# Patient Record
Sex: MALE | Race: WHITE | Employment: OTHER | URBAN - NONMETROPOLITAN AREA
[De-identification: names, ages, dates, MRNs, and addresses within clinical notes are randomized per-mention and may not be internally consistent; named-entity substitution may affect disease eponyms.]

---

## 2017-01-17 ENCOUNTER — ANTI-COAG VISIT (OUTPATIENT)
Dept: OTHER | Age: 77
End: 2017-01-17

## 2017-01-17 VITALS
BODY MASS INDEX: 23.91 KG/M2 | DIASTOLIC BLOOD PRESSURE: 65 MMHG | HEART RATE: 54 BPM | WEIGHT: 171.4 LBS | SYSTOLIC BLOOD PRESSURE: 122 MMHG

## 2017-01-17 DIAGNOSIS — I26.99 OTHER PULMONARY EMBOLISM WITHOUT ACUTE COR PULMONALE, UNSPECIFIED CHRONICITY (HCC): ICD-10-CM

## 2017-01-17 LAB — POC INR: 2.6 (ref 0.8–1.2)

## 2017-01-17 PROCEDURE — G8420 CALC BMI NORM PARAMETERS: HCPCS | Performed by: PHARMACIST

## 2017-01-17 PROCEDURE — 99999 PR OFFICE/OUTPT VISIT,PROCEDURE ONLY: CPT | Performed by: PHARMACIST

## 2017-01-17 PROCEDURE — G8427 DOCREV CUR MEDS BY ELIG CLIN: HCPCS | Performed by: PHARMACIST

## 2017-01-17 PROCEDURE — 4040F PNEUMOC VAC/ADMIN/RCVD: CPT | Performed by: PHARMACIST

## 2017-01-17 PROCEDURE — 85610 PROTHROMBIN TIME: CPT | Performed by: PHARMACIST

## 2017-01-17 ASSESSMENT — ENCOUNTER SYMPTOMS
DIARRHEA: 0
BLOOD IN STOOL: 0
SHORTNESS OF BREATH: 0
CONSTIPATION: 0

## 2017-01-18 ENCOUNTER — TELEPHONE (OUTPATIENT)
Dept: OTHER | Age: 77
End: 2017-01-18

## 2017-01-19 ENCOUNTER — OFFICE VISIT (OUTPATIENT)
Dept: UROLOGY | Age: 77
End: 2017-01-19

## 2017-01-19 ENCOUNTER — TELEPHONE (OUTPATIENT)
Dept: UROLOGY | Age: 77
End: 2017-01-19

## 2017-01-19 VITALS — WEIGHT: 170 LBS | BODY MASS INDEX: 23.8 KG/M2 | HEIGHT: 71 IN

## 2017-01-19 DIAGNOSIS — R31.0 GROSS HEMATURIA: Primary | ICD-10-CM

## 2017-01-19 LAB
BILIRUBIN URINE: NEGATIVE
BLOOD URINE, POC: NORMAL
CHARACTER, URINE: CLEAR
COLOR, URINE: YELLOW
GLUCOSE URINE: NEGATIVE MG/DL
KETONES, URINE: NORMAL
LEUKOCYTE CLUMPS, URINE: NEGATIVE
NITRITE, URINE: NEGATIVE
PH, URINE: 6
PROTEIN, URINE: NORMAL MG/DL
SPECIFIC GRAVITY, URINE: 1.02 (ref 1–1.03)
UROBILINOGEN, URINE: 0.2 EU/DL

## 2017-01-19 PROCEDURE — G8427 DOCREV CUR MEDS BY ELIG CLIN: HCPCS | Performed by: UROLOGY

## 2017-01-19 PROCEDURE — 81003 URINALYSIS AUTO W/O SCOPE: CPT | Performed by: UROLOGY

## 2017-01-19 PROCEDURE — 1036F TOBACCO NON-USER: CPT | Performed by: UROLOGY

## 2017-01-19 PROCEDURE — G8420 CALC BMI NORM PARAMETERS: HCPCS | Performed by: UROLOGY

## 2017-01-19 PROCEDURE — 1123F ACP DISCUSS/DSCN MKR DOCD: CPT | Performed by: UROLOGY

## 2017-01-19 PROCEDURE — G8484 FLU IMMUNIZE NO ADMIN: HCPCS | Performed by: UROLOGY

## 2017-01-19 PROCEDURE — 52000 CYSTOURETHROSCOPY: CPT | Performed by: UROLOGY

## 2017-01-19 PROCEDURE — 4040F PNEUMOC VAC/ADMIN/RCVD: CPT | Performed by: UROLOGY

## 2017-01-19 PROCEDURE — 99203 OFFICE O/P NEW LOW 30 MIN: CPT | Performed by: UROLOGY

## 2017-01-19 RX ORDER — FINASTERIDE 5 MG/1
5 TABLET, FILM COATED ORAL DAILY
Qty: 90 TABLET | Refills: 0 | Status: SHIPPED | OUTPATIENT
Start: 2017-01-19 | End: 2017-03-23 | Stop reason: SDUPTHER

## 2017-01-19 RX ORDER — TAMSULOSIN HYDROCHLORIDE 0.4 MG/1
0.4 CAPSULE ORAL NIGHTLY
Qty: 90 CAPSULE | Refills: 0 | Status: SHIPPED | OUTPATIENT
Start: 2017-01-19 | End: 2017-03-23 | Stop reason: SDUPTHER

## 2017-01-19 ASSESSMENT — ENCOUNTER SYMPTOMS
SHORTNESS OF BREATH: 1
BACK PAIN: 0
EYE REDNESS: 0
NAUSEA: 0
ABDOMINAL PAIN: 0
COLOR CHANGE: 0
CHEST TIGHTNESS: 0
EYE PAIN: 0

## 2017-01-23 ENCOUNTER — TELEPHONE (OUTPATIENT)
Dept: OTHER | Age: 77
End: 2017-01-23

## 2017-02-02 ENCOUNTER — OFFICE VISIT (OUTPATIENT)
Dept: UROLOGY | Age: 77
End: 2017-02-02

## 2017-02-02 ENCOUNTER — TELEPHONE (OUTPATIENT)
Dept: UROLOGY | Age: 77
End: 2017-02-02

## 2017-02-02 VITALS
SYSTOLIC BLOOD PRESSURE: 108 MMHG | BODY MASS INDEX: 23.8 KG/M2 | WEIGHT: 170 LBS | DIASTOLIC BLOOD PRESSURE: 72 MMHG | HEIGHT: 71 IN

## 2017-02-02 DIAGNOSIS — N40.1 BPH WITH OBSTRUCTION/LOWER URINARY TRACT SYMPTOMS: ICD-10-CM

## 2017-02-02 DIAGNOSIS — R31.0 GROSS HEMATURIA: Primary | ICD-10-CM

## 2017-02-02 DIAGNOSIS — N20.0 KIDNEY STONES: ICD-10-CM

## 2017-02-02 DIAGNOSIS — N13.8 BPH WITH OBSTRUCTION/LOWER URINARY TRACT SYMPTOMS: ICD-10-CM

## 2017-02-02 LAB
BILIRUBIN URINE: NEGATIVE
BLOOD URINE, POC: NORMAL
CHARACTER, URINE: CLEAR
COLOR, URINE: YELLOW
GLUCOSE URINE: NEGATIVE MG/DL
KETONES, URINE: NEGATIVE
LEUKOCYTE CLUMPS, URINE: NORMAL
NITRITE, URINE: NEGATIVE
PH, URINE: 5.5
POST VOID RESIDUAL (PVR): 0 ML
PROTEIN, URINE: NEGATIVE MG/DL
SPECIFIC GRAVITY, URINE: 1.02 (ref 1–1.03)
UROBILINOGEN, URINE: 0.2 EU/DL

## 2017-02-02 PROCEDURE — G8427 DOCREV CUR MEDS BY ELIG CLIN: HCPCS | Performed by: UROLOGY

## 2017-02-02 PROCEDURE — 1036F TOBACCO NON-USER: CPT | Performed by: UROLOGY

## 2017-02-02 PROCEDURE — 99213 OFFICE O/P EST LOW 20 MIN: CPT | Performed by: UROLOGY

## 2017-02-02 PROCEDURE — 81003 URINALYSIS AUTO W/O SCOPE: CPT | Performed by: UROLOGY

## 2017-02-02 PROCEDURE — 4040F PNEUMOC VAC/ADMIN/RCVD: CPT | Performed by: UROLOGY

## 2017-02-02 PROCEDURE — G8484 FLU IMMUNIZE NO ADMIN: HCPCS | Performed by: UROLOGY

## 2017-02-02 PROCEDURE — 1123F ACP DISCUSS/DSCN MKR DOCD: CPT | Performed by: UROLOGY

## 2017-02-02 PROCEDURE — G8420 CALC BMI NORM PARAMETERS: HCPCS | Performed by: UROLOGY

## 2017-02-02 PROCEDURE — 51798 US URINE CAPACITY MEASURE: CPT | Performed by: UROLOGY

## 2017-02-14 ENCOUNTER — OFFICE VISIT (OUTPATIENT)
Dept: FAMILY MEDICINE CLINIC | Age: 77
End: 2017-02-14

## 2017-02-14 ENCOUNTER — TELEPHONE (OUTPATIENT)
Dept: OTHER | Age: 77
End: 2017-02-14

## 2017-02-14 ENCOUNTER — TELEPHONE (OUTPATIENT)
Dept: UROLOGY | Age: 77
End: 2017-02-14

## 2017-02-14 VITALS
DIASTOLIC BLOOD PRESSURE: 56 MMHG | BODY MASS INDEX: 24.4 KG/M2 | HEART RATE: 56 BPM | RESPIRATION RATE: 16 BRPM | WEIGHT: 174.25 LBS | SYSTOLIC BLOOD PRESSURE: 108 MMHG | HEIGHT: 71 IN

## 2017-02-14 DIAGNOSIS — J96.11 HYPOXEMIC RESPIRATORY FAILURE, CHRONIC (HCC): ICD-10-CM

## 2017-02-14 DIAGNOSIS — E11.8 TYPE 2 DIABETES MELLITUS WITH COMPLICATION, WITHOUT LONG-TERM CURRENT USE OF INSULIN (HCC): Primary | ICD-10-CM

## 2017-02-14 DIAGNOSIS — Z85.01 HISTORY OF ESOPHAGEAL CANCER: ICD-10-CM

## 2017-02-14 DIAGNOSIS — I10 ESSENTIAL HYPERTENSION: ICD-10-CM

## 2017-02-14 DIAGNOSIS — J44.9 CHRONIC OBSTRUCTIVE PULMONARY DISEASE, UNSPECIFIED COPD TYPE (HCC): ICD-10-CM

## 2017-02-14 DIAGNOSIS — K21.9 GASTROESOPHAGEAL REFLUX DISEASE, ESOPHAGITIS PRESENCE NOT SPECIFIED: ICD-10-CM

## 2017-02-14 DIAGNOSIS — Z85.038 HISTORY OF COLON CANCER: ICD-10-CM

## 2017-02-14 LAB
GLUCOSE BLD-MCNC: 149 MG/DL
HBA1C MFR BLD: 7.2 %

## 2017-02-14 PROCEDURE — 99213 OFFICE O/P EST LOW 20 MIN: CPT | Performed by: EMERGENCY MEDICINE

## 2017-02-14 PROCEDURE — 83036 HEMOGLOBIN GLYCOSYLATED A1C: CPT | Performed by: EMERGENCY MEDICINE

## 2017-02-14 PROCEDURE — 82962 GLUCOSE BLOOD TEST: CPT | Performed by: EMERGENCY MEDICINE

## 2017-02-14 RX ORDER — OMEPRAZOLE 40 MG/1
40 CAPSULE, DELAYED RELEASE ORAL 2 TIMES DAILY
Qty: 180 CAPSULE | Refills: 1 | Status: SHIPPED | OUTPATIENT
Start: 2017-02-14 | End: 2017-08-17 | Stop reason: SDUPTHER

## 2017-02-14 ASSESSMENT — ENCOUNTER SYMPTOMS
SORE THROAT: 0
SINUS PRESSURE: 0
COUGH: 0
DIARRHEA: 0
CHEST TIGHTNESS: 0
CONSTIPATION: 0
WHEEZING: 0
VOICE CHANGE: 0
RHINORRHEA: 0
BACK PAIN: 0
VOMITING: 0
TROUBLE SWALLOWING: 0
ABDOMINAL PAIN: 0
SHORTNESS OF BREATH: 0
NAUSEA: 0

## 2017-02-14 ASSESSMENT — PATIENT HEALTH QUESTIONNAIRE - PHQ9
1. LITTLE INTEREST OR PLEASURE IN DOING THINGS: 0
SUM OF ALL RESPONSES TO PHQ QUESTIONS 1-9: 0
2. FEELING DOWN, DEPRESSED OR HOPELESS: 0
SUM OF ALL RESPONSES TO PHQ9 QUESTIONS 1 & 2: 0

## 2017-02-14 ASSESSMENT — COPD QUESTIONNAIRES: COPD: 1

## 2017-02-15 ENCOUNTER — TELEPHONE (OUTPATIENT)
Dept: UROLOGY | Age: 77
End: 2017-02-15

## 2017-02-24 ENCOUNTER — TELEPHONE (OUTPATIENT)
Dept: UROLOGY | Age: 77
End: 2017-02-24

## 2017-02-24 DIAGNOSIS — I10 ESSENTIAL HYPERTENSION: ICD-10-CM

## 2017-02-24 DIAGNOSIS — N20.0 KIDNEY STONES: Primary | ICD-10-CM

## 2017-02-24 DIAGNOSIS — Z01.818 PRE-OP TESTING: ICD-10-CM

## 2017-02-28 ENCOUNTER — ANTI-COAG VISIT (OUTPATIENT)
Dept: OTHER | Age: 77
End: 2017-02-28

## 2017-02-28 VITALS
DIASTOLIC BLOOD PRESSURE: 65 MMHG | HEART RATE: 58 BPM | BODY MASS INDEX: 24.13 KG/M2 | SYSTOLIC BLOOD PRESSURE: 120 MMHG | WEIGHT: 173 LBS

## 2017-02-28 DIAGNOSIS — I26.99 OTHER PULMONARY EMBOLISM WITHOUT ACUTE COR PULMONALE, UNSPECIFIED CHRONICITY (HCC): ICD-10-CM

## 2017-02-28 LAB — POC INR: 2.2 (ref 0.8–1.2)

## 2017-02-28 PROCEDURE — G8420 CALC BMI NORM PARAMETERS: HCPCS | Performed by: NURSE PRACTITIONER

## 2017-02-28 PROCEDURE — G8484 FLU IMMUNIZE NO ADMIN: HCPCS | Performed by: NURSE PRACTITIONER

## 2017-02-28 PROCEDURE — 99212 OFFICE O/P EST SF 10 MIN: CPT | Performed by: NURSE PRACTITIONER

## 2017-02-28 PROCEDURE — 1036F TOBACCO NON-USER: CPT | Performed by: NURSE PRACTITIONER

## 2017-02-28 PROCEDURE — 4040F PNEUMOC VAC/ADMIN/RCVD: CPT | Performed by: NURSE PRACTITIONER

## 2017-02-28 PROCEDURE — G8427 DOCREV CUR MEDS BY ELIG CLIN: HCPCS | Performed by: NURSE PRACTITIONER

## 2017-02-28 PROCEDURE — 85610 PROTHROMBIN TIME: CPT | Performed by: NURSE PRACTITIONER

## 2017-02-28 PROCEDURE — 1123F ACP DISCUSS/DSCN MKR DOCD: CPT | Performed by: NURSE PRACTITIONER

## 2017-02-28 ASSESSMENT — ENCOUNTER SYMPTOMS
CONSTIPATION: 0
SHORTNESS OF BREATH: 0
DIARRHEA: 0
BLOOD IN STOOL: 0

## 2017-03-01 ENCOUNTER — TELEPHONE (OUTPATIENT)
Dept: UROLOGY | Age: 77
End: 2017-03-01

## 2017-03-15 DIAGNOSIS — I26.99 OTHER PULMONARY EMBOLISM WITHOUT ACUTE COR PULMONALE, UNSPECIFIED CHRONICITY (HCC): Primary | ICD-10-CM

## 2017-03-20 ENCOUNTER — ANTI-COAG VISIT (OUTPATIENT)
Dept: OTHER | Age: 77
End: 2017-03-20

## 2017-03-20 VITALS
HEART RATE: 59 BPM | BODY MASS INDEX: 24.07 KG/M2 | WEIGHT: 172.6 LBS | DIASTOLIC BLOOD PRESSURE: 66 MMHG | SYSTOLIC BLOOD PRESSURE: 113 MMHG

## 2017-03-20 DIAGNOSIS — I26.99 OTHER PULMONARY EMBOLISM WITHOUT ACUTE COR PULMONALE, UNSPECIFIED CHRONICITY (HCC): ICD-10-CM

## 2017-03-20 LAB — POC INR: 1.5 (ref 0.8–1.2)

## 2017-03-20 RX ORDER — WARFARIN SODIUM 5 MG/1
5 TABLET ORAL
COMMUNITY
End: 2017-04-03 | Stop reason: SDUPTHER

## 2017-03-20 ASSESSMENT — ENCOUNTER SYMPTOMS
CONSTIPATION: 0
SHORTNESS OF BREATH: 0
DIARRHEA: 0
BLOOD IN STOOL: 0

## 2017-03-23 ENCOUNTER — OFFICE VISIT (OUTPATIENT)
Dept: UROLOGY | Age: 77
End: 2017-03-23

## 2017-03-23 VITALS
SYSTOLIC BLOOD PRESSURE: 94 MMHG | HEIGHT: 71 IN | DIASTOLIC BLOOD PRESSURE: 58 MMHG | BODY MASS INDEX: 23.8 KG/M2 | WEIGHT: 170 LBS

## 2017-03-23 DIAGNOSIS — N20.0 KIDNEY STONE: Primary | ICD-10-CM

## 2017-03-23 PROCEDURE — 99024 POSTOP FOLLOW-UP VISIT: CPT | Performed by: UROLOGY

## 2017-03-23 RX ORDER — FINASTERIDE 5 MG/1
5 TABLET, FILM COATED ORAL DAILY
Qty: 90 TABLET | Refills: 3 | Status: SHIPPED | OUTPATIENT
Start: 2017-03-23 | End: 2018-03-27 | Stop reason: SDUPTHER

## 2017-03-23 RX ORDER — TAMSULOSIN HYDROCHLORIDE 0.4 MG/1
0.4 CAPSULE ORAL NIGHTLY
Qty: 90 CAPSULE | Refills: 3 | Status: SHIPPED | OUTPATIENT
Start: 2017-03-23 | End: 2018-03-27 | Stop reason: SDUPTHER

## 2017-03-28 LAB — STONE ANALYSIS: NORMAL

## 2017-04-03 ENCOUNTER — ANTI-COAG VISIT (OUTPATIENT)
Dept: OTHER | Age: 77
End: 2017-04-03

## 2017-04-03 VITALS
HEART RATE: 62 BPM | SYSTOLIC BLOOD PRESSURE: 121 MMHG | DIASTOLIC BLOOD PRESSURE: 65 MMHG | WEIGHT: 170.2 LBS | BODY MASS INDEX: 23.74 KG/M2

## 2017-04-03 DIAGNOSIS — I26.99 OTHER PULMONARY EMBOLISM WITHOUT ACUTE COR PULMONALE, UNSPECIFIED CHRONICITY (HCC): ICD-10-CM

## 2017-04-03 LAB — POC INR: 2.2 (ref 0.8–1.2)

## 2017-04-03 RX ORDER — WARFARIN SODIUM 5 MG/1
TABLET ORAL
Qty: 25 TABLET | Refills: 11 | Status: SHIPPED | OUTPATIENT
Start: 2017-04-03 | End: 2018-04-24 | Stop reason: SDUPTHER

## 2017-04-03 ASSESSMENT — ENCOUNTER SYMPTOMS
DIARRHEA: 0
SHORTNESS OF BREATH: 0
CONSTIPATION: 0
BLOOD IN STOOL: 0

## 2017-04-24 ENCOUNTER — ANTI-COAG VISIT (OUTPATIENT)
Dept: OTHER | Age: 77
End: 2017-04-24

## 2017-04-24 VITALS
DIASTOLIC BLOOD PRESSURE: 63 MMHG | SYSTOLIC BLOOD PRESSURE: 112 MMHG | BODY MASS INDEX: 23.85 KG/M2 | HEART RATE: 62 BPM | WEIGHT: 171 LBS

## 2017-04-24 DIAGNOSIS — I26.99 OTHER PULMONARY EMBOLISM WITHOUT ACUTE COR PULMONALE, UNSPECIFIED CHRONICITY (HCC): ICD-10-CM

## 2017-04-24 LAB — POC INR: 1.7 (ref 0.8–1.2)

## 2017-04-24 ASSESSMENT — ENCOUNTER SYMPTOMS
BLOOD IN STOOL: 0
SHORTNESS OF BREATH: 0
CONSTIPATION: 0
DIARRHEA: 0

## 2017-05-15 ENCOUNTER — ANTI-COAG VISIT (OUTPATIENT)
Dept: OTHER | Age: 77
End: 2017-05-15

## 2017-05-15 VITALS
HEART RATE: 57 BPM | BODY MASS INDEX: 23.93 KG/M2 | WEIGHT: 171.6 LBS | SYSTOLIC BLOOD PRESSURE: 99 MMHG | DIASTOLIC BLOOD PRESSURE: 53 MMHG

## 2017-05-15 DIAGNOSIS — I26.99 OTHER PULMONARY EMBOLISM WITHOUT ACUTE COR PULMONALE, UNSPECIFIED CHRONICITY (HCC): ICD-10-CM

## 2017-05-15 LAB — POC INR: 2.3 (ref 0.8–1.2)

## 2017-05-15 ASSESSMENT — ENCOUNTER SYMPTOMS
SHORTNESS OF BREATH: 1
BLOOD IN STOOL: 0
DIARRHEA: 0
CONSTIPATION: 0

## 2017-06-12 ENCOUNTER — ANTI-COAG VISIT (OUTPATIENT)
Dept: OTHER | Age: 77
End: 2017-06-12

## 2017-06-12 VITALS
WEIGHT: 168.8 LBS | DIASTOLIC BLOOD PRESSURE: 61 MMHG | BODY MASS INDEX: 23.54 KG/M2 | HEART RATE: 62 BPM | SYSTOLIC BLOOD PRESSURE: 106 MMHG

## 2017-06-12 DIAGNOSIS — I26.99 OTHER PULMONARY EMBOLISM WITHOUT ACUTE COR PULMONALE, UNSPECIFIED CHRONICITY (HCC): ICD-10-CM

## 2017-06-12 LAB — POC INR: 1.8 (ref 0.8–1.2)

## 2017-06-12 ASSESSMENT — ENCOUNTER SYMPTOMS
DIARRHEA: 0
SHORTNESS OF BREATH: 1
BLOOD IN STOOL: 0
CONSTIPATION: 0

## 2017-06-22 ENCOUNTER — OFFICE VISIT (OUTPATIENT)
Dept: FAMILY MEDICINE CLINIC | Age: 77
End: 2017-06-22

## 2017-06-22 VITALS
SYSTOLIC BLOOD PRESSURE: 102 MMHG | DIASTOLIC BLOOD PRESSURE: 60 MMHG | WEIGHT: 171.6 LBS | HEIGHT: 71 IN | BODY MASS INDEX: 24.02 KG/M2 | RESPIRATION RATE: 16 BRPM | HEART RATE: 76 BPM

## 2017-06-22 DIAGNOSIS — J44.9 COPD, SEVERE (HCC): ICD-10-CM

## 2017-06-22 DIAGNOSIS — Z85.038 HISTORY OF COLON CANCER: ICD-10-CM

## 2017-06-22 DIAGNOSIS — E11.8 TYPE 2 DIABETES MELLITUS WITH COMPLICATION, WITHOUT LONG-TERM CURRENT USE OF INSULIN (HCC): Primary | ICD-10-CM

## 2017-06-22 DIAGNOSIS — Z85.01 HISTORY OF ESOPHAGEAL CANCER: ICD-10-CM

## 2017-06-22 DIAGNOSIS — K21.9 GASTROESOPHAGEAL REFLUX DISEASE, ESOPHAGITIS PRESENCE NOT SPECIFIED: ICD-10-CM

## 2017-06-22 LAB
GLUCOSE BLD-MCNC: 160 MG/DL
HBA1C MFR BLD: 7 %

## 2017-06-22 PROCEDURE — 99213 OFFICE O/P EST LOW 20 MIN: CPT | Performed by: EMERGENCY MEDICINE

## 2017-06-22 PROCEDURE — 82962 GLUCOSE BLOOD TEST: CPT | Performed by: EMERGENCY MEDICINE

## 2017-06-22 PROCEDURE — 83036 HEMOGLOBIN GLYCOSYLATED A1C: CPT | Performed by: EMERGENCY MEDICINE

## 2017-06-22 RX ORDER — CLOPIDOGREL BISULFATE 75 MG/1
TABLET ORAL
Refills: 0 | COMMUNITY
Start: 2017-05-21 | End: 2020-01-01

## 2017-06-22 ASSESSMENT — ENCOUNTER SYMPTOMS
VOICE CHANGE: 0
WHEEZING: 0
SINUS PRESSURE: 0
VOMITING: 0
ABDOMINAL PAIN: 0
SORE THROAT: 0
CONSTIPATION: 0
NAUSEA: 0
COUGH: 0
TROUBLE SWALLOWING: 0
SHORTNESS OF BREATH: 0
DIARRHEA: 0
BACK PAIN: 0
CHEST TIGHTNESS: 0
RHINORRHEA: 0

## 2017-07-10 ENCOUNTER — ANTI-COAG VISIT (OUTPATIENT)
Dept: OTHER | Age: 77
End: 2017-07-10

## 2017-07-10 VITALS
DIASTOLIC BLOOD PRESSURE: 66 MMHG | BODY MASS INDEX: 24.27 KG/M2 | WEIGHT: 174 LBS | HEART RATE: 61 BPM | SYSTOLIC BLOOD PRESSURE: 119 MMHG

## 2017-07-10 DIAGNOSIS — I26.99 OTHER PULMONARY EMBOLISM WITHOUT ACUTE COR PULMONALE, UNSPECIFIED CHRONICITY (HCC): ICD-10-CM

## 2017-07-10 LAB — POC INR: 1.7 (ref 0.8–1.2)

## 2017-07-10 ASSESSMENT — ENCOUNTER SYMPTOMS
CONSTIPATION: 0
DIARRHEA: 0
BLOOD IN STOOL: 0
SHORTNESS OF BREATH: 1

## 2017-07-11 ENCOUNTER — OFFICE VISIT (OUTPATIENT)
Dept: PULMONOLOGY | Age: 77
End: 2017-07-11

## 2017-07-11 VITALS
HEART RATE: 58 BPM | TEMPERATURE: 98.2 F | HEIGHT: 71 IN | DIASTOLIC BLOOD PRESSURE: 68 MMHG | SYSTOLIC BLOOD PRESSURE: 126 MMHG | WEIGHT: 174.6 LBS | BODY MASS INDEX: 24.44 KG/M2 | OXYGEN SATURATION: 97 %

## 2017-07-11 DIAGNOSIS — Z87.891 HISTORY OF TOBACCO USE: ICD-10-CM

## 2017-07-11 DIAGNOSIS — J44.9 COPD, SEVERE (HCC): ICD-10-CM

## 2017-07-11 DIAGNOSIS — R09.02 HYPOXIA: ICD-10-CM

## 2017-07-11 DIAGNOSIS — J96.11 CHRONIC RESPIRATORY FAILURE WITH HYPOXIA (HCC): ICD-10-CM

## 2017-07-11 DIAGNOSIS — R94.2 DIFFUSION CAPACITY OF LUNG (DL), DECREASED: ICD-10-CM

## 2017-07-11 DIAGNOSIS — J44.9 MODERATE COPD (CHRONIC OBSTRUCTIVE PULMONARY DISEASE) (HCC): ICD-10-CM

## 2017-07-11 DIAGNOSIS — J98.4 RESTRICTIVE LUNG DISEASE: ICD-10-CM

## 2017-07-11 PROCEDURE — G8420 CALC BMI NORM PARAMETERS: HCPCS | Performed by: INTERNAL MEDICINE

## 2017-07-11 PROCEDURE — G8926 SPIRO NO PERF OR DOC: HCPCS | Performed by: INTERNAL MEDICINE

## 2017-07-11 PROCEDURE — 4040F PNEUMOC VAC/ADMIN/RCVD: CPT | Performed by: INTERNAL MEDICINE

## 2017-07-11 PROCEDURE — 3023F SPIROM DOC REV: CPT | Performed by: INTERNAL MEDICINE

## 2017-07-11 PROCEDURE — 1123F ACP DISCUSS/DSCN MKR DOCD: CPT | Performed by: INTERNAL MEDICINE

## 2017-07-11 PROCEDURE — 1036F TOBACCO NON-USER: CPT | Performed by: INTERNAL MEDICINE

## 2017-07-11 PROCEDURE — G8427 DOCREV CUR MEDS BY ELIG CLIN: HCPCS | Performed by: INTERNAL MEDICINE

## 2017-07-11 PROCEDURE — 99214 OFFICE O/P EST MOD 30 MIN: CPT | Performed by: INTERNAL MEDICINE

## 2017-07-11 RX ORDER — IPRATROPIUM BROMIDE AND ALBUTEROL SULFATE 2.5; .5 MG/3ML; MG/3ML
3 SOLUTION RESPIRATORY (INHALATION) EVERY 6 HOURS PRN
Qty: 360 ML | Refills: 6 | Status: SHIPPED | OUTPATIENT
Start: 2017-07-11 | End: 2018-09-05 | Stop reason: SDUPTHER

## 2017-07-11 RX ORDER — BUDESONIDE 0.5 MG/2ML
1 INHALANT ORAL 2 TIMES DAILY
Qty: 60 AMPULE | Refills: 11 | Status: SHIPPED | OUTPATIENT
Start: 2017-07-11 | End: 2018-05-15 | Stop reason: SDUPTHER

## 2017-07-11 RX ORDER — ARFORMOTEROL TARTRATE 15 UG/2ML
15 SOLUTION RESPIRATORY (INHALATION) 2 TIMES DAILY
Qty: 120 ML | Refills: 11 | Status: SHIPPED | OUTPATIENT
Start: 2017-07-11 | End: 2018-05-08 | Stop reason: SDUPTHER

## 2017-07-14 ENCOUNTER — TELEPHONE (OUTPATIENT)
Dept: PULMONOLOGY | Age: 77
End: 2017-07-14

## 2017-07-24 ENCOUNTER — HOSPITAL ENCOUNTER (OUTPATIENT)
Dept: PHARMACY | Age: 77
Setting detail: THERAPIES SERIES
Discharge: HOME | End: 2017-07-24
Payer: MEDICARE

## 2017-07-24 VITALS
SYSTOLIC BLOOD PRESSURE: 108 MMHG | DIASTOLIC BLOOD PRESSURE: 63 MMHG | BODY MASS INDEX: 23.71 KG/M2 | WEIGHT: 170 LBS | HEART RATE: 58 BPM

## 2017-07-24 DIAGNOSIS — I26.99 OTHER PULMONARY EMBOLISM WITHOUT ACUTE COR PULMONALE, UNSPECIFIED CHRONICITY (HCC): ICD-10-CM

## 2017-07-24 LAB — POC INR: 2 (ref 0.8–1.2)

## 2017-07-24 PROCEDURE — 85610 PROTHROMBIN TIME: CPT

## 2017-07-24 PROCEDURE — 36416 COLLJ CAPILLARY BLOOD SPEC: CPT

## 2017-07-24 PROCEDURE — 99211 OFF/OP EST MAY X REQ PHY/QHP: CPT

## 2017-07-24 ASSESSMENT — ENCOUNTER SYMPTOMS
BLOOD IN STOOL: 0
DIARRHEA: 0
CONSTIPATION: 0

## 2017-08-02 RX ORDER — FUROSEMIDE 40 MG/1
TABLET ORAL
Qty: 30 TABLET | Refills: 0 | Status: SHIPPED | OUTPATIENT
Start: 2017-08-02 | End: 2017-09-26 | Stop reason: SDUPTHER

## 2017-08-04 LAB — POC INR: 1.8 (ref 0.8–1.2)

## 2017-08-07 ENCOUNTER — HOSPITAL ENCOUNTER (OUTPATIENT)
Dept: PHARMACY | Age: 77
Setting detail: THERAPIES SERIES
Discharge: HOME | End: 2017-08-07
Payer: MEDICARE

## 2017-08-07 VITALS
BODY MASS INDEX: 24.04 KG/M2 | WEIGHT: 172.4 LBS | HEART RATE: 57 BPM | DIASTOLIC BLOOD PRESSURE: 66 MMHG | SYSTOLIC BLOOD PRESSURE: 113 MMHG

## 2017-08-07 DIAGNOSIS — I26.99 OTHER PULMONARY EMBOLISM WITHOUT ACUTE COR PULMONALE, UNSPECIFIED CHRONICITY (HCC): ICD-10-CM

## 2017-08-07 LAB — INTERNATIONAL NORMALIZATION RATIO, POC: 1.8

## 2017-08-07 PROCEDURE — 99211 OFF/OP EST MAY X REQ PHY/QHP: CPT

## 2017-08-07 PROCEDURE — 36416 COLLJ CAPILLARY BLOOD SPEC: CPT

## 2017-08-07 PROCEDURE — 85610 PROTHROMBIN TIME: CPT

## 2017-08-15 RX ORDER — ATENOLOL 100 MG/1
TABLET ORAL
Qty: 90 TABLET | Refills: 1 | Status: SHIPPED | OUTPATIENT
Start: 2017-08-15 | End: 2018-02-14 | Stop reason: SDUPTHER

## 2017-08-18 RX ORDER — OMEPRAZOLE 40 MG/1
CAPSULE, DELAYED RELEASE ORAL
Qty: 180 CAPSULE | Refills: 1 | Status: SHIPPED | OUTPATIENT
Start: 2017-08-18 | End: 2018-03-13 | Stop reason: SDUPTHER

## 2017-08-21 ENCOUNTER — HOSPITAL ENCOUNTER (OUTPATIENT)
Dept: PHARMACY | Age: 77
Setting detail: THERAPIES SERIES
Discharge: HOME | End: 2017-08-21
Payer: MEDICARE

## 2017-08-21 VITALS
HEART RATE: 59 BPM | WEIGHT: 168.2 LBS | DIASTOLIC BLOOD PRESSURE: 61 MMHG | SYSTOLIC BLOOD PRESSURE: 109 MMHG | BODY MASS INDEX: 23.46 KG/M2

## 2017-08-21 DIAGNOSIS — I26.99 OTHER PULMONARY EMBOLISM WITHOUT ACUTE COR PULMONALE, UNSPECIFIED CHRONICITY (HCC): ICD-10-CM

## 2017-08-21 LAB — POC INR: 3.1 (ref 0.8–1.2)

## 2017-08-21 PROCEDURE — 36416 COLLJ CAPILLARY BLOOD SPEC: CPT

## 2017-08-21 PROCEDURE — 99211 OFF/OP EST MAY X REQ PHY/QHP: CPT

## 2017-08-21 PROCEDURE — 85610 PROTHROMBIN TIME: CPT

## 2017-08-21 ASSESSMENT — ENCOUNTER SYMPTOMS
BLOOD IN STOOL: 0
DIARRHEA: 0
SHORTNESS OF BREATH: 0
CONSTIPATION: 0

## 2017-09-05 ENCOUNTER — HOSPITAL ENCOUNTER (OUTPATIENT)
Dept: PHARMACY | Age: 77
Setting detail: THERAPIES SERIES
Discharge: HOME | End: 2017-09-05
Payer: MEDICARE

## 2017-09-05 VITALS
HEART RATE: 61 BPM | WEIGHT: 171 LBS | SYSTOLIC BLOOD PRESSURE: 114 MMHG | DIASTOLIC BLOOD PRESSURE: 64 MMHG | BODY MASS INDEX: 23.85 KG/M2

## 2017-09-05 DIAGNOSIS — I26.99 OTHER PULMONARY EMBOLISM WITHOUT ACUTE COR PULMONALE, UNSPECIFIED CHRONICITY (HCC): ICD-10-CM

## 2017-09-05 LAB — POC INR: 2.2 (ref 0.8–1.2)

## 2017-09-05 PROCEDURE — 85610 PROTHROMBIN TIME: CPT

## 2017-09-05 PROCEDURE — 36416 COLLJ CAPILLARY BLOOD SPEC: CPT

## 2017-09-05 PROCEDURE — 99211 OFF/OP EST MAY X REQ PHY/QHP: CPT

## 2017-09-22 ENCOUNTER — HOSPITAL ENCOUNTER (OUTPATIENT)
Age: 77
Discharge: HOME | End: 2017-09-22
Payer: MEDICARE

## 2017-09-22 LAB
ALBUMIN SERPL-MCNC: 3.8 G/DL (ref 3.5–5.1)
ALP BLD-CCNC: 71 U/L (ref 38–126)
ALT SERPL-CCNC: 11 U/L (ref 11–66)
ANION GAP SERPL CALCULATED.3IONS-SCNC: 13 MEQ/L (ref 8–16)
AST SERPL-CCNC: 20 U/L (ref 5–40)
BILIRUB SERPL-MCNC: 0.5 MG/DL (ref 0.3–1.2)
BILIRUBIN DIRECT: < 0.2 MG/DL (ref 0–0.3)
BUN BLDV-MCNC: 22 MG/DL (ref 7–22)
CALCIUM SERPL-MCNC: 9.2 MG/DL (ref 8.5–10.5)
CHLORIDE BLD-SCNC: 100 MEQ/L (ref 98–111)
CHOLESTEROL, TOTAL: 166 MG/DL (ref 100–199)
CO2: 30 MEQ/L (ref 23–33)
CREAT SERPL-MCNC: 1.2 MG/DL (ref 0.4–1.2)
GFR SERPL CREATININE-BSD FRML MDRD: 59 ML/MIN/1.73M2
GLUCOSE BLD-MCNC: 136 MG/DL (ref 70–108)
HDLC SERPL-MCNC: 57 MG/DL
LDL CHOLESTEROL CALCULATED: 91 MG/DL
POTASSIUM SERPL-SCNC: 3.8 MEQ/L (ref 3.5–5.2)
SODIUM BLD-SCNC: 143 MEQ/L (ref 135–145)
TOTAL PROTEIN: 7.2 G/DL (ref 6.1–8)
TRIGL SERPL-MCNC: 90 MG/DL (ref 0–199)

## 2017-09-22 PROCEDURE — 80061 LIPID PANEL: CPT

## 2017-09-22 PROCEDURE — 80053 COMPREHEN METABOLIC PANEL: CPT

## 2017-09-22 PROCEDURE — 82248 BILIRUBIN DIRECT: CPT

## 2017-09-22 PROCEDURE — 36415 COLL VENOUS BLD VENIPUNCTURE: CPT

## 2017-09-26 ENCOUNTER — OFFICE VISIT (OUTPATIENT)
Dept: FAMILY MEDICINE CLINIC | Age: 77
End: 2017-09-26

## 2017-09-26 ENCOUNTER — HOSPITAL ENCOUNTER (OUTPATIENT)
Dept: PHARMACY | Age: 77
Setting detail: THERAPIES SERIES
Discharge: HOME | End: 2017-09-26
Payer: MEDICARE

## 2017-09-26 VITALS
WEIGHT: 166.2 LBS | BODY MASS INDEX: 23.18 KG/M2 | SYSTOLIC BLOOD PRESSURE: 119 MMHG | DIASTOLIC BLOOD PRESSURE: 65 MMHG | HEART RATE: 59 BPM

## 2017-09-26 VITALS
SYSTOLIC BLOOD PRESSURE: 118 MMHG | WEIGHT: 167.4 LBS | HEART RATE: 68 BPM | DIASTOLIC BLOOD PRESSURE: 64 MMHG | BODY MASS INDEX: 23.44 KG/M2 | RESPIRATION RATE: 14 BRPM | HEIGHT: 71 IN

## 2017-09-26 DIAGNOSIS — K21.9 GASTROESOPHAGEAL REFLUX DISEASE, ESOPHAGITIS PRESENCE NOT SPECIFIED: ICD-10-CM

## 2017-09-26 DIAGNOSIS — I10 ESSENTIAL HYPERTENSION: ICD-10-CM

## 2017-09-26 DIAGNOSIS — I26.99 OTHER PULMONARY EMBOLISM WITHOUT ACUTE COR PULMONALE, UNSPECIFIED CHRONICITY (HCC): ICD-10-CM

## 2017-09-26 DIAGNOSIS — E11.8 TYPE 2 DIABETES MELLITUS WITH COMPLICATION, WITHOUT LONG-TERM CURRENT USE OF INSULIN (HCC): Primary | ICD-10-CM

## 2017-09-26 LAB
GLUCOSE BLD-MCNC: 211 MG/DL
HBA1C MFR BLD: 6.6 %
HCT VFR BLD CALC: 43.1 % (ref 42–52)
HEMOGLOBIN: 14.6 GM/DL (ref 14–18)
POC INR: 3.6 (ref 0.8–1.2)

## 2017-09-26 PROCEDURE — 99211 OFF/OP EST MAY X REQ PHY/QHP: CPT | Performed by: PHARMACIST

## 2017-09-26 PROCEDURE — 99213 OFFICE O/P EST LOW 20 MIN: CPT | Performed by: EMERGENCY MEDICINE

## 2017-09-26 PROCEDURE — G0008 ADMIN INFLUENZA VIRUS VAC: HCPCS | Performed by: EMERGENCY MEDICINE

## 2017-09-26 PROCEDURE — 36416 COLLJ CAPILLARY BLOOD SPEC: CPT | Performed by: PHARMACIST

## 2017-09-26 PROCEDURE — 85610 PROTHROMBIN TIME: CPT | Performed by: PHARMACIST

## 2017-09-26 PROCEDURE — 82962 GLUCOSE BLOOD TEST: CPT | Performed by: EMERGENCY MEDICINE

## 2017-09-26 PROCEDURE — 83036 HEMOGLOBIN GLYCOSYLATED A1C: CPT | Performed by: EMERGENCY MEDICINE

## 2017-09-26 RX ORDER — FUROSEMIDE 40 MG/1
40 TABLET ORAL DAILY
Qty: 90 TABLET | Refills: 1 | Status: SHIPPED | OUTPATIENT
Start: 2017-09-26 | End: 2018-03-28 | Stop reason: SDUPTHER

## 2017-09-26 ASSESSMENT — ENCOUNTER SYMPTOMS
VOICE CHANGE: 0
ABDOMINAL PAIN: 0
CONSTIPATION: 0
SORE THROAT: 0
COUGH: 0
BACK PAIN: 0
VOMITING: 0
RHINORRHEA: 0
NAUSEA: 0
CHEST TIGHTNESS: 0
TROUBLE SWALLOWING: 0
SINUS PRESSURE: 0
WHEEZING: 0
SHORTNESS OF BREATH: 0
DIARRHEA: 0

## 2017-09-26 ASSESSMENT — PATIENT HEALTH QUESTIONNAIRE - PHQ9
SUM OF ALL RESPONSES TO PHQ QUESTIONS 1-9: 0
2. FEELING DOWN, DEPRESSED OR HOPELESS: 0
1. LITTLE INTEREST OR PLEASURE IN DOING THINGS: 0
SUM OF ALL RESPONSES TO PHQ9 QUESTIONS 1 & 2: 0

## 2017-09-26 ASSESSMENT — COPD QUESTIONNAIRES: COPD: 1

## 2017-10-10 ENCOUNTER — HOSPITAL ENCOUNTER (OUTPATIENT)
Dept: PHARMACY | Age: 77
Setting detail: THERAPIES SERIES
Discharge: HOME | End: 2017-10-10
Payer: MEDICARE

## 2017-10-10 VITALS
WEIGHT: 167.6 LBS | BODY MASS INDEX: 23.38 KG/M2 | HEART RATE: 82 BPM | SYSTOLIC BLOOD PRESSURE: 101 MMHG | DIASTOLIC BLOOD PRESSURE: 65 MMHG

## 2017-10-10 DIAGNOSIS — I26.99 OTHER PULMONARY EMBOLISM WITHOUT ACUTE COR PULMONALE, UNSPECIFIED CHRONICITY (HCC): ICD-10-CM

## 2017-10-10 LAB — POC INR: 2 (ref 0.8–1.2)

## 2017-10-10 PROCEDURE — 36416 COLLJ CAPILLARY BLOOD SPEC: CPT | Performed by: PHARMACIST

## 2017-10-10 PROCEDURE — 85610 PROTHROMBIN TIME: CPT | Performed by: PHARMACIST

## 2017-10-10 PROCEDURE — 99211 OFF/OP EST MAY X REQ PHY/QHP: CPT | Performed by: PHARMACIST

## 2017-10-10 NOTE — PROGRESS NOTES
The Medication Management [facility]  Anticoagulation Clinic  [phone] (phone)                      [phone] (fax)    Visit Date: 10/10/2017     Subjective:       Patient ID: Danny White, 67 y.o., male    Patient presents for 3 week INR check. Patient in for anticoagulation management due to Pulmonary embolism with a goal INR of 2.0-3.0. INR ordered and reviewed today. Patient reports the following:    Medication changes: No  Tablet strength per patient: 5mg  Patient reported dosing regimen over last 1 week: 5mg MWF, 2.5mg all other days  Missed doses in the last 1-2 weeks: No  Extra doses in the last 1-2 weeks: No  Any problems with bleeding/bruising? Nothing unusual (easily bruised at baseline)  Any recent falls? No   Any signs or symptoms of DVT/PE or stroke? No  Alcohol use: No  Tobacco use: No  Diet changes as follows: No changes   Green leafy intake: No   Grapefruit juice: No   Upcoming surgeries or procedures: No  Appointment preference: AM late morning 11:00am      Review of Systems   Constitutional: Negative for activity change, appetite change and fatigue. HENT: Had one nosebleed lasting 30 minutes last week. Respiratory: Negative for shortness of breath. Cardiovascular: Negative for chest pain and leg swelling. Gastrointestinal: Negative for blood in stool, constipation and diarrhea. Genitourinary: Negative for hematuria. Neurological: Negative for weakness, light-headedness and headaches. Hematological: Bruise/bleed easily at baseline.      Objective:   Physical Exam    Assessment:      Lab Results   Component Value Date    INR 2.00 (H) 10/10/2017    INR 3.60 (H) 09/26/2017    INR 2.20 (H) 09/05/2017    PROTIME 1.89 (H) 09/03/2011    PROTIME 2.20 (H) 09/02/2011     INR supratherapeutic   Recent Labs      10/10/17   1118   INR  2.00*                           Plan:     Hold today and then continue Coumadin 5mg MWF, 2.5mg TTHSS. Recheck INR 3 weeks.

## 2017-10-31 ENCOUNTER — HOSPITAL ENCOUNTER (OUTPATIENT)
Dept: PHARMACY | Age: 77
Setting detail: THERAPIES SERIES
Discharge: HOME | End: 2017-10-31
Payer: MEDICARE

## 2017-10-31 VITALS
HEART RATE: 71 BPM | WEIGHT: 169.4 LBS | BODY MASS INDEX: 23.63 KG/M2 | DIASTOLIC BLOOD PRESSURE: 65 MMHG | SYSTOLIC BLOOD PRESSURE: 129 MMHG

## 2017-10-31 DIAGNOSIS — I26.99 OTHER PULMONARY EMBOLISM WITHOUT ACUTE COR PULMONALE, UNSPECIFIED CHRONICITY (HCC): ICD-10-CM

## 2017-10-31 LAB — POC INR: 2.3 (ref 0.8–1.2)

## 2017-10-31 PROCEDURE — 85610 PROTHROMBIN TIME: CPT | Performed by: PHARMACIST

## 2017-10-31 PROCEDURE — 36416 COLLJ CAPILLARY BLOOD SPEC: CPT | Performed by: PHARMACIST

## 2017-10-31 PROCEDURE — 99211 OFF/OP EST MAY X REQ PHY/QHP: CPT | Performed by: PHARMACIST

## 2017-11-27 ENCOUNTER — HOSPITAL ENCOUNTER (OUTPATIENT)
Dept: PHARMACY | Age: 77
Setting detail: THERAPIES SERIES
Discharge: HOME | End: 2017-11-27
Payer: MEDICARE

## 2017-11-27 VITALS
WEIGHT: 175.2 LBS | SYSTOLIC BLOOD PRESSURE: 135 MMHG | HEART RATE: 62 BPM | BODY MASS INDEX: 24.44 KG/M2 | DIASTOLIC BLOOD PRESSURE: 69 MMHG

## 2017-11-27 DIAGNOSIS — I26.99 OTHER PULMONARY EMBOLISM WITHOUT ACUTE COR PULMONALE, UNSPECIFIED CHRONICITY (HCC): ICD-10-CM

## 2017-11-27 DIAGNOSIS — Z86.718 PERSONAL HISTORY OF DVT (DEEP VEIN THROMBOSIS): Primary | ICD-10-CM

## 2017-11-27 LAB
HCT VFR BLD CALC: 39.3 % (ref 42–52)
HEMOGLOBIN: 12.8 GM/DL (ref 14–18)
INR BLD: 7.66 (ref 0.85–1.13)

## 2017-11-27 PROCEDURE — 36415 COLL VENOUS BLD VENIPUNCTURE: CPT

## 2017-11-27 PROCEDURE — 36416 COLLJ CAPILLARY BLOOD SPEC: CPT

## 2017-11-27 PROCEDURE — 99211 OFF/OP EST MAY X REQ PHY/QHP: CPT

## 2017-11-27 PROCEDURE — 85610 PROTHROMBIN TIME: CPT

## 2017-11-27 RX ORDER — METHYLPREDNISOLONE 4 MG/1
TABLET ORAL
Refills: 0 | COMMUNITY
Start: 2017-11-25 | End: 2017-12-13 | Stop reason: ALTCHOICE

## 2017-11-27 NOTE — PROGRESS NOTES
The Medication Management [facility]  Anticoagulation Clinic  [phone] (phone)                      [phone] (fax)    Visit Date: 11/27/2017     Subjective:       Patient ID: Cain Stone, 65 y.o., male    Patient presents for 4 week INR check. Patient in for anticoagulation management due to Pulmonary embolism with a goal INR of 2.0-3.0. INR ordered and reviewed today. Patient reports the following:    Medication changes: Patient states he started a Medrol dose pack 2 days ago  Tablet strength per patient: 5 mg   Patient reported dosing regimen over last 1 week: 5 mg MWF, 2.5 mg STTHS  Missed doses in the last 1-2 weeks: no  Extra doses in the last 1-2 weeks: no  Any problems with bleeding/bruising? Yes, bloody nose yesterday (~10-15 minutes)  Any recent falls? No   Any signs or symptoms of DVT/PE or stroke? Yes, swelling in legs (gout in knee)   Alcohol use: no change  Tobacco use: no change  Diet changes as follows: No changes  Upcoming surgeries or procedures: no  Appointment preference: AM - Monday's around 11     Review of Systems   Constitutional: Negative for activity change, appetite change and fatigue. HENT: Had one nosebleed yesterday. Respiratory: Negative for shortness of breath. Cardiovascular: Negative for chest pain and leg swelling. Gastrointestinal: Negative for blood in stool, constipation and diarrhea. Genitourinary: Negative for hematuria. Neurological: Negative for weakness, light-headedness and headaches. Hematological: Bruise/bleed easily at baseline.      Objective:   Physical Exam    Assessment:     Lab Results   Component Value Date    INR 7.66 (HH) 11/27/2017    INR 2.30 (H) 10/31/2017    INR 2.00 (H) 10/10/2017    PROTIME 1.89 (H) 09/03/2011    PROTIME 2.20 (H) 09/02/2011     INR supratherapeutic   Recent Labs      11/27/17   1116   INR  7.66*            Plan:     H&H reviewed. Hold Coumadin 11/27/17 and 11/28/17. Recheck INR 2 days. Patient reminded to call the Anticoagulation Clinic with any signs or symptoms of bleeding or with any medication changes. Patient given instructions utilizing the teach back method. Assessment and plan review with Preet Villagomez PharmD    Discharged ambulatory in no apparent distress.

## 2017-11-29 ENCOUNTER — HOSPITAL ENCOUNTER (OUTPATIENT)
Dept: PHARMACY | Age: 77
Setting detail: THERAPIES SERIES
Discharge: HOME | End: 2017-11-29
Payer: MEDICARE

## 2017-11-29 DIAGNOSIS — I26.99 OTHER PULMONARY EMBOLISM WITHOUT ACUTE COR PULMONALE, UNSPECIFIED CHRONICITY (HCC): ICD-10-CM

## 2017-11-29 LAB — POC INR: 3 (ref 0.8–1.2)

## 2017-11-29 PROCEDURE — 85610 PROTHROMBIN TIME: CPT

## 2017-11-29 PROCEDURE — 99211 OFF/OP EST MAY X REQ PHY/QHP: CPT

## 2017-11-29 PROCEDURE — 36416 COLLJ CAPILLARY BLOOD SPEC: CPT

## 2017-11-29 NOTE — PROGRESS NOTES
The Medication Management [facility]  Anticoagulation Clinic  [phone] (phone)                      [phone] (fax)    Visit Date: 11/29/2017     Subjective:       Patient ID: Simba Florian, 65 y.o., male     Patient in for Warfarin management due to Pulmonary embolism , per Dr. Nabeel Conley referral (2 day visit). Correctly states dose/tablet strength. Has had no Coumadin in past 2 days, as ordered. Will take last dose of Medrol Dosepak tomorrow night (gout). Review of Systems   Constitutional: Negative for activity change, appetite change and fatigue. HENT: No nosebleeds. Respiratory: usual shortness of breath. Cardiovascular: Negative for chest pain. Has some swelling of left leg - gout in that knee. Gastrointestinal: Negative for blood in stool, constipation and diarrhea. Genitourinary: Negative for hematuria. Neurological: Negative for weakness, light-headedness and headaches. Hematological: Bruise/bleed easily at baseline.      Objective:   Physical Exam  Alert and oriented. Respirations unlabored. Noted several small purple bruises on hands. Assessment:     Lab Results   Component Value Date    INR 3.00 (H) 11/29/2017    INR 7.66 (HH) 11/27/2017    INR 2.30 (H) 10/31/2017    PROTIME 1.89 (H) 09/03/2011    PROTIME 2.20 (H) 09/02/2011     INR therapeutic  - goal 2-3. Recent Labs      11/29/17   1047   INR  3.00*     Recent Labs      11/27/17   1116   INR  7.66*            Plan:   POCT INR ordered/performed/reviewed. 2.5 mg today, W, then continue Coumadin 5 mg MWF, 2.5 mg TThSS PO. Recheck INR 2 weeks. (Report given - orders entered by SALAZAR Lucero, PharmD.)  Patient reminded to call the Anticoagulation Clinic with any signs or symptoms of bleeding or with any medication changes. Patient given instructions utilizing the teach back method. Discharged ambulatory in no apparent distress, with cane and wife.

## 2017-11-30 ENCOUNTER — TELEPHONE (OUTPATIENT)
Dept: PHARMACY | Age: 77
End: 2017-11-30

## 2017-12-13 ENCOUNTER — HOSPITAL ENCOUNTER (OUTPATIENT)
Dept: PHARMACY | Age: 77
Setting detail: THERAPIES SERIES
Discharge: HOME | End: 2017-12-13
Payer: MEDICARE

## 2017-12-13 DIAGNOSIS — I26.99 OTHER PULMONARY EMBOLISM WITHOUT ACUTE COR PULMONALE, UNSPECIFIED CHRONICITY (HCC): ICD-10-CM

## 2017-12-13 LAB — POC INR: 3.1 (ref 0.8–1.2)

## 2017-12-13 PROCEDURE — 36416 COLLJ CAPILLARY BLOOD SPEC: CPT

## 2017-12-13 PROCEDURE — 99211 OFF/OP EST MAY X REQ PHY/QHP: CPT

## 2017-12-13 PROCEDURE — 85610 PROTHROMBIN TIME: CPT

## 2017-12-13 NOTE — PROGRESS NOTES
The Medication Management [facility]  Anticoagulation Clinic  [phone] (phone)                      [phone] (fax)    Visit Date: 12/13/2017     Subjective:       Patient ID: Kraig Rivero, 65 y.o., male     Patient in for Warfarin management due to Pulmonary embolism , per Dr. Bess Schafer referral (2 week visit). Correctly states dose/tablet strength. Patient called clinic 11/30 about being put on another  Medrol Dosepak - finished about 5 days ago. Was instructed to take 2.5 mg Coumadin daily for 1 week, than back to usual dose. Being seen at [facility] today regarding left knee gout. Review of Systems   Constitutional: Negative for activity change, appetite change and fatigue. HENT: No nosebleeds. Respiratory: usual shortness of breath. Cardiovascular: Negative for chest pain. Has some swelling of left leg/knee - gout in that knee. Gastrointestinal: Negative for blood in stool, constipation and diarrhea. Genitourinary: Negative for hematuria. Neurological: Negative for weakness, light-headedness and headaches. Hematological: Bruise/bleed easily at baseline.      Objective:   Physical Exam  Alert and oriented. Respirations unlabored. Noted several small purple bruises on hands. Skin warm/dry. Behavior normal.  Assessment:     Lab Results   Component Value Date    INR 3.10 (H) 12/13/2017    INR 3.00 (H) 11/29/2017    INR 7.66 (HH) 11/27/2017    PROTIME 1.89 (H) 09/03/2011    PROTIME 2.20 (H) 09/02/2011     INR supratherapeutic - goal 2-3. Recent Labs      12/13/17   1045   INR  3.10*            Plan:   POCT INR ordered/performed/reviewed. 2.5 mg today, W, then continue Coumadin 5 mg MWF, 2.5 mg TThSS PO. Recheck INR 3 weeks. (Report given - orders received from/verified with SALAZAR Irving, PharmD.)  Patient reminded to call the Anticoagulation Clinic with any signs or symptoms of bleeding or with any medication changes.   Patient given instructions

## 2018-01-02 ENCOUNTER — OFFICE VISIT (OUTPATIENT)
Dept: FAMILY MEDICINE CLINIC | Age: 78
End: 2018-01-02

## 2018-01-02 VITALS
BODY MASS INDEX: 24.39 KG/M2 | DIASTOLIC BLOOD PRESSURE: 68 MMHG | SYSTOLIC BLOOD PRESSURE: 120 MMHG | WEIGHT: 174.2 LBS | HEART RATE: 60 BPM | RESPIRATION RATE: 12 BRPM | HEIGHT: 71 IN

## 2018-01-02 DIAGNOSIS — J96.11 HYPOXEMIC RESPIRATORY FAILURE, CHRONIC (HCC): ICD-10-CM

## 2018-01-02 DIAGNOSIS — J44.9 CHRONIC OBSTRUCTIVE PULMONARY DISEASE, UNSPECIFIED COPD TYPE (HCC): ICD-10-CM

## 2018-01-02 DIAGNOSIS — Z79.01 ANTICOAGULATED ON COUMADIN: ICD-10-CM

## 2018-01-02 DIAGNOSIS — E11.8 TYPE 2 DIABETES MELLITUS WITH COMPLICATION, WITHOUT LONG-TERM CURRENT USE OF INSULIN (HCC): Primary | ICD-10-CM

## 2018-01-02 DIAGNOSIS — I10 ESSENTIAL HYPERTENSION: ICD-10-CM

## 2018-01-02 LAB
GLUCOSE BLD-MCNC: 133 MG/DL
HBA1C MFR BLD: 7.5 %

## 2018-01-02 PROCEDURE — 99213 OFFICE O/P EST LOW 20 MIN: CPT | Performed by: EMERGENCY MEDICINE

## 2018-01-02 PROCEDURE — 83036 HEMOGLOBIN GLYCOSYLATED A1C: CPT | Performed by: EMERGENCY MEDICINE

## 2018-01-02 PROCEDURE — 82962 GLUCOSE BLOOD TEST: CPT | Performed by: EMERGENCY MEDICINE

## 2018-01-02 ASSESSMENT — ENCOUNTER SYMPTOMS
NAUSEA: 0
TROUBLE SWALLOWING: 0
RHINORRHEA: 0
WHEEZING: 0
SHORTNESS OF BREATH: 0
COUGH: 0
ABDOMINAL PAIN: 0
SORE THROAT: 0

## 2018-01-02 ASSESSMENT — COPD QUESTIONNAIRES: COPD: 1

## 2018-01-02 NOTE — PROGRESS NOTES
Visit Date: 1/2/2018    Holly Hummel is a 65 y.o. male who presents today for:  Chief Complaint   Patient presents with    Diabetes    Hypertension         HPI:     Knee comes and goes but better. Seeing Dr Maritza Salvador in the morning      Diabetes   Pertinent negatives for hypoglycemia include no dizziness or headaches. Pertinent negatives for diabetes include no chest pain, no fatigue and no weakness. His breakfast blood glucose range is generally 130-140 mg/dl. (Lately it is 140's to 160)   Hypertension   Pertinent negatives include no chest pain, headaches, neck pain, palpitations or shortness of breath. Gastroesophageal Reflux   He reports no abdominal pain, no chest pain, no coughing, no nausea, no sore throat or no wheezing. Pertinent negatives include no fatigue. COPD   There is no cough, shortness of breath or wheezing. Pertinent negatives include no appetite change, chest pain, ear pain, fever, headaches, myalgias, postnasal drip, rhinorrhea, sore throat or trouble swallowing. Current Medications:  Current Outpatient Prescriptions   Medication Sig Dispense Refill    glucose blood VI test strips (ONE TOUCH ULTRA TEST) strip Test daily. Dx: E11.9 100 strip 2    furosemide (LASIX) 40 MG tablet Take 1 tablet by mouth daily 90 tablet 1    omeprazole (PRILOSEC) 40 MG delayed release capsule TAKE 1 CAPSULE BY MOUTH TWICE DAILY 180 capsule 1    atenolol (TENORMIN) 100 MG tablet take 1 tablet by mouth once daily 90 tablet 1    Arformoterol Tartrate (BROVANA) 15 MCG/2ML NEBU Take 2 mLs by nebulization 2 times daily 120 mL 11    ipratropium-albuterol (DUONEB) 0.5-2.5 (3) MG/3ML SOLN nebulizer solution Inhale 3 mLs into the lungs every 6 hours as needed for Shortness of Breath (dyspnea or wheezes. ) .  360 mL 6    budesonide (PULMICORT) 0.5 MG/2ML nebulizer suspension Take 2 mLs by nebulization 2 times daily 60 ampule 11    albuterol-ipratropium (COMBIVENT RESPIMAT)  MCG/ACT AERS inhaler 09/22/2017       Continue to monitor blood sugars 1 times a day. Return in about 3 months (around 4/2/2018), or DM. Discussed use, benefit, and side effects of prescribed medications. All patient questions answered. Pt voiced understanding. Instructed to continue current medications, diet and exercise. Patient agreed with treatment plan.

## 2018-01-04 ENCOUNTER — HOSPITAL ENCOUNTER (OUTPATIENT)
Dept: PHARMACY | Age: 78
Setting detail: THERAPIES SERIES
Discharge: HOME | End: 2018-01-04
Payer: MEDICARE

## 2018-01-04 DIAGNOSIS — I26.99 OTHER PULMONARY EMBOLISM WITHOUT ACUTE COR PULMONALE, UNSPECIFIED CHRONICITY (HCC): ICD-10-CM

## 2018-01-04 LAB — POC INR: 3.5 (ref 0.8–1.2)

## 2018-01-04 PROCEDURE — 85610 PROTHROMBIN TIME: CPT

## 2018-01-04 PROCEDURE — 99211 OFF/OP EST MAY X REQ PHY/QHP: CPT

## 2018-01-04 PROCEDURE — 36416 COLLJ CAPILLARY BLOOD SPEC: CPT

## 2018-01-11 ENCOUNTER — HOSPITAL ENCOUNTER (OUTPATIENT)
Dept: MRI IMAGING | Age: 78
Discharge: HOME | End: 2018-01-11
Payer: MEDICARE

## 2018-01-11 ENCOUNTER — HOSPITAL ENCOUNTER (OUTPATIENT)
Dept: CT IMAGING | Age: 78
Discharge: HOME | End: 2018-01-11
Payer: MEDICARE

## 2018-01-11 DIAGNOSIS — Z00.6 EXAMINATION FOR NORMAL COMPARISON FOR CLINICAL RESEARCH: ICD-10-CM

## 2018-01-11 DIAGNOSIS — M89.9 BONE LESION: ICD-10-CM

## 2018-01-11 PROCEDURE — 3209999900 MRI COMPARISON OF OUTSIDE FILMS

## 2018-01-11 PROCEDURE — 73700 CT LOWER EXTREMITY W/O DYE: CPT

## 2018-01-18 ENCOUNTER — HOSPITAL ENCOUNTER (OUTPATIENT)
Dept: PHARMACY | Age: 78
Setting detail: THERAPIES SERIES
Discharge: HOME | End: 2018-01-18
Payer: MEDICARE

## 2018-01-18 DIAGNOSIS — I26.99 OTHER PULMONARY EMBOLISM WITHOUT ACUTE COR PULMONALE, UNSPECIFIED CHRONICITY (HCC): ICD-10-CM

## 2018-01-18 LAB — POC INR: 2.3 (ref 0.8–1.2)

## 2018-01-18 PROCEDURE — 85610 PROTHROMBIN TIME: CPT | Performed by: PHARMACIST

## 2018-01-18 PROCEDURE — 36416 COLLJ CAPILLARY BLOOD SPEC: CPT | Performed by: PHARMACIST

## 2018-01-18 PROCEDURE — 99211 OFF/OP EST MAY X REQ PHY/QHP: CPT | Performed by: PHARMACIST

## 2018-02-14 RX ORDER — ATENOLOL 100 MG/1
TABLET ORAL
Qty: 90 TABLET | Refills: 1 | Status: SHIPPED | OUTPATIENT
Start: 2018-02-14 | End: 2018-07-10 | Stop reason: SDUPTHER

## 2018-02-15 ENCOUNTER — HOSPITAL ENCOUNTER (OUTPATIENT)
Dept: PHARMACY | Age: 78
Setting detail: THERAPIES SERIES
Discharge: HOME | End: 2018-02-15
Payer: MEDICARE

## 2018-02-15 DIAGNOSIS — I26.99 OTHER PULMONARY EMBOLISM WITHOUT ACUTE COR PULMONALE, UNSPECIFIED CHRONICITY (HCC): ICD-10-CM

## 2018-02-15 LAB — POC INR: 1.9 (ref 0.8–1.2)

## 2018-02-15 PROCEDURE — 99211 OFF/OP EST MAY X REQ PHY/QHP: CPT | Performed by: PHARMACIST

## 2018-02-15 PROCEDURE — 85610 PROTHROMBIN TIME: CPT | Performed by: PHARMACIST

## 2018-02-15 PROCEDURE — 36416 COLLJ CAPILLARY BLOOD SPEC: CPT | Performed by: PHARMACIST

## 2018-03-13 ENCOUNTER — TELEPHONE (OUTPATIENT)
Dept: FAMILY MEDICINE CLINIC | Age: 78
End: 2018-03-13

## 2018-03-13 RX ORDER — CEFUROXIME AXETIL 500 MG/1
500 TABLET ORAL 2 TIMES DAILY
Qty: 20 TABLET | Refills: 0 | Status: SHIPPED | OUTPATIENT
Start: 2018-03-13 | End: 2018-03-23

## 2018-03-13 RX ORDER — OMEPRAZOLE 40 MG/1
40 CAPSULE, DELAYED RELEASE ORAL DAILY
Qty: 180 CAPSULE | Refills: 1 | Status: SHIPPED | OUTPATIENT
Start: 2018-03-13 | End: 2018-12-27

## 2018-03-13 NOTE — TELEPHONE ENCOUNTER
Pt called req atb AND rx cough syrup for  Deep continuous  cough,chest congestion, coughing uo greenish mucus.     [facility] [place]

## 2018-03-14 ENCOUNTER — TELEPHONE (OUTPATIENT)
Dept: PHARMACY | Age: 78
End: 2018-03-14

## 2018-03-14 NOTE — TELEPHONE ENCOUNTER
Mary Fishman called to reschedule his appointment due to him not feeling well. I rescheduled him for Monday 3/19/18. He also stated that he was put on the antibiotic Cefuroxineaxetil 500 mg 1 tab bid x 10 days. I instructed him that he would only receive a phone call if it effected the dosing of his coumadin.

## 2018-03-19 ENCOUNTER — HOSPITAL ENCOUNTER (OUTPATIENT)
Age: 78
Discharge: HOME | End: 2018-03-19
Payer: MEDICARE

## 2018-03-19 ENCOUNTER — HOSPITAL ENCOUNTER (OUTPATIENT)
Dept: PHARMACY | Age: 78
Setting detail: THERAPIES SERIES
Discharge: HOME | End: 2018-03-19
Payer: MEDICARE

## 2018-03-19 DIAGNOSIS — I26.99 OTHER PULMONARY EMBOLISM WITHOUT ACUTE COR PULMONALE, UNSPECIFIED CHRONICITY (HCC): ICD-10-CM

## 2018-03-19 LAB
ALBUMIN SERPL-MCNC: 3.5 G/DL (ref 3.5–5.1)
ALP BLD-CCNC: 69 U/L (ref 38–126)
ALT SERPL-CCNC: 12 U/L (ref 11–66)
ANION GAP SERPL CALCULATED.3IONS-SCNC: 12 MEQ/L (ref 8–16)
AST SERPL-CCNC: 20 U/L (ref 5–40)
BILIRUB SERPL-MCNC: 0.4 MG/DL (ref 0.3–1.2)
BILIRUBIN DIRECT: < 0.2 MG/DL (ref 0–0.3)
BUN BLDV-MCNC: 14 MG/DL (ref 7–22)
CALCIUM SERPL-MCNC: 9.1 MG/DL (ref 8.5–10.5)
CHLORIDE BLD-SCNC: 100 MEQ/L (ref 98–111)
CHOLESTEROL, TOTAL: 138 MG/DL (ref 100–199)
CO2: 28 MEQ/L (ref 23–33)
CREAT SERPL-MCNC: 0.8 MG/DL (ref 0.4–1.2)
GFR SERPL CREATININE-BSD FRML MDRD: > 90 ML/MIN/1.73M2
GLUCOSE BLD-MCNC: 134 MG/DL (ref 70–108)
HCT VFR BLD CALC: 41.4 % (ref 42–52)
HDLC SERPL-MCNC: 44 MG/DL
HEMOGLOBIN: 13.6 GM/DL (ref 14–18)
LDL CHOLESTEROL CALCULATED: 78 MG/DL
MCH RBC QN AUTO: 31.1 PG (ref 27–31)
MCHC RBC AUTO-ENTMCNC: 32.9 GM/DL (ref 33–37)
MCV RBC AUTO: 94.8 FL (ref 80–94)
PDW BLD-RTO: 14.7 % (ref 11.5–14.5)
PLATELET # BLD: 390 THOU/MM3 (ref 130–400)
PMV BLD AUTO: 8 FL (ref 7.4–10.4)
POC INR: 2.8 (ref 0.8–1.2)
POTASSIUM SERPL-SCNC: 4.3 MEQ/L (ref 3.5–5.2)
RBC # BLD: 4.37 MILL/MM3 (ref 4.7–6.1)
SODIUM BLD-SCNC: 140 MEQ/L (ref 135–145)
TOTAL PROTEIN: 7 G/DL (ref 6.1–8)
TRIGL SERPL-MCNC: 80 MG/DL (ref 0–199)
TSH SERPL DL<=0.05 MIU/L-ACNC: 2.67 UIU/ML (ref 0.4–4.2)
WBC # BLD: 6.4 THOU/MM3 (ref 4.8–10.8)

## 2018-03-19 PROCEDURE — 80061 LIPID PANEL: CPT

## 2018-03-19 PROCEDURE — 36415 COLL VENOUS BLD VENIPUNCTURE: CPT

## 2018-03-19 PROCEDURE — 85027 COMPLETE CBC AUTOMATED: CPT

## 2018-03-19 PROCEDURE — 82248 BILIRUBIN DIRECT: CPT

## 2018-03-19 PROCEDURE — 99211 OFF/OP EST MAY X REQ PHY/QHP: CPT

## 2018-03-19 PROCEDURE — 84443 ASSAY THYROID STIM HORMONE: CPT

## 2018-03-19 PROCEDURE — 80053 COMPREHEN METABOLIC PANEL: CPT

## 2018-03-19 PROCEDURE — 36416 COLLJ CAPILLARY BLOOD SPEC: CPT

## 2018-03-19 PROCEDURE — 85610 PROTHROMBIN TIME: CPT

## 2018-03-21 ENCOUNTER — TELEPHONE (OUTPATIENT)
Dept: FAMILY MEDICINE CLINIC | Age: 78
End: 2018-03-21

## 2018-03-23 ENCOUNTER — TELEPHONE (OUTPATIENT)
Dept: FAMILY MEDICINE CLINIC | Age: 78
End: 2018-03-23

## 2018-03-24 ENCOUNTER — NURSE TRIAGE (OUTPATIENT)
Dept: ADMINISTRATIVE | Age: 78
End: 2018-03-24

## 2018-03-24 NOTE — TELEPHONE ENCOUNTER
Debo states that her  has had a blood sugar of 329 this morning about 1 1\/2-2 hours after a bowl of Cheerios. Has a prescription for his sugar that he is supposed to get(does not remember what it is). Just wanted to check and see if the meter is working or he needs to go get another one. Normally running about 200. Encouraged fluids today and be sure he picks up the meds and follows the Dr instruction.

## 2018-03-27 ENCOUNTER — OFFICE VISIT (OUTPATIENT)
Dept: UROLOGY | Age: 78
End: 2018-03-27
Payer: MEDICARE

## 2018-03-27 VITALS
DIASTOLIC BLOOD PRESSURE: 68 MMHG | HEIGHT: 71 IN | SYSTOLIC BLOOD PRESSURE: 118 MMHG | WEIGHT: 173.1 LBS | BODY MASS INDEX: 24.23 KG/M2

## 2018-03-27 DIAGNOSIS — R35.1 BENIGN PROSTATIC HYPERPLASIA WITH NOCTURIA: Primary | ICD-10-CM

## 2018-03-27 DIAGNOSIS — N20.0 KIDNEY STONE: ICD-10-CM

## 2018-03-27 DIAGNOSIS — N40.1 BENIGN PROSTATIC HYPERPLASIA WITH NOCTURIA: Primary | ICD-10-CM

## 2018-03-27 LAB
BILIRUBIN URINE: ABNORMAL
BLOOD URINE, POC: NEGATIVE
CHARACTER, URINE: CLEAR
COLOR, URINE: YELLOW
GLUCOSE URINE: NEGATIVE MG/DL
KETONES, URINE: NEGATIVE
LEUKOCYTE CLUMPS, URINE: NEGATIVE
NITRITE, URINE: NEGATIVE
PH, URINE: 6
POST VOID RESIDUAL (PVR): 1 ML
PROTEIN, URINE: 30 MG/DL
SPECIFIC GRAVITY, URINE: 1.02 (ref 1–1.03)
UROBILINOGEN, URINE: 1 EU/DL

## 2018-03-27 PROCEDURE — 51798 US URINE CAPACITY MEASURE: CPT | Performed by: NURSE PRACTITIONER

## 2018-03-27 PROCEDURE — 1123F ACP DISCUSS/DSCN MKR DOCD: CPT | Performed by: NURSE PRACTITIONER

## 2018-03-27 PROCEDURE — G8482 FLU IMMUNIZE ORDER/ADMIN: HCPCS | Performed by: NURSE PRACTITIONER

## 2018-03-27 PROCEDURE — G8427 DOCREV CUR MEDS BY ELIG CLIN: HCPCS | Performed by: NURSE PRACTITIONER

## 2018-03-27 PROCEDURE — 81003 URINALYSIS AUTO W/O SCOPE: CPT | Performed by: NURSE PRACTITIONER

## 2018-03-27 PROCEDURE — 99213 OFFICE O/P EST LOW 20 MIN: CPT | Performed by: NURSE PRACTITIONER

## 2018-03-27 PROCEDURE — 1036F TOBACCO NON-USER: CPT | Performed by: NURSE PRACTITIONER

## 2018-03-27 PROCEDURE — G8420 CALC BMI NORM PARAMETERS: HCPCS | Performed by: NURSE PRACTITIONER

## 2018-03-27 PROCEDURE — 4040F PNEUMOC VAC/ADMIN/RCVD: CPT | Performed by: NURSE PRACTITIONER

## 2018-03-27 RX ORDER — TAMSULOSIN HYDROCHLORIDE 0.4 MG/1
0.4 CAPSULE ORAL NIGHTLY
Qty: 90 CAPSULE | Refills: 3 | Status: SHIPPED | OUTPATIENT
Start: 2018-03-27 | End: 2019-02-19 | Stop reason: SDUPTHER

## 2018-03-27 RX ORDER — FINASTERIDE 5 MG/1
5 TABLET, FILM COATED ORAL DAILY
Qty: 90 TABLET | Refills: 3 | Status: SHIPPED | OUTPATIENT
Start: 2018-03-27 | End: 2019-03-28 | Stop reason: SDUPTHER

## 2018-03-27 NOTE — PROGRESS NOTES
Evonne Egan is a 65 y.o. male was seen in follow up for kidney stones and BPH    Pt has a hx of kidney stones with most recent ESWL in march of 2017 by Dr. Cesar Prince. F/u KUB noted continued fragments present. Pt reports since that time he passed a few fragments. He denies any gross hematuria or flank pain. He reports he hasn't had a RITA for some time and can't remember when he had last PSA. He notes he is urinating well on finasteride and flomax with only LUT symptom of nocturia 2 x per night. Ramya Hernandez denies dysuria, hesitancy, weak stream, urgency, frequency, gross hematuria, flank pain, fever, chills, suprapubic pain, and feeling of incomplete emptying. He also denies night sweats, poor appetite, unexplained weight loss, fatigue, malaise. Current Outpatient Prescriptions   Medication Sig Dispense Refill    tamsulosin (FLOMAX) 0.4 MG capsule Take 1 capsule by mouth nightly 90 capsule 3    finasteride (PROSCAR) 5 MG tablet Take 1 tablet by mouth daily 90 tablet 3    omeprazole (PRILOSEC) 40 MG delayed release capsule Take 1 capsule by mouth daily (Patient taking differently: Take 40 mg by mouth 2 times daily ) 180 capsule 1    atenolol (TENORMIN) 100 MG tablet take 1 tablet by mouth once daily 90 tablet 1    Blood Glucose Monitoring Suppl (ONE TOUCH ULTRA SYSTEM KIT) w/Device KIT Test blood sugar daily Dx E11.9 1 kit 0    furosemide (LASIX) 40 MG tablet Take 1 tablet by mouth daily 90 tablet 1    Arformoterol Tartrate (BROVANA) 15 MCG/2ML NEBU Take 2 mLs by nebulization 2 times daily 120 mL 11    ipratropium-albuterol (DUONEB) 0.5-2.5 (3) MG/3ML SOLN nebulizer solution Inhale 3 mLs into the lungs every 6 hours as needed for Shortness of Breath (dyspnea or wheezes. ) .  360 mL 6    budesonide (PULMICORT) 0.5 MG/2ML nebulizer suspension Take 2 mLs by nebulization 2 times daily 60 ampule 11    clopidogrel (PLAVIX) 75 MG tablet take 1 tablet by mouth once daily  0    warfarin (COUMADIN) 5

## 2018-03-28 RX ORDER — FUROSEMIDE 40 MG/1
TABLET ORAL
Qty: 90 TABLET | Refills: 1 | Status: SHIPPED | OUTPATIENT
Start: 2018-03-28 | End: 2018-07-10 | Stop reason: SDUPTHER

## 2018-03-29 ENCOUNTER — HOSPITAL ENCOUNTER (OUTPATIENT)
Age: 78
Discharge: HOME | End: 2018-03-29
Payer: MEDICARE

## 2018-03-29 ENCOUNTER — HOSPITAL ENCOUNTER (OUTPATIENT)
Dept: GENERAL RADIOLOGY | Age: 78
Discharge: HOME | End: 2018-03-29
Payer: MEDICARE

## 2018-03-29 DIAGNOSIS — R35.1 BENIGN PROSTATIC HYPERPLASIA WITH NOCTURIA: ICD-10-CM

## 2018-03-29 DIAGNOSIS — N40.1 BENIGN PROSTATIC HYPERPLASIA WITH NOCTURIA: ICD-10-CM

## 2018-03-29 DIAGNOSIS — N20.0 KIDNEY STONE: ICD-10-CM

## 2018-03-29 LAB — PROSTATE SPECIFIC ANTIGEN: 0.62 NG/ML (ref 0–1)

## 2018-03-29 PROCEDURE — 74018 RADEX ABDOMEN 1 VIEW: CPT

## 2018-03-29 PROCEDURE — 36415 COLL VENOUS BLD VENIPUNCTURE: CPT

## 2018-03-29 PROCEDURE — 84153 ASSAY OF PSA TOTAL: CPT

## 2018-03-30 ENCOUNTER — TELEPHONE (OUTPATIENT)
Dept: UROLOGY | Age: 78
End: 2018-03-30

## 2018-03-30 DIAGNOSIS — N20.0 KIDNEY STONES: Primary | ICD-10-CM

## 2018-04-05 ENCOUNTER — OFFICE VISIT (OUTPATIENT)
Dept: FAMILY MEDICINE CLINIC | Age: 78
End: 2018-04-05

## 2018-04-05 VITALS
HEIGHT: 71 IN | RESPIRATION RATE: 16 BRPM | BODY MASS INDEX: 24.75 KG/M2 | DIASTOLIC BLOOD PRESSURE: 64 MMHG | SYSTOLIC BLOOD PRESSURE: 114 MMHG | WEIGHT: 176.8 LBS | HEART RATE: 60 BPM

## 2018-04-05 DIAGNOSIS — Z79.01 ANTICOAGULATED ON COUMADIN: ICD-10-CM

## 2018-04-05 DIAGNOSIS — E11.8 TYPE 2 DIABETES MELLITUS WITH COMPLICATION, WITHOUT LONG-TERM CURRENT USE OF INSULIN (HCC): Primary | ICD-10-CM

## 2018-04-05 DIAGNOSIS — I10 ESSENTIAL HYPERTENSION: ICD-10-CM

## 2018-04-05 LAB
GLUCOSE BLD-MCNC: 211 MG/DL
HBA1C MFR BLD: 7.6 %

## 2018-04-05 PROCEDURE — 82962 GLUCOSE BLOOD TEST: CPT | Performed by: EMERGENCY MEDICINE

## 2018-04-05 PROCEDURE — 99213 OFFICE O/P EST LOW 20 MIN: CPT | Performed by: EMERGENCY MEDICINE

## 2018-04-05 PROCEDURE — 83036 HEMOGLOBIN GLYCOSYLATED A1C: CPT | Performed by: EMERGENCY MEDICINE

## 2018-04-05 RX ORDER — PREDNISOLONE ACETATE 10 MG/ML
SUSPENSION/ DROPS OPHTHALMIC
Refills: 0 | COMMUNITY
Start: 2018-04-03 | End: 2018-06-18 | Stop reason: ALTCHOICE

## 2018-04-05 RX ORDER — OFLOXACIN 3 MG/ML
SOLUTION/ DROPS OPHTHALMIC
Refills: 0 | COMMUNITY
Start: 2018-04-03 | End: 2018-06-18 | Stop reason: ALTCHOICE

## 2018-04-05 ASSESSMENT — ENCOUNTER SYMPTOMS
NAUSEA: 0
CONSTIPATION: 0
SHORTNESS OF BREATH: 0
SORE THROAT: 0
RHINORRHEA: 0
BACK PAIN: 0
DIARRHEA: 0
VOICE CHANGE: 0
ABDOMINAL PAIN: 0
COUGH: 0
SINUS PRESSURE: 0
VOMITING: 0
TROUBLE SWALLOWING: 0
WHEEZING: 0
CHEST TIGHTNESS: 0

## 2018-04-05 ASSESSMENT — COPD QUESTIONNAIRES: COPD: 1

## 2018-04-16 ENCOUNTER — HOSPITAL ENCOUNTER (OUTPATIENT)
Dept: PHARMACY | Age: 78
Setting detail: THERAPIES SERIES
Discharge: HOME | End: 2018-04-16
Payer: MEDICARE

## 2018-04-16 DIAGNOSIS — I26.99 OTHER PULMONARY EMBOLISM WITHOUT ACUTE COR PULMONALE, UNSPECIFIED CHRONICITY (HCC): ICD-10-CM

## 2018-04-16 LAB — POC INR: 2.6 (ref 0.8–1.2)

## 2018-04-16 PROCEDURE — 99211 OFF/OP EST MAY X REQ PHY/QHP: CPT

## 2018-04-16 PROCEDURE — 85610 PROTHROMBIN TIME: CPT

## 2018-04-16 PROCEDURE — 36416 COLLJ CAPILLARY BLOOD SPEC: CPT

## 2018-04-20 ENCOUNTER — HOSPITAL ENCOUNTER (OUTPATIENT)
Dept: GENERAL RADIOLOGY | Age: 78
Discharge: HOME | End: 2018-04-20
Payer: MEDICARE

## 2018-04-20 ENCOUNTER — HOSPITAL ENCOUNTER (OUTPATIENT)
Age: 78
Discharge: HOME | End: 2018-04-20
Payer: MEDICARE

## 2018-04-20 DIAGNOSIS — R06.02 SOB (SHORTNESS OF BREATH): ICD-10-CM

## 2018-04-20 PROCEDURE — 71046 X-RAY EXAM CHEST 2 VIEWS: CPT

## 2018-04-24 ENCOUNTER — TELEPHONE (OUTPATIENT)
Dept: PHARMACY | Age: 78
End: 2018-04-24

## 2018-04-24 RX ORDER — WARFARIN SODIUM 5 MG/1
TABLET ORAL
Qty: 75 TABLET | Refills: 3 | Status: SHIPPED | OUTPATIENT
Start: 2018-04-24 | End: 2019-05-14

## 2018-04-24 RX ORDER — WARFARIN SODIUM 5 MG/1
TABLET ORAL
Qty: 25 TABLET | Refills: 11 | Status: SHIPPED | OUTPATIENT
Start: 2018-04-24 | End: 2018-04-24 | Stop reason: SDUPTHER

## 2018-05-08 RX ORDER — ARFORMOTEROL TARTRATE 15 UG/2ML
15 SOLUTION RESPIRATORY (INHALATION) 2 TIMES DAILY
Qty: 120 ML | Refills: 11 | Status: SHIPPED | OUTPATIENT
Start: 2018-05-08 | End: 2018-05-15 | Stop reason: SDUPTHER

## 2018-05-14 ENCOUNTER — HOSPITAL ENCOUNTER (OUTPATIENT)
Dept: PHARMACY | Age: 78
Setting detail: THERAPIES SERIES
Discharge: HOME | End: 2018-05-14
Payer: MEDICARE

## 2018-05-14 DIAGNOSIS — I26.99 OTHER PULMONARY EMBOLISM WITHOUT ACUTE COR PULMONALE, UNSPECIFIED CHRONICITY (HCC): ICD-10-CM

## 2018-05-14 LAB — POC INR: 2.2 (ref 0.8–1.2)

## 2018-05-14 PROCEDURE — 99211 OFF/OP EST MAY X REQ PHY/QHP: CPT

## 2018-05-14 PROCEDURE — 85610 PROTHROMBIN TIME: CPT

## 2018-05-14 PROCEDURE — 36416 COLLJ CAPILLARY BLOOD SPEC: CPT

## 2018-05-15 ENCOUNTER — TELEPHONE (OUTPATIENT)
Dept: PULMONOLOGY | Age: 78
End: 2018-05-15

## 2018-05-15 DIAGNOSIS — J96.11 CHRONIC RESPIRATORY FAILURE WITH HYPOXIA (HCC): ICD-10-CM

## 2018-05-15 DIAGNOSIS — R09.02 HYPOXIA: ICD-10-CM

## 2018-05-15 DIAGNOSIS — Z87.891 HISTORY OF TOBACCO USE: ICD-10-CM

## 2018-05-15 DIAGNOSIS — J44.9 MODERATE COPD (CHRONIC OBSTRUCTIVE PULMONARY DISEASE) (HCC): ICD-10-CM

## 2018-05-15 RX ORDER — BUDESONIDE 0.5 MG/2ML
1 INHALANT ORAL 2 TIMES DAILY
Qty: 60 AMPULE | Refills: 11 | Status: SHIPPED | OUTPATIENT
Start: 2018-05-15 | End: 2018-09-05 | Stop reason: SDUPTHER

## 2018-05-15 RX ORDER — ARFORMOTEROL TARTRATE 15 UG/2ML
15 SOLUTION RESPIRATORY (INHALATION) 2 TIMES DAILY
Qty: 120 ML | Refills: 11 | Status: SHIPPED | OUTPATIENT
Start: 2018-05-15 | End: 2018-09-05 | Stop reason: SDUPTHER

## 2018-06-18 ENCOUNTER — HOSPITAL ENCOUNTER (OUTPATIENT)
Dept: PHARMACY | Age: 78
Setting detail: THERAPIES SERIES
Discharge: HOME | End: 2018-06-18
Payer: MEDICARE

## 2018-06-18 DIAGNOSIS — I26.99 OTHER PULMONARY EMBOLISM WITHOUT ACUTE COR PULMONALE, UNSPECIFIED CHRONICITY (HCC): ICD-10-CM

## 2018-06-18 LAB — POC INR: 3.1 (ref 0.8–1.2)

## 2018-06-18 PROCEDURE — 36416 COLLJ CAPILLARY BLOOD SPEC: CPT

## 2018-06-18 PROCEDURE — 85610 PROTHROMBIN TIME: CPT

## 2018-06-18 PROCEDURE — 99211 OFF/OP EST MAY X REQ PHY/QHP: CPT

## 2018-07-10 ENCOUNTER — OFFICE VISIT (OUTPATIENT)
Dept: FAMILY MEDICINE CLINIC | Age: 78
End: 2018-07-10

## 2018-07-10 ENCOUNTER — HOSPITAL ENCOUNTER (OUTPATIENT)
Dept: PHARMACY | Age: 78
Setting detail: THERAPIES SERIES
Discharge: HOME | End: 2018-07-10
Payer: MEDICARE

## 2018-07-10 VITALS
HEART RATE: 76 BPM | WEIGHT: 173.25 LBS | RESPIRATION RATE: 14 BRPM | SYSTOLIC BLOOD PRESSURE: 128 MMHG | BODY MASS INDEX: 24.25 KG/M2 | DIASTOLIC BLOOD PRESSURE: 68 MMHG | HEIGHT: 71 IN

## 2018-07-10 DIAGNOSIS — J96.11 CHRONIC RESPIRATORY FAILURE WITH HYPOXIA (HCC): ICD-10-CM

## 2018-07-10 DIAGNOSIS — J44.9 COPD, SEVERE (HCC): ICD-10-CM

## 2018-07-10 DIAGNOSIS — K21.9 GASTROESOPHAGEAL REFLUX DISEASE, ESOPHAGITIS PRESENCE NOT SPECIFIED: ICD-10-CM

## 2018-07-10 DIAGNOSIS — E11.8 TYPE 2 DIABETES MELLITUS WITH COMPLICATION, WITHOUT LONG-TERM CURRENT USE OF INSULIN (HCC): Primary | ICD-10-CM

## 2018-07-10 DIAGNOSIS — I26.99 OTHER PULMONARY EMBOLISM WITHOUT ACUTE COR PULMONALE, UNSPECIFIED CHRONICITY (HCC): ICD-10-CM

## 2018-07-10 LAB
GLUCOSE BLD-MCNC: 105 MG/DL
HBA1C MFR BLD: 7.1 %
POC INR: 2.8 (ref 0.8–1.2)

## 2018-07-10 PROCEDURE — 99211 OFF/OP EST MAY X REQ PHY/QHP: CPT

## 2018-07-10 PROCEDURE — 82962 GLUCOSE BLOOD TEST: CPT | Performed by: EMERGENCY MEDICINE

## 2018-07-10 PROCEDURE — 85610 PROTHROMBIN TIME: CPT

## 2018-07-10 PROCEDURE — 99213 OFFICE O/P EST LOW 20 MIN: CPT | Performed by: EMERGENCY MEDICINE

## 2018-07-10 PROCEDURE — 36416 COLLJ CAPILLARY BLOOD SPEC: CPT

## 2018-07-10 PROCEDURE — 83036 HEMOGLOBIN GLYCOSYLATED A1C: CPT | Performed by: EMERGENCY MEDICINE

## 2018-07-10 RX ORDER — FUROSEMIDE 40 MG/1
TABLET ORAL
Qty: 90 TABLET | Refills: 1 | Status: SHIPPED | OUTPATIENT
Start: 2018-07-10 | End: 2019-03-24 | Stop reason: SDUPTHER

## 2018-07-10 RX ORDER — ATENOLOL 100 MG/1
TABLET ORAL
Qty: 90 TABLET | Refills: 1 | Status: SHIPPED | OUTPATIENT
Start: 2018-07-10 | End: 2019-02-02 | Stop reason: SDUPTHER

## 2018-07-10 ASSESSMENT — ENCOUNTER SYMPTOMS
RHINORRHEA: 0
ABDOMINAL PAIN: 0
BACK PAIN: 0
CHEST TIGHTNESS: 0
VOMITING: 0
WHEEZING: 0
NAUSEA: 0
SINUS PRESSURE: 0
CONSTIPATION: 0
COUGH: 0
SORE THROAT: 0
VOICE CHANGE: 0
TROUBLE SWALLOWING: 0
DIARRHEA: 0
SHORTNESS OF BREATH: 0

## 2018-07-10 ASSESSMENT — COPD QUESTIONNAIRES: COPD: 1

## 2018-07-10 NOTE — PROGRESS NOTES
stiffness. Skin: Negative for rash. Neurological: Negative for dizziness, syncope, weakness, light-headedness, numbness and headaches. Objective:     /68 (Site: Right Arm, Position: Sitting, Cuff Size: Medium Adult)   Pulse 76   Resp 14   Ht 5' 11\" (1.803 m)   Wt 173 lb 4 oz (78.6 kg)   BMI 24.16 kg/m²   BP Readings from Last 3 Encounters:   07/10/18 128/68   04/05/18 114/64   03/27/18 118/68     Wt Readings from Last 3 Encounters:   07/10/18 173 lb 4 oz (78.6 kg)   04/05/18 176 lb 12.8 oz (80.2 kg)   03/27/18 173 lb 1.6 oz (78.5 kg)       Physical Exam   Constitutional: He is oriented to person, place, and time. He appears well-developed and well-nourished. He is cooperative. HENT:   Head: Normocephalic and atraumatic. Right Ear: External ear normal.   Left Ear: External ear normal.   Nose: Nose normal.   Mouth/Throat: Oropharynx is clear and moist.   Eyes: Conjunctivae and EOM are normal. Pupils are equal, round, and reactive to light. No scleral icterus. Neck: Normal range of motion. Neck supple. No JVD present. No thyromegaly present. Cardiovascular: Normal rate, regular rhythm and intact distal pulses. Exam reveals no friction rub. No murmur heard. Pulmonary/Chest: Effort normal. He has decreased breath sounds. He has no wheezes. He has no rales. He exhibits no tenderness. Abdominal: Soft. Bowel sounds are normal. He exhibits no mass. There is no tenderness. Musculoskeletal: He exhibits no edema. Lymphadenopathy:     He has no cervical adenopathy. Neurological: He is alert and oriented to person, place, and time. Skin: No rash noted. Vitals reviewed. Assessment:       Diagnosis Orders   1. Type 2 diabetes mellitus with complication, without long-term current use of insulin ([facility])  POCT Glucose    POCT glycosylated hemoglobin (Hb A1C)   2. COPD, severe     3. Chronic respiratory failure with hypoxia ([facility])     4.  Gastroesophageal reflux disease, esophagitis presence not specified         Plan:      Medications Prescribed:  Orders Placed This Encounter   Medications    furosemide (LASIX) 40 MG tablet     Sig: take 1 tablet by mouth once daily     Dispense:  90 tablet     Refill:  1    atenolol (TENORMIN) 100 MG tablet     Sig: take 1 tablet by mouth once daily     Dispense:  90 tablet     Refill:  1       Orders Placed:  Orders Placed This Encounter   Procedures    POCT Glucose    POCT glycosylated hemoglobin (Hb A1C)     Lab Results   Component Value Date    LABA1C 7.1 07/10/2018    LABA1C 7.6 04/05/2018    LABA1C 7.5 01/02/2018     Lab Results   Component Value Date    LDLCALC 78 03/19/2018    CREATININE 0.8 03/19/2018       Continue to monitor blood sugars 1 times a day. Return in about 3 months (around 10/10/2018) for DM. Discussed use, benefit, and side effects of prescribed medications. All patient questions answered. Pt voiced understanding. Instructed to continue current medications, diet and exercise. Patient agreed with treatment plan.

## 2018-07-10 NOTE — LETTER
[facility]  [address],[place] [id number]  Phone: [phone]  Fax: [phone]    Jose Beatty MD        July 10, 2018     Patient: Neptali Mccall   YOB: 1940   Date of Visit: 7/10/2018       To Whom It May Concern: It is my medical opinion that Mason Palacios requires a disability parking placard for the following reasons:  He is restricted by lung disease to the extent that forced expiratory volume for one second when measured by spirometry is less than one liter. He has limited walking ability due to an arthritic condition. Duration of need: 5 years    If you have any questions or concerns, please don't hesitate to call.     Sincerely,        Jose Beatty MD

## 2018-07-10 NOTE — PROGRESS NOTES
Medication Management [facility]  Anticoagulation Clinic  [phone] (phone)  [phone] (fax)      Mr. Neil Thomas is a 65 y.o.  male with history of PE, per Dr. Marvin Pulido referral, who presents today for Warfarin monitoring and adjustment (3 week visit). Patient verifies current dosing regimen and tablet strength. No missed or extra doses. Patient denies bleeding/swelling/chest pain. Has usual easy bruising (noted right forearm has large reddish-purple bruise - had bumped it recently). Has SOB occasionally - usual.  No blood in urine or stool. No dietary changes. No changes in medication/OTC agents/herbals. No change in alcohol use or tobacco use. No change in activity level. Patient denies headaches/dizziness/lightheadedness/falls. No vomiting/diarrhea or acute illness. No procedures scheduled in the future at this time. Assessment:   Lab Results   Component Value Date    INR 2.80 (H) 07/10/2018    INR 3.10 (H) 06/18/2018    INR 2.20 (H) 05/14/2018    PROTIME 1.89 (H) 09/03/2011    PROTIME 2.20 (H) 09/02/2011     INR therapeutic - goal 2-3. Recent Labs      07/10/18   1311   INR  2.80*       Plan:  POCT INR ordered/performed/result reviewed. Continue PO Coumadin 5 mg MF, 2.5 mg TWThSS. Recheck INR in 4 weeks. Patient reminded to call the Anticoagulation Clinic with any signs or symptoms of bleeding or with any medication changes. Patient given instructions utilizing the teach back method. Discharged ambulatory in no apparent distress, with cane and wife. Sees PCP next. After visit summary printed and reviewed with patient.       Medications reviewed and updated on home medication list.     Influenza vaccine:     [] given    [x] declined   [x] received previously   [] plans to receive at a later time   [] refused    [x] documented in EPIC

## 2018-07-11 ENCOUNTER — HOSPITAL ENCOUNTER (OUTPATIENT)
Dept: PULMONOLOGY | Age: 78
Discharge: HOME | End: 2018-07-11
Payer: MEDICARE

## 2018-07-11 DIAGNOSIS — J98.4 RESTRICTIVE LUNG DISEASE: ICD-10-CM

## 2018-07-11 DIAGNOSIS — Z87.891 HISTORY OF TOBACCO USE: ICD-10-CM

## 2018-07-11 DIAGNOSIS — R09.02 HYPOXIA: ICD-10-CM

## 2018-07-11 DIAGNOSIS — R94.2 DIFFUSION CAPACITY OF LUNG (DL), DECREASED: ICD-10-CM

## 2018-07-11 DIAGNOSIS — J44.9 MODERATE COPD (CHRONIC OBSTRUCTIVE PULMONARY DISEASE) (HCC): ICD-10-CM

## 2018-07-11 DIAGNOSIS — J96.11 CHRONIC RESPIRATORY FAILURE WITH HYPOXIA (HCC): ICD-10-CM

## 2018-07-11 PROCEDURE — 94060 EVALUATION OF WHEEZING: CPT
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
## 2018-07-16 ENCOUNTER — OFFICE VISIT (OUTPATIENT)
Dept: PULMONOLOGY | Age: 78
End: 2018-07-16
Payer: MEDICARE

## 2018-07-16 VITALS
SYSTOLIC BLOOD PRESSURE: 100 MMHG | DIASTOLIC BLOOD PRESSURE: 62 MMHG | RESPIRATION RATE: 17 BRPM | HEART RATE: 68 BPM | TEMPERATURE: 97.4 F | HEIGHT: 71 IN | WEIGHT: 174 LBS | OXYGEN SATURATION: 96 % | BODY MASS INDEX: 24.36 KG/M2

## 2018-07-16 DIAGNOSIS — J96.11 CHRONIC RESPIRATORY FAILURE WITH HYPOXIA (HCC): ICD-10-CM

## 2018-07-16 DIAGNOSIS — J44.9 MODERATE COPD (CHRONIC OBSTRUCTIVE PULMONARY DISEASE) (HCC): Primary | ICD-10-CM

## 2018-07-16 PROCEDURE — 99214 OFFICE O/P EST MOD 30 MIN: CPT | Performed by: INTERNAL MEDICINE

## 2018-07-16 PROCEDURE — 3023F SPIROM DOC REV: CPT | Performed by: INTERNAL MEDICINE

## 2018-07-16 PROCEDURE — G8926 SPIRO NO PERF OR DOC: HCPCS | Performed by: INTERNAL MEDICINE

## 2018-07-16 PROCEDURE — G8420 CALC BMI NORM PARAMETERS: HCPCS | Performed by: INTERNAL MEDICINE

## 2018-07-16 PROCEDURE — 1123F ACP DISCUSS/DSCN MKR DOCD: CPT | Performed by: INTERNAL MEDICINE

## 2018-07-16 PROCEDURE — 4040F PNEUMOC VAC/ADMIN/RCVD: CPT | Performed by: INTERNAL MEDICINE

## 2018-07-16 PROCEDURE — 1036F TOBACCO NON-USER: CPT | Performed by: INTERNAL MEDICINE

## 2018-07-16 PROCEDURE — G8427 DOCREV CUR MEDS BY ELIG CLIN: HCPCS | Performed by: INTERNAL MEDICINE

## 2018-07-16 PROCEDURE — 1101F PT FALLS ASSESS-DOCD LE1/YR: CPT | Performed by: INTERNAL MEDICINE

## 2018-07-16 NOTE — PATIENT INSTRUCTIONS
-Continue DuoNeb 3 ml/nebulizer every 6 hours as needed for shortness of breath or wheezing. Pt advised to use nebulizer while in the home and Combivent 20mcg/100mcg Spray MDI 1 puff inhalerd QID ( Maximum is 6 puffs/24h) when outside of home. He verbalizes understanding.   -Continue Pulmicort Respules per nebulizer twice daily.   -Continue Brovana ( Arformoterol) 15mcg/2ml via nebs bid.  - Patient educated to update his pneumococcal vaccine with family physician and take influenza vaccine in coming season with out fail.   -Schedule follow up appt in 9months for clinical reevaluation with spirometry before clinic visit. Patient advised to make early appointment if needed. The patient was advised to call for questions, concerns, or worsening symptoms. Laurie Chavez verbalizes understanding and agrees with the plan of care.

## 2018-08-07 ENCOUNTER — HOSPITAL ENCOUNTER (OUTPATIENT)
Dept: PHARMACY | Age: 78
Setting detail: THERAPIES SERIES
Discharge: HOME | End: 2018-08-07
Payer: MEDICARE

## 2018-08-07 DIAGNOSIS — I26.99 OTHER PULMONARY EMBOLISM WITHOUT ACUTE COR PULMONALE, UNSPECIFIED CHRONICITY (HCC): ICD-10-CM

## 2018-08-07 LAB — POC INR: 3.6 (ref 0.8–1.2)

## 2018-08-07 PROCEDURE — 36416 COLLJ CAPILLARY BLOOD SPEC: CPT

## 2018-08-07 PROCEDURE — 85610 PROTHROMBIN TIME: CPT

## 2018-08-07 PROCEDURE — 99211 OFF/OP EST MAY X REQ PHY/QHP: CPT

## 2018-08-07 NOTE — PROGRESS NOTES
Medication Management [facility]  Anticoagulation Clinic  [phone] (phone)  [phone] (fax)      Mr. Juana Adnerson is a 65 y.o.  male with history of PE, per Dr. Sophia Davey referral, who presents today for Warfarin monitoring and adjustment (4 week visit). Patient verifies current dosing regimen and tablet strength. No missed or extra doses. Patient denies bleeding/swelling/chest pain. Has usual easy bruising (numerous bruises on hands/forearms) and TONG. No blood in urine or stool. No dietary changes. No changes in medication/OTC agents/herbals. No change in alcohol use or tobacco use. No change in activity level. Patient denies headaches/dizziness. Has usual slight lightheadedness at times. Feels unsteady at times. Valere Hank against wall at home - has large adhesive bandage on right forearm. No vomiting or acute illness. Had a little diarrhea yesterday. No pocedures scheduled in the future at this time. Assessment:   Lab Results   Component Value Date    INR 3.60 (H) 08/07/2018    INR 2.80 (H) 07/10/2018    INR 3.10 (H) 06/18/2018    PROTIME 1.89 (H) 09/03/2011    PROTIME 2.20 (H) 09/02/2011     INR supratherapeutic - goal 2-3. Recent Labs      08/07/18   1322   INR  3.60*       Plan:  POCT INR ordered/performed/result reviewed. Hold today, T, then continue PO Coumadin 5 mg MF, 2.5 mg TWThSS. Recheck INR in 2 weeks. (Report given - orders entered by COSME Dunham, PharmD.)  Patient reminded to call the Anticoagulation Clinic with any signs or symptoms of bleeding or with any medication changes. Patient given instructions utilizing the teach back method. Discharged ambulatory in no apparent distress, with cane and wife. After visit summary printed and reviewed with patient.       Medications reviewed and updated on home medication list.     Influenza vaccine:     [] given    [x] declined   [x] received previously   [] plans to receive at a later time   []

## 2018-08-20 ENCOUNTER — HOSPITAL ENCOUNTER (OUTPATIENT)
Dept: PHARMACY | Age: 78
Setting detail: THERAPIES SERIES
Discharge: HOME | End: 2018-08-20
Payer: MEDICARE

## 2018-08-20 DIAGNOSIS — I26.99 OTHER PULMONARY EMBOLISM WITHOUT ACUTE COR PULMONALE, UNSPECIFIED CHRONICITY (HCC): ICD-10-CM

## 2018-08-20 LAB — POC INR: 4.2 (ref 0.8–1.2)

## 2018-08-20 PROCEDURE — 99211 OFF/OP EST MAY X REQ PHY/QHP: CPT

## 2018-08-20 PROCEDURE — 85610 PROTHROMBIN TIME: CPT

## 2018-08-20 PROCEDURE — 36416 COLLJ CAPILLARY BLOOD SPEC: CPT

## 2018-08-20 NOTE — PROGRESS NOTES
medication list.    Influenza vaccine:     [] given    [x] declined   [x] received previously   [] plans to receive at a later time   [] refused    [x] documented in EPIC

## 2018-08-23 ENCOUNTER — HOSPITAL ENCOUNTER (EMERGENCY)
Age: 78
Discharge: HOME | End: 2018-08-23
Attending: EMERGENCY MEDICINE
Payer: MEDICARE

## 2018-08-23 VITALS
RESPIRATION RATE: 16 BRPM | WEIGHT: 175 LBS | TEMPERATURE: 98.6 F | HEART RATE: 65 BPM | OXYGEN SATURATION: 98 % | BODY MASS INDEX: 24.41 KG/M2 | DIASTOLIC BLOOD PRESSURE: 75 MMHG | SYSTOLIC BLOOD PRESSURE: 140 MMHG

## 2018-08-23 DIAGNOSIS — I10 PRIMARY HYPERTENSION: ICD-10-CM

## 2018-08-23 DIAGNOSIS — S51.811A SKIN TEAR OF RIGHT FOREARM WITHOUT COMPLICATION, INITIAL ENCOUNTER: Primary | ICD-10-CM

## 2018-08-23 DIAGNOSIS — Z79.01 LONG TERM (CURRENT) USE OF ANTICOAGULANTS: ICD-10-CM

## 2018-08-23 DIAGNOSIS — Z79.02 ANTIPLATELET OR ANTITHROMBOTIC LONG-TERM USE: ICD-10-CM

## 2018-08-23 LAB — INR BLD: 2.24 (ref 0.85–1.13)

## 2018-08-23 PROCEDURE — 99282 EMERGENCY DEPT VISIT SF MDM: CPT

## 2018-08-23 PROCEDURE — 12001 RPR S/N/AX/GEN/TRNK 2.5CM/<: CPT

## 2018-08-23 PROCEDURE — 90471 IMMUNIZATION ADMIN: CPT | Performed by: EMERGENCY MEDICINE

## 2018-08-23 PROCEDURE — 85610 PROTHROMBIN TIME: CPT

## 2018-08-23 PROCEDURE — 6360000002 HC RX W HCPCS: Performed by: EMERGENCY MEDICINE

## 2018-08-23 PROCEDURE — 2709999900 HC NON-CHARGEABLE SUPPLY

## 2018-08-23 PROCEDURE — 36415 COLL VENOUS BLD VENIPUNCTURE: CPT

## 2018-08-23 PROCEDURE — 90715 TDAP VACCINE 7 YRS/> IM: CPT | Performed by: EMERGENCY MEDICINE

## 2018-08-23 RX ORDER — CEPHALEXIN 500 MG/1
500 CAPSULE ORAL 4 TIMES DAILY
Qty: 20 CAPSULE | Refills: 0 | Status: SHIPPED | OUTPATIENT
Start: 2018-08-23 | End: 2018-09-13 | Stop reason: ALTCHOICE

## 2018-08-23 RX ORDER — LIDOCAINE HYDROCHLORIDE AND EPINEPHRINE 10; 10 MG/ML; UG/ML
INJECTION, SOLUTION INFILTRATION; PERINEURAL
Status: DISCONTINUED
Start: 2018-08-23 | End: 2018-08-24 | Stop reason: HOSPADM

## 2018-08-23 RX ORDER — LIDOCAINE HYDROCHLORIDE AND EPINEPHRINE BITARTRATE 20; .01 MG/ML; MG/ML
INJECTION, SOLUTION SUBCUTANEOUS
Status: DISCONTINUED
Start: 2018-08-23 | End: 2018-08-24 | Stop reason: HOSPADM

## 2018-08-23 RX ADMIN — TETANUS TOXOID, REDUCED DIPHTHERIA TOXOID AND ACELLULAR PERTUSSIS VACCINE, ADSORBED 0.5 ML: 5; 2.5; 8; 8; 2.5 SUSPENSION INTRAMUSCULAR at 22:13

## 2018-08-23 ASSESSMENT — ENCOUNTER SYMPTOMS
ROS SKIN COMMENTS: SKIN TEAR TO RIGHT FOREARM
COUGH: 0
EYE DISCHARGE: 0
WHEEZING: 0
NAUSEA: 0
DIARRHEA: 0
ABDOMINAL DISTENTION: 0
ABDOMINAL PAIN: 0
RHINORRHEA: 0
SHORTNESS OF BREATH: 0
VOMITING: 0
EYE ITCHING: 0

## 2018-08-24 ENCOUNTER — TELEPHONE (OUTPATIENT)
Dept: PHARMACY | Age: 78
End: 2018-08-24

## 2018-08-24 ENCOUNTER — CARE COORDINATION (OUTPATIENT)
Dept: CASE MANAGEMENT | Age: 78
End: 2018-08-24

## 2018-08-24 NOTE — CARE COORDINATION
Care Transition  ED Follow up Call    Reason for ED visit: Skin tear, uncontrolled bleeding    How are you feeling? \"real good\"  Status:     improved      Do you have any questions related to your discharge instructions? no    Review of Instructions:    Discharged with new prescription? yes - Keflex   Review Medications:  Yes   Do you have any questions related to your medications? no    Understands what to report/when to return?:  Yes   Do you have a follow up appointment scheduled? Yes  Specific review if indicated (symptom, services, etc): Spoke with Gabe Urrutia, introduced self/role. Reports doing \"real good\". Denies further bleeding or signs of infection. Taking Keflex without difficulty. Dressing intact, understands to leave on until PCP appt. Has PCP appt on 8/27. Denies further needs/assistance/concerns at this time.        Do you have any needs or concerns I can assist you with? no     FU appts/Provider:    Future Appointments  Date Time Provider Matias Torres   8/27/2018 10:00 AM Nida Koyanagi, MD [facility]   8/30/2018 11:00 AM Kobi Maciel, [address] [place] [address] [place]   10/11/2018 10:40 AM Derek Daniel MD [facility]   3/27/2019 10:30 AM Vivek Barnes [facility] [place]   4/3/2019 9:30 AM [facility] PULMONARY FUNCTION ROOM 1 [facility] PFT None   4/9/2019 10:15 AM LARRY Summers - CNP Pulm Med [address] [facility]

## 2018-08-24 NOTE — ED TRIAGE NOTES
Pt to ed c/o skin tear and bleeding since Monday. Pt states that he is on blood thinners and that is the main concern. Dr. Lemuel Caldera at the bedside to assessment. Skin tear covered with papertape and bleeding currently controlled.  Dr. Lemuel Caldera discussing the [address] with pt

## 2018-08-24 NOTE — ED PROVIDER NOTES
(gastroesophageal reflux disease); History of colon cancer; History of esophageal cancer; Hx of blood clots; Hypertension; Kidney stone on left side; Nausea & vomiting; Pericarditis; Personal history of DVT (deep vein thrombosis); Personal history of pulmonary embolism; Pseudogout; and Type II or unspecified type diabetes mellitus without mention of complication, not stated as uncontrolled. 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 CURRENT MEDICATIONS       Previous Medications    ACETAMINOPHEN (TYLENOL) 325 MG TABLET    Take 650 mg by mouth every 6 hours as needed for Pain Don't take more than 3,000 mg per day. ALBUTEROL-IPRATROPIUM (COMBIVENT RESPIMAT)  MCG/ACT AERS INHALER    Inhale 1 puff into the lungs every 6 hours as needed for Wheezing    ARFORMOTEROL TARTRATE (BROVANA) 15 MCG/2ML NEBU    Take 2 mLs by nebulization 2 times daily    ATENOLOL (TENORMIN) 100 MG TABLET    take 1 tablet by mouth once daily    BLOOD GLUCOSE MONITORING SUPPL (ONE TOUCH ULTRA SYSTEM KIT) W/DEVICE KIT    Test blood sugar daily Dx E11.9    BUDESONIDE (PULMICORT) 0.5 MG/2ML NEBULIZER SUSPENSION    Take 2 mLs by nebulization 2 times daily    CLOPIDOGREL (PLAVIX) 75 MG TABLET    take 1 tablet by mouth once daily    FINASTERIDE (PROSCAR) 5 MG TABLET    Take 1 tablet by mouth daily    FUROSEMIDE (LASIX) 40 MG TABLET    take 1 tablet by mouth once daily    IPRATROPIUM-ALBUTEROL (DUONEB) 0.5-2.5 (3) MG/3ML SOLN NEBULIZER SOLUTION    Inhale 3 mLs into the lungs every 6 hours as needed for Shortness of Breath (dyspnea or wheezes. ) .     ISOSORBIDE MONONITRATE [place]. Patient is observed in the ED for 2 hours and no more bleeding recurred. INR is 2.24 and a recommend he continue his Coumadin. He should be followed by PCP in 3 days. Keep dressing on until seen by PCP. Blood pressure screening was not performed due to known history of hypertension. CRITICAL CARE:   None    CONSULTS:  none    PROCEDURES:  None    FINAL IMPRESSION      1. Skin tear of right forearm without complication, initial encounter    2. Long term (current) use of anticoagulants    3. Antiplatelet or antithrombotic long-term use    4.  Primary hypertension          DISPOSITION/PLAN   Home    PATIENT REFERRED TO:  Ruben Gillespie MD  [address]  [phone]    In 3 days        DISCHARGE MEDICATIONS:  New Prescriptions    CEPHALEXIN (KEFLEX) 500 MG CAPSULE    Take 1 capsule by mouth 4 times daily       (Please note that portions of this note were completed with a voice recognition program.  Efforts were made to edit the dictations but occasionally words are mis-transcribed.)    MD Kirt Esquivel MD  08/23/18 [id number]

## 2018-08-27 ENCOUNTER — OFFICE VISIT (OUTPATIENT)
Dept: FAMILY MEDICINE CLINIC | Age: 78
End: 2018-08-27

## 2018-08-27 VITALS
HEART RATE: 72 BPM | BODY MASS INDEX: 24.11 KG/M2 | RESPIRATION RATE: 16 BRPM | SYSTOLIC BLOOD PRESSURE: 102 MMHG | HEIGHT: 71 IN | WEIGHT: 172.25 LBS | DIASTOLIC BLOOD PRESSURE: 60 MMHG

## 2018-08-27 DIAGNOSIS — S41.111A SKIN TEAR OF RIGHT UPPER EXTREMITY: Primary | ICD-10-CM

## 2018-08-27 DIAGNOSIS — Z89.111: ICD-10-CM

## 2018-08-27 PROCEDURE — 99213 OFFICE O/P EST LOW 20 MIN: CPT | Performed by: FAMILY MEDICINE

## 2018-08-27 PROCEDURE — 1101F PT FALLS ASSESS-DOCD LE1/YR: CPT | Performed by: FAMILY MEDICINE

## 2018-08-27 ASSESSMENT — ENCOUNTER SYMPTOMS
SINUS PRESSURE: 0
SHORTNESS OF BREATH: 1
CONSTIPATION: 0

## 2018-08-27 NOTE — PROGRESS NOTES
Subjective:      Patient ID: Luis Eduardo Gimenez is a 65 y.o. male. HPI  1. He scratched the right forearm on the watch and then the bandaid tore off more skin. 2. He went to the ED and the tetnus was updated  Review of Systems   Constitutional: Negative for fatigue. HENT: Negative for sinus pressure. Eyes: Negative for visual disturbance. Respiratory: Positive for shortness of breath. Cardiovascular: Negative for chest pain. Gastrointestinal: Negative for constipation. Genitourinary: Negative. Musculoskeletal: Positive for gait problem. Negative for arthralgias. Skin: Positive for wound. Negative for rash. Neurological: Positive for weakness. Negative for headaches. The patient's medications, allergies, past medical problems, surgical, social, and family histories were reviewed and updated as needed. Objective:   Physical Exam   Constitutional: He is oriented to person, place, and time. He appears well-developed and well-nourished. No distress. HENT:   Head: Normocephalic and atraumatic. Eyes: Conjunctivae are normal. No scleral icterus. Neck: No tracheal deviation present. Cardiovascular: Normal rate. Pulmonary/Chest: Effort normal.   Musculoskeletal: He exhibits deformity (he has the partial amputation of the riight hand). He exhibits no edema. Arms:  Neurological: He is alert and oriented to person, place, and time. Skin: Skin is warm and dry. Psychiatric: He has a normal mood and affect. His behavior is normal.   Blood pressure 102/60, pulse 72, resp. rate 16, height 5' 11\" (1.803 m), weight 172 lb 4 oz (78.1 kg). The area was cleaned and a new non adherent dressing applied    Assessment:       Diagnosis Orders   1.  Skin tear of right upper extremity             Plan:      Leave the area open to air  Let us know if the area worsens in any way        Swapna Beach MD

## 2018-08-30 ENCOUNTER — HOSPITAL ENCOUNTER (OUTPATIENT)
Dept: PHARMACY | Age: 78
Setting detail: THERAPIES SERIES
Discharge: HOME | End: 2018-08-30
Payer: MEDICARE

## 2018-08-30 DIAGNOSIS — I26.99 OTHER PULMONARY EMBOLISM WITHOUT ACUTE COR PULMONALE, UNSPECIFIED CHRONICITY (HCC): ICD-10-CM

## 2018-08-30 LAB — POC INR: 2.5 (ref 0.8–1.2)

## 2018-08-30 PROCEDURE — 99211 OFF/OP EST MAY X REQ PHY/QHP: CPT

## 2018-08-30 PROCEDURE — 85610 PROTHROMBIN TIME: CPT

## 2018-08-30 PROCEDURE — 36416 COLLJ CAPILLARY BLOOD SPEC: CPT

## 2018-08-30 NOTE — PROGRESS NOTES
The [address] [facility]  Anticoagulation Clinic  [phone] (phone)  [phone] (fax)    Mr. Joshua Skaggs is a 65 y.o.  male with history of PE who presents today for anticoagulation monitoring and adjustment. Patient verifies current dosing regimen and tablet strength. No missed or extra doses. Patient denies s/s bleeding/bruising/swelling/SOB/chest pain  No blood in urine or stool. No dietary changes. No changes in medication/OTC agents/Herbals. No change in alcohol use or tobacco use. No change in activity level. Patient denies headaches/dizziness/lightheadedness/falls. No vomiting/diarrhea or acute illness. No Procedures scheduled in the future at this time. Patient was in the ER 8/23 for a skin tear and was prescribed cephalexin x 5 days. Assessment:   Lab Results   Component Value Date    INR 2.50 (H) 08/30/2018    INR 2.24 (H) 08/23/2018    INR 4.20 (H) 08/20/2018    PROTIME 1.89 (H) 09/03/2011    PROTIME 2.20 (H) 09/02/2011     INR therapeutic   Recent Labs      08/30/18   1100   INR  2.50*         Plan:  Continue Coumadin 2.5 mg PO daily. Recheck INR 2 weeks. Patient reminded to call the Anticoagulation Clinic with signs or symptoms of bleeding or with any medication changes. Patient given instructions utilizing the teach back method. Discharged ambulatory in no apparent distress. After visit summary printed and reviewed with patient.       Medications reviewed and updated on home medication list Yes    Influenza vaccine:     [] given    [x] declined   [x] received previously   [] plans to receive at a later time   [] refused    [x] documented in DANTE Pennington [facility], PharmD  PGY2 Ambulatory Care Resident

## 2018-09-04 ENCOUNTER — TELEPHONE (OUTPATIENT)
Dept: PULMONOLOGY | Age: 78
End: 2018-09-04

## 2018-09-05 DIAGNOSIS — Z87.891 HISTORY OF TOBACCO USE: ICD-10-CM

## 2018-09-05 DIAGNOSIS — R09.02 HYPOXIA: ICD-10-CM

## 2018-09-05 DIAGNOSIS — J44.9 MODERATE COPD (CHRONIC OBSTRUCTIVE PULMONARY DISEASE) (HCC): ICD-10-CM

## 2018-09-05 DIAGNOSIS — J96.11 CHRONIC RESPIRATORY FAILURE WITH HYPOXIA (HCC): ICD-10-CM

## 2018-09-05 RX ORDER — BUDESONIDE 0.5 MG/2ML
1 INHALANT ORAL 2 TIMES DAILY
Qty: 60 AMPULE | Refills: 11 | Status: SHIPPED | OUTPATIENT
Start: 2018-09-05 | End: 2020-01-01 | Stop reason: SDUPTHER

## 2018-09-05 RX ORDER — ARFORMOTEROL TARTRATE 15 UG/2ML
15 SOLUTION RESPIRATORY (INHALATION) 2 TIMES DAILY
Qty: 120 ML | Refills: 11 | Status: SHIPPED | OUTPATIENT
Start: 2018-09-05 | End: 2020-01-01 | Stop reason: SDUPTHER

## 2018-09-05 RX ORDER — IPRATROPIUM BROMIDE AND ALBUTEROL SULFATE 2.5; .5 MG/3ML; MG/3ML
3 SOLUTION RESPIRATORY (INHALATION) EVERY 6 HOURS PRN
Qty: 360 ML | Refills: 7 | Status: ON HOLD | OUTPATIENT
Start: 2018-09-05 | End: 2020-01-01 | Stop reason: HOSPADM

## 2018-09-12 DIAGNOSIS — Z79.01 ANTICOAGULATED ON COUMADIN: Primary | ICD-10-CM

## 2018-09-13 ENCOUNTER — HOSPITAL ENCOUNTER (OUTPATIENT)
Dept: PHARMACY | Age: 78
Setting detail: THERAPIES SERIES
Discharge: HOME | End: 2018-09-13
Payer: MEDICARE

## 2018-09-13 DIAGNOSIS — I26.99 OTHER PULMONARY EMBOLISM WITHOUT ACUTE COR PULMONALE, UNSPECIFIED CHRONICITY (HCC): ICD-10-CM

## 2018-09-13 LAB — POC INR: 1.4 (ref 0.8–1.2)

## 2018-09-13 PROCEDURE — 36416 COLLJ CAPILLARY BLOOD SPEC: CPT

## 2018-09-13 PROCEDURE — 99211 OFF/OP EST MAY X REQ PHY/QHP: CPT

## 2018-09-13 PROCEDURE — 85610 PROTHROMBIN TIME: CPT

## 2018-09-27 ENCOUNTER — HOSPITAL ENCOUNTER (OUTPATIENT)
Dept: PHARMACY | Age: 78
Setting detail: THERAPIES SERIES
Discharge: HOME | End: 2018-09-27
Payer: MEDICARE

## 2018-09-27 ENCOUNTER — HOSPITAL ENCOUNTER (OUTPATIENT)
Age: 78
Discharge: HOME | End: 2018-09-27
Payer: MEDICARE

## 2018-09-27 DIAGNOSIS — I26.99 OTHER PULMONARY EMBOLISM WITHOUT ACUTE COR PULMONALE, UNSPECIFIED CHRONICITY (HCC): ICD-10-CM

## 2018-09-27 LAB
ALBUMIN SERPL-MCNC: 3.7 G/DL (ref 3.5–5.1)
ALP BLD-CCNC: 85 U/L (ref 38–126)
ALT SERPL-CCNC: 13 U/L (ref 11–66)
ANION GAP SERPL CALCULATED.3IONS-SCNC: 11 MEQ/L (ref 8–16)
AST SERPL-CCNC: 24 U/L (ref 5–40)
BILIRUB SERPL-MCNC: 0.5 MG/DL (ref 0.3–1.2)
BILIRUBIN DIRECT: < 0.2 MG/DL (ref 0–0.3)
BUN BLDV-MCNC: 14 MG/DL (ref 7–22)
CALCIUM SERPL-MCNC: 9.2 MG/DL (ref 8.5–10.5)
CHLORIDE BLD-SCNC: 102 MEQ/L (ref 98–111)
CHOLESTEROL, TOTAL: 160 MG/DL (ref 100–199)
CO2: 28 MEQ/L (ref 23–33)
CREAT SERPL-MCNC: 0.8 MG/DL (ref 0.4–1.2)
ERYTHROCYTE [DISTWIDTH] IN BLOOD BY AUTOMATED COUNT: 14.5 % (ref 11.5–14.5)
ERYTHROCYTE [DISTWIDTH] IN BLOOD BY AUTOMATED COUNT: 53 FL (ref 35–45)
GFR SERPL CREATININE-BSD FRML MDRD: > 90 ML/MIN/1.73M2
GLUCOSE BLD-MCNC: 137 MG/DL (ref 70–108)
HCT VFR BLD CALC: 45.1 % (ref 42–52)
HDLC SERPL-MCNC: 58 MG/DL
HEMOGLOBIN: 14.4 GM/DL (ref 14–18)
LDL CHOLESTEROL CALCULATED: 89 MG/DL
MCH RBC QN AUTO: 31.5 PG (ref 26–33)
MCHC RBC AUTO-ENTMCNC: 31.9 GM/DL (ref 32.2–35.5)
MCV RBC AUTO: 98.7 FL (ref 80–94)
PLATELET # BLD: 316 THOU/MM3 (ref 130–400)
PMV BLD AUTO: 10.2 FL (ref 9.4–12.4)
POC INR: 2.7 (ref 0.8–1.2)
POTASSIUM SERPL-SCNC: 5.1 MEQ/L (ref 3.5–5.2)
RBC # BLD: 4.57 MILL/MM3 (ref 4.7–6.1)
SODIUM BLD-SCNC: 141 MEQ/L (ref 135–145)
TOTAL PROTEIN: 7.7 G/DL (ref 6.1–8)
TRIGL SERPL-MCNC: 64 MG/DL (ref 0–199)
WBC # BLD: 8.6 THOU/MM3 (ref 4.8–10.8)

## 2018-09-27 PROCEDURE — 80061 LIPID PANEL: CPT

## 2018-09-27 PROCEDURE — 90686 IIV4 VACC NO PRSV 0.5 ML IM: CPT | Performed by: INTERNAL MEDICINE

## 2018-09-27 PROCEDURE — 36416 COLLJ CAPILLARY BLOOD SPEC: CPT

## 2018-09-27 PROCEDURE — 82248 BILIRUBIN DIRECT: CPT

## 2018-09-27 PROCEDURE — 36415 COLL VENOUS BLD VENIPUNCTURE: CPT

## 2018-09-27 PROCEDURE — 6360000002 HC RX W HCPCS: Performed by: INTERNAL MEDICINE

## 2018-09-27 PROCEDURE — 85610 PROTHROMBIN TIME: CPT

## 2018-09-27 PROCEDURE — G0463 HOSPITAL OUTPT CLINIC VISIT: HCPCS

## 2018-09-27 PROCEDURE — 85027 COMPLETE CBC AUTOMATED: CPT

## 2018-09-27 PROCEDURE — 80053 COMPREHEN METABOLIC PANEL: CPT

## 2018-09-27 PROCEDURE — G0008 ADMIN INFLUENZA VIRUS VAC: HCPCS | Performed by: INTERNAL MEDICINE

## 2018-09-27 RX ADMIN — INFLUENZA A VIRUS A/MICHIGAN/45/2015 X-275 (H1N1) ANTIGEN (FORMALDEHYDE INACTIVATED), INFLUENZA A VIRUS A/SINGAPORE/INFIMH-16-0019/2016 IVR-186 (H3N2) ANTIGEN (FORMALDEHYDE INACTIVATED), INFLUENZA B VIRUS B/PHUKET/3073/2013 ANTIGEN (FORMALDEHYDE INACTIVATED), AND INFLUENZA B VIRUS B/MARYLAND/15/2016 BX-69A ANTIGEN (FORMALDEHYDE INACTIVATED) 0.5 ML: 15; 15; 15; 15 INJECTION, SUSPENSION INTRAMUSCULAR at 11:57

## 2018-10-03 ENCOUNTER — CARE COORDINATION (OUTPATIENT)
Dept: CARE COORDINATION | Age: 78
End: 2018-10-03

## 2018-10-11 ENCOUNTER — OFFICE VISIT (OUTPATIENT)
Dept: FAMILY MEDICINE CLINIC | Age: 78
End: 2018-10-11

## 2018-10-11 ENCOUNTER — HOSPITAL ENCOUNTER (OUTPATIENT)
Dept: PHARMACY | Age: 78
Setting detail: THERAPIES SERIES
Discharge: HOME | End: 2018-10-11
Payer: MEDICARE

## 2018-10-11 VITALS
HEART RATE: 68 BPM | RESPIRATION RATE: 14 BRPM | HEIGHT: 71 IN | DIASTOLIC BLOOD PRESSURE: 62 MMHG | WEIGHT: 173 LBS | SYSTOLIC BLOOD PRESSURE: 110 MMHG | BODY MASS INDEX: 24.22 KG/M2

## 2018-10-11 DIAGNOSIS — I26.99 OTHER ACUTE PULMONARY EMBOLISM WITHOUT ACUTE COR PULMONALE (HCC): ICD-10-CM

## 2018-10-11 DIAGNOSIS — K21.9 GASTROESOPHAGEAL REFLUX DISEASE, ESOPHAGITIS PRESENCE NOT SPECIFIED: ICD-10-CM

## 2018-10-11 DIAGNOSIS — Z85.038 HISTORY OF COLON CANCER: ICD-10-CM

## 2018-10-11 DIAGNOSIS — Z85.01 HISTORY OF ESOPHAGEAL CANCER: ICD-10-CM

## 2018-10-11 DIAGNOSIS — J44.9 COPD, SEVERE (HCC): ICD-10-CM

## 2018-10-11 DIAGNOSIS — Z86.711 HISTORY OF PULMONARY EMBOLISM: ICD-10-CM

## 2018-10-11 DIAGNOSIS — E11.8 TYPE 2 DIABETES MELLITUS WITH COMPLICATION, WITHOUT LONG-TERM CURRENT USE OF INSULIN (HCC): Primary | ICD-10-CM

## 2018-10-11 DIAGNOSIS — R49.9 HOARSENESS OR CHANGING VOICE: ICD-10-CM

## 2018-10-11 LAB
GLUCOSE BLD-MCNC: 142 MG/DL
HBA1C MFR BLD: 6.1 %
POC INR: 2.2 (ref 0.8–1.2)

## 2018-10-11 PROCEDURE — 99211 OFF/OP EST MAY X REQ PHY/QHP: CPT | Performed by: PHARMACIST

## 2018-10-11 PROCEDURE — 82962 GLUCOSE BLOOD TEST: CPT | Performed by: EMERGENCY MEDICINE

## 2018-10-11 PROCEDURE — 85610 PROTHROMBIN TIME: CPT | Performed by: PHARMACIST

## 2018-10-11 PROCEDURE — 83036 HEMOGLOBIN GLYCOSYLATED A1C: CPT | Performed by: EMERGENCY MEDICINE

## 2018-10-11 PROCEDURE — 36416 COLLJ CAPILLARY BLOOD SPEC: CPT | Performed by: PHARMACIST

## 2018-10-11 PROCEDURE — 1101F PT FALLS ASSESS-DOCD LE1/YR: CPT | Performed by: EMERGENCY MEDICINE

## 2018-10-11 PROCEDURE — 99214 OFFICE O/P EST MOD 30 MIN: CPT | Performed by: EMERGENCY MEDICINE

## 2018-10-11 ASSESSMENT — PATIENT HEALTH QUESTIONNAIRE - PHQ9
2. FEELING DOWN, DEPRESSED OR HOPELESS: 0
SUM OF ALL RESPONSES TO PHQ9 QUESTIONS 1 & 2: 0
SUM OF ALL RESPONSES TO PHQ QUESTIONS 1-9: 0
1. LITTLE INTEREST OR PLEASURE IN DOING THINGS: 0
SUM OF ALL RESPONSES TO PHQ QUESTIONS 1-9: 0

## 2018-10-11 ASSESSMENT — ENCOUNTER SYMPTOMS
SINUS PRESSURE: 0
WHEEZING: 0
VOICE CHANGE: 1
BACK PAIN: 0
TROUBLE SWALLOWING: 0
SORE THROAT: 0
NAUSEA: 0
SHORTNESS OF BREATH: 0
DIARRHEA: 0
RHINORRHEA: 0
CONSTIPATION: 0
VOMITING: 0
COUGH: 0
ABDOMINAL PAIN: 0
CHEST TIGHTNESS: 0

## 2018-10-11 ASSESSMENT — COPD QUESTIONNAIRES: COPD: 1

## 2018-10-19 ENCOUNTER — HOSPITAL ENCOUNTER (OUTPATIENT)
Dept: CT IMAGING | Age: 78
Discharge: HOME | End: 2018-10-19
Payer: MEDICARE

## 2018-10-19 DIAGNOSIS — I26.99 OTHER ACUTE PULMONARY EMBOLISM WITHOUT ACUTE COR PULMONALE (HCC): ICD-10-CM

## 2018-10-19 DIAGNOSIS — Z85.038 HISTORY OF COLON CANCER: ICD-10-CM

## 2018-10-19 DIAGNOSIS — Z85.01 HISTORY OF ESOPHAGEAL CANCER: ICD-10-CM

## 2018-10-19 PROCEDURE — 71260 CT THORAX DX C+: CPT

## 2018-10-19 PROCEDURE — 6360000004 HC RX CONTRAST MEDICATION: Performed by: EMERGENCY MEDICINE

## 2018-10-19 PROCEDURE — 74177 CT ABD & PELVIS W/CONTRAST: CPT

## 2018-10-19 RX ADMIN — IOHEXOL 50 ML: 240 INJECTION, SOLUTION INTRATHECAL; INTRAVASCULAR; INTRAVENOUS; ORAL at 14:53

## 2018-10-19 RX ADMIN — IOPAMIDOL 85 ML: 755 INJECTION, SOLUTION INTRAVENOUS at 14:53

## 2018-10-22 ENCOUNTER — TELEPHONE (OUTPATIENT)
Dept: FAMILY MEDICINE CLINIC | Age: 78
End: 2018-10-22

## 2018-11-08 ENCOUNTER — HOSPITAL ENCOUNTER (OUTPATIENT)
Dept: PHARMACY | Age: 78
Setting detail: THERAPIES SERIES
Discharge: HOME | End: 2018-11-08
Payer: MEDICARE

## 2018-11-08 DIAGNOSIS — I26.99 OTHER ACUTE PULMONARY EMBOLISM WITHOUT ACUTE COR PULMONALE (HCC): ICD-10-CM

## 2018-11-08 LAB — POC INR: 2.7 (ref 0.8–1.2)

## 2018-11-08 PROCEDURE — 99211 OFF/OP EST MAY X REQ PHY/QHP: CPT

## 2018-11-08 PROCEDURE — 36416 COLLJ CAPILLARY BLOOD SPEC: CPT

## 2018-11-08 PROCEDURE — 85610 PROTHROMBIN TIME: CPT

## 2018-11-08 NOTE — PROGRESS NOTES
Medication Management [facility]  Anticoagulation Clinic  [phone] (phone)  [phone] (fax)    Mr. Lakia Maria is a 65 y.o.  male with history of PE, per Dr. Castillo Organ referral, who presents today for Warfarin monitoring and adjustment (4 week visit). Patient verifies current dosing regimen and tablet strength. No missed or extra doses. Patient denies bleeding/swelling/chest pain. Has usual TONG/easy bruising. No blood in urine or stool. No dietary changes. No changes in medication/OTC agents/herbals. No change in alcohol use or tobacco use. No change in activity level. Patient denies headaches/dizziness/lightheadedness/falls. No vomiting/diarrhea or acute illness. Procedures scheduled in the future at this time: endoscopy per Dr. Kassie Harrington 11/15. States was told to stop Coumadin 3 days before and Plavix 7 days before. Assessment:   Lab Results   Component Value Date    INR 2.70 (H) 11/08/2018    INR 2.20 (H) 10/11/2018    INR 2.70 (H) 09/27/2018    PROTIME 1.89 (H) 09/03/2011    PROTIME 2.20 (H) 09/02/2011     INR therapeutic - goal 2-3. Recent Labs      11/08/18   1328   INR  2.70*       Plan:  POCT INR ordered/performed/result reviewed. Continue Coumadin 2.5 mg daily PO; hold 11/12 thru 11/15, then 5 mg daily for 2 days, then usual dose. Recheck INR 11/21. (Report given - orders entered by CEM Padilla, PharmD.)  Patient reminded to call the Anticoagulation Clinic with any signs or symptoms of bleeding or with any medication changes. Patient given instructions utilizing the teach back method. Discharged ambulatory in no apparent distress, with cane and wife. After visit summary printed and reviewed with patient.       Medications reviewed and updated on home medication list.    Influenza vaccine:     [] given    [x] declined   [x] received previously   [] plans to receive at a later time   [] refused    [x] documented in EPIC

## 2018-11-15 ENCOUNTER — TELEPHONE (OUTPATIENT)
Dept: PHARMACY | Age: 78
End: 2018-11-15

## 2018-11-15 NOTE — TELEPHONE ENCOUNTER
Tiffany Tam called and stated the he had his endoscopy and he was experiencing some bleeding so he was instructed to hold his Coumadin for 3 more days and then resume his Coumadin. After speaking with Nagi PAYTON, I instructed the patient to take 5 mg for 2 days and then resume his normal dose and come for an appointment 11/26/18.

## 2018-11-21 ENCOUNTER — APPOINTMENT (OUTPATIENT)
Dept: PHARMACY | Age: 78
End: 2018-11-21
Payer: MEDICARE

## 2018-11-26 ENCOUNTER — HOSPITAL ENCOUNTER (OUTPATIENT)
Dept: PHARMACY | Age: 78
Setting detail: THERAPIES SERIES
Discharge: HOME | End: 2018-11-26
Payer: MEDICARE

## 2018-11-26 DIAGNOSIS — I26.99 OTHER ACUTE PULMONARY EMBOLISM WITHOUT ACUTE COR PULMONALE (HCC): ICD-10-CM

## 2018-11-26 LAB — POC INR: 1.6 (ref 0.8–1.2)

## 2018-11-26 PROCEDURE — 36416 COLLJ CAPILLARY BLOOD SPEC: CPT

## 2018-11-26 PROCEDURE — 85610 PROTHROMBIN TIME: CPT

## 2018-11-26 PROCEDURE — 99211 OFF/OP EST MAY X REQ PHY/QHP: CPT

## 2018-11-27 ENCOUNTER — OFFICE VISIT (OUTPATIENT)
Dept: ENT CLINIC | Age: 78
End: 2018-11-27
Payer: MEDICARE

## 2018-11-27 VITALS
SYSTOLIC BLOOD PRESSURE: 116 MMHG | HEART RATE: 64 BPM | RESPIRATION RATE: 14 BRPM | BODY MASS INDEX: 24.01 KG/M2 | DIASTOLIC BLOOD PRESSURE: 70 MMHG | WEIGHT: 171.5 LBS | HEIGHT: 71 IN | TEMPERATURE: 97.5 F

## 2018-11-27 DIAGNOSIS — C15.9 SQUAMOUS CELL CARCINOMA, ESOPHAGUS (HCC): ICD-10-CM

## 2018-11-27 DIAGNOSIS — R49.0 HOARSENESS: Primary | ICD-10-CM

## 2018-11-27 DIAGNOSIS — J38.7: ICD-10-CM

## 2018-11-27 PROCEDURE — G8420 CALC BMI NORM PARAMETERS: HCPCS | Performed by: OTOLARYNGOLOGY

## 2018-11-27 PROCEDURE — 1123F ACP DISCUSS/DSCN MKR DOCD: CPT | Performed by: OTOLARYNGOLOGY

## 2018-11-27 PROCEDURE — 31575 DIAGNOSTIC LARYNGOSCOPY: CPT | Performed by: OTOLARYNGOLOGY

## 2018-11-27 PROCEDURE — G8427 DOCREV CUR MEDS BY ELIG CLIN: HCPCS | Performed by: OTOLARYNGOLOGY

## 2018-11-27 PROCEDURE — 1101F PT FALLS ASSESS-DOCD LE1/YR: CPT | Performed by: OTOLARYNGOLOGY

## 2018-11-27 PROCEDURE — G8482 FLU IMMUNIZE ORDER/ADMIN: HCPCS | Performed by: OTOLARYNGOLOGY

## 2018-11-27 PROCEDURE — 1036F TOBACCO NON-USER: CPT | Performed by: OTOLARYNGOLOGY

## 2018-11-27 PROCEDURE — 4040F PNEUMOC VAC/ADMIN/RCVD: CPT | Performed by: OTOLARYNGOLOGY

## 2018-11-27 PROCEDURE — 99203 OFFICE O/P NEW LOW 30 MIN: CPT | Performed by: OTOLARYNGOLOGY

## 2018-11-27 ASSESSMENT — ENCOUNTER SYMPTOMS
VOICE CHANGE: 1
STRIDOR: 0
SHORTNESS OF BREATH: 0
DIARRHEA: 0
VOMITING: 0
NAUSEA: 0
COLOR CHANGE: 0
TROUBLE SWALLOWING: 0
COUGH: 1
FACIAL SWELLING: 0
RHINORRHEA: 1
SORE THROAT: 0
CHEST TIGHTNESS: 0
APNEA: 0
CHOKING: 0
SINUS PRESSURE: 0
ABDOMINAL PAIN: 0
WHEEZING: 0

## 2018-11-27 NOTE — PROGRESS NOTES
[address], NOSE AND THROAT  [facility]  [address]  [address] [address]  Dept: [phone]  Dept Fax: [phone]  Loc: [phone]    Kajal Valenzuela is a 77 y.o. male who was referred Lianet Zelaya MD for:  Chief Complaint   Patient presents with   [facility] patient is here for hoarness or change of voice ref by Dr. Ellie Arroyo   . HPI:     Kajal Valenzuela is a 77 y.o. male who presents today for 4 month history of gradually worsening hoarseness. Has a very breathy voice. He has some difficulty swallowing and coughing. Has a history of esophageal carcinoma removed 13 years ago with a stomach pull up. This anastomosis was dilated recently with some improvement in his swallowing. Key Miriam History: Allergies   Allergen Reactions    Norco [Hydrocodone-Acetaminophen] Nausea And Vomiting     Current Outpatient Prescriptions   Medication Sig Dispense Refill    ipratropium-albuterol (DUONEB) 0.5-2.5 (3) MG/3ML SOLN nebulizer solution Inhale 3 mLs into the lungs every 6 hours as needed for Shortness of Breath (dyspnea or wheezes. ) .  360 mL 7    budesonide (PULMICORT) 0.5 MG/2ML nebulizer suspension Take 2 mLs by nebulization 2 times daily 60 ampule 11    Arformoterol Tartrate (BROVANA) 15 MCG/2ML NEBU Take 2 mLs by nebulization 2 times daily 120 mL 11    furosemide (LASIX) 40 MG tablet take 1 tablet by mouth once daily 90 tablet 1    atenolol (TENORMIN) 100 MG tablet take 1 tablet by mouth once daily 90 tablet 1    warfarin (COUMADIN) 5 MG tablet Take as directed by [facility], #75=90 day supply 75 tablet 3    tamsulosin (FLOMAX) 0.4 MG capsule Take 1 capsule by mouth nightly 90 capsule 3    finasteride (PROSCAR) 5 MG tablet Take 1 tablet by mouth daily 90 tablet 3    omeprazole (PRILOSEC) 40 MG delayed release capsule Take 1 capsule by mouth daily (Patient taking differently: Take 40 mg by mouth 2 times daily ) 180 capsule 1    ONE TOUCH ULTRA TEST strip Test daily. Dx: E11.9 100 strip 2    Blood Glucose Monitoring Suppl (ONE TOUCH ULTRA SYSTEM KIT) w/Device KIT Test blood sugar daily Dx E11.9 1 kit 0    albuterol-ipratropium (COMBIVENT RESPIMAT)  MCG/ACT AERS inhaler Inhale 1 puff into the lungs every 6 hours as needed for Wheezing 1 Inhaler 5    clopidogrel (PLAVIX) 75 MG tablet take 1 tablet by mouth once daily  0    acetaminophen (TYLENOL) 325 MG tablet Take 650 mg by mouth every 6 hours as needed for Pain Don't take more than 3,000 mg per day.  isosorbide mononitrate (IMDUR) 30 MG CR tablet Take 30 mg by mouth daily. Indications: Chest Pain      nitroGLYCERIN (NITROSTAT) 0.4 MG SL tablet Place 0.4 mg under the tongue every 5 minutes as needed for Chest pain. If third one does not relieve pain, call 9-1-1.  Respiratory Therapy Supplies (NEBULIZER COMPRESSOR) KIT by Does not apply route. 1 kit 0     No current facility-administered medications for this visit.       Past Medical History:   Diagnosis Date    Arthritis     Hunt syndrome 2013    intestinal mucosa ( HX esophageal resection w/ pull through    CAD (coronary artery disease)     Clotting disorder (HCC)     COPD (chronic obstructive pulmonary disease) (HCC)     COPD (chronic obstructive pulmonary disease) (HCC)     GERD (gastroesophageal reflux disease)     History of colon cancer 1997    Status post ressection    History of esophageal cancer 2000    Status post resection in [facility]. Hx of blood clots     LEG, LUNGS    Hypertension     Kidney stone on left side 7/2010    Nausea & vomiting     Pericarditis     History of    Personal history of DVT (deep vein thrombosis)     Personal history of pulmonary embolism     Pseudogout 12/2005    Type II or unspecified type diabetes mellitus without mention of complication, not stated as uncontrolled       Past Surgical History:   Procedure Laterality Date    COLONOSCOPY  6/6/2011     environmental allergies, food allergies and immunocompromised state. Neurological: Negative for dizziness, syncope, facial asymmetry, speech difficulty, light-headedness and headaches. Hematological: Negative for adenopathy. Does not bruise/bleed easily. Psychiatric/Behavioral: Negative for confusion and sleep disturbance. The patient is not nervous/anxious. Objective:   /70 (Site: Left Upper Arm, Position: Sitting)   Pulse 64   Temp 97.5 °F (36.4 °C) (Oral)   Resp 14   Ht 5' 11\" (1.803 m)   Wt 171 lb 8 oz (77.8 kg)   BMI 23.92 kg/m²     Physical Exam   Constitutional: He is oriented to person, place, and time. He appears well-developed and well-nourished. He is cooperative. HENT:   Head: Normocephalic and atraumatic. Head is without laceration. Right Ear: Hearing, external ear and ear canal normal. No drainage or swelling. Tympanic membrane is not perforated and not erythematous. No middle ear effusion. Left Ear: Hearing, tympanic membrane, external ear and ear canal normal. No drainage or swelling. Tympanic membrane is not perforated and not erythematous. No middle ear effusion. Nose: Nose normal. No mucosal edema, rhinorrhea or septal deviation. Mouth/Throat: Uvula is midline, oropharynx is clear and moist and mucous membranes are normal. Mucous membranes are not pale and not dry. No oral lesions. No uvula swelling. No oropharyngeal exudate, posterior oropharyngeal edema or posterior oropharyngeal erythema. Turbinates: normal  LIps: lips normal    Teeth and gums: Full dentures  Mallampati 1  Tonsils:Unremarkable  Base of tongue: symmetric,  Larynx, mirror exam: unable due to gag reflex  Voice: Breathy hoarseness     Eyes: Right eye exhibits normal extraocular motion and no nystagmus. Left eye exhibits normal extraocular motion and no nystagmus. Conjugate gaze   Neck: Trachea normal and phonation normal. Neck supple. No tracheal deviation present.  No thyroid mass and no assist with closure. I would consider a bolus of steroids  if this does not help. Also consider videostrobe of the larynx and CT of the neck with contrast      Return in about 2 months (around 1/27/2019) for Follow-Up hoarseness. Kandace Schuster. Iram Brito MD    **This report has been created using voice recognition software. It may contain minor errors which are inherent in voicerecognition technology. **

## 2018-11-29 ENCOUNTER — HOSPITAL ENCOUNTER (OUTPATIENT)
Dept: SPEECH THERAPY | Age: 78
Setting detail: THERAPIES SERIES
Discharge: HOME | End: 2018-11-29
Payer: MEDICARE

## 2018-11-29 PROCEDURE — G9172 VOICE GOAL STATUS: HCPCS | Performed by: SPEECH-LANGUAGE PATHOLOGIST

## 2018-11-29 PROCEDURE — G9171 VOICE CURRENT STATUS: HCPCS | Performed by: SPEECH-LANGUAGE PATHOLOGIST

## 2018-11-29 PROCEDURE — 92524 BEHAVRAL QUALIT ANALYS VOICE: CPT | Performed by: SPEECH-LANGUAGE PATHOLOGIST

## 2018-11-29 NOTE — PROGRESS NOTES
** PLEASE SIGN, DATE AND TIME CERTIFICATION BELOW AND RETURN TO [facility] OUTPATIENT REHABILITATION (FAX #: [phone]). ATTEST/CO-SIGN IF ACCESSING VIA [facility]. THANK YOU.**    I certify that I have examined the patient below and determined that Physical Medicine and Rehabilitation service is necessary and that I approve the established plan of care for up to 90 days or as specifically noted. Attestation, signature or co-signature of physician indicates approval of certification requirements.    ________________________ ____________ __________  Physician Signature   Date   Time    [facility]  Speech Therapy  VOICE EVALUATION      [x]Outpatient [address]  [][facility]  [][facility] [address] [] Rehab [] TCU    Date: 2018  Patient Name: Diallo Stephens      CSN: [id number]   : 1940  (77 y.o.)  Gender: male   Referring Physician:  Dr. Amanda Eugene  Diagnosis: Hoarseness, Cricoarytenoid joint dysfunction  Secondary Diagnosis:  dysphonia  Insurance/Certification Information: Medicare  Visit number / total approved visits: 1 - unlimited visits based on medical necessity  Visit count since last progress note:  1  Certification Date:   Diet:  Regular with thin liquids  History of Present Illness:  Patient reports he started with a hoarse vocal quality about 1-2 months ago and it has gradually gotten worse. Patient reports others sometimes have difficulty hearing him. Patient also reports some difficulty with swallowing and he had an EGD with dilation of the esophagus about 2 weeks ago, which seemed to help his swallowing. Voice evaluation to be completed to initiate treatment to address the hoarseness. Patient has a past medical history of Arthritis; Hunt syndrome; CAD (coronary artery disease);  Clotting disorder ([facility] [address] COPD (chronic obstructive pulmonary disease) ([facility] [address] COPD (chronic obstructive pulmonary disease) ([facility] [address] Gliding down the musical scale:  317 - 163 Hz range    [x]Comments: limited pitch range      VOCAL INFLECTION: [x]Normal []Monotone []Excessive      []Comments:    ORAL RESONANCE:  [x]Normal []Cul-de-sac []Chesty       []Variable      []Comments:    INTENSITY / LOUDNESS: []Normal []Too loud [x]Too soft []Variable      []Comments:       Avg dB  /a/     62.6 dB   Count 1-10     60.3 dB   [address] [address]     59.9 dB   Conversation     60.5 dB     VOCAL QUALITY:    Breathy []Mild [x]Moderate []Severe   Dysphonic []Mild [x]Moderate []Severe   Harsh []Mild []Moderate []Severe   Hoarse []Mild [x]Moderate []Severe   Strained/Strangled [x]Mild []Moderate []Severe   Strident []Mild []Moderate []Severe   Other: diplophonia [x]Mild  []Moderate []Severe     LARYNGEAL TENSION:   [x]Normal []Hypotension  []Hypertension   [x]Comments:  No tension noted majority of the time, but patient with noted laryngeal tension with sustained phonation tasks and higher and lower pitches at times. 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 Trial 1 Trial 2 Trial 3   /s/ 4.2 seconds 4.6 seconds 3.8 seconds   /z/ 1.1 seconds 1.2 seconds . 79 seconds     RATE OF SPEECH: [x]Normal  []Abnormal     []Comments:    IDENTIFIED VOCAL ABUSES / MISUSES:  []Yelling   []Singing (abusive) [x]Coughing []Throat clearing   []Persistent URI factor  []Grunting  []Smoking [x]Caffeine- 1 cup of coffee per day, 16-32 ounces of soda per day  []Vocally demanding job []Coaching  []Talking over equipment    [x]Reduced hydration - 32 ounces of water per day  []Smoky environments []Alcohol []Prolonged talking    DYSARTHRIA: []Yes  [x]No     []Comments:    CLINICAL IMPRESSIONS: Patient presents with moderate dysphonia characterized by a hoarse, breathy vocal quality with occasional straining, periods of diplophonia and impaired breath support for speech. RECOMMENDATIONS:   [x]Voice Therapy 2 x week / 6 weeks. Specific Interventions for next treatment may include: vocal adduction exercises, sustained phonation tasks, address breathing/respiration and phonation coordination   []Otolaryngologic (ENT) Evaluation   []Stroboscopy   []Primary Physician Evaluation / Consultation   []Audiology Evaluation   []Psychological Evaluation   []Other    EDUCATION:   Learner: [x]Patient [] Significant other [] Son/Daughter[] Parent     [] Other:   Education: [x] Reviewed results and recommendations of this evaluation     [] Reviewed diet and strategies     [] Reviewed signs, symptoms and risk of aspiration     [] Demonstrated how to thick liquid appropriately. [x] Reviewed goals and Plan of Care     [] OTHER:   Method: [x] Discussion [] Demonstration [] Hand-out     [] OTHER:   Evaluation of Education:     [x] Verbalizes understanding [] Demonstrates with assistance     [] Demonstrates without assistance [x]Needs further instruction     [] No evidence of learning  [] Family not present      SHORT TERM GOALS:   Short-term Goals  Timeframe for Short-term Goals: 4 weeks  Goal 1: Patient will complete vocal adduction exercises x 10 each for improved vocal fold closure and thus improved vocal quality. Goal 2: Patient will demonstrate coordination of respiration and phonation with min cues, 80% of the time for improved breath support for speech. Goal 3: Patient will complete sustained phonation tasks for a duration of 5+ seconds and no greater than . 7% RAP, 80% of the time for improved breath support and vocal quality. Goal 4: Patient will demonstrate use of a resonant voice with min cues during sustained phonation tasks and while reading words/phrases, 75% of the time to decrease laryngeal tension during speaking tasks.       LONG TERM GOALS:   Long-term Goals  Timeframe for Long-term Goals: 6 weeks  Goal 1: Patient's breath support and vocal quality will improve to close to baseline via sustained phonation for 8+ seconds (patient has hx of COPD) and % RAP of .7 or less. Time in:  1050  Time Out: 1150  Untimed treatment: 60  Timed treatment: 0  Total Time:  60 minutes        SLP G-Codes  Functional Assessment Tool Used: Functional Communication Measures  Score: 3  Functional Limitations: Voice  Voice Current Status (): At least 60 percent but less than 80 percent impaired, limited or restricted  Voice Goal Status ():  At least 40 percent but less than 60 percent impaired, limited or restricted      Ceasar Carter M.S. [facility]

## 2018-12-04 ENCOUNTER — HOSPITAL ENCOUNTER (OUTPATIENT)
Dept: SPEECH THERAPY | Age: 78
Setting detail: THERAPIES SERIES
Discharge: HOME | End: 2018-12-04
Payer: MEDICARE

## 2018-12-04 PROCEDURE — 92507 TX SP LANG VOICE COMM INDIV: CPT

## 2018-12-04 NOTE — PROGRESS NOTES
[address] THERAPY    [x] DAILY NOTE [] PROGRESS NOTE [] DISCHARGE NOTE    [x]Outpatient [address]   [][place] [address] [facility]   [][facility]      Date: 2018  Patient Name: Alice Mcmahan      CSN: [id number]   : 1940  (77 y.o.)  Gender: male   Referring Physician:  Dr. Maykel Watson   Diagnosis: Hoarseness, Cricoarytenoid joint dysfunction  Secondary Diagnosis:  Dysphonia   Insurance/Certification Information: Medicare   Visit number / total approved visits: 2 - unlimited visits based on medical necessity   Visit count since last progress note:  2  Certification Date:   Last scheduled appointment: 19    Subjective: Patient pleasant and cooperative, denies pain. Spouse, Americo Page, present. SHORT TERM GOALS:   Short-term Goals  Timeframe for Short-term Goals: 4 weeks  Goal 1: Patient will complete vocal adduction exercises x 10 each for improved vocal fold closure and thus improved vocal quality. INTERVENTIONS: Patient asked about the recommended frequency and duration of exercises. Recommended patient complete 10 repetitions of each exercise, 2-3x a day. Patient able to recall pull up on chair and sustain /i/ and reports he has been completing this exercise at home.   Demonstration via modeling and visual handout provided to complete the following adduction exercises:    Pull up on chair and sustain /i/:  x10 repetitions with fair success when provided mod cues to 'bear down' when pulling up on chair   Swallow and phonate: x10 repetitions with fair-good success when provided mod visual, verbal, and tactile cues   Gliding up and down the musical scale: x2 repetitions, required 'stair-stepping' up and down the musical scale due to difficulty with glides   Short, consecutive production of /a/ with laryngeal tension:  x10 repetitions with fair success when provided consistent verbal and visual cues     Goal 2: Patient will demonstrate coordination of respiration and phonation with min cues, 80% of the time for improved breath support for speech. INTERVENTIONS:  Provided visual demonstration of appropriate coordination of respiration and phonation and discussed use of diaphragmatic breathing to maximize breath support. Patient able to demonstrate coordination of respiration and phonation with diaphragmatic breathing during sustained vowel phonation with good success when provided mod cues. Goal 3: Patient will complete sustained phonation tasks for a duration of 5+ seconds and no greater than . 7% RAP, 80% of the time for improved breath support and vocal quality. INTERVENTIONS: Sustained /i/ phonation completed x10 repetitions with maximum phonation time of 1.9 seconds, average of 1.7 seconds, when provided mod cues for diaphragmatic breathing and coordination of respiration and phonation    Goal 4: Patient will demonstrate use of a resonant voice with min cues during sustained phonation tasks and while reading words/phrases, 75% of the time to decrease laryngeal tension during speaking tasks. INTERVENTIONS: Did not address due to focus on other goals.        LONG TERM GOALS:   Long-term Goals  Timeframe for Long-term Goals: 6 weeks  Goal 1: Patient's breath support and vocal quality will improve to close to baseline via sustained phonation for 8+ seconds (patient has hx of COPD) and % RAP of .7 or less.  ONGOING      Assessment: [x]Progressing towards goals []Not Progressing towards goals    Plan for Next Session: resonant voicing instruction; sustained phonation tasks; vocal adduction exercises     Patient Tolerance of Treatment:  [x]Tolerated well []Tolerated fair []Required rest breaks  []Fatigued    Education:  [x]Education was provided []Education not provided due to:  Learner:  [x]Patient [x]Significant Other []Other  Education provided regarding:  [x]Goals and POC []Diet and swallowing

## 2018-12-06 ENCOUNTER — HOSPITAL ENCOUNTER (OUTPATIENT)
Dept: SPEECH THERAPY | Age: 78
Setting detail: THERAPIES SERIES
Discharge: HOME | End: 2018-12-06
Payer: MEDICARE

## 2018-12-06 ENCOUNTER — HOSPITAL ENCOUNTER (OUTPATIENT)
Dept: PHARMACY | Age: 78
Setting detail: THERAPIES SERIES
Discharge: HOME | End: 2018-12-06
Payer: MEDICARE

## 2018-12-06 DIAGNOSIS — I26.99 OTHER ACUTE PULMONARY EMBOLISM WITHOUT ACUTE COR PULMONALE (HCC): ICD-10-CM

## 2018-12-06 LAB — POC INR: 3 (ref 0.8–1.2)

## 2018-12-06 PROCEDURE — 36416 COLLJ CAPILLARY BLOOD SPEC: CPT

## 2018-12-06 PROCEDURE — 99211 OFF/OP EST MAY X REQ PHY/QHP: CPT

## 2018-12-06 PROCEDURE — 92507 TX SP LANG VOICE COMM INDIV: CPT | Performed by: SPEECH-LANGUAGE PATHOLOGIST

## 2018-12-06 PROCEDURE — 85610 PROTHROMBIN TIME: CPT

## 2018-12-11 ENCOUNTER — APPOINTMENT (OUTPATIENT)
Dept: SPEECH THERAPY | Age: 78
End: 2018-12-11
Payer: MEDICARE

## 2018-12-13 ENCOUNTER — HOSPITAL ENCOUNTER (OUTPATIENT)
Dept: SPEECH THERAPY | Age: 78
Setting detail: THERAPIES SERIES
Discharge: HOME | End: 2018-12-13
Payer: MEDICARE

## 2018-12-13 PROCEDURE — 92507 TX SP LANG VOICE COMM INDIV: CPT | Performed by: SPEECH-LANGUAGE PATHOLOGIST

## 2018-12-13 NOTE — PROGRESS NOTES
[address] THERAPY    [x] DAILY NOTE [] PROGRESS NOTE [] DISCHARGE NOTE    [x]Outpatient [address]   [][place] [facility]   [][facility]      Date: 2018  Patient Name: Niki Keen      CSN: [id number]   : 1940  (77 y.o.)  Gender: male   Referring Physician:  Dr. Misti Shay   Diagnosis: Hoarseness, Cricoarytenoid joint dysfunction  Secondary Diagnosis:  Dysphonia   Insurance/Certification Information: Medicare   Visit number / total approved visits: 4 - unlimited visits based on medical necessity   Visit count since last progress note:  4  Certification Date: 4/28/15  Last scheduled appointment: 19    Subjective: Patient's spouse reports no improvement in the patient's voice since initiation of treatment. SHORT TERM GOALS:   Short-term Goals  Timeframe for Short-term Goals: 4 weeks  Goal 1: Patient will complete vocal adduction exercises x 10 each for improved vocal fold closure and thus improved vocal quality. INTERVENTIONS: Ongoing education regarding vocal adduction exercises and rationale for them. Patient completed the following this session:   - clasp hands in front of body and pull while sustaining /a/ for 2-3 seconds x 10 with fair to good voicing, no pitch breaks    - pull up on chair and sustain /a/ for 2-3 seconds x 10 with fair to good success overall, slightly improved vocal intensity    Goal 2: Patient will demonstrate coordination of respiration and phonation with min cues, 80% of the time for improved breath support for speech. INTERVENTIONS:  Min cues to pause and take another breath prior to starting sustained phonation tasks as the patient was noted to hold it for a few seconds waiting to start the sustained phonation tasks. Patient with good coordination of respiration and phonation during completion of vocal adduction exercises.     Goal 3: Patient will complete sustained phonation tasks for a duration of 5+ seconds and no greater than . 7% RAP, 80% of the time for improved breath support and vocal quality. INTERVENTIONS: Sustained /i/ phonation completed this session with the following results with use of the visi-pitch:   - trial 1:  1.3% RAP, 2.6 seconds, no pitch breaks   - trial 2:  1.2% RAP, 2.2 seconds, no pitch breaks   - trial 3:  1.5% RAP, 2.0 seconds, no pitch breaks   - trial 4:  1.7% RAP, 2.2 seconds, no pitch breaks   - trial 5:  1.5% RAP, 2.1 seconds, no pitch breaks    Goal 4: Patient will demonstrate use of a resonant voice with min cues during sustained phonation tasks and while reading words/phrases, 75% of the time to decrease laryngeal tension during speaking tasks. INTERVENTIONS: Patient independently stated the rationale for use of a resonant voice. Patient completed humming (/m/) in isolation with fair success overall. Carryover of resonant voice into sustained phonation tasks was fair as well with mod cues.       LONG TERM GOALS:   Long-term Goals  Timeframe for Long-term Goals: 6 weeks  Goal 1: Patient's breath support and vocal quality will improve to close to baseline via sustained phonation for 8+ seconds (patient has hx of COPD) and % RAP of .7 or less.  ONGOING      Assessment: [x]Progressing towards goals []Not Progressing towards goals    Plan for Next Session: resonant voicing instruction; sustained phonation tasks; vocal adduction exercises     Patient Tolerance of Treatment:  [x]Tolerated well []Tolerated fair []Required rest breaks  []Fatigued    Education:  [x]Education was provided []Education not provided due to:  Learner:  [x]Patient [x]Significant Other []Other  Education provided regarding:  [x]Goals and POC []Diet and swallowing precautions    [x]Home Exercise Program []Progress and/or discharge information  Method of Education: [x]Discussion [x]Demonstration []Handouts []Other:  Evaluation of Education:  [x]Verbalized

## 2018-12-18 ENCOUNTER — APPOINTMENT (OUTPATIENT)
Dept: SPEECH THERAPY | Age: 78
End: 2018-12-18
Payer: MEDICARE

## 2018-12-20 ENCOUNTER — HOSPITAL ENCOUNTER (OUTPATIENT)
Dept: SPEECH THERAPY | Age: 78
Setting detail: THERAPIES SERIES
Discharge: HOME | End: 2018-12-20
Payer: MEDICARE

## 2018-12-20 PROCEDURE — 92507 TX SP LANG VOICE COMM INDIV: CPT | Performed by: SPEECH-LANGUAGE PATHOLOGIST

## 2018-12-20 NOTE — PROGRESS NOTES
.7% RAP, 80% of the time for improved breath support and vocal quality. INTERVENTIONS: Sustained /i/ phonation completed this session with the following results with use of the visi-pitch:   - trial 1:  1.8% RAP, 1.5 seconds, no pitch breaks   - trial 2:  1.7% RAP, 1.4 seconds, no pitch breaks   - trial 3:  2.0% RAP, 1.2 seconds, no pitch breaks   - trial 4:  2.0% RAP, 1.1 seconds, no pitch breaks   - trial 5:  1.2% RAP, 2.6 seconds, no pitch breaks  **mod cues for use of abdominal/diaphragmatic breathing throughout the above sustained phonation tasks     Goal 4: Patient will demonstrate use of a resonant voice with min cues during sustained phonation tasks and while reading words/phrases, 75% of the time to decrease laryngeal tension during speaking tasks. INTERVENTIONS: Patient completed humming (/m/) in isolation with fair to good success overall, min cues. Patient then verbalized  /m/ + vowels with good use of resonant voicing. Carryover of resonant voicing with reading phrases filled with nasal phonemes was good. Patient given a handout to practice reading phrases with a resonant voice outside of therapy for improved carryover.       LONG TERM GOALS:   Long-term Goals  Timeframe for Long-term Goals: 6 weeks  Goal 1: Patient's breath support and vocal quality will improve to close to baseline via sustained phonation for 8+ seconds (patient has hx of COPD) and % RAP of .7 or less.  ONGOING      Assessment: [x]Progressing towards goals []Not Progressing towards goals    Plan for Next Session: resonant voicing instruction; sustained phonation tasks; vocal adduction exercises; abdominal/diaphragmatic breathing    Patient Tolerance of Treatment:  [x]Tolerated well []Tolerated fair []Required rest breaks  []Fatigued    Education:  [x]Education was provided []Education not provided due to:  Learner:  [x]Patient [x]Significant Other []Other  Education provided regarding:  [x]Goals and POC []Diet and swallowing

## 2018-12-26 ENCOUNTER — HOSPITAL ENCOUNTER (OUTPATIENT)
Dept: SPEECH THERAPY | Age: 78
Setting detail: THERAPIES SERIES
Discharge: HOME | End: 2018-12-26
Payer: MEDICARE

## 2018-12-26 PROCEDURE — 92507 TX SP LANG VOICE COMM INDIV: CPT | Performed by: SPEECH-LANGUAGE PATHOLOGIST

## 2018-12-26 NOTE — PROGRESS NOTES
[address] THERAPY    [x] DAILY NOTE [] PROGRESS NOTE [] DISCHARGE NOTE    [x]Outpatient [address]   [][place] [address] [facility]   [][facility]      Date: 2018  Patient Name: Jon Medina      CSN: [id number]   : 1940  (77 y.o.)  Gender: male   Referring Physician:  Dr. Trino Alvarez   Diagnosis: Hoarseness, Cricoarytenoid joint dysfunction  Secondary Diagnosis:  Dysphonia   Insurance/Certification Information: Medicare   Visit number / total approved visits: 6 - unlimited visits based on medical necessity   Visit count since last progress note:  6  Certification Date:   Last scheduled appointment: 19    Subjective: Patient reports he feels he has made \"some\" slow improvement with his voice since the start of therapy. Patient's spouse agrees that his voice sounds a little stronger and she can hear him a little better. SHORT TERM GOALS:   Short-term Goals  Timeframe for Short-term Goals: 4 weeks  Goal 1: Patient will complete vocal adduction exercises x 10 each for improved vocal fold closure and thus improved vocal quality. INTERVENTIONS: Ongoing education regarding vocal adduction exercises and rationale for them. Patient completed the following this session:   - clasp hands in front of body and pull for 3 seconds x 10 with fair success, min cues to hold breath throughout each repetition of this exercise to aid in vocal fold adduction. - pull up on chair for 3 seconds x 10 with fair to good success, min cues for timing of breathing and completion of this exercise     Goal 2: Patient will demonstrate coordination of respiration and phonation with min cues, 80% of the time for improved breath support for speech. INTERVENTIONS:  Further education provided regarding use of abdominal/diaphragmatic breathing as well as coordination of breathing and speaking.   Patient required min cues for correct breathing initially and then completed quick inhale with slower exhale x 5 with fair to good success. Patient required min cues for carryover of abdominal/diaphragmatic breathing during structured tasks. Goal 3: Patient will complete sustained phonation tasks for a duration of 5+ seconds and no greater than . 7% RAP, 80% of the time for improved breath support and vocal quality. INTERVENTIONS: Sustained /i/ phonation completed this session with the following results with use of the visi-pitch:   - trial 1:  1.5% RAP, 3.3 seconds, no pitch breaks   - trial 2:  1.5% RAP, 3.6 seconds, no pitch breaks   - trial 3:  1.4% RAP, 3.7 seconds, no pitch breaks   - trial 4:  1.7% RAP, 3.2 seconds, no pitch breaks, increased breathiness   - trial 5:  1.4% RAP, 3.2 seconds, no pitch breaks   - trial 6:  1.7% RAP, 2.1 seconds, no pitch breaks  **Improvement noted overall with breathing and vocal quality  **min cues for use of abdominal/diaphragmatic breathing throughout the above sustained phonation tasks   **diplophonia noted at times in conversation, but not during sustained phonation tasks    Goal 4: Patient will demonstrate use of a resonant voice with min cues during sustained phonation tasks and while reading words/phrases, 75% of the time to decrease laryngeal tension during speaking tasks. INTERVENTIONS: Patient completed humming (/m/) in isolation with good success overall, min cues. Patient then verbalized  /m/ + vowels x 1 each with good use of resonant voicing and good overall quality. Carryover of resonant voicing with reading phrases filled with nasal phonemes was good. Patient also read 6 additional phrases/sentences with good use of resonant voicing.   Patient educated to continue to focus on using a resonant voice at all times, including in conversation.       LONG TERM GOALS:   Long-term Goals  Timeframe for Long-term Goals: 6 weeks  Goal 1: Patient's breath support and vocal quality will improve to

## 2018-12-27 ENCOUNTER — HOSPITAL ENCOUNTER (OUTPATIENT)
Dept: SPEECH THERAPY | Age: 78
Setting detail: THERAPIES SERIES
Discharge: HOME | End: 2018-12-27
Payer: MEDICARE

## 2018-12-27 ENCOUNTER — HOSPITAL ENCOUNTER (OUTPATIENT)
Dept: PHARMACY | Age: 78
Setting detail: THERAPIES SERIES
Discharge: HOME | End: 2018-12-27
Payer: MEDICARE

## 2018-12-27 PROBLEM — I26.99 PULMONARY EMBOLUS (HCC): Status: ACTIVE | Noted: 2018-12-27

## 2018-12-27 LAB — POC INR: 4 (ref 0.8–1.2)

## 2018-12-27 PROCEDURE — 99211 OFF/OP EST MAY X REQ PHY/QHP: CPT

## 2018-12-27 PROCEDURE — 85610 PROTHROMBIN TIME: CPT

## 2018-12-27 PROCEDURE — 36416 COLLJ CAPILLARY BLOOD SPEC: CPT

## 2018-12-27 RX ORDER — PANTOPRAZOLE SODIUM 40 MG/1
40 TABLET, DELAYED RELEASE ORAL 2 TIMES DAILY
Refills: 1 | Status: ON HOLD | COMMUNITY
Start: 2018-12-09 | End: 2020-01-01 | Stop reason: HOSPADM

## 2018-12-27 NOTE — PROGRESS NOTES
Medication Management [facility]  Anticoagulation Clinic  [phone] (phone)  [phone] (fax)    Mr. Alysia Terry is a 77 y.o.  male with history of PE who presents today for anticoagulation monitoring and adjustment. Patient verifies current dosing regimen and tablet strength. No missed or extra doses. Patient denies s/s bleeding/swelling/SOB/chest pain. No blood in urine or stool. No dietary changes. No changes in medication/OTC agents/Herbals. No change in alcohol use or tobacco use. No change in activity level. Patient denies headaches/dizziness/lightheadedness. Bumped head on medicine chest and fell back, did not go down to floor. Has fading bruise on back. No vomiting or acute illness. Had a little diarrhea last night. No procedures scheduled in the future at this time. Assessment:   Lab Results   Component Value Date    INR 4.00 (H) 12/27/2018    INR 3.00 (H) 12/06/2018    INR 1.60 (H) 11/26/2018    PROTIME 1.89 (H) 09/03/2011    PROTIME 2.20 (H) 09/02/2011     INR supratherapeutic   Recent Labs      12/27/18   1137   INR  4.00*     Plan: POCT INR ordered and result reviewed with Hayden, Pharm. D. Order received and verified to hold coumadin today, then continue Coumadin 2.5 mg po daily. Recheck INR in 1 week. Patient reminded to call the Anticoagulation Clinic with any signs or symptoms of bleeding or with any medication changes. Patient given instructions utilizing the teach back method. Discharged ambulatory in no apparent distress. After visit summary printed and reviewed with patient.       Medications reviewed and updated on home medication list Yes    Influenza vaccine:     [] given    [] declined   [x] received previously   [] plans to receive at a later time   [] refused    [x] documented in EPIC

## 2019-01-02 ENCOUNTER — HOSPITAL ENCOUNTER (OUTPATIENT)
Dept: SPEECH THERAPY | Age: 79
Setting detail: THERAPIES SERIES
Discharge: HOME | End: 2019-01-02
Payer: MEDICARE

## 2019-01-02 PROCEDURE — 92507 TX SP LANG VOICE COMM INDIV: CPT | Performed by: SPEECH-LANGUAGE PATHOLOGIST

## 2019-01-03 ENCOUNTER — HOSPITAL ENCOUNTER (OUTPATIENT)
Dept: PHARMACY | Age: 79
Setting detail: THERAPIES SERIES
Discharge: HOME | End: 2019-01-03
Payer: MEDICARE

## 2019-01-03 LAB — POC INR: 2.6 (ref 0.8–1.2)

## 2019-01-03 PROCEDURE — 99211 OFF/OP EST MAY X REQ PHY/QHP: CPT

## 2019-01-03 PROCEDURE — 36416 COLLJ CAPILLARY BLOOD SPEC: CPT

## 2019-01-03 PROCEDURE — 85610 PROTHROMBIN TIME: CPT

## 2019-01-04 ENCOUNTER — HOSPITAL ENCOUNTER (OUTPATIENT)
Dept: SPEECH THERAPY | Age: 79
Setting detail: THERAPIES SERIES
Discharge: HOME | End: 2019-01-04
Payer: MEDICARE

## 2019-01-04 PROCEDURE — 92507 TX SP LANG VOICE COMM INDIV: CPT | Performed by: SPEECH-LANGUAGE PATHOLOGIST

## 2019-01-15 ENCOUNTER — OFFICE VISIT (OUTPATIENT)
Dept: FAMILY MEDICINE CLINIC | Age: 79
End: 2019-01-15

## 2019-01-15 VITALS
RESPIRATION RATE: 14 BRPM | SYSTOLIC BLOOD PRESSURE: 130 MMHG | WEIGHT: 180.6 LBS | HEART RATE: 68 BPM | DIASTOLIC BLOOD PRESSURE: 68 MMHG | HEIGHT: 71 IN | BODY MASS INDEX: 25.28 KG/M2

## 2019-01-15 DIAGNOSIS — K21.9 GASTROESOPHAGEAL REFLUX DISEASE, ESOPHAGITIS PRESENCE NOT SPECIFIED: ICD-10-CM

## 2019-01-15 DIAGNOSIS — E11.8 TYPE 2 DIABETES MELLITUS WITH COMPLICATION, WITHOUT LONG-TERM CURRENT USE OF INSULIN (HCC): Primary | ICD-10-CM

## 2019-01-15 DIAGNOSIS — J44.9 COPD, SEVERE (HCC): ICD-10-CM

## 2019-01-15 LAB
GLUCOSE BLD-MCNC: 141 MG/DL
HBA1C MFR BLD: 7.5 %

## 2019-01-15 PROCEDURE — 4040F PNEUMOC VAC/ADMIN/RCVD: CPT | Performed by: EMERGENCY MEDICINE

## 2019-01-15 PROCEDURE — G8427 DOCREV CUR MEDS BY ELIG CLIN: HCPCS | Performed by: EMERGENCY MEDICINE

## 2019-01-15 PROCEDURE — 82962 GLUCOSE BLOOD TEST: CPT | Performed by: EMERGENCY MEDICINE

## 2019-01-15 PROCEDURE — 1036F TOBACCO NON-USER: CPT | Performed by: EMERGENCY MEDICINE

## 2019-01-15 PROCEDURE — 99214 OFFICE O/P EST MOD 30 MIN: CPT | Performed by: EMERGENCY MEDICINE

## 2019-01-15 PROCEDURE — 1101F PT FALLS ASSESS-DOCD LE1/YR: CPT | Performed by: EMERGENCY MEDICINE

## 2019-01-15 PROCEDURE — G8419 CALC BMI OUT NRM PARAM NOF/U: HCPCS | Performed by: EMERGENCY MEDICINE

## 2019-01-15 PROCEDURE — 83036 HEMOGLOBIN GLYCOSYLATED A1C: CPT | Performed by: EMERGENCY MEDICINE

## 2019-01-15 PROCEDURE — 1123F ACP DISCUSS/DSCN MKR DOCD: CPT | Performed by: EMERGENCY MEDICINE

## 2019-01-15 ASSESSMENT — ENCOUNTER SYMPTOMS
CONSTIPATION: 0
VOMITING: 0
VOICE CHANGE: 0
SHORTNESS OF BREATH: 0
DIARRHEA: 0
NAUSEA: 0
SORE THROAT: 0
WHEEZING: 0
TROUBLE SWALLOWING: 0
SINUS PRESSURE: 0
BACK PAIN: 0
COUGH: 0
ABDOMINAL PAIN: 0
RHINORRHEA: 0
CHEST TIGHTNESS: 0

## 2019-01-15 ASSESSMENT — COPD QUESTIONNAIRES: COPD: 1

## 2019-01-17 ENCOUNTER — HOSPITAL ENCOUNTER (OUTPATIENT)
Dept: PHARMACY | Age: 79
Setting detail: THERAPIES SERIES
Discharge: HOME | End: 2019-01-17
Payer: MEDICARE

## 2019-01-17 DIAGNOSIS — I26.99 OTHER ACUTE PULMONARY EMBOLISM WITHOUT ACUTE COR PULMONALE (HCC): ICD-10-CM

## 2019-01-17 LAB — POC INR: 1.8 (ref 0.8–1.2)

## 2019-01-17 PROCEDURE — 99211 OFF/OP EST MAY X REQ PHY/QHP: CPT

## 2019-01-17 PROCEDURE — 85610 PROTHROMBIN TIME: CPT

## 2019-01-17 PROCEDURE — 36416 COLLJ CAPILLARY BLOOD SPEC: CPT

## 2019-02-04 ENCOUNTER — HOSPITAL ENCOUNTER (OUTPATIENT)
Dept: PHARMACY | Age: 79
Setting detail: THERAPIES SERIES
Discharge: HOME | End: 2019-02-04
Payer: MEDICARE

## 2019-02-04 DIAGNOSIS — I26.99 OTHER ACUTE PULMONARY EMBOLISM WITHOUT ACUTE COR PULMONALE (HCC): ICD-10-CM

## 2019-02-04 LAB — POC INR: 3.1 (ref 0.8–1.2)

## 2019-02-04 PROCEDURE — 99211 OFF/OP EST MAY X REQ PHY/QHP: CPT

## 2019-02-04 PROCEDURE — 36416 COLLJ CAPILLARY BLOOD SPEC: CPT

## 2019-02-04 PROCEDURE — 85610 PROTHROMBIN TIME: CPT

## 2019-02-04 RX ORDER — ATENOLOL 100 MG/1
TABLET ORAL
Qty: 90 TABLET | Refills: 1 | Status: SHIPPED | OUTPATIENT
Start: 2019-02-04 | End: 2019-08-08 | Stop reason: SDUPTHER

## 2019-02-18 ENCOUNTER — HOSPITAL ENCOUNTER (OUTPATIENT)
Dept: PHARMACY | Age: 79
Setting detail: THERAPIES SERIES
Discharge: HOME | End: 2019-02-18
Payer: MEDICARE

## 2019-02-18 DIAGNOSIS — I26.99 OTHER ACUTE PULMONARY EMBOLISM WITHOUT ACUTE COR PULMONALE (HCC): ICD-10-CM

## 2019-02-18 LAB — POC INR: 2.2 (ref 0.8–1.2)

## 2019-02-18 PROCEDURE — 99211 OFF/OP EST MAY X REQ PHY/QHP: CPT

## 2019-02-18 PROCEDURE — 36416 COLLJ CAPILLARY BLOOD SPEC: CPT

## 2019-02-18 PROCEDURE — 85610 PROTHROMBIN TIME: CPT

## 2019-02-19 ENCOUNTER — TELEPHONE (OUTPATIENT)
Dept: UROLOGY | Age: 79
End: 2019-02-19

## 2019-02-19 RX ORDER — TAMSULOSIN HYDROCHLORIDE 0.4 MG/1
0.4 CAPSULE ORAL NIGHTLY
Qty: 90 CAPSULE | Refills: 3 | Status: SHIPPED | OUTPATIENT
Start: 2019-02-19 | End: 2019-03-28 | Stop reason: SDUPTHER

## 2019-03-11 ENCOUNTER — HOSPITAL ENCOUNTER (OUTPATIENT)
Dept: PHARMACY | Age: 79
Setting detail: THERAPIES SERIES
Discharge: HOME | End: 2019-03-11
Payer: MEDICARE

## 2019-03-11 DIAGNOSIS — I26.99 OTHER ACUTE PULMONARY EMBOLISM WITHOUT ACUTE COR PULMONALE (HCC): ICD-10-CM

## 2019-03-11 LAB — POC INR: 1.7 (ref 0.8–1.2)

## 2019-03-11 PROCEDURE — 36416 COLLJ CAPILLARY BLOOD SPEC: CPT

## 2019-03-11 PROCEDURE — 99211 OFF/OP EST MAY X REQ PHY/QHP: CPT

## 2019-03-11 PROCEDURE — 85610 PROTHROMBIN TIME: CPT

## 2019-03-25 RX ORDER — FUROSEMIDE 40 MG/1
TABLET ORAL
Qty: 90 TABLET | Refills: 1 | Status: SHIPPED | OUTPATIENT
Start: 2019-03-25 | End: 2019-09-28 | Stop reason: SDUPTHER

## 2019-03-27 ENCOUNTER — APPOINTMENT (OUTPATIENT)
Dept: PHARMACY | Age: 79
End: 2019-03-27
Payer: MEDICARE

## 2019-03-27 ENCOUNTER — TELEPHONE (OUTPATIENT)
Dept: UROLOGY | Age: 79
End: 2019-03-27

## 2019-03-27 ENCOUNTER — HOSPITAL ENCOUNTER (OUTPATIENT)
Dept: PHYSICAL THERAPY | Age: 79
Setting detail: THERAPIES SERIES
Discharge: HOME | End: 2019-03-27
Payer: MEDICARE

## 2019-03-27 ENCOUNTER — HOSPITAL ENCOUNTER (OUTPATIENT)
Dept: PHARMACY | Age: 79
Setting detail: THERAPIES SERIES
Discharge: HOME | End: 2019-03-27
Payer: MEDICARE

## 2019-03-27 DIAGNOSIS — N40.1 BENIGN PROSTATIC HYPERPLASIA WITH NOCTURIA: Primary | ICD-10-CM

## 2019-03-27 DIAGNOSIS — R35.1 BENIGN PROSTATIC HYPERPLASIA WITH NOCTURIA: Primary | ICD-10-CM

## 2019-03-27 DIAGNOSIS — I26.99 OTHER ACUTE PULMONARY EMBOLISM WITHOUT ACUTE COR PULMONALE (HCC): ICD-10-CM

## 2019-03-27 LAB — POC INR: 1.9 (ref 0.8–1.2)

## 2019-03-27 PROCEDURE — 97161 PT EVAL LOW COMPLEX 20 MIN: CPT

## 2019-03-27 PROCEDURE — 99211 OFF/OP EST MAY X REQ PHY/QHP: CPT

## 2019-03-27 PROCEDURE — 85610 PROTHROMBIN TIME: CPT

## 2019-03-27 PROCEDURE — 97530 THERAPEUTIC ACTIVITIES: CPT

## 2019-03-27 PROCEDURE — 36416 COLLJ CAPILLARY BLOOD SPEC: CPT

## 2019-03-27 ASSESSMENT — PAIN DESCRIPTION - PROGRESSION: CLINICAL_PROGRESSION: GRADUALLY WORSENING

## 2019-03-27 ASSESSMENT — PAIN DESCRIPTION - ONSET: ONSET: PROGRESSIVE

## 2019-03-27 ASSESSMENT — PAIN SCALES - GENERAL: PAINLEVEL_OUTOF10: 7

## 2019-03-27 ASSESSMENT — PAIN DESCRIPTION - PAIN TYPE: TYPE: CHRONIC PAIN

## 2019-03-27 ASSESSMENT — PAIN DESCRIPTION - ORIENTATION: ORIENTATION: RIGHT

## 2019-03-27 ASSESSMENT — PAIN DESCRIPTION - DESCRIPTORS: DESCRIPTORS: ACHING

## 2019-03-27 ASSESSMENT — PAIN DESCRIPTION - LOCATION: LOCATION: KNEE

## 2019-03-27 ASSESSMENT — PAIN DESCRIPTION - FREQUENCY: FREQUENCY: INTERMITTENT

## 2019-03-28 ENCOUNTER — HOSPITAL ENCOUNTER (OUTPATIENT)
Dept: GENERAL RADIOLOGY | Age: 79
Discharge: HOME | End: 2019-03-28
Payer: MEDICARE

## 2019-03-28 ENCOUNTER — HOSPITAL ENCOUNTER (OUTPATIENT)
Age: 79
Discharge: HOME | End: 2019-03-28
Payer: MEDICARE

## 2019-03-28 ENCOUNTER — OFFICE VISIT (OUTPATIENT)
Dept: UROLOGY | Age: 79
End: 2019-03-28
Payer: MEDICARE

## 2019-03-28 VITALS
WEIGHT: 169 LBS | SYSTOLIC BLOOD PRESSURE: 122 MMHG | HEIGHT: 71 IN | BODY MASS INDEX: 23.66 KG/M2 | DIASTOLIC BLOOD PRESSURE: 64 MMHG

## 2019-03-28 DIAGNOSIS — N40.1 BENIGN PROSTATIC HYPERPLASIA WITH NOCTURIA: ICD-10-CM

## 2019-03-28 DIAGNOSIS — R35.1 BENIGN PROSTATIC HYPERPLASIA WITH NOCTURIA: ICD-10-CM

## 2019-03-28 DIAGNOSIS — N20.0 KIDNEY STONES: ICD-10-CM

## 2019-03-28 DIAGNOSIS — R31.29 MICROSCOPIC HEMATURIA: ICD-10-CM

## 2019-03-28 DIAGNOSIS — N40.1 BENIGN PROSTATIC HYPERPLASIA WITH NOCTURIA: Primary | ICD-10-CM

## 2019-03-28 DIAGNOSIS — R35.1 BENIGN PROSTATIC HYPERPLASIA WITH NOCTURIA: Primary | ICD-10-CM

## 2019-03-28 LAB
BACTERIA: ABNORMAL
BILIRUBIN URINE: ABNORMAL
BILIRUBIN URINE: ABNORMAL
BLOOD URINE, POC: ABNORMAL
BLOOD, URINE: NEGATIVE
CASTS: ABNORMAL /LPF
CASTS: ABNORMAL /LPF
CHARACTER, URINE: CLEAR
CHARACTER, URINE: CLEAR
COLOR, URINE: YELLOW
COLOR: YELLOW
CRYSTALS: ABNORMAL
EPITHELIAL CELLS, UA: ABNORMAL /HPF
GLUCOSE URINE: 100 MG/DL
GLUCOSE, URINE: 100 MG/DL
ICTOTEST: NEGATIVE
KETONES, URINE: ABNORMAL
KETONES, URINE: NEGATIVE
LEUKOCYTE CLUMPS, URINE: NEGATIVE
LEUKOCYTE ESTERASE, URINE: ABNORMAL
MISCELLANEOUS LAB TEST RESULT: ABNORMAL
NITRITE, URINE: NEGATIVE
NITRITE, URINE: NEGATIVE
PH UA: 5.5 (ref 5–9)
PH, URINE: 5.5 (ref 5–9)
POST VOID RESIDUAL (PVR): 5 ML
PROSTATE SPECIFIC ANTIGEN: 0.87 NG/ML (ref 0–1)
PROTEIN UA: NEGATIVE MG/DL
PROTEIN, URINE: NEGATIVE MG/DL
RBC URINE: ABNORMAL /HPF
RENAL EPITHELIAL, UA: ABNORMAL
SPECIFIC GRAVITY UA: 1.02 (ref 1–1.03)
SPECIFIC GRAVITY, URINE: 1.02 (ref 1–1.03)
UROBILINOGEN, URINE: 0.2 EU/DL (ref 0–1)
UROBILINOGEN, URINE: 0.2 EU/DL (ref 0–1)
WBC UA: ABNORMAL /HPF
YEAST: ABNORMAL

## 2019-03-28 PROCEDURE — 81003 URINALYSIS AUTO W/O SCOPE: CPT | Performed by: NURSE PRACTITIONER

## 2019-03-28 PROCEDURE — 84153 ASSAY OF PSA TOTAL: CPT

## 2019-03-28 PROCEDURE — 1123F ACP DISCUSS/DSCN MKR DOCD: CPT | Performed by: NURSE PRACTITIONER

## 2019-03-28 PROCEDURE — G8482 FLU IMMUNIZE ORDER/ADMIN: HCPCS | Performed by: NURSE PRACTITIONER

## 2019-03-28 PROCEDURE — 74018 RADEX ABDOMEN 1 VIEW: CPT

## 2019-03-28 PROCEDURE — 36415 COLL VENOUS BLD VENIPUNCTURE: CPT

## 2019-03-28 PROCEDURE — 99999 URINALYSIS WITH MICROSCOPIC: CPT | Performed by: NURSE PRACTITIONER

## 2019-03-28 PROCEDURE — 1036F TOBACCO NON-USER: CPT | Performed by: NURSE PRACTITIONER

## 2019-03-28 PROCEDURE — 51798 US URINE CAPACITY MEASURE: CPT | Performed by: NURSE PRACTITIONER

## 2019-03-28 PROCEDURE — G8420 CALC BMI NORM PARAMETERS: HCPCS | Performed by: NURSE PRACTITIONER

## 2019-03-28 PROCEDURE — 4040F PNEUMOC VAC/ADMIN/RCVD: CPT | Performed by: NURSE PRACTITIONER

## 2019-03-28 PROCEDURE — 99213 OFFICE O/P EST LOW 20 MIN: CPT | Performed by: NURSE PRACTITIONER

## 2019-03-28 PROCEDURE — G8427 DOCREV CUR MEDS BY ELIG CLIN: HCPCS | Performed by: NURSE PRACTITIONER

## 2019-03-28 RX ORDER — FINASTERIDE 5 MG/1
5 TABLET, FILM COATED ORAL DAILY
Qty: 90 TABLET | Refills: 3 | Status: SHIPPED | OUTPATIENT
Start: 2019-03-28 | End: 2020-01-01 | Stop reason: SDUPTHER

## 2019-03-28 RX ORDER — TAMSULOSIN HYDROCHLORIDE 0.4 MG/1
0.4 CAPSULE ORAL NIGHTLY
Qty: 90 CAPSULE | Refills: 3 | Status: SHIPPED | OUTPATIENT
Start: 2019-03-28 | End: 2020-01-01 | Stop reason: SDUPTHER

## 2019-04-01 ENCOUNTER — HOSPITAL ENCOUNTER (OUTPATIENT)
Dept: PHYSICAL THERAPY | Age: 79
Setting detail: THERAPIES SERIES
Discharge: HOME | End: 2019-04-01
Payer: MEDICARE

## 2019-04-01 PROCEDURE — 97110 THERAPEUTIC EXERCISES: CPT

## 2019-04-01 ASSESSMENT — PAIN DESCRIPTION - LOCATION: LOCATION: KNEE

## 2019-04-01 ASSESSMENT — PAIN SCALES - GENERAL: PAINLEVEL_OUTOF10: 3

## 2019-04-01 ASSESSMENT — PAIN DESCRIPTION - ORIENTATION: ORIENTATION: RIGHT

## 2019-04-01 ASSESSMENT — PAIN DESCRIPTION - PROGRESSION: CLINICAL_PROGRESSION: GRADUALLY WORSENING

## 2019-04-01 NOTE — PROGRESS NOTES
strength, Decreased ROM, Decreased balance, Increased Pain  Assessment: Initiated Nustep, rockerboard, 3 way hip, TG squats for strengthening and balance with good tolerance. Pt requires cues for posture during exercises. Reapplied rock tape for chondromalacia of patella. Pt ambulates around clinic with cane at slow pace. Prognosis: Good       Patient Education:  Patient Education: Continue with sit- stands- add 3 way hip with handout provided. Plan:  Times per week: 2x per week  Plan weeks: 8 weeks   Current Treatment Recommendations: Strengthening, Functional Mobility Training, ROM, Balance Training, Stair training, Gait Training, Neuromuscular Re-education, Manual Therapy - Joint Manipulation, Manual Therapy - Soft Tissue Mobilization, Pain Management, Home Exercise Program, Modalities, Patient/Caregiver Education & Training    Goals:  Patient goals : Be able to walk without having to wear knee brace; walking with less pain     Short term goals  Time Frame for Short term goals: 4 weeks   Short term goal 1: AROM: 0-125 degrees without pain  Short term goal 2: Pilar Lefort will be able to walk with greater confidence with a SPC both in his home as well as surrounding community. Short term goal 3: Pilar Lefort will perform sit-stand transfers using \"nose over toes\" posturing and minimal use of UE as his concentric/eccentric quad strength improves to 4+/5. Long term goals  Time Frame for Long term goals : 8 weeks   Long term goal 1: Discharge with independent HEP to maintain all strength and ROM gains achieved in clinic. Long term goal 2: Pilar Lefort will be able to walk around his home and community daily without having to rely on his hinge brace and will only use hinge brace for pain relief as needed.      Yoli Salguero, PT

## 2019-04-03 ENCOUNTER — HOSPITAL ENCOUNTER (OUTPATIENT)
Dept: PHYSICAL THERAPY | Age: 79
Setting detail: THERAPIES SERIES
Discharge: HOME | End: 2019-04-03
Payer: MEDICARE

## 2019-04-03 ENCOUNTER — HOSPITAL ENCOUNTER (OUTPATIENT)
Dept: PULMONOLOGY | Age: 79
Discharge: HOME | End: 2019-04-03
Payer: MEDICARE

## 2019-04-03 DIAGNOSIS — J44.9 MODERATE COPD (CHRONIC OBSTRUCTIVE PULMONARY DISEASE) (HCC): ICD-10-CM

## 2019-04-03 PROCEDURE — 2709999900 HC NON-CHARGEABLE SUPPLY

## 2019-04-03 PROCEDURE — 94060 EVALUATION OF WHEEZING: CPT

## 2019-04-03 PROCEDURE — 97110 THERAPEUTIC EXERCISES: CPT

## 2019-04-03 ASSESSMENT — PAIN SCALES - GENERAL: PAINLEVEL_OUTOF10: 3

## 2019-04-03 ASSESSMENT — PAIN DESCRIPTION - LOCATION: LOCATION: KNEE

## 2019-04-03 ASSESSMENT — PAIN DESCRIPTION - ORIENTATION: ORIENTATION: RIGHT

## 2019-04-03 ASSESSMENT — PAIN DESCRIPTION - PAIN TYPE: TYPE: CHRONIC PAIN

## 2019-04-03 NOTE — PROGRESS NOTES
functional mobility , Decreased strength, Decreased ROM, Decreased balance  Assessment: Patient progressing well without increase pain. Added heel slides and quad sets to address AROM. right knee -2 to 125. Discharge Recommendations: Continue to assess pending progress    Patient Education:  Patient Education: Add Heel slides and quad sets to HEP. Plan:  Times per week: 2x per week  Plan weeks: 8 weeks   Current Treatment Recommendations: Strengthening, Functional Mobility Training, ROM, Balance Training, Stair training, Gait Training, Neuromuscular Re-education, Manual Therapy - Joint Manipulation, Manual Therapy - Soft Tissue Mobilization, Pain Management, Home Exercise Program, Modalities, Patient/Caregiver Education & Training    Goals:  Patient goals : Be able to walk without having to wear knee brace; walking with less pain     Short term goals  Time Frame for Short term goals: 4 weeks   Short term goal 1: AROM: 0-125 degrees without pain  Short term goal 2: Verona Zelaya will be able to walk with greater confidence with a SPC both in his home as well as surrounding community. Short term goal 3: Verona Zelaya will perform sit-stand transfers using \"nose over toes\" posturing and minimal use of UE as his concentric/eccentric quad strength improves to 4+/5. Long term goals  Time Frame for Long term goals : 8 weeks   Long term goal 1: Discharge with independent HEP to maintain all strength and ROM gains achieved in clinic. Long term goal 2: Verona Zelaya will be able to walk around his home and community daily without having to rely on his hinge brace and will only use hinge brace for pain relief as needed.      Brionna Mar

## 2019-04-08 ENCOUNTER — HOSPITAL ENCOUNTER (OUTPATIENT)
Dept: PHYSICAL THERAPY | Age: 79
Setting detail: THERAPIES SERIES
Discharge: HOME | End: 2019-04-08
Payer: MEDICARE

## 2019-04-08 PROCEDURE — 97110 THERAPEUTIC EXERCISES: CPT

## 2019-04-08 ASSESSMENT — PAIN DESCRIPTION - ORIENTATION: ORIENTATION: RIGHT

## 2019-04-08 ASSESSMENT — PAIN SCALES - GENERAL: PAINLEVEL_OUTOF10: 2

## 2019-04-08 ASSESSMENT — PAIN DESCRIPTION - LOCATION: LOCATION: KNEE

## 2019-04-08 ASSESSMENT — PAIN DESCRIPTION - PAIN TYPE: TYPE: CHRONIC PAIN

## 2019-04-08 NOTE — PROGRESS NOTES
[place]     Time In: 1106  Time Out: [address]  Minutes: 34  Timed Code Treatment Minutes: 34 Minutes             Date: 2019  Patient Name: Francesco Jacobs,  Gender:  male        CSN: [id number]   : 1940  (77 y.o.)  Referral Date : 19    Referring Practitioner: Blank Olmstead MD      Diagnosis: M25.561 (ICD-10-CM) - Pain in right knee  Treatment Diagnosis: right knee pain; difficulty walking                       General:  PT Visit Information  Onset Date: 19  PT Insurance Information: Tamara Little has unlimited visits based on medical necessity. Aquatics and modalities are covered except ionto and hot/cold packs. Total # of Visits to Date: 4  Plan of Care/Certification Expiration Date: 19  Progress Note Counter:  for PN. Subjective:  Response To Previous Treatment: Patient with no complaints from previous session. Family / Caregiver Present: Yes  Comments: Will follow up in 6 months or so; looks to his wife when answering questions      Subjective: Patient reporting knee pain 2/10 today and stating he has had good compliance with HEP. ` 5 minutes late to session today. Pain:  Patient Currently in Pain: Yes  Pain Assessment: 0-10  Pain Level: 2  Pain Type: Chronic pain  Pain Location: Knee  Pain Orientation: Right      Objective  Exercises  Exercise 2: Rock tape: chondromalacia patella to right knee   Exercise 3: NuStep S13, A14 L2 x5 min  Exercise 4: Standing 3 way hip, mini squats x10 each - cues to keep heels down  Exercise 5: Rocker board x2 directions taps x15 B UE, balance x30 sec each way 1 UE  Exercise 6: TG squats and heel raises LJ x10 -cues for weight through heels and for technique with heel raises  Exercise 7: Step stance on foam B x 30 seconds and no UE. Exercise 8: Heel sides x 15, quad sets with towel roll under knee x 15 hold x 5.   AROM right knee -2 to 125. Exercise 9: Passive hamstring stretch in supine 30 seconds x 3 on R  Exercise 10: cues to keep head up during gait         Activity Tolerance:  Activity Tolerance: Patient Tolerated treatment well  Activity Tolerance: denies increase in pain at end of session    Assessment:  Assessment: Progressed with heel raises on total gym, passive hamstring stretch and LAQ with good tolerance. Patient needing cues for posture throughout session and cues to avoid externally rotating right LE. Denies increase in pain at end of session and having no other complains. Prognosis: Good       Patient Education:  Patient Education: Monitor response to progressions and tape. Plan:  Times per week: 2x per week  Plan weeks: 8 weeks   Specific instructions for Next Treatment: Follow up on tape; start with bike or NuStep and focus on CKC strengthening and dynamic balance and gait activities   Current Treatment Recommendations: Strengthening, Functional Mobility Training, ROM, Balance Training, Stair training, Gait Training, Neuromuscular Re-education, Manual Therapy - Joint Manipulation, Manual Therapy - Soft Tissue Mobilization, Pain Management, Home Exercise Program, Modalities, Patient/Caregiver Education & Training    Goals:  Patient goals : Be able to walk without having to wear knee brace; walking with less pain     Short term goals  Time Frame for Short term goals: 4 weeks   Short term goal 1: AROM: 0-125 degrees without pain  Short term goal 2: Sonna Bamberger will be able to walk with greater confidence with a SPC both in his home as well as surrounding community. Short term goal 3: Sonna Bamberger will perform sit-stand transfers using \"nose over toes\" posturing and minimal use of UE as his concentric/eccentric quad strength improves to 4+/5.     Long term goals  Time Frame for Long term goals : 8 weeks   Long term goal 1: Discharge with independent [facility] to maintain all strength and ROM gains achieved in clinic. Long term goal 2: Erasmo Welch will be able to walk around his home and community daily without having to rely on his hinge brace and will only use hinge brace for pain relief as needed.      Radha Morales, [id number]

## 2019-04-09 ENCOUNTER — OFFICE VISIT (OUTPATIENT)
Dept: PULMONOLOGY | Age: 79
End: 2019-04-09
Payer: MEDICARE

## 2019-04-09 VITALS
WEIGHT: 169.6 LBS | HEART RATE: 74 BPM | HEIGHT: 71 IN | OXYGEN SATURATION: 99 % | SYSTOLIC BLOOD PRESSURE: 118 MMHG | TEMPERATURE: 96.9 F | BODY MASS INDEX: 23.74 KG/M2 | DIASTOLIC BLOOD PRESSURE: 62 MMHG

## 2019-04-09 DIAGNOSIS — R94.2 DIFFUSION CAPACITY OF LUNG (DL), DECREASED: ICD-10-CM

## 2019-04-09 DIAGNOSIS — J44.9 MODERATE COPD (CHRONIC OBSTRUCTIVE PULMONARY DISEASE) (HCC): Primary | ICD-10-CM

## 2019-04-09 DIAGNOSIS — Z87.891 HISTORY OF TOBACCO USE: ICD-10-CM

## 2019-04-09 PROCEDURE — G8926 SPIRO NO PERF OR DOC: HCPCS | Performed by: NURSE PRACTITIONER

## 2019-04-09 PROCEDURE — 1036F TOBACCO NON-USER: CPT | Performed by: NURSE PRACTITIONER

## 2019-04-09 PROCEDURE — 3023F SPIROM DOC REV: CPT | Performed by: NURSE PRACTITIONER

## 2019-04-09 PROCEDURE — 99214 OFFICE O/P EST MOD 30 MIN: CPT | Performed by: NURSE PRACTITIONER

## 2019-04-09 PROCEDURE — 1123F ACP DISCUSS/DSCN MKR DOCD: CPT | Performed by: NURSE PRACTITIONER

## 2019-04-09 PROCEDURE — G8427 DOCREV CUR MEDS BY ELIG CLIN: HCPCS | Performed by: NURSE PRACTITIONER

## 2019-04-09 PROCEDURE — G8420 CALC BMI NORM PARAMETERS: HCPCS | Performed by: NURSE PRACTITIONER

## 2019-04-09 PROCEDURE — 4040F PNEUMOC VAC/ADMIN/RCVD: CPT | Performed by: NURSE PRACTITIONER

## 2019-04-09 ASSESSMENT — ENCOUNTER SYMPTOMS
NAUSEA: 0
VOMITING: 0
SPUTUM PRODUCTION: 0
DIARRHEA: 0
SHORTNESS OF BREATH: 1
COUGH: 0
STRIDOR: 0
WHEEZING: 0
CHEST TIGHTNESS: 0

## 2019-04-09 ASSESSMENT — COPD QUESTIONNAIRES: COPD: 1
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
## 2019-04-10 ENCOUNTER — HOSPITAL ENCOUNTER (OUTPATIENT)
Dept: PHYSICAL THERAPY | Age: 79
Setting detail: THERAPIES SERIES
Discharge: HOME | End: 2019-04-10
Payer: MEDICARE

## 2019-04-10 PROCEDURE — 97110 THERAPEUTIC EXERCISES: CPT

## 2019-04-10 ASSESSMENT — PAIN DESCRIPTION - PAIN TYPE: TYPE: CHRONIC PAIN

## 2019-04-10 ASSESSMENT — PAIN DESCRIPTION - LOCATION: LOCATION: KNEE

## 2019-04-10 ASSESSMENT — PAIN DESCRIPTION - ORIENTATION: ORIENTATION: RIGHT

## 2019-04-10 ASSESSMENT — PAIN SCALES - GENERAL: PAINLEVEL_OUTOF10: 1

## 2019-04-10 NOTE — PROGRESS NOTES
[facility]     Time In: 1100  Time Out: 0014  Minutes: 38  Timed Code Treatment Minutes: 38 Minutes  Date: 4/10/2019  Patient Name: Alex Holland,  Gender:  male        CSN: [id number]   : 1940  (77 y.o.)  Referral Date : 19    Referring Practitioner: Parvin Fierro MD      Diagnosis: M25.561 (ICD-10-CM) - Pain in right knee  Treatment Diagnosis: right knee pain; difficulty walking       General:  PT Visit Information  Onset Date: 19  PT Insurance Information: Tristan Flores has unlimited visits based on medical necessity. Aquatics and modalities are covered except ionto and hot/cold packs. Total # of Visits to Date: 5  Plan of Care/Certification Expiration Date: 19  Progress Note Counter:  for PN. Subjective:  Chart Reviewed: Yes  Patient assessed for rehabilitation services?: Yes  Family / Caregiver Present: No  Comments: Will follow up in 6 months or so; looks to his wife when answering questions      Subjective: Patient reporting knee pain 1/10 today and stating he has had good compliance with HEP. Patient states he has COLD and is having, \" a little trouble breathing today. \"02 sats 98%     Pain:  Patient Currently in Pain: Yes  Pain Assessment: 0-10  Pain Level: 1  Pain Type: Chronic pain  Pain Location: Knee  Pain Orientation: Right  Objective     Exercises  Exercise 3: Nustep S13, A14 L2 x5 min  Exercise 4: Standing  heel raises x 10,   3 way hip peach x 10,   TKE peach x 10 R,   mini squats x10 each - cues to keep heels down  Exercise 5: Rockerboard x2 directions taps x15 B UE, balance x30 sec each way 1 UE  Exercise 6: TG squats and heel raises LJ x10 -cues for weight through heels and for technique with heel raises  Exercise 7: Step stance on foam B x 30 seconds and no UE.    Exercise 9: Hamstring stretch at step x 3 x 15   NTPassive hamstring stretch in supine 30 seconds x 3 on R  Exercise 10: cues to keep head up during gait  Exercise 11: Hydrohip L3 B x 10, R/L L3 x 10       Activity Tolerance: Tolerated well. Assessment: Body structures, Functions, Activity limitations: Decreased functional mobility , Decreased ROM  Assessment: Patient appeared tired today. 02sats takenn during session ranging between 97 to 99%. x 1 seated rest break. Progressing well with strengthening and balance . Discharge Recommendations: Continue to assess pending progress    Patient Education:  Patient Education: Continue with HEP. Plan:  Times per week: 2x per week  Plan weeks: 8 weeks   Specific instructions for Next Treatment: Follow up on tape; start with bike or NuStep and focus on CKC strengthening and dynamic balance and gait activities   Current Treatment Recommendations: Strengthening, Functional Mobility Training, ROM, Balance Training, Stair training, Gait Training, Neuromuscular Re-education, Manual Therapy - Joint Manipulation, Manual Therapy - Soft Tissue Mobilization, Pain Management, Home Exercise Program, Modalities, Patient/Caregiver Education & Training    Goals:  Patient goals : Be able to walk without having to wear knee brace; walking with less pain     Short term goals  Time Frame for Short term goals: 4 weeks   Short term goal 1: AROM: 0-125 degrees without pain  Short term goal 2: Pilar Lefort will be able to walk with greater confidence with a SPC both in his home as well as surrounding community. Short term goal 3: Pilar Lefort will perform sit-stand transfers using \"nose over toes\" posturing and minimal use of UE as his concentric/eccentric quad strength improves to 4+/5. Long term goals  Time Frame for Long term goals : 8 weeks   Long term goal 1: Discharge with independent HEP to maintain all strength and ROM gains achieved in clinic.   Long term goal 2: Pilar Lefort will be able to walk around his home and community daily without having to rely on his hinge brace and will

## 2019-04-15 ENCOUNTER — HOSPITAL ENCOUNTER (OUTPATIENT)
Dept: PHYSICAL THERAPY | Age: 79
Setting detail: THERAPIES SERIES
Discharge: HOME | End: 2019-04-15
Payer: MEDICARE

## 2019-04-15 ENCOUNTER — HOSPITAL ENCOUNTER (OUTPATIENT)
Age: 79
Discharge: HOME | End: 2019-04-15
Payer: MEDICARE

## 2019-04-15 DIAGNOSIS — E11.8 TYPE 2 DIABETES MELLITUS WITH COMPLICATION, WITHOUT LONG-TERM CURRENT USE OF INSULIN (HCC): ICD-10-CM

## 2019-04-15 DIAGNOSIS — K21.9 GASTROESOPHAGEAL REFLUX DISEASE, ESOPHAGITIS PRESENCE NOT SPECIFIED: ICD-10-CM

## 2019-04-15 LAB
BASOPHILS # BLD: 0.4 %
BASOPHILS ABSOLUTE: 0 THOU/MM3 (ref 0–0.1)
CHOLESTEROL, TOTAL: 159 MG/DL (ref 100–199)
EOSINOPHIL # BLD: 1.2 %
EOSINOPHILS ABSOLUTE: 0.1 THOU/MM3 (ref 0–0.4)
ERYTHROCYTE [DISTWIDTH] IN BLOOD BY AUTOMATED COUNT: 14.2 % (ref 11.5–14.5)
ERYTHROCYTE [DISTWIDTH] IN BLOOD BY AUTOMATED COUNT: 51.2 FL (ref 35–45)
HCT VFR BLD CALC: 44.2 % (ref 42–52)
HDLC SERPL-MCNC: 53 MG/DL
HEMOGLOBIN: 14.1 GM/DL (ref 14–18)
IMMATURE GRANS (ABS): 0.02 THOU/MM3 (ref 0–0.07)
IMMATURE GRANULOCYTES: 0.3 %
LDL CHOLESTEROL CALCULATED: 90 MG/DL
LYMPHOCYTES # BLD: 13.7 %
LYMPHOCYTES ABSOLUTE: 1 THOU/MM3 (ref 1–4.8)
MCH RBC QN AUTO: 31.1 PG (ref 26–33)
MCHC RBC AUTO-ENTMCNC: 31.9 GM/DL (ref 32.2–35.5)
MCV RBC AUTO: 97.4 FL (ref 80–94)
MONOCYTES # BLD: 8.5 %
MONOCYTES ABSOLUTE: 0.6 THOU/MM3 (ref 0.4–1.3)
NUCLEATED RED BLOOD CELLS: 0 /100 WBC
PLATELET # BLD: 288 THOU/MM3 (ref 130–400)
PMV BLD AUTO: 10.7 FL (ref 9.4–12.4)
RBC # BLD: 4.54 MILL/MM3 (ref 4.7–6.1)
SEG NEUTROPHILS: 75.9 %
SEGMENTED NEUTROPHILS ABSOLUTE COUNT: 5.7 THOU/MM3 (ref 1.8–7.7)
TRIGL SERPL-MCNC: 78 MG/DL (ref 0–199)
TSH SERPL DL<=0.05 MIU/L-ACNC: 3.28 UIU/ML (ref 0.4–4.2)
WBC # BLD: 7.5 THOU/MM3 (ref 4.8–10.8)

## 2019-04-15 PROCEDURE — 80061 LIPID PANEL: CPT

## 2019-04-15 PROCEDURE — 97110 THERAPEUTIC EXERCISES: CPT

## 2019-04-15 PROCEDURE — 84443 ASSAY THYROID STIM HORMONE: CPT

## 2019-04-15 PROCEDURE — 85025 COMPLETE CBC W/AUTO DIFF WBC: CPT

## 2019-04-15 PROCEDURE — 36415 COLL VENOUS BLD VENIPUNCTURE: CPT

## 2019-04-15 ASSESSMENT — PAIN DESCRIPTION - PAIN TYPE: TYPE: CHRONIC PAIN

## 2019-04-15 ASSESSMENT — PAIN DESCRIPTION - LOCATION: LOCATION: KNEE

## 2019-04-15 ASSESSMENT — PAIN SCALES - GENERAL: PAINLEVEL_OUTOF10: 1

## 2019-04-15 ASSESSMENT — PAIN DESCRIPTION - ORIENTATION: ORIENTATION: RIGHT

## 2019-04-15 NOTE — PROGRESS NOTES
[facility]     Time In: 2250  Time Out: 3151  Minutes: 46  Timed Code Treatment Minutes: 46 Minutes             Date: 4/15/2019  Patient Name: Tierney Thompson,  Gender:  male        CSN: [id number]   : 1940  (77 y.o.)  Referral Date : 19    Referring Practitioner: Bora Qureshi MD      Diagnosis: M25.561 (ICD-10-CM) - Pain in right knee  Treatment Diagnosis: right knee pain; difficulty walking                       General:  PT Visit Information  Onset Date: 19  PT Insurance Information: Heath Marshall has unlimited visits based on medical necessity. Aquatics and modalities are covered except ionto and hot/cold packs. Total # of Visits to Date: 6  Plan of Care/Certification Expiration Date: 19  Progress Note Counter:  for PN. Subjective:  Response To Previous Treatment: Patient with no complaints from previous session. Family / Caregiver Present: No  Comments: Will follow up in 6 months or so; looks to his wife when answering questions      Subjective: Patient reporting knee pain 1/10 and states no issues with HEP. No other complains at this time.      Pain:  Patient Currently in Pain: Yes  Pain Assessment: 0-10  Pain Level: 1  Pain Type: Chronic pain  Pain Location: Knee  Pain Orientation: Right      Objective  Exercises  Exercise 1: Sit-stand: 5x with use of UEs- cues to push through legs and pt needing CGA to min A today to correct posterior lean upon standing  Exercise 2: Rock tape: chondromalacia patella to right knee   Exercise 3: NuStep S13, A14 L3 x5 min  Exercise 4: Standing  heel raises x 15,   3 way hip peach x 15,   TKE peach x 15 R,   mini squats x15 each - cues to keep heels down  Exercise 5: Rocker board x2 directions taps x15 B UE, balance x30 sec each way 1 UE  Exercise 6: TG squats and heel raises LJ x15 -cues for weight through heels and for technique with heel raises  Exercise 7: Step stance on foam B x 30 seconds and no UE. Exercise 8: Quad sets with bolster under heel x 15 hold x 5. AROM right knee -2 to 126. Exercise 9: Hamstring and heel cord stretch at step 3 x 15 seconds. Passive hamstring stretch to right 20 seconds x 3  Exercise 10: cues to keep head up during gait  Exercise 11: 4' steps ups forward/lateral/retro R CC         Activity Tolerance:  Activity Tolerance: Patient Tolerated treatment well  Activity Tolerance: denies increase in pain at end of session    Assessment:  Assessment: Patient tolerating session well today. Did progress by added step ups with patient having good tolerance but needing cues to avoid external rotation of right LE. Patient denies having any increase in pain or soreness at end of session and having no other complains. Prognosis: Good       Patient Education:  Patient Education: Continue with HEP and monitor response to progressions. Plan:  Times per week: 2x per week  Plan weeks: 8 weeks   Specific instructions for Next Treatment: Follow up on tape; start with bike or NuStep and focus on CKC strengthening and dynamic balance and gait activities   Current Treatment Recommendations: Strengthening, Functional Mobility Training, ROM, Balance Training, Stair training, Gait Training, Neuromuscular Re-education, Manual Therapy - Joint Manipulation, Manual Therapy - Soft Tissue Mobilization, Pain Management, Home Exercise Program, Modalities, Patient/Caregiver Education & Training    Goals:  Patient goals : Be able to walk without having to wear knee brace; walking with less pain     Short term goals  Time Frame for Short term goals: 4 weeks   Short term goal 1: AROM: 0-125 degrees without pain  Short term goal 2: Sal Dhaliwal will be able to walk with greater confidence with a SPC both in his home as well as surrounding community.    Short term goal 3: Sal Dhaliwal will perform sit-stand transfers using \"nose over toes\" posturing and minimal use of UE as his concentric/eccentric quad strength improves to 4+/5. Long term goals  Time Frame for Long term goals : 8 weeks   Long term goal 1: Discharge with independent HEP to maintain all strength and ROM gains achieved in clinic. Long term goal 2: Lakhwinder Huffman will be able to walk around his home and community daily without having to rely on his hinge brace and will only use hinge brace for pain relief as needed.      Hollis Moya, [id number]

## 2019-04-16 ENCOUNTER — TELEPHONE (OUTPATIENT)
Dept: FAMILY MEDICINE CLINIC | Age: 79
End: 2019-04-16

## 2019-04-16 ENCOUNTER — HOSPITAL ENCOUNTER (OUTPATIENT)
Dept: PHARMACY | Age: 79
Setting detail: THERAPIES SERIES
Discharge: HOME | End: 2019-04-16
Payer: MEDICARE

## 2019-04-16 ENCOUNTER — OFFICE VISIT (OUTPATIENT)
Dept: FAMILY MEDICINE CLINIC | Age: 79
End: 2019-04-16

## 2019-04-16 VITALS
SYSTOLIC BLOOD PRESSURE: 116 MMHG | DIASTOLIC BLOOD PRESSURE: 60 MMHG | HEART RATE: 66 BPM | RESPIRATION RATE: 12 BRPM | BODY MASS INDEX: 23.44 KG/M2 | WEIGHT: 167.4 LBS | HEIGHT: 71 IN

## 2019-04-16 DIAGNOSIS — E11.8 TYPE 2 DIABETES MELLITUS WITH COMPLICATION, WITH LONG-TERM CURRENT USE OF INSULIN (HCC): Primary | ICD-10-CM

## 2019-04-16 DIAGNOSIS — Z79.4 TYPE 2 DIABETES MELLITUS WITH COMPLICATION, WITH LONG-TERM CURRENT USE OF INSULIN (HCC): Primary | ICD-10-CM

## 2019-04-16 DIAGNOSIS — Z85.01 HISTORY OF ESOPHAGEAL CANCER: ICD-10-CM

## 2019-04-16 DIAGNOSIS — K21.9 GASTROESOPHAGEAL REFLUX DISEASE, ESOPHAGITIS PRESENCE NOT SPECIFIED: ICD-10-CM

## 2019-04-16 DIAGNOSIS — I26.99 OTHER ACUTE PULMONARY EMBOLISM WITHOUT ACUTE COR PULMONALE (HCC): ICD-10-CM

## 2019-04-16 LAB
CHP ED QC CHECK: ABNORMAL
GLUCOSE BLD-MCNC: 150 MG/DL
HBA1C MFR BLD: 7.7 %
POC INR: 2.1 (ref 0.8–1.2)

## 2019-04-16 PROCEDURE — G8420 CALC BMI NORM PARAMETERS: HCPCS | Performed by: EMERGENCY MEDICINE

## 2019-04-16 PROCEDURE — 99213 OFFICE O/P EST LOW 20 MIN: CPT | Performed by: EMERGENCY MEDICINE

## 2019-04-16 PROCEDURE — 85610 PROTHROMBIN TIME: CPT

## 2019-04-16 PROCEDURE — 1123F ACP DISCUSS/DSCN MKR DOCD: CPT | Performed by: EMERGENCY MEDICINE

## 2019-04-16 PROCEDURE — 99211 OFF/OP EST MAY X REQ PHY/QHP: CPT

## 2019-04-16 PROCEDURE — 1036F TOBACCO NON-USER: CPT | Performed by: EMERGENCY MEDICINE

## 2019-04-16 PROCEDURE — 36416 COLLJ CAPILLARY BLOOD SPEC: CPT

## 2019-04-16 PROCEDURE — 4040F PNEUMOC VAC/ADMIN/RCVD: CPT | Performed by: EMERGENCY MEDICINE

## 2019-04-16 PROCEDURE — 83036 HEMOGLOBIN GLYCOSYLATED A1C: CPT | Performed by: EMERGENCY MEDICINE

## 2019-04-16 PROCEDURE — 82962 GLUCOSE BLOOD TEST: CPT | Performed by: EMERGENCY MEDICINE

## 2019-04-16 PROCEDURE — G8427 DOCREV CUR MEDS BY ELIG CLIN: HCPCS | Performed by: EMERGENCY MEDICINE

## 2019-04-16 ASSESSMENT — ENCOUNTER SYMPTOMS
RHINORRHEA: 0
SORE THROAT: 0
COUGH: 0
ABDOMINAL PAIN: 0
BACK PAIN: 0
NAUSEA: 0
SINUS PRESSURE: 0
VOICE CHANGE: 0
VOMITING: 0
WHEEZING: 0
CHEST TIGHTNESS: 0
DIARRHEA: 0
TROUBLE SWALLOWING: 0
CONSTIPATION: 0
SHORTNESS OF BREATH: 0

## 2019-04-16 ASSESSMENT — PATIENT HEALTH QUESTIONNAIRE - PHQ9
SUM OF ALL RESPONSES TO PHQ QUESTIONS 1-9: 0
SUM OF ALL RESPONSES TO PHQ QUESTIONS 1-9: 0
1. LITTLE INTEREST OR PLEASURE IN DOING THINGS: 0
SUM OF ALL RESPONSES TO PHQ9 QUESTIONS 1 & 2: 0
2. FEELING DOWN, DEPRESSED OR HOPELESS: 0

## 2019-04-16 ASSESSMENT — COPD QUESTIONNAIRES: COPD: 1

## 2019-04-16 NOTE — TELEPHONE ENCOUNTER
----- Message from Sergei Peterson MD sent at 4/16/2019 10:43 AM EDT -----  Please call the patient, his laboratories including thyroid cholesterol and kidneys are within normal limits

## 2019-04-16 NOTE — PROGRESS NOTES
Visit Date: 4/16/2019     Annette Shelton is a 77 y.o. male who presents today for:  Chief Complaint   Patient presents with    Diabetes    Hypertension         HPI:     Hoarseness little better    Otherwise doing well, No SOB, No chest pain , No fatigue. No nausea nor abdominal pain    Diabetes   Pertinent negatives for hypoglycemia include no dizziness or headaches. Pertinent negatives for diabetes include no chest pain, no fatigue and no weakness. His breakfast blood glucose range is generally 130-140 mg/dl. (Lately it is 140's to 160)   Hypertension   Pertinent negatives include no chest pain, headaches, neck pain, palpitations or shortness of breath. Gastroesophageal Reflux   He reports no abdominal pain, no chest pain, no coughing, no nausea, no sore throat or no wheezing. Pertinent negatives include no fatigue. COPD   There is no cough, shortness of breath or wheezing. Pertinent negatives include no appetite change, chest pain, ear pain, fever, headaches, myalgias, postnasal drip, rhinorrhea, sore throat or trouble swallowing. Current Medications:  Current Outpatient Medications   Medication Sig Dispense Refill    albuterol-ipratropium (COMBIVENT RESPIMAT)  MCG/ACT AERS inhaler Inhale 1 puff into the lungs every 6 hours as needed for Wheezing 1 Inhaler 5    finasteride (PROSCAR) 5 MG tablet Take 1 tablet by mouth daily 90 tablet 3    tamsulosin (FLOMAX) 0.4 MG capsule Take 1 capsule by mouth nightly 90 capsule 3    furosemide (LASIX) 40 MG tablet take 1 tablet by mouth once daily 90 tablet 1    ONE TOUCH ULTRA TEST strip Check blood sugar once daily.  Dx: E11.9 100 strip 0    atenolol (TENORMIN) 100 MG tablet take 1 tablet by mouth once daily 90 tablet 1    pantoprazole (PROTONIX) 40 MG tablet Take 40 mg by mouth 2 times daily   1    ipratropium-albuterol (DUONEB) 0.5-2.5 (3) MG/3ML SOLN nebulizer solution Inhale 3 mLs into the lungs every 6 hours as needed for Shortness of Breath (dyspnea or wheezes. ) . 360 mL 7    budesonide (PULMICORT) 0.5 MG/2ML nebulizer suspension Take 2 mLs by nebulization 2 times daily 60 ampule 11    Arformoterol Tartrate (BROVANA) 15 MCG/2ML NEBU Take 2 mLs by nebulization 2 times daily 120 mL 11    warfarin (COUMADIN) 5 MG tablet Take as directed by [facility], #75=90 day supply 75 tablet 3    Blood Glucose Monitoring Suppl (ONE TOUCH ULTRA SYSTEM KIT) w/Device KIT Test blood sugar daily Dx E11.9 1 kit 0    clopidogrel (PLAVIX) 75 MG tablet take 1 tablet by mouth once daily  0    acetaminophen (TYLENOL) 325 MG tablet Take 650 mg by mouth every 6 hours as needed for Pain Don't take more than 3,000 mg per day.  isosorbide mononitrate (IMDUR) 30 MG CR tablet Take 30 mg by mouth daily. Indications: Chest Pain      nitroGLYCERIN (NITROSTAT) 0.4 MG SL tablet Place 0.4 mg under the tongue every 5 minutes as needed for Chest pain. If third one does not relieve pain, call 9-1-1.  Respiratory Therapy Supplies (NEBULIZER COMPRESSOR) KIT by Does not apply route. 1 kit 0     No current facility-administered medications for this visit. Subjective:     Review of Systems   Constitutional: Negative for appetite change, chills, diaphoresis, fatigue and fever. HENT: Negative for congestion, ear discharge, ear pain, postnasal drip, rhinorrhea, sinus pressure, sore throat, trouble swallowing and voice change. Respiratory: Negative for cough, chest tightness, shortness of breath and wheezing. Cardiovascular: Negative for chest pain, palpitations and leg swelling. Gastrointestinal: Negative for abdominal pain, constipation, diarrhea, nausea and vomiting. Musculoskeletal: Negative for arthralgias, back pain, joint swelling, myalgias, neck pain and neck stiffness. Skin: Negative for rash. Neurological: Negative for dizziness, syncope, weakness, light-headedness, numbness and headaches.        Objective:     /60 (Site: Right Upper Arm, Position: Sitting, Cuff Size: Medium Adult)   Pulse 66   Resp 12   Ht 5' 11\" (1.803 m)   Wt 167 lb 6.4 oz (75.9 kg)   BMI 23.35 kg/m²   BP Readings from Last 3 Encounters:   04/16/19 116/60   04/09/19 118/62   03/28/19 122/64     Wt Readings from Last 3 Encounters:   04/16/19 167 lb 6.4 oz (75.9 kg)   04/09/19 169 lb 9.6 oz (76.9 kg)   03/28/19 169 lb (76.7 kg)        Physical Exam   Constitutional: He is oriented to person, place, and time. He appears well-developed and well-nourished. He is cooperative. HENT:   Head: Normocephalic and atraumatic. Right Ear: External ear normal.   Left Ear: External ear normal.   Nose: Nose normal.   Mouth/Throat: Oropharynx is clear and moist.   Eyes: Pupils are equal, round, and reactive to light. Conjunctivae and EOM are normal. No scleral icterus. Neck: Normal range of motion. Neck supple. No JVD present. No thyromegaly present. Cardiovascular: Normal rate, regular rhythm and intact distal pulses. Exam reveals no friction rub. No murmur heard. Pulmonary/Chest: Effort normal and breath sounds normal. He has no wheezes. He has no rales. He exhibits no tenderness. Abdominal: Soft. Bowel sounds are normal. He exhibits no mass. There is no tenderness. Musculoskeletal: He exhibits no edema. Lymphadenopathy:     He has no cervical adenopathy. Neurological: He is alert and oriented to person, place, and time. Skin: No rash noted. Vitals reviewed. Assessment:       Diagnosis Orders   1. Type 2 diabetes mellitus with complication, with long-term current use of insulin (HCC)  POCT Glucose    POCT glycosylated hemoglobin (Hb A1C)   2. Gastroesophageal reflux disease, esophagitis presence not specified     3. History of esophageal cancer         Plan:      Medications Prescribed:  No orders of the defined types were placed in this encounter.       Orders Placed:  Orders Placed This Encounter   Procedures    POCT Glucose    POCT glycosylated hemoglobin (Hb

## 2019-04-16 NOTE — PROGRESS NOTES
BP Readings from Last 2 Encounters:   04/16/19 116/60   04/09/19 118/62     Hemoglobin A1C (%)   Date Value   01/15/2019 7.5     LDL Calculated (mg/dL)   Date Value   04/15/2019 90              Tobacco use:  Patient  reports that he quit smoking about 49 years ago. His smoking use included cigarettes. He has a 25.50 pack-year smoking history. He has never used smokeless tobacco.    If Smoker - Cessation materials given? NA    Is Daily aspirin on medication list?:  Yes    Diabetic retinal exam done? Yes   If yes, document in Health Maintenance. Monofilament placed on counter? No    Shoes and socks removed? No    BP taken with correct size cuff? Yes   Repeated if > 140/90 NA     Is patient taking any medications for diabetes    Yes   If yes, see medication list    Is the patient reporting any side effects of diabetic medications? No    Microalbumin performed if applicable? NA      Is patient taking any over the counter medications    Yes   If yes, see medication list      Patient Self-Management Goal for Chronic Condition  Goal: I will take all medications as prescribed by my doctor, and I will call the office if I am having any medication problems. Barriers to success: none  Plan for overcoming my barriers: N/A     Confidence: 10/10  Date goal set: 4/16/19  Date goal attained:     Have you seen any other physician or provider since your last visit no    Have you had any other diagnostic tests since your last visit? no    Have you changed or stopped any medications since your last visit including any over-the-counter medicines, vitamins, or herbal medicines? no     Are you taking all your prescribed medications?  Yes    If NO, why?

## 2019-04-16 NOTE — PROGRESS NOTES
Medication Management [facility]  Anticoagulation Clinic  [phone] (phone)  [phone] (fax)      Mr. Radha Bray is a 77 y.o.  male with history of PE/DVT, per Dr. FranciscoJ Goldsmith referral, who presents today for Warfarin monitoring and adjustment (3 week visit). Patient verifies current dosing regimen and tablet strength. No missed or extra doses, except as ordered last visit. Patient denies bleeding/swelling/chest pain. Has usual SOB/easy bruising. No blood in urine or stool. No dietary changes. No changes in medication/OTC agents/herbals. No change in alcohol use or tobacco use. No change in activity level. Patient denies headaches/dizziness/lightheadedness/falls. No vomiting/diarrhea or acute illness. No procedures scheduled in the future at this time. Assessment:   Lab Results   Component Value Date    INR 2.10 (H) 04/16/2019    INR 1.90 (H) 03/27/2019    INR 1.70 (H) 03/11/2019    PROTIME 1.89 (H) 09/03/2011    PROTIME 2.20 (H) 09/02/2011     INR therapeutic - goal 2-3. Recent Labs     04/16/19  1026   INR 2.10*       Plan:  POCT INR ordered/performed/result reviewed. Continue Coumadin 2.5 mg daily PO. Recheck INR in 4 weeks. Patient reminded to call the Anticoagulation Clinic with any signs or symptoms of bleeding or with any medication changes. Patient given instructions utilizing the teach back method. Discharged ambulatory in no apparent distress, with cane and wife. Sees PCP next. After visit summary printed and reviewed with patient.       Medications reviewed and updated on home medication list.    Influenza vaccine:     [] given    [] declined   [] received previously   [] plans to receive at a later time   [] refused    [] documented in EPIC

## 2019-04-17 ENCOUNTER — HOSPITAL ENCOUNTER (OUTPATIENT)
Dept: PHYSICAL THERAPY | Age: 79
Setting detail: THERAPIES SERIES
Discharge: HOME | End: 2019-04-17
Payer: MEDICARE

## 2019-04-17 PROCEDURE — 97530 THERAPEUTIC ACTIVITIES: CPT

## 2019-04-17 PROCEDURE — 97110 THERAPEUTIC EXERCISES: CPT

## 2019-04-17 PROCEDURE — 97112 NEUROMUSCULAR REEDUCATION: CPT

## 2019-04-17 NOTE — PROGRESS NOTES
marches 10x         Activity Tolerance:  Activity Tolerance: Patient Tolerated treatment well    Assessment: Body structures, Functions, Activity limitations: Decreased functional mobility , Decreased ROM  Assessment: Increased the height of the step with step ups; tolerated well. Continues to require verbal cuing and CGA with sit-stands as he has a tencency to lock out his knees and shift his weight posteriorly before standing upright. Prognosis: Good  Discharge Recommendations: Continue to assess pending progress    Patient Education:  Patient Education: Continue with HEP and monitor repsonse to progressions. Plan:  Times per week: 2x per week  Plan weeks: 8 weeks   Specific instructions for Next Treatment: Follow up on tape; start with bike or NuStep and focus on CKC strengthening and dynamic balance and gait activities   Current Treatment Recommendations: Strengthening, Functional Mobility Training, ROM, Balance Training, Stair training, Gait Training, Neuromuscular Re-education, Manual Therapy - Joint Manipulation, Manual Therapy - Soft Tissue Mobilization, Pain Management, Home Exercise Program, Modalities, Patient/Caregiver Education & Training    Goals:  Patient goals : Be able to walk without having to wear knee brace; walking with less pain     Short term goals  Time Frame for Short term goals: 4 weeks   Short term goal 1: AROM: 0-125 degrees without pain  Short term goal 2: Sonna Bamberger will be able to walk with greater confidence with a SPC both in his home as well as surrounding community. Short term goal 3: Sonna Bamberger will perform sit-stand transfers using \"nose over toes\" posturing and minimal use of UE as his concentric/eccentric quad strength improves to 4+/5. Long term goals  Time Frame for Long term goals : 8 weeks   Long term goal 1: Discharge with independent HEP to maintain all strength and ROM gains achieved in clinic.   Long term goal 2: Sonna Bamberger will be able to walk around his home and community daily without having to rely on his hinge brace and will only use hinge brace for pain relief as needed.      Rad Guerrero, PT

## 2019-04-22 ENCOUNTER — HOSPITAL ENCOUNTER (OUTPATIENT)
Dept: PHYSICAL THERAPY | Age: 79
Setting detail: THERAPIES SERIES
Discharge: HOME | End: 2019-04-22
Payer: MEDICARE

## 2019-04-22 PROCEDURE — 97110 THERAPEUTIC EXERCISES: CPT

## 2019-04-22 NOTE — PROGRESS NOTES
Passive hamstring stretch to right 20 seconds x 3  Exercise 11: 6' steps ups forward: 15x; lateral step up: 4\" 10x bilaterally   Exercise 12: Airex: anterior/posterior wt shifts 15x; standing marches 10x         Activity Tolerance:  Activity Tolerance: Patient Tolerated treatment well  Activity Tolerance: denies increase in pain at end of session    Assessment:  Assessment: Patient doing a little better with sit to stands today and needing less assist but still having poor posture throughout session. Patient also continues to have difficulty with keeping heels down during squats and is lacking full knee extension. Patient reporting that his knee felt a little sore but denies having any pain at end of session and having no other complains. Prognosis: Good       Patient Education:  Patient Education: Continue with HEP and monitor repsonse to progressions. Plan:  Times per week: 2x per week  Plan weeks: 8 weeks   Specific instructions for Next Treatment: Follow up on tape; start with bike or NuStep and focus on CKC strengthening and dynamic balance and gait activities   Current Treatment Recommendations: Strengthening, Functional Mobility Training, ROM, Balance Training, Stair training, Gait Training, Neuromuscular Re-education, Manual Therapy - Joint Manipulation, Manual Therapy - Soft Tissue Mobilization, Pain Management, Home Exercise Program, Modalities, Patient/Caregiver Education & Training    Goals:  Patient goals : Be able to walk without having to wear knee brace; walking with less pain     Short term goals  Time Frame for Short term goals: 4 weeks   Short term goal 1: AROM: 0-125 degrees without pain  Short term goal 2: Eliezer Haile will be able to walk with greater confidence with a SPC both in his home as well as surrounding community.    Short term goal 3: Eliezer Haile will perform sit-stand transfers using \"nose over toes\" posturing and minimal use of UE as his concentric/eccentric quad strength improves to 4+/5. Long term goals  Time Frame for Long term goals : 8 weeks   Long term goal 1: Discharge with independent HEP to maintain all strength and ROM gains achieved in clinic. Long term goal 2: Gaby Zamora will be able to walk around his home and community daily without having to rely on his hinge brace and will only use hinge brace for pain relief as needed.      Sean Pallas, [id number]

## 2019-04-24 ENCOUNTER — HOSPITAL ENCOUNTER (OUTPATIENT)
Dept: PHYSICAL THERAPY | Age: 79
Setting detail: THERAPIES SERIES
Discharge: HOME | End: 2019-04-24
Payer: MEDICARE

## 2019-04-24 PROCEDURE — 97112 NEUROMUSCULAR REEDUCATION: CPT

## 2019-04-24 PROCEDURE — 97110 THERAPEUTIC EXERCISES: CPT

## 2019-04-24 NOTE — DISCHARGE SUMMARY
raises  Exercise 7: Step stance on foam B x 30 seconds and no UE. Exercise 11: 8' steps ups forward: 15x; lateral step up: 6\" 15x bilaterally   Exercise 12: Airex: anterior/posterior wt shifts 15x; standing marches 10x  Exercise 13: Seated hip abd with green band and ball squeezes: 15x bilaterally          Activity Tolerance:  Activity Tolerance: Patient Tolerated treatment well  Activity Tolerance: denies increase in pain at end of session    Assessment: Body structures, Functions, Activity limitations: Decreased functional mobility , Decreased ROM  Assessment: Berta Lang is discharged from PT today with independent HEP. He has achieved 85-90% of all goals, however he continues to demonstrate more of a forward head/trunk posture. He no longer wears his knee brace and does not plan on using it at all. Prognosis: Good  Discharge Recommendations: Home independently    Patient Education:  Patient Education: Review of goals; discharge                       Plan:  Times per week: Discharge   Plan Comment: Discharge     Goals:  Patient goals : Be able to walk without having to wear knee brace; walking with less pain     Short term goals  Time Frame for Short term goals: 4 weeks   Short term goal 1: AROM: 0-125 degrees without pain NOT MET: +2-125 degrees without pain   Short term goal 2: Berta Lang will be able to walk with greater confidence with a SPC both in his home as well as surrounding community. GOAL MET   Short term goal 3: Berta Lang will perform sit-stand transfers using \"nose over toes\" posturing and minimal use of UE as his concentric/eccentric quad strength improves to 4+/5. NOT MET-demonstrating improved positioning, however continues to rely on UE assistance     Long term goals  Time Frame for Long term goals : 8 weeks   Long term goal 1: Discharge with independent HEP to maintain all strength and ROM gains achieved in clinic.  ONGOING  Long term goal 2: Berta Lang will be able to walk around his home and community daily without having to rely on his hinge brace and will only use hinge brace for pain relief as needed.  GOAL MET     Milena Ryan, PT

## 2019-05-14 ENCOUNTER — HOSPITAL ENCOUNTER (OUTPATIENT)
Dept: PHARMACY | Age: 79
Setting detail: THERAPIES SERIES
Discharge: HOME | End: 2019-05-14
Payer: MEDICARE

## 2019-05-14 DIAGNOSIS — I26.99 OTHER ACUTE PULMONARY EMBOLISM WITHOUT ACUTE COR PULMONALE (HCC): ICD-10-CM

## 2019-05-14 LAB — POC INR: 2.3 (ref 0.8–1.2)

## 2019-05-14 PROCEDURE — 85610 PROTHROMBIN TIME: CPT

## 2019-05-14 PROCEDURE — 36416 COLLJ CAPILLARY BLOOD SPEC: CPT

## 2019-05-14 PROCEDURE — 99212 OFFICE O/P EST SF 10 MIN: CPT

## 2019-05-14 RX ORDER — WARFARIN SODIUM 2.5 MG/1
TABLET ORAL
Qty: 30 TABLET | Refills: 3 | Status: SHIPPED | OUTPATIENT
Start: 2019-05-14 | End: 2019-05-14 | Stop reason: SDUPTHER

## 2019-05-14 RX ORDER — WARFARIN SODIUM 2.5 MG/1
TABLET ORAL EVERY EVENING
COMMUNITY
End: 2019-06-11 | Stop reason: ALTCHOICE

## 2019-05-14 RX ORDER — WARFARIN SODIUM 2.5 MG/1
TABLET ORAL
Qty: 35 TABLET | Refills: 3 | Status: SHIPPED | OUTPATIENT
Start: 2019-05-14 | End: 2019-09-05 | Stop reason: SDUPTHER

## 2019-06-11 ENCOUNTER — HOSPITAL ENCOUNTER (OUTPATIENT)
Dept: PHARMACY | Age: 79
Setting detail: THERAPIES SERIES
Discharge: HOME | End: 2019-06-11
Payer: MEDICARE

## 2019-06-11 DIAGNOSIS — I26.99 OTHER ACUTE PULMONARY EMBOLISM WITHOUT ACUTE COR PULMONALE (HCC): ICD-10-CM

## 2019-06-11 LAB — POC INR: 3 (ref 0.8–1.2)

## 2019-06-11 PROCEDURE — 36416 COLLJ CAPILLARY BLOOD SPEC: CPT

## 2019-06-11 PROCEDURE — 85610 PROTHROMBIN TIME: CPT

## 2019-06-11 PROCEDURE — 99211 OFF/OP EST MAY X REQ PHY/QHP: CPT

## 2019-07-01 ENCOUNTER — HOSPITAL ENCOUNTER (EMERGENCY)
Age: 79
Discharge: HOME | End: 2019-07-01
Payer: MEDICARE

## 2019-07-01 VITALS
TEMPERATURE: 97.9 F | WEIGHT: 170 LBS | DIASTOLIC BLOOD PRESSURE: 70 MMHG | HEIGHT: 71 IN | SYSTOLIC BLOOD PRESSURE: 130 MMHG | BODY MASS INDEX: 23.8 KG/M2 | HEART RATE: 70 BPM | RESPIRATION RATE: 16 BRPM | OXYGEN SATURATION: 97 %

## 2019-07-01 DIAGNOSIS — S51.811A SKIN TEAR OF RIGHT FOREARM WITHOUT COMPLICATION, INITIAL ENCOUNTER: ICD-10-CM

## 2019-07-01 DIAGNOSIS — T45.515A WARFARIN-INDUCED COAGULOPATHY (HCC): Primary | ICD-10-CM

## 2019-07-01 DIAGNOSIS — D68.32 WARFARIN-INDUCED COAGULOPATHY (HCC): Primary | ICD-10-CM

## 2019-07-01 LAB
ANION GAP SERPL CALCULATED.3IONS-SCNC: 11 MEQ/L (ref 8–16)
BASOPHILS # BLD: 0.4 %
BASOPHILS ABSOLUTE: 0 THOU/MM3 (ref 0–0.1)
BUN BLDV-MCNC: 11 MG/DL (ref 7–22)
CALCIUM SERPL-MCNC: 8.6 MG/DL (ref 8.5–10.5)
CHLORIDE BLD-SCNC: 101 MEQ/L (ref 98–111)
CO2: 29 MEQ/L (ref 23–33)
CREAT SERPL-MCNC: 1.1 MG/DL (ref 0.4–1.2)
EOSINOPHIL # BLD: 1.7 %
EOSINOPHILS ABSOLUTE: 0.1 THOU/MM3 (ref 0–0.4)
ERYTHROCYTE [DISTWIDTH] IN BLOOD BY AUTOMATED COUNT: 13.9 % (ref 11.5–14.5)
ERYTHROCYTE [DISTWIDTH] IN BLOOD BY AUTOMATED COUNT: 49.3 FL (ref 35–45)
GFR SERPL CREATININE-BSD FRML MDRD: 64 ML/MIN/1.73M2
GLUCOSE BLD-MCNC: 150 MG/DL (ref 70–108)
HCT VFR BLD CALC: 38.5 % (ref 42–52)
HEMOGLOBIN: 12.4 GM/DL (ref 14–18)
IMMATURE GRANS (ABS): 0.02 THOU/MM3 (ref 0–0.07)
IMMATURE GRANULOCYTES: 0.3 %
INR BLD: 1.86 (ref 0.85–1.13)
LYMPHOCYTES # BLD: 12.3 %
LYMPHOCYTES ABSOLUTE: 0.9 THOU/MM3 (ref 1–4.8)
MCH RBC QN AUTO: 31.5 PG (ref 26–33)
MCHC RBC AUTO-ENTMCNC: 32.2 GM/DL (ref 32.2–35.5)
MCV RBC AUTO: 97.7 FL (ref 80–94)
MONOCYTES # BLD: 12.1 %
MONOCYTES ABSOLUTE: 0.9 THOU/MM3 (ref 0.4–1.3)
NUCLEATED RED BLOOD CELLS: 0 /100 WBC
OSMOLALITY CALCULATION: 283.5 MOSMOL/KG (ref 275–300)
PLATELET # BLD: 254 THOU/MM3 (ref 130–400)
PMV BLD AUTO: 10.6 FL (ref 9.4–12.4)
POTASSIUM SERPL-SCNC: 4 MEQ/L (ref 3.5–5.2)
RBC # BLD: 3.94 MILL/MM3 (ref 4.7–6.1)
SEG NEUTROPHILS: 73.2 %
SEGMENTED NEUTROPHILS ABSOLUTE COUNT: 5.2 THOU/MM3 (ref 1.8–7.7)
SODIUM BLD-SCNC: 141 MEQ/L (ref 135–145)
WBC # BLD: 7.1 THOU/MM3 (ref 4.8–10.8)

## 2019-07-01 PROCEDURE — 85610 PROTHROMBIN TIME: CPT

## 2019-07-01 PROCEDURE — 36415 COLL VENOUS BLD VENIPUNCTURE: CPT

## 2019-07-01 PROCEDURE — 6370000000 HC RX 637 (ALT 250 FOR IP): Performed by: PHYSICIAN ASSISTANT

## 2019-07-01 PROCEDURE — 12002 RPR S/N/AX/GEN/TRNK2.6-7.5CM: CPT

## 2019-07-01 PROCEDURE — 99283 EMERGENCY DEPT VISIT LOW MDM: CPT

## 2019-07-01 PROCEDURE — 2709999900 HC NON-CHARGEABLE SUPPLY

## 2019-07-01 PROCEDURE — 85025 COMPLETE CBC W/AUTO DIFF WBC: CPT

## 2019-07-01 PROCEDURE — 80048 BASIC METABOLIC PNL TOTAL CA: CPT

## 2019-07-01 RX ADMIN — Medication 1 EACH: at 10:28

## 2019-07-01 RX ADMIN — Medication 3 ML: at 09:58

## 2019-07-01 ASSESSMENT — ENCOUNTER SYMPTOMS
EYE DISCHARGE: 0
WHEEZING: 0
COUGH: 0
VOMITING: 0
EYE REDNESS: 0
DIARRHEA: 0
SHORTNESS OF BREATH: 0
NAUSEA: 0
SORE THROAT: 0
BACK PAIN: 0
ABDOMINAL PAIN: 0
RHINORRHEA: 0

## 2019-07-01 ASSESSMENT — PAIN SCALES - GENERAL: PAINLEVEL_OUTOF10: 3

## 2019-07-01 ASSESSMENT — PAIN DESCRIPTION - PAIN TYPE: TYPE: ACUTE PAIN

## 2019-07-01 ASSESSMENT — PAIN DESCRIPTION - FREQUENCY: FREQUENCY: CONTINUOUS

## 2019-07-01 ASSESSMENT — PAIN DESCRIPTION - DESCRIPTORS: DESCRIPTORS: BURNING

## 2019-07-01 ASSESSMENT — PAIN DESCRIPTION - PROGRESSION: CLINICAL_PROGRESSION: GRADUALLY WORSENING

## 2019-07-01 ASSESSMENT — PAIN DESCRIPTION - ORIENTATION: ORIENTATION: RIGHT

## 2019-07-01 ASSESSMENT — PAIN DESCRIPTION - ONSET: ONSET: ON-GOING

## 2019-07-01 ASSESSMENT — PAIN DESCRIPTION - LOCATION: LOCATION: ARM

## 2019-07-01 NOTE — ED PROVIDER NOTES
R-1Normal      pantoprazole (PROTONIX) 40 MG tablet Take 40 mg by mouth 2 times daily , R-1Historical Med      ipratropium-albuterol (DUONEB) 0.5-2.5 (3) MG/3ML SOLN nebulizer solution Inhale 3 mLs into the lungs every 6 hours as needed for Shortness of Breath (dyspnea or wheezes. ) . , Disp-360 mL, R-7Print      budesonide (PULMICORT) 0.5 MG/2ML nebulizer suspension Take 2 mLs by nebulization 2 times daily, Disp-60 ampule, R-11Print      Arformoterol Tartrate (BROVANA) 15 MCG/2ML NEBU Take 2 mLs by nebulization 2 times daily, Disp-120 mL, R-11Print      Blood Glucose Monitoring Suppl (ONE TOUCH ULTRA SYSTEM KIT) w/Device KIT Disp-1 kit, R-0, NormalTest blood sugar daily Dx E11.9      clopidogrel (PLAVIX) 75 MG tablet take 1 tablet by mouth once daily, R-0Historical Med      acetaminophen (TYLENOL) 325 MG tablet Take 650 mg by mouth every 6 hours as needed for Pain Don't take more than 3,000 mg per day. Historical Med      isosorbide mononitrate (IMDUR) 30 MG CR tablet Take 30 mg by mouth daily. Indications: Chest Pain      nitroGLYCERIN (NITROSTAT) 0.4 MG SL tablet Place 0.4 mg under the tongue every 5 minutes as needed for Chest pain. If third one does not relieve pain, call -1. Respiratory Therapy Supplies (NEBULIZER COMPRESSOR) KIT Starting 2012, Until Discontinued, Disp-1 kit, R-0, Print             ALLERGIES     is allergic to norco [hydrocodone-acetaminophen]. FAMILY HISTORY     indicated that his mother is . He indicated that his father is . He indicated that only one of his two sisters is alive. He indicated that only one of his two brothers is alive. family history includes Cancer in his brother, brother, and father; Diabetes in his mother; Stroke in his mother. SOCIAL HISTORY    reports that he quit smoking about 49 years ago. His smoking use included cigarettes. He has a 25.50 pack-year smoking history.  He has never used smokeless tobacco. He reports that he does not

## 2019-07-01 NOTE — ED TRIAGE NOTES
Patient arrived to room 36 with c/o arm injury. Patient stated he was cutting a limb of a tree and the piece cut his arm. Patient stated he has changed the bandage a couple of times at home but continues to bleed. Patient is on blood thinners.

## 2019-07-03 ENCOUNTER — OFFICE VISIT (OUTPATIENT)
Dept: FAMILY MEDICINE CLINIC | Age: 79
End: 2019-07-03

## 2019-07-03 VITALS
DIASTOLIC BLOOD PRESSURE: 68 MMHG | WEIGHT: 166.38 LBS | SYSTOLIC BLOOD PRESSURE: 118 MMHG | HEIGHT: 71 IN | BODY MASS INDEX: 23.29 KG/M2 | RESPIRATION RATE: 16 BRPM | HEART RATE: 76 BPM

## 2019-07-03 DIAGNOSIS — S51.801A AVULSION OF SKIN OF RIGHT FOREARM, INITIAL ENCOUNTER: Primary | ICD-10-CM

## 2019-07-03 PROCEDURE — 1036F TOBACCO NON-USER: CPT | Performed by: FAMILY MEDICINE

## 2019-07-03 PROCEDURE — 1123F ACP DISCUSS/DSCN MKR DOCD: CPT | Performed by: FAMILY MEDICINE

## 2019-07-03 PROCEDURE — 99213 OFFICE O/P EST LOW 20 MIN: CPT | Performed by: FAMILY MEDICINE

## 2019-07-03 PROCEDURE — 4040F PNEUMOC VAC/ADMIN/RCVD: CPT | Performed by: FAMILY MEDICINE

## 2019-07-03 PROCEDURE — G8427 DOCREV CUR MEDS BY ELIG CLIN: HCPCS | Performed by: FAMILY MEDICINE

## 2019-07-03 PROCEDURE — G8420 CALC BMI NORM PARAMETERS: HCPCS | Performed by: FAMILY MEDICINE

## 2019-07-03 ASSESSMENT — ENCOUNTER SYMPTOMS
SINUS PRESSURE: 0
CONSTIPATION: 0
SHORTNESS OF BREATH: 1

## 2019-07-03 NOTE — PATIENT INSTRUCTIONS
Soak the area with peroxide once daily beginning later today  See me 7/5/19 and call sooner if needed

## 2019-07-05 ENCOUNTER — OFFICE VISIT (OUTPATIENT)
Dept: FAMILY MEDICINE CLINIC | Age: 79
End: 2019-07-05

## 2019-07-05 VITALS
RESPIRATION RATE: 16 BRPM | HEIGHT: 71 IN | SYSTOLIC BLOOD PRESSURE: 108 MMHG | DIASTOLIC BLOOD PRESSURE: 60 MMHG | BODY MASS INDEX: 23.54 KG/M2 | HEART RATE: 68 BPM | WEIGHT: 168.13 LBS

## 2019-07-05 DIAGNOSIS — S51.801A AVULSION OF SKIN OF RIGHT FOREARM, INITIAL ENCOUNTER: Primary | ICD-10-CM

## 2019-07-05 PROCEDURE — 4040F PNEUMOC VAC/ADMIN/RCVD: CPT | Performed by: FAMILY MEDICINE

## 2019-07-05 PROCEDURE — G8420 CALC BMI NORM PARAMETERS: HCPCS | Performed by: FAMILY MEDICINE

## 2019-07-05 PROCEDURE — 1123F ACP DISCUSS/DSCN MKR DOCD: CPT | Performed by: FAMILY MEDICINE

## 2019-07-05 PROCEDURE — 99213 OFFICE O/P EST LOW 20 MIN: CPT | Performed by: FAMILY MEDICINE

## 2019-07-05 PROCEDURE — G8427 DOCREV CUR MEDS BY ELIG CLIN: HCPCS | Performed by: FAMILY MEDICINE

## 2019-07-05 PROCEDURE — 1036F TOBACCO NON-USER: CPT | Performed by: FAMILY MEDICINE

## 2019-07-05 ASSESSMENT — ENCOUNTER SYMPTOMS
CONSTIPATION: 0
SHORTNESS OF BREATH: 0
SINUS PRESSURE: 0

## 2019-07-05 NOTE — PROGRESS NOTES
Subjective:      Patient ID: Rohan Mccurdy is a 77 y.o. male. HPI  1. He has been soaking the area daily with peroxide  1. The is no pain  Review of Systems   Constitutional: Negative for fatigue. HENT: Negative for sinus pressure. Eyes: Negative for visual disturbance. Respiratory: Negative for shortness of breath. Cardiovascular: Negative for chest pain. Gastrointestinal: Negative for constipation. Genitourinary: Negative. Musculoskeletal: Negative for arthralgias. Skin: Positive for wound. Negative for rash. Neurological: Negative for headaches. The patient's medications, allergies, past medical problems, surgical, social, and family histories were reviewed and updated as needed. Objective:   Physical Exam   Constitutional: He is oriented to person, place, and time. He appears well-developed and well-nourished. No distress. HENT:   Head: Normocephalic and atraumatic. Eyes: Conjunctivae are normal. No scleral icterus. Neck: No tracheal deviation present. Cardiovascular: Normal rate. Pulmonary/Chest: Effort normal.   Musculoskeletal: He exhibits no edema. Neurological: He is alert and oriented to person, place, and time. Skin: Skin is warm and dry. The area looks  from before and shows no secondary infection   Psychiatric: He has a normal mood and affect. His behavior is normal.         Blood pressure 108/60, pulse 68, resp. rate 16, height 5' 11\" (1.803 m), weight 168 lb 2 oz (76.3 kg). Assessment:       Diagnosis Orders   1.  Avulsion of skin of right forearm, initial encounter             Plan:      Leave the area open to air as much as possible  Let us know if it worsens in any way  See us as needed        Daisy Mills MD

## 2019-07-08 ENCOUNTER — CARE COORDINATION (OUTPATIENT)
Dept: CASE MANAGEMENT | Age: 79
End: 2019-07-08

## 2019-07-18 ENCOUNTER — HOSPITAL ENCOUNTER (OUTPATIENT)
Dept: PHARMACY | Age: 79
Setting detail: THERAPIES SERIES
Discharge: HOME | End: 2019-07-18
Payer: MEDICARE

## 2019-07-18 ENCOUNTER — OFFICE VISIT (OUTPATIENT)
Dept: FAMILY MEDICINE CLINIC | Age: 79
End: 2019-07-18

## 2019-07-18 VITALS
HEART RATE: 60 BPM | BODY MASS INDEX: 23.13 KG/M2 | SYSTOLIC BLOOD PRESSURE: 112 MMHG | HEIGHT: 71 IN | DIASTOLIC BLOOD PRESSURE: 60 MMHG | WEIGHT: 165.2 LBS | RESPIRATION RATE: 12 BRPM

## 2019-07-18 DIAGNOSIS — S51.801A AVULSION OF SKIN OF RIGHT FOREARM, INITIAL ENCOUNTER: ICD-10-CM

## 2019-07-18 DIAGNOSIS — J44.9 COPD, SEVERE (HCC): ICD-10-CM

## 2019-07-18 DIAGNOSIS — E11.8 TYPE 2 DIABETES MELLITUS WITH COMPLICATION, WITHOUT LONG-TERM CURRENT USE OF INSULIN (HCC): Primary | ICD-10-CM

## 2019-07-18 DIAGNOSIS — I26.99 OTHER ACUTE PULMONARY EMBOLISM WITHOUT ACUTE COR PULMONALE (HCC): ICD-10-CM

## 2019-07-18 DIAGNOSIS — K21.9 GASTROESOPHAGEAL REFLUX DISEASE, ESOPHAGITIS PRESENCE NOT SPECIFIED: ICD-10-CM

## 2019-07-18 LAB
CHP ED QC CHECK: ABNORMAL
GLUCOSE BLD-MCNC: 127 MG/DL
HBA1C MFR BLD: 6.8 %
POC INR: 3.3 (ref 0.8–1.2)

## 2019-07-18 PROCEDURE — 99211 OFF/OP EST MAY X REQ PHY/QHP: CPT

## 2019-07-18 PROCEDURE — 85610 PROTHROMBIN TIME: CPT

## 2019-07-18 PROCEDURE — 99213 OFFICE O/P EST LOW 20 MIN: CPT | Performed by: EMERGENCY MEDICINE

## 2019-07-18 PROCEDURE — 1123F ACP DISCUSS/DSCN MKR DOCD: CPT | Performed by: EMERGENCY MEDICINE

## 2019-07-18 PROCEDURE — 82962 GLUCOSE BLOOD TEST: CPT | Performed by: EMERGENCY MEDICINE

## 2019-07-18 PROCEDURE — 36416 COLLJ CAPILLARY BLOOD SPEC: CPT

## 2019-07-18 PROCEDURE — G8427 DOCREV CUR MEDS BY ELIG CLIN: HCPCS | Performed by: EMERGENCY MEDICINE

## 2019-07-18 PROCEDURE — 1036F TOBACCO NON-USER: CPT | Performed by: EMERGENCY MEDICINE

## 2019-07-18 PROCEDURE — 83036 HEMOGLOBIN GLYCOSYLATED A1C: CPT | Performed by: EMERGENCY MEDICINE

## 2019-07-18 PROCEDURE — G8420 CALC BMI NORM PARAMETERS: HCPCS | Performed by: EMERGENCY MEDICINE

## 2019-07-18 PROCEDURE — 4040F PNEUMOC VAC/ADMIN/RCVD: CPT | Performed by: EMERGENCY MEDICINE

## 2019-07-18 ASSESSMENT — COPD QUESTIONNAIRES: COPD: 1

## 2019-07-18 ASSESSMENT — ENCOUNTER SYMPTOMS
WHEEZING: 0
ABDOMINAL PAIN: 0
CONSTIPATION: 0
SINUS PRESSURE: 0
SHORTNESS OF BREATH: 0
DIARRHEA: 0
SORE THROAT: 0
BACK PAIN: 0
VOICE CHANGE: 0
VOMITING: 0
COUGH: 0
TROUBLE SWALLOWING: 0
CHEST TIGHTNESS: 0
NAUSEA: 0
RHINORRHEA: 0

## 2019-07-18 ASSESSMENT — PATIENT HEALTH QUESTIONNAIRE - PHQ9
SUM OF ALL RESPONSES TO PHQ QUESTIONS 1-9: 0
SUM OF ALL RESPONSES TO PHQ9 QUESTIONS 1 & 2: 0
SUM OF ALL RESPONSES TO PHQ QUESTIONS 1-9: 0
1. LITTLE INTEREST OR PLEASURE IN DOING THINGS: 0
2. FEELING DOWN, DEPRESSED OR HOPELESS: 0

## 2019-07-18 NOTE — PROGRESS NOTES
Visit Date: 7/18/2019     Don Solares is a 77 y.o. male who presents today for:  Chief Complaint   Patient presents with    Diabetes    Hypertension         HPI:     Skin tear bled a lot thus he went to the ER    Diabetes   Pertinent negatives for hypoglycemia include no dizziness or headaches. Pertinent negatives for diabetes include no chest pain, no fatigue and no weakness. His breakfast blood glucose range is generally 130-140 mg/dl. (Lately it is 140's to 160)   Hypertension   Pertinent negatives include no chest pain, headaches, neck pain, palpitations or shortness of breath. Gastroesophageal Reflux   He reports no abdominal pain, no chest pain, no coughing, no nausea, no sore throat or no wheezing. Pertinent negatives include no fatigue. COPD   There is no cough, shortness of breath or wheezing. Pertinent negatives include no appetite change, chest pain, ear pain, fever, headaches, myalgias, postnasal drip, rhinorrhea, sore throat or trouble swallowing. Current Medications:  Current Outpatient Medications   Medication Sig Dispense Refill    warfarin (COUMADIN) 2.5 MG tablet Take as directed by coumadin clinic, 35 tablets = 30 days 35 tablet 3    albuterol-ipratropium (COMBIVENT RESPIMAT)  MCG/ACT AERS inhaler Inhale 1 puff into the lungs every 6 hours as needed for Wheezing 1 Inhaler 5    finasteride (PROSCAR) 5 MG tablet Take 1 tablet by mouth daily 90 tablet 3    tamsulosin (FLOMAX) 0.4 MG capsule Take 1 capsule by mouth nightly 90 capsule 3    furosemide (LASIX) 40 MG tablet take 1 tablet by mouth once daily 90 tablet 1    ONE TOUCH ULTRA TEST strip Check blood sugar once daily.  Dx: E11.9 100 strip 0    atenolol (TENORMIN) 100 MG tablet take 1 tablet by mouth once daily 90 tablet 1    pantoprazole (PROTONIX) 40 MG tablet Take 40 mg by mouth 2 times daily   1    ipratropium-albuterol (DUONEB) 0.5-2.5 (3) MG/3ML SOLN nebulizer solution Inhale 3 mLs into the lungs every 6 hours as needed for Shortness of Breath (dyspnea or wheezes. ) . 360 mL 7    budesonide (PULMICORT) 0.5 MG/2ML nebulizer suspension Take 2 mLs by nebulization 2 times daily 60 ampule 11    Arformoterol Tartrate (BROVANA) 15 MCG/2ML NEBU Take 2 mLs by nebulization 2 times daily 120 mL 11    Blood Glucose Monitoring Suppl (ONE TOUCH ULTRA SYSTEM KIT) w/Device KIT Test blood sugar daily Dx E11.9 1 kit 0    clopidogrel (PLAVIX) 75 MG tablet take 1 tablet by mouth once daily  0    acetaminophen (TYLENOL) 325 MG tablet Take 650 mg by mouth every 6 hours as needed for Pain Don't take more than 3,000 mg per day.  isosorbide mononitrate (IMDUR) 30 MG CR tablet Take 30 mg by mouth daily. Indications: Chest Pain      nitroGLYCERIN (NITROSTAT) 0.4 MG SL tablet Place 0.4 mg under the tongue every 5 minutes as needed for Chest pain. If third one does not relieve pain, call 9-1-1.  Respiratory Therapy Supplies (NEBULIZER COMPRESSOR) KIT by Does not apply route. 1 kit 0     No current facility-administered medications for this visit. Subjective:     Review of Systems   Constitutional: Negative for appetite change, chills, diaphoresis, fatigue and fever. HENT: Negative for congestion, ear discharge, ear pain, postnasal drip, rhinorrhea, sinus pressure, sore throat, trouble swallowing and voice change. Respiratory: Negative for cough, chest tightness, shortness of breath and wheezing. Cardiovascular: Negative for chest pain, palpitations and leg swelling. Gastrointestinal: Negative for abdominal pain, constipation, diarrhea, nausea and vomiting. Musculoskeletal: Negative for arthralgias, back pain, joint swelling, myalgias, neck pain and neck stiffness. Skin: Negative for rash. Neurological: Negative for dizziness, syncope, weakness, light-headedness, numbness and headaches.        Objective:     /60 (Site: Left Upper Arm, Position: Sitting, Cuff Size: Medium Adult)   Pulse 60   Resp 12 Ht 5' 11\" (1.803 m)   Wt 165 lb 3.2 oz (74.9 kg)   BMI 23.04 kg/m²   BP Readings from Last 3 Encounters:   07/18/19 112/60   07/05/19 108/60   07/03/19 118/68     Wt Readings from Last 3 Encounters:   07/18/19 165 lb 3.2 oz (74.9 kg)   07/05/19 168 lb 2 oz (76.3 kg)   07/03/19 166 lb 6 oz (75.5 kg)        Physical Exam   Constitutional: He is oriented to person, place, and time. He appears well-developed and well-nourished. He is cooperative. HENT:   Head: Normocephalic and atraumatic. Right Ear: External ear normal.   Left Ear: External ear normal.   Nose: Nose normal.   Mouth/Throat: Oropharynx is clear and moist.   Eyes: Pupils are equal, round, and reactive to light. Conjunctivae and EOM are normal. No scleral icterus. Neck: Normal range of motion. Neck supple. No JVD present. No thyromegaly present. Cardiovascular: Normal rate, regular rhythm and intact distal pulses. Exam reveals no friction rub. No murmur heard. Pulmonary/Chest: Effort normal and breath sounds normal. He has no wheezes. He has no rales. He exhibits no tenderness. Abdominal: Soft. Bowel sounds are normal. He exhibits no mass. There is no tenderness. Musculoskeletal: He exhibits no edema. Skin tear elbow right healing well + scabs   Lymphadenopathy:     He has no cervical adenopathy. Neurological: He is alert and oriented to person, place, and time. Skin: No rash noted. Vitals reviewed. Assessment:       Diagnosis Orders   1. Type 2 diabetes mellitus with complication, without long-term current use of insulin ([facility])  POCT Glucose    POCT glycosylated hemoglobin (Hb A1C)   2. COPD, severe     3. Avulsion of skin of right forearm, initial encounter     4. Gastroesophageal reflux disease, esophagitis presence not specified         Plan:      Medications Prescribed:  No orders of the defined types were placed in this encounter.       Orders Placed:  Orders Placed This Encounter   Procedures    POCT Glucose    POCT

## 2019-07-31 ENCOUNTER — HOSPITAL ENCOUNTER (OUTPATIENT)
Age: 79
Discharge: HOME | End: 2019-07-31
Payer: MEDICARE

## 2019-07-31 LAB
ALBUMIN SERPL-MCNC: 3.6 G/DL (ref 3.5–5.1)
ALP BLD-CCNC: 83 U/L (ref 38–126)
ALT SERPL-CCNC: 10 U/L (ref 11–66)
ANION GAP SERPL CALCULATED.3IONS-SCNC: 14 MEQ/L (ref 8–16)
AST SERPL-CCNC: 19 U/L (ref 5–40)
BASOPHILS # BLD: 0.5 %
BASOPHILS ABSOLUTE: 0 THOU/MM3 (ref 0–0.1)
BILIRUB SERPL-MCNC: 0.4 MG/DL (ref 0.3–1.2)
BILIRUBIN DIRECT: < 0.2 MG/DL (ref 0–0.3)
BUN BLDV-MCNC: 12 MG/DL (ref 7–22)
CALCIUM SERPL-MCNC: 9.2 MG/DL (ref 8.5–10.5)
CHLORIDE BLD-SCNC: 100 MEQ/L (ref 98–111)
CHOLESTEROL, TOTAL: 148 MG/DL (ref 100–199)
CO2: 29 MEQ/L (ref 23–33)
CREAT SERPL-MCNC: 0.9 MG/DL (ref 0.4–1.2)
EOSINOPHIL # BLD: 1.6 %
EOSINOPHILS ABSOLUTE: 0.1 THOU/MM3 (ref 0–0.4)
ERYTHROCYTE [DISTWIDTH] IN BLOOD BY AUTOMATED COUNT: 14.4 % (ref 11.5–14.5)
ERYTHROCYTE [DISTWIDTH] IN BLOOD BY AUTOMATED COUNT: 52.2 FL (ref 35–45)
GFR SERPL CREATININE-BSD FRML MDRD: 81 ML/MIN/1.73M2
GLUCOSE BLD-MCNC: 149 MG/DL (ref 70–108)
HCT VFR BLD CALC: 42.1 % (ref 42–52)
HDLC SERPL-MCNC: 59 MG/DL
HEMOGLOBIN: 13.3 GM/DL (ref 14–18)
IMMATURE GRANS (ABS): 0.02 THOU/MM3 (ref 0–0.07)
IMMATURE GRANULOCYTES: 0.3 %
LDL CHOLESTEROL CALCULATED: 75 MG/DL
LYMPHOCYTES # BLD: 15.2 %
LYMPHOCYTES ABSOLUTE: 1.1 THOU/MM3 (ref 1–4.8)
MCH RBC QN AUTO: 31.4 PG (ref 26–33)
MCHC RBC AUTO-ENTMCNC: 31.6 GM/DL (ref 32.2–35.5)
MCV RBC AUTO: 99.3 FL (ref 80–94)
MONOCYTES # BLD: 10 %
MONOCYTES ABSOLUTE: 0.7 THOU/MM3 (ref 0.4–1.3)
NUCLEATED RED BLOOD CELLS: 0 /100 WBC
PLATELET # BLD: 258 THOU/MM3 (ref 130–400)
PMV BLD AUTO: 10.5 FL (ref 9.4–12.4)
POTASSIUM SERPL-SCNC: 4 MEQ/L (ref 3.5–5.2)
RBC # BLD: 4.24 MILL/MM3 (ref 4.7–6.1)
SEG NEUTROPHILS: 72.4 %
SEGMENTED NEUTROPHILS ABSOLUTE COUNT: 5.4 THOU/MM3 (ref 1.8–7.7)
SODIUM BLD-SCNC: 143 MEQ/L (ref 135–145)
TOTAL PROTEIN: 6.9 G/DL (ref 6.1–8)
TRIGL SERPL-MCNC: 70 MG/DL (ref 0–199)
WBC # BLD: 7.4 THOU/MM3 (ref 4.8–10.8)

## 2019-07-31 PROCEDURE — 80053 COMPREHEN METABOLIC PANEL: CPT

## 2019-07-31 PROCEDURE — 82248 BILIRUBIN DIRECT: CPT

## 2019-07-31 PROCEDURE — 80061 LIPID PANEL: CPT

## 2019-07-31 PROCEDURE — 36415 COLL VENOUS BLD VENIPUNCTURE: CPT

## 2019-07-31 PROCEDURE — 85025 COMPLETE CBC W/AUTO DIFF WBC: CPT

## 2019-08-08 ENCOUNTER — HOSPITAL ENCOUNTER (OUTPATIENT)
Dept: PHARMACY | Age: 79
Setting detail: THERAPIES SERIES
Discharge: HOME | End: 2019-08-08
Payer: MEDICARE

## 2019-08-08 DIAGNOSIS — I26.99 OTHER ACUTE PULMONARY EMBOLISM WITHOUT ACUTE COR PULMONALE (HCC): ICD-10-CM

## 2019-08-08 LAB — POC INR: 3 (ref 0.8–1.2)

## 2019-08-08 PROCEDURE — 36416 COLLJ CAPILLARY BLOOD SPEC: CPT

## 2019-08-08 PROCEDURE — 99211 OFF/OP EST MAY X REQ PHY/QHP: CPT

## 2019-08-08 PROCEDURE — 85610 PROTHROMBIN TIME: CPT

## 2019-08-08 RX ORDER — ATENOLOL 100 MG/1
TABLET ORAL
Qty: 90 TABLET | Refills: 1 | Status: SHIPPED | OUTPATIENT
Start: 2019-08-08 | End: 2020-01-01 | Stop reason: SDUPTHER

## 2019-08-08 NOTE — TELEPHONE ENCOUNTER
Date of last visit:  7/18/2019  Date of next visit:  10/22/2019    Requested Prescriptions     Pending Prescriptions Disp Refills    atenolol (TENORMIN) 100 MG tablet [Pharmacy Med Name: ATENOLOL 100 MG TABLET] 90 tablet 1     Sig: take 1 tablet by mouth once daily

## 2019-09-05 ENCOUNTER — HOSPITAL ENCOUNTER (OUTPATIENT)
Dept: PHARMACY | Age: 79
Setting detail: THERAPIES SERIES
Discharge: HOME | End: 2019-09-05
Payer: MEDICARE

## 2019-09-05 DIAGNOSIS — I26.99 OTHER ACUTE PULMONARY EMBOLISM WITHOUT ACUTE COR PULMONALE (HCC): ICD-10-CM

## 2019-09-05 LAB — POC INR: 1.9 (ref 0.8–1.2)

## 2019-09-05 PROCEDURE — 85610 PROTHROMBIN TIME: CPT

## 2019-09-05 PROCEDURE — 99211 OFF/OP EST MAY X REQ PHY/QHP: CPT

## 2019-09-05 PROCEDURE — 36416 COLLJ CAPILLARY BLOOD SPEC: CPT

## 2019-09-05 RX ORDER — WARFARIN SODIUM 2.5 MG/1
TABLET ORAL
Qty: 105 TABLET | Refills: 3 | Status: SHIPPED | OUTPATIENT
Start: 2019-09-05 | End: 2020-01-01

## 2019-09-26 ENCOUNTER — HOSPITAL ENCOUNTER (OUTPATIENT)
Dept: PHARMACY | Age: 79
Setting detail: THERAPIES SERIES
Discharge: HOME | End: 2019-09-26
Payer: MEDICARE

## 2019-09-26 DIAGNOSIS — I26.99 OTHER ACUTE PULMONARY EMBOLISM WITHOUT ACUTE COR PULMONALE (HCC): ICD-10-CM

## 2019-09-26 LAB — POC INR: 2.7 (ref 0.8–1.2)

## 2019-09-26 PROCEDURE — 85610 PROTHROMBIN TIME: CPT

## 2019-09-26 PROCEDURE — 36416 COLLJ CAPILLARY BLOOD SPEC: CPT

## 2019-09-26 PROCEDURE — 99211 OFF/OP EST MAY X REQ PHY/QHP: CPT

## 2019-09-26 PROCEDURE — G0008 ADMIN INFLUENZA VIRUS VAC: HCPCS

## 2019-09-26 PROCEDURE — 90686 IIV4 VACC NO PRSV 0.5 ML IM: CPT

## 2019-09-26 PROCEDURE — 6360000002 HC RX W HCPCS

## 2019-09-26 RX ADMIN — INFLUENZA A VIRUS A/BRISBANE/02/2018 IVR-190 (H1N1) ANTIGEN (PROPIOLACTONE INACTIVATED), INFLUENZA A VIRUS A/KANSAS/14/2017 X-327 (H3N2) ANTIGEN (PROPIOLACTONE INACTIVATED), INFLUENZA B VIRUS B/MARYLAND/15/2016 ANTIGEN (PROPIOLACTONE INACTIVATED), INFLUENZA B VIRUS B/PHUKET/3073/2013 BVR-1B ANTIGEN (PROPIOLACTONE INACTIVATED) 0.5 ML: 15; 15; 15; 15 INJECTION, SUSPENSION INTRAMUSCULAR at 11:35

## 2019-09-26 NOTE — PROGRESS NOTES
Medication Management [facility]  Anticoagulation Clinic  [phone] (phone)  [phone] (fax)      Mr. Brandy Iqbal is a 77 y.o.  male with history of DVT, PE who presents today for anticoagulation monitoring and adjustment. Patient verifies current dosing regimen and tablet strength. No missed or extra doses. Patient denies s/s bruising/swelling/SOB/chest pain. Had one runny nosebleed last Friday. Put a nose clamp on for over one hour. Does know how long it took to stop. No blood in urine or stool. No dietary changes. No changes in medication/OTC agents/Herbals. No change in alcohol use or tobacco use. No change in activity level. Patient denies headaches/dizziness/lightheadedness/falls. No vomiting/diarrhea or acute illness. No procedures scheduled in the future at this time. Assessment:   Lab Results   Component Value Date    INR 2.70 (H) 09/26/2019    INR 1.90 (H) 09/05/2019    INR 3.00 (H) 08/08/2019    PROTIME 1.89 (H) 09/03/2011    PROTIME 2.20 (H) 09/02/2011     INR therapeutic   Recent Labs     09/26/19  1125   INR 2.70*     Plan: POCT INR ordered and result reviewed. Continue Coumadin 2.5 mg po daily. Recheck INR in 4 weeks. Patient reminded to call the Anticoagulation Clinic with any signs or symptoms of bleeding or with any medication changes. Patient given instructions utilizing the teach back method. After obtaining consent, Afluria given in left deltoid by this observer. Patient instructed to remain in clinic for 20 minutes afterwards, and to report any adverse reaction to staff immediately. CDC Vaccine Information Sheet given to patient for immunization(s) administered. Patient tolerated well, please see immunization note. Discharged ambulatory in no apparent distress with cane and wife. After visit summary printed and reviewed with patient.       Medications reviewed and updated on home medication list Yes    Influenza vaccine:     [x]

## 2019-09-30 RX ORDER — FUROSEMIDE 40 MG/1
TABLET ORAL
Qty: 90 TABLET | Refills: 1 | Status: ON HOLD | OUTPATIENT
Start: 2019-09-30 | End: 2020-01-01 | Stop reason: HOSPADM

## 2019-10-22 ENCOUNTER — OFFICE VISIT (OUTPATIENT)
Dept: FAMILY MEDICINE CLINIC | Age: 79
End: 2019-10-22

## 2019-10-22 VITALS
SYSTOLIC BLOOD PRESSURE: 114 MMHG | DIASTOLIC BLOOD PRESSURE: 72 MMHG | RESPIRATION RATE: 14 BRPM | WEIGHT: 167.6 LBS | BODY MASS INDEX: 23.46 KG/M2 | HEART RATE: 78 BPM | HEIGHT: 71 IN

## 2019-10-22 DIAGNOSIS — K21.9 GASTROESOPHAGEAL REFLUX DISEASE, ESOPHAGITIS PRESENCE NOT SPECIFIED: ICD-10-CM

## 2019-10-22 DIAGNOSIS — Z85.01 HISTORY OF ESOPHAGEAL CANCER: ICD-10-CM

## 2019-10-22 DIAGNOSIS — Z85.038 HISTORY OF COLON CANCER: ICD-10-CM

## 2019-10-22 DIAGNOSIS — E11.8 TYPE 2 DIABETES MELLITUS WITH COMPLICATION (HCC): Primary | ICD-10-CM

## 2019-10-22 LAB
CHP ED QC CHECK: ABNORMAL
GLUCOSE BLD-MCNC: 146 MG/DL
HBA1C MFR BLD: 6.9 %

## 2019-10-22 PROCEDURE — 82962 GLUCOSE BLOOD TEST: CPT | Performed by: EMERGENCY MEDICINE

## 2019-10-22 PROCEDURE — 4040F PNEUMOC VAC/ADMIN/RCVD: CPT | Performed by: EMERGENCY MEDICINE

## 2019-10-22 PROCEDURE — 99213 OFFICE O/P EST LOW 20 MIN: CPT | Performed by: EMERGENCY MEDICINE

## 2019-10-22 PROCEDURE — G8420 CALC BMI NORM PARAMETERS: HCPCS | Performed by: EMERGENCY MEDICINE

## 2019-10-22 PROCEDURE — 1123F ACP DISCUSS/DSCN MKR DOCD: CPT | Performed by: EMERGENCY MEDICINE

## 2019-10-22 PROCEDURE — G8427 DOCREV CUR MEDS BY ELIG CLIN: HCPCS | Performed by: EMERGENCY MEDICINE

## 2019-10-22 PROCEDURE — 83036 HEMOGLOBIN GLYCOSYLATED A1C: CPT | Performed by: EMERGENCY MEDICINE

## 2019-10-22 PROCEDURE — 1036F TOBACCO NON-USER: CPT | Performed by: EMERGENCY MEDICINE

## 2019-10-22 ASSESSMENT — COPD QUESTIONNAIRES: COPD: 1

## 2019-10-22 ASSESSMENT — ENCOUNTER SYMPTOMS
CHEST TIGHTNESS: 0
DIARRHEA: 0
BACK PAIN: 0
COUGH: 0
SHORTNESS OF BREATH: 0
SORE THROAT: 0
VOICE CHANGE: 0
CONSTIPATION: 0
TROUBLE SWALLOWING: 0
WHEEZING: 0
RHINORRHEA: 0
VOMITING: 0
ABDOMINAL PAIN: 0
SINUS PRESSURE: 0
NAUSEA: 0

## 2019-10-22 ASSESSMENT — PATIENT HEALTH QUESTIONNAIRE - PHQ9
SUM OF ALL RESPONSES TO PHQ9 QUESTIONS 1 & 2: 0
SUM OF ALL RESPONSES TO PHQ QUESTIONS 1-9: 0
SUM OF ALL RESPONSES TO PHQ QUESTIONS 1-9: 0
2. FEELING DOWN, DEPRESSED OR HOPELESS: 0
1. LITTLE INTEREST OR PLEASURE IN DOING THINGS: 0

## 2019-10-24 ENCOUNTER — HOSPITAL ENCOUNTER (OUTPATIENT)
Dept: PHARMACY | Age: 79
Setting detail: THERAPIES SERIES
Discharge: HOME | End: 2019-10-24
Payer: MEDICARE

## 2019-10-24 DIAGNOSIS — I26.99 OTHER ACUTE PULMONARY EMBOLISM WITHOUT ACUTE COR PULMONALE (HCC): ICD-10-CM

## 2019-10-24 LAB — POC INR: 1.8 (ref 0.8–1.2)

## 2019-10-24 PROCEDURE — 85610 PROTHROMBIN TIME: CPT

## 2019-10-24 PROCEDURE — 36416 COLLJ CAPILLARY BLOOD SPEC: CPT

## 2019-10-24 PROCEDURE — 99211 OFF/OP EST MAY X REQ PHY/QHP: CPT

## 2019-11-14 ENCOUNTER — HOSPITAL ENCOUNTER (OUTPATIENT)
Dept: PHARMACY | Age: 79
Setting detail: THERAPIES SERIES
Discharge: HOME | End: 2019-11-14
Payer: MEDICARE

## 2019-11-14 DIAGNOSIS — I26.99 OTHER ACUTE PULMONARY EMBOLISM WITHOUT ACUTE COR PULMONALE (HCC): ICD-10-CM

## 2019-11-14 LAB — POC INR: 2.8 (ref 0.8–1.2)

## 2019-11-14 PROCEDURE — 99211 OFF/OP EST MAY X REQ PHY/QHP: CPT

## 2019-11-14 PROCEDURE — 36416 COLLJ CAPILLARY BLOOD SPEC: CPT

## 2019-11-14 PROCEDURE — 85610 PROTHROMBIN TIME: CPT

## 2019-12-11 ENCOUNTER — TELEPHONE (OUTPATIENT)
Dept: PHARMACY | Age: 79
End: 2019-12-11

## 2019-12-11 ENCOUNTER — TELEPHONE (OUTPATIENT)
Dept: FAMILY MEDICINE CLINIC | Age: 79
End: 2019-12-11

## 2019-12-11 NOTE — TELEPHONE ENCOUNTER
Erasmo Shea with [facility] called asking for a new order to be entered into Epic as they switched from paper referrals to electronic.      Erasmo Shea may be reached at [phone]

## 2019-12-12 ENCOUNTER — HOSPITAL ENCOUNTER (OUTPATIENT)
Dept: PHARMACY | Age: 79
Setting detail: THERAPIES SERIES
Discharge: HOME | End: 2019-12-12
Payer: MEDICARE

## 2019-12-12 DIAGNOSIS — I26.99 OTHER ACUTE PULMONARY EMBOLISM WITHOUT ACUTE COR PULMONALE (HCC): ICD-10-CM

## 2019-12-12 LAB — POC INR: 1.9 (ref 0.8–1.2)

## 2019-12-12 PROCEDURE — 36416 COLLJ CAPILLARY BLOOD SPEC: CPT

## 2019-12-12 PROCEDURE — 99211 OFF/OP EST MAY X REQ PHY/QHP: CPT

## 2019-12-12 PROCEDURE — 85610 PROTHROMBIN TIME: CPT

## 2020-01-01 ENCOUNTER — HOSPITAL ENCOUNTER (INPATIENT)
Age: 80
LOS: 20 days | Discharge: NURSING, REHAB OR LONG-TERM CARE | DRG: 004 | End: 2020-10-31
Attending: EMERGENCY MEDICINE | Admitting: INTERNAL MEDICINE
Payer: MEDICARE

## 2020-01-01 ENCOUNTER — APPOINTMENT (OUTPATIENT)
Dept: GENERAL RADIOLOGY | Age: 80
DRG: 004 | End: 2020-01-01
Payer: MEDICARE

## 2020-01-01 ENCOUNTER — ANESTHESIA (OUTPATIENT)
Dept: OPERATING ROOM | Age: 80
DRG: 004 | End: 2020-01-01
Payer: MEDICARE

## 2020-01-01 ENCOUNTER — TELEPHONE (OUTPATIENT)
Dept: PHARMACY | Age: 80
End: 2020-01-01

## 2020-01-01 ENCOUNTER — HOSPITAL ENCOUNTER (OUTPATIENT)
Dept: PHARMACY | Age: 80
Setting detail: THERAPIES SERIES
Discharge: HOME | End: 2020-06-16
Payer: MEDICARE

## 2020-01-01 ENCOUNTER — HOSPITAL ENCOUNTER (OUTPATIENT)
Dept: PHARMACY | Age: 80
Setting detail: THERAPIES SERIES
Discharge: HOME | End: 2020-04-06
Payer: MEDICARE

## 2020-01-01 ENCOUNTER — HOSPITAL ENCOUNTER (OUTPATIENT)
Dept: GENERAL RADIOLOGY | Age: 80
Discharge: HOME | End: 2020-11-02
Attending: INTERNAL MEDICINE
Payer: COMMERCIAL

## 2020-01-01 ENCOUNTER — APPOINTMENT (OUTPATIENT)
Dept: CT IMAGING | Age: 80
DRG: 004 | End: 2020-01-01
Payer: MEDICARE

## 2020-01-01 ENCOUNTER — HOSPITAL ENCOUNTER (OUTPATIENT)
Age: 80
Discharge: HOME | End: 2020-02-10
Payer: MEDICARE

## 2020-01-01 ENCOUNTER — HOSPITAL ENCOUNTER (OUTPATIENT)
Age: 80
Setting detail: SPECIMEN
Discharge: HOME | End: 2020-05-28
Payer: MEDICARE

## 2020-01-01 ENCOUNTER — HOSPITAL ENCOUNTER (OUTPATIENT)
Dept: PHARMACY | Age: 80
Setting detail: THERAPIES SERIES
Discharge: HOME | End: 2020-05-28
Payer: MEDICARE

## 2020-01-01 ENCOUNTER — HOSPITAL ENCOUNTER (OUTPATIENT)
Age: 80
Discharge: HOME | End: 2021-01-20
Attending: INTERNAL MEDICINE | Admitting: INTERNAL MEDICINE
Payer: COMMERCIAL

## 2020-01-01 ENCOUNTER — HOSPITAL ENCOUNTER (OUTPATIENT)
Age: 80
Setting detail: SPECIMEN
Discharge: HOME | End: 2020-07-02
Payer: MEDICARE

## 2020-01-01 ENCOUNTER — HOSPITAL ENCOUNTER (OUTPATIENT)
Dept: PHARMACY | Age: 80
Setting detail: THERAPIES SERIES
Discharge: HOME | End: 2020-05-06
Payer: MEDICARE

## 2020-01-01 ENCOUNTER — APPOINTMENT (OUTPATIENT)
Dept: GENERAL RADIOLOGY | Age: 80
End: 2020-01-01
Attending: INTERNAL MEDICINE
Payer: COMMERCIAL

## 2020-01-01 ENCOUNTER — HOSPITAL ENCOUNTER (OUTPATIENT)
Dept: PHARMACY | Age: 80
Setting detail: THERAPIES SERIES
Discharge: HOME | End: 2020-01-28
Payer: MEDICARE

## 2020-01-01 ENCOUNTER — APPOINTMENT (OUTPATIENT)
Dept: INTERVENTIONAL RADIOLOGY/VASCULAR | Age: 80
DRG: 004 | End: 2020-01-01
Payer: MEDICARE

## 2020-01-01 ENCOUNTER — HOSPITAL ENCOUNTER (OUTPATIENT)
Dept: CT IMAGING | Age: 80
Discharge: HOME | End: 2020-11-10
Attending: INTERNAL MEDICINE
Payer: COMMERCIAL

## 2020-01-01 ENCOUNTER — HOSPITAL ENCOUNTER (OUTPATIENT)
Dept: PHARMACY | Age: 80
Setting detail: THERAPIES SERIES
Discharge: HOME | End: 2020-04-20
Payer: MEDICARE

## 2020-01-01 ENCOUNTER — ANESTHESIA EVENT (OUTPATIENT)
Dept: OPERATING ROOM | Age: 80
End: 2020-01-01
Payer: COMMERCIAL

## 2020-01-01 ENCOUNTER — HOSPITAL ENCOUNTER (OUTPATIENT)
Dept: PHARMACY | Age: 80
Setting detail: THERAPIES SERIES
Discharge: HOME | End: 2020-02-27
Payer: MEDICARE

## 2020-01-01 ENCOUNTER — TELEPHONE (OUTPATIENT)
Dept: PULMONOLOGY | Age: 80
End: 2020-01-01

## 2020-01-01 ENCOUNTER — ANESTHESIA EVENT (OUTPATIENT)
Dept: OPERATING ROOM | Age: 80
DRG: 470 | End: 2020-01-01
Payer: MEDICARE

## 2020-01-01 ENCOUNTER — NURSE ONLY (OUTPATIENT)
Dept: LAB | Age: 80
End: 2020-01-01

## 2020-01-01 ENCOUNTER — APPOINTMENT (OUTPATIENT)
Dept: GENERAL RADIOLOGY | Age: 80
End: 2020-01-01
Payer: COMMERCIAL

## 2020-01-01 ENCOUNTER — HOSPITAL ENCOUNTER (OUTPATIENT)
Dept: PHARMACY | Age: 80
Setting detail: THERAPIES SERIES
Discharge: HOME | End: 2020-07-02
Payer: MEDICARE

## 2020-01-01 ENCOUNTER — HOSPITAL ENCOUNTER (OUTPATIENT)
Dept: PHARMACY | Age: 80
Setting detail: THERAPIES SERIES
Discharge: HOME | End: 2020-03-09
Payer: MEDICARE

## 2020-01-01 ENCOUNTER — OFFICE VISIT (OUTPATIENT)
Dept: FAMILY MEDICINE CLINIC | Age: 80
End: 2020-01-01

## 2020-01-01 ENCOUNTER — HOSPITAL ENCOUNTER (OUTPATIENT)
Age: 80
Setting detail: SPECIMEN
Discharge: HOME | End: 2020-06-25
Payer: MEDICARE

## 2020-01-01 ENCOUNTER — HOSPITAL ENCOUNTER (OUTPATIENT)
Dept: INTERVENTIONAL RADIOLOGY/VASCULAR | Age: 80
Discharge: HOME | End: 2020-12-02
Payer: MEDICARE

## 2020-01-01 ENCOUNTER — HOSPITAL ENCOUNTER (OUTPATIENT)
Age: 80
Discharge: NURSING, REHAB OR LONG-TERM CARE | End: 2020-12-23
Attending: INTERNAL MEDICINE | Admitting: INTERNAL MEDICINE
Payer: COMMERCIAL

## 2020-01-01 ENCOUNTER — VIRTUAL VISIT (OUTPATIENT)
Dept: UROLOGY | Age: 80
End: 2020-01-01
Payer: MEDICARE

## 2020-01-01 ENCOUNTER — HOSPITAL ENCOUNTER (OUTPATIENT)
Age: 80
Setting detail: SPECIMEN
Discharge: HOME | End: 2020-06-08
Payer: MEDICARE

## 2020-01-01 ENCOUNTER — HOSPITAL ENCOUNTER (INPATIENT)
Age: 80
LOS: 6 days | Discharge: HOME HEALTH | DRG: 470 | End: 2020-05-26
Attending: EMERGENCY MEDICINE | Admitting: ORTHOPAEDIC SURGERY
Payer: MEDICARE

## 2020-01-01 ENCOUNTER — APPOINTMENT (OUTPATIENT)
Dept: GENERAL RADIOLOGY | Age: 80
DRG: 470 | End: 2020-01-01
Payer: MEDICARE

## 2020-01-01 ENCOUNTER — TELEPHONE (OUTPATIENT)
Dept: FAMILY MEDICINE CLINIC | Age: 80
End: 2020-01-01

## 2020-01-01 ENCOUNTER — HOSPITAL ENCOUNTER (OUTPATIENT)
Age: 80
Setting detail: SPECIMEN
Discharge: HOME | End: 2020-06-15
Payer: MEDICARE

## 2020-01-01 ENCOUNTER — HOSPITAL ENCOUNTER (OUTPATIENT)
Dept: PHARMACY | Age: 80
Setting detail: THERAPIES SERIES
Discharge: HOME | End: 2020-07-28
Payer: MEDICARE

## 2020-01-01 ENCOUNTER — ANESTHESIA (OUTPATIENT)
Dept: OPERATING ROOM | Age: 80
End: 2020-01-01
Payer: COMMERCIAL

## 2020-01-01 ENCOUNTER — HOSPITAL ENCOUNTER (OUTPATIENT)
Age: 80
Setting detail: SPECIMEN
Discharge: HOME | End: 2020-06-18
Payer: MEDICARE

## 2020-01-01 ENCOUNTER — ANESTHESIA (OUTPATIENT)
Dept: OPERATING ROOM | Age: 80
DRG: 470 | End: 2020-01-01
Payer: MEDICARE

## 2020-01-01 ENCOUNTER — HOSPITAL ENCOUNTER (OUTPATIENT)
Dept: PHARMACY | Age: 80
Setting detail: THERAPIES SERIES
Discharge: HOME | End: 2020-03-19
Payer: MEDICARE

## 2020-01-01 ENCOUNTER — ANESTHESIA EVENT (OUTPATIENT)
Dept: OPERATING ROOM | Age: 80
DRG: 004 | End: 2020-01-01
Payer: MEDICARE

## 2020-01-01 ENCOUNTER — HOSPITAL ENCOUNTER (OUTPATIENT)
Dept: PHARMACY | Age: 80
Setting detail: THERAPIES SERIES
Discharge: HOME | End: 2020-06-08
Payer: MEDICARE

## 2020-01-01 ENCOUNTER — HOSPITAL ENCOUNTER (OUTPATIENT)
Dept: PHARMACY | Age: 80
Setting detail: THERAPIES SERIES
Discharge: HOME | End: 2020-08-17
Payer: MEDICARE

## 2020-01-01 ENCOUNTER — HOSPITAL ENCOUNTER (OUTPATIENT)
Dept: PHARMACY | Age: 80
Setting detail: THERAPIES SERIES
Discharge: HOME | End: 2020-06-25
Payer: MEDICARE

## 2020-01-01 ENCOUNTER — HOSPITAL ENCOUNTER (OUTPATIENT)
Age: 80
Setting detail: SURGERY ADMIT
Discharge: NURSING, REHAB OR LONG-TERM CARE | End: 2020-12-23
Attending: SURGERY | Admitting: SURGERY
Payer: COMMERCIAL

## 2020-01-01 ENCOUNTER — APPOINTMENT (OUTPATIENT)
Dept: GENERAL RADIOLOGY | Age: 80
End: 2020-01-01
Attending: SURGERY
Payer: COMMERCIAL

## 2020-01-01 ENCOUNTER — HOSPITAL ENCOUNTER (OUTPATIENT)
Age: 80
Discharge: HOME | End: 2020-10-05
Payer: MEDICARE

## 2020-01-01 ENCOUNTER — HOSPITAL ENCOUNTER (OUTPATIENT)
Dept: PHARMACY | Age: 80
Setting detail: THERAPIES SERIES
Discharge: HOME | End: 2020-07-14
Payer: MEDICARE

## 2020-01-01 ENCOUNTER — OFFICE VISIT (OUTPATIENT)
Dept: PULMONOLOGY | Age: 80
End: 2020-01-01
Payer: MEDICARE

## 2020-01-01 ENCOUNTER — HOSPITAL ENCOUNTER (OUTPATIENT)
Dept: PHARMACY | Age: 80
Setting detail: THERAPIES SERIES
Discharge: HOME | End: 2020-06-18
Payer: MEDICARE

## 2020-01-01 ENCOUNTER — APPOINTMENT (OUTPATIENT)
Dept: CT IMAGING | Age: 80
End: 2020-01-01
Attending: INTERNAL MEDICINE
Payer: COMMERCIAL

## 2020-01-01 ENCOUNTER — HOSPITAL ENCOUNTER (OUTPATIENT)
Dept: PHARMACY | Age: 80
Setting detail: THERAPIES SERIES
Discharge: HOME | End: 2020-08-03
Payer: MEDICARE

## 2020-01-01 ENCOUNTER — HOSPITAL ENCOUNTER (OUTPATIENT)
Dept: PHARMACY | Age: 80
Setting detail: THERAPIES SERIES
Discharge: HOME | End: 2020-02-13
Payer: MEDICARE

## 2020-01-01 VITALS
TEMPERATURE: 97.5 F | DIASTOLIC BLOOD PRESSURE: 68 MMHG | HEIGHT: 71 IN | SYSTOLIC BLOOD PRESSURE: 118 MMHG | OXYGEN SATURATION: 99 % | BODY MASS INDEX: 22.01 KG/M2 | HEART RATE: 67 BPM | WEIGHT: 157.2 LBS

## 2020-01-01 VITALS
RESPIRATION RATE: 33 BRPM | DIASTOLIC BLOOD PRESSURE: 63 MMHG | OXYGEN SATURATION: 100 % | SYSTOLIC BLOOD PRESSURE: 110 MMHG | TEMPERATURE: 98.1 F

## 2020-01-01 VITALS
OXYGEN SATURATION: 97 % | RESPIRATION RATE: 15 BRPM | HEIGHT: 71 IN | HEART RATE: 91 BPM | WEIGHT: 167.55 LBS | BODY MASS INDEX: 23.46 KG/M2 | SYSTOLIC BLOOD PRESSURE: 136 MMHG | DIASTOLIC BLOOD PRESSURE: 66 MMHG | TEMPERATURE: 98.4 F

## 2020-01-01 VITALS
DIASTOLIC BLOOD PRESSURE: 48 MMHG | SYSTOLIC BLOOD PRESSURE: 88 MMHG | RESPIRATION RATE: 14 BRPM | OXYGEN SATURATION: 100 %

## 2020-01-01 VITALS
HEIGHT: 71 IN | BODY MASS INDEX: 23.03 KG/M2 | TEMPERATURE: 97.3 F | RESPIRATION RATE: 16 BRPM | HEART RATE: 60 BPM | SYSTOLIC BLOOD PRESSURE: 112 MMHG | DIASTOLIC BLOOD PRESSURE: 64 MMHG | WEIGHT: 164.5 LBS

## 2020-01-01 VITALS
HEIGHT: 71 IN | HEART RATE: 64 BPM | RESPIRATION RATE: 21 BRPM | SYSTOLIC BLOOD PRESSURE: 124 MMHG | WEIGHT: 172.4 LBS | BODY MASS INDEX: 24.14 KG/M2 | OXYGEN SATURATION: 100 % | TEMPERATURE: 99.4 F | DIASTOLIC BLOOD PRESSURE: 55 MMHG

## 2020-01-01 VITALS — TEMPERATURE: 97.4 F

## 2020-01-01 VITALS
BODY MASS INDEX: 23.94 KG/M2 | HEART RATE: 68 BPM | WEIGHT: 171 LBS | RESPIRATION RATE: 16 BRPM | DIASTOLIC BLOOD PRESSURE: 62 MMHG | SYSTOLIC BLOOD PRESSURE: 104 MMHG | HEIGHT: 71 IN

## 2020-01-01 VITALS
TEMPERATURE: 97.6 F | SYSTOLIC BLOOD PRESSURE: 106 MMHG | BODY MASS INDEX: 23.38 KG/M2 | WEIGHT: 167 LBS | HEART RATE: 64 BPM | HEIGHT: 71 IN | DIASTOLIC BLOOD PRESSURE: 58 MMHG | RESPIRATION RATE: 16 BRPM

## 2020-01-01 VITALS
DIASTOLIC BLOOD PRESSURE: 49 MMHG | SYSTOLIC BLOOD PRESSURE: 106 MMHG | RESPIRATION RATE: 33 BRPM | OXYGEN SATURATION: 95 % | HEART RATE: 86 BPM

## 2020-01-01 VITALS
TEMPERATURE: 96.8 F | SYSTOLIC BLOOD PRESSURE: 80 MMHG | RESPIRATION RATE: 10 BRPM | DIASTOLIC BLOOD PRESSURE: 56 MMHG | OXYGEN SATURATION: 100 %

## 2020-01-01 VITALS — OXYGEN SATURATION: 100 % | DIASTOLIC BLOOD PRESSURE: 55 MMHG | SYSTOLIC BLOOD PRESSURE: 117 MMHG

## 2020-01-01 VITALS — TEMPERATURE: 96 F

## 2020-01-01 VITALS — TEMPERATURE: 97.6 F

## 2020-01-01 LAB
ABO: NORMAL
ACINETOBACTER CALCOACETICUS-BAUMANNII BY PCR: NOT DETECTED
ADENOVIRUS BY PCR: NOT DETECTED
AEROBIC CULTURE: ABNORMAL
ALBUMIN SERPL-MCNC: 1.5 G/DL (ref 3.5–5.1)
ALBUMIN SERPL-MCNC: 1.6 G/DL (ref 3.5–5.1)
ALBUMIN SERPL-MCNC: 1.7 G/DL (ref 3.5–5.1)
ALBUMIN SERPL-MCNC: 1.7 G/DL (ref 3.5–5.1)
ALBUMIN SERPL-MCNC: 1.8 G/DL (ref 3.5–5.1)
ALBUMIN SERPL-MCNC: 1.9 G/DL (ref 3.5–5.1)
ALBUMIN SERPL-MCNC: 2 G/DL (ref 3.5–5.1)
ALBUMIN SERPL-MCNC: 2.1 G/DL (ref 3.5–5.1)
ALBUMIN SERPL-MCNC: 2.2 G/DL (ref 3.5–5.1)
ALBUMIN SERPL-MCNC: 2.3 G/DL (ref 3.5–5.1)
ALBUMIN SERPL-MCNC: 2.4 G/DL (ref 3.5–5.1)
ALBUMIN SERPL-MCNC: 2.5 G/DL (ref 3.5–5.1)
ALBUMIN SERPL-MCNC: 2.6 G/DL (ref 3.5–5.1)
ALBUMIN SERPL-MCNC: 2.6 G/DL (ref 3.5–5.1)
ALBUMIN SERPL-MCNC: 2.8 G/DL (ref 3.5–5.1)
ALBUMIN SERPL-MCNC: 2.9 G/DL (ref 3.5–5.1)
ALBUMIN SERPL-MCNC: 3.1 G/DL (ref 3.5–5.1)
ALBUMIN SERPL-MCNC: 3.4 G/DL (ref 3.5–5.1)
ALBUMIN SERPL-MCNC: 3.7 G/DL (ref 3.5–5.1)
ALP BLD-CCNC: 102 U/L (ref 38–126)
ALP BLD-CCNC: 107 U/L (ref 38–126)
ALP BLD-CCNC: 111 U/L (ref 38–126)
ALP BLD-CCNC: 117 U/L (ref 38–126)
ALP BLD-CCNC: 60 U/L (ref 38–126)
ALP BLD-CCNC: 61 U/L (ref 38–126)
ALP BLD-CCNC: 62 U/L (ref 38–126)
ALP BLD-CCNC: 63 U/L (ref 38–126)
ALP BLD-CCNC: 63 U/L (ref 38–126)
ALP BLD-CCNC: 64 U/L (ref 38–126)
ALP BLD-CCNC: 64 U/L (ref 38–126)
ALP BLD-CCNC: 66 U/L (ref 38–126)
ALP BLD-CCNC: 67 U/L (ref 38–126)
ALP BLD-CCNC: 69 U/L (ref 38–126)
ALP BLD-CCNC: 70 U/L (ref 38–126)
ALP BLD-CCNC: 70 U/L (ref 38–126)
ALP BLD-CCNC: 71 U/L (ref 38–126)
ALP BLD-CCNC: 73 U/L (ref 38–126)
ALP BLD-CCNC: 74 U/L (ref 38–126)
ALP BLD-CCNC: 75 U/L (ref 38–126)
ALP BLD-CCNC: 75 U/L (ref 38–126)
ALP BLD-CCNC: 76 U/L (ref 38–126)
ALP BLD-CCNC: 82 U/L (ref 38–126)
ALP BLD-CCNC: 82 U/L (ref 38–126)
ALP BLD-CCNC: 83 U/L (ref 38–126)
ALP BLD-CCNC: 84 U/L (ref 38–126)
ALP BLD-CCNC: 86 U/L (ref 38–126)
ALP BLD-CCNC: 87 U/L (ref 38–126)
ALT SERPL-CCNC: 10 U/L (ref 11–66)
ALT SERPL-CCNC: 11 U/L (ref 11–66)
ALT SERPL-CCNC: 12 U/L (ref 11–66)
ALT SERPL-CCNC: 13 U/L (ref 11–66)
ALT SERPL-CCNC: 13 U/L (ref 11–66)
ALT SERPL-CCNC: 14 U/L (ref 11–66)
ALT SERPL-CCNC: 15 U/L (ref 11–66)
ALT SERPL-CCNC: 15 U/L (ref 11–66)
ALT SERPL-CCNC: 20 U/L (ref 11–66)
ALT SERPL-CCNC: 6 U/L (ref 11–66)
ALT SERPL-CCNC: 7 U/L (ref 11–66)
ALT SERPL-CCNC: 8 U/L (ref 11–66)
ALT SERPL-CCNC: 9 U/L (ref 11–66)
AMPHETAMINE+METHAMPHETAMINE URINE SCREEN: NEGATIVE
ANAEROBIC CULTURE: ABNORMAL
ANAEROBIC CULTURE: ABNORMAL
ANION GAP SERPL CALCULATED.3IONS-SCNC: 10 MEQ/L (ref 8–16)
ANION GAP SERPL CALCULATED.3IONS-SCNC: 11 MEQ/L (ref 8–16)
ANION GAP SERPL CALCULATED.3IONS-SCNC: 12 MEQ/L (ref 8–16)
ANION GAP SERPL CALCULATED.3IONS-SCNC: 13 MEQ/L (ref 8–16)
ANION GAP SERPL CALCULATED.3IONS-SCNC: 13 MEQ/L (ref 8–16)
ANION GAP SERPL CALCULATED.3IONS-SCNC: 15 MEQ/L (ref 8–16)
ANION GAP SERPL CALCULATED.3IONS-SCNC: 16 MEQ/L (ref 8–16)
ANION GAP SERPL CALCULATED.3IONS-SCNC: 16 MEQ/L (ref 8–16)
ANION GAP SERPL CALCULATED.3IONS-SCNC: 4 MEQ/L (ref 8–16)
ANION GAP SERPL CALCULATED.3IONS-SCNC: 5 MEQ/L (ref 8–16)
ANION GAP SERPL CALCULATED.3IONS-SCNC: 6 MEQ/L (ref 8–16)
ANION GAP SERPL CALCULATED.3IONS-SCNC: 7 MEQ/L (ref 8–16)
ANION GAP SERPL CALCULATED.3IONS-SCNC: 8 MEQ/L (ref 8–16)
ANION GAP SERPL CALCULATED.3IONS-SCNC: 9 MEQ/L (ref 8–16)
ANISOCYTOSIS: PRESENT
ANTIBODY SCREEN: NORMAL
AST SERPL-CCNC: 10 U/L (ref 5–40)
AST SERPL-CCNC: 11 U/L (ref 5–40)
AST SERPL-CCNC: 12 U/L (ref 5–40)
AST SERPL-CCNC: 13 U/L (ref 5–40)
AST SERPL-CCNC: 14 U/L (ref 5–40)
AST SERPL-CCNC: 14 U/L (ref 5–40)
AST SERPL-CCNC: 15 U/L (ref 5–40)
AST SERPL-CCNC: 18 U/L (ref 5–40)
AST SERPL-CCNC: 19 U/L (ref 5–40)
AST SERPL-CCNC: 19 U/L (ref 5–40)
AST SERPL-CCNC: 21 U/L (ref 5–40)
AST SERPL-CCNC: 21 U/L (ref 5–40)
AST SERPL-CCNC: 22 U/L (ref 5–40)
AST SERPL-CCNC: 24 U/L (ref 5–40)
AST SERPL-CCNC: 25 U/L (ref 5–40)
AST SERPL-CCNC: 25 U/L (ref 5–40)
AST SERPL-CCNC: 26 U/L (ref 5–40)
AST SERPL-CCNC: 27 U/L (ref 5–40)
AST SERPL-CCNC: 29 U/L (ref 5–40)
AST SERPL-CCNC: 34 U/L (ref 5–40)
AST SERPL-CCNC: 34 U/L (ref 5–40)
AST SERPL-CCNC: 64 U/L (ref 5–40)
AST SERPL-CCNC: 73 U/L (ref 5–40)
AST SERPL-CCNC: 8 U/L (ref 5–40)
AVERAGE GLUCOSE: NORMAL
BACTERIA: ABNORMAL
BACTERIA: ABNORMAL /HPF
BARBITURATE QUANTITATIVE URINE: NEGATIVE
BASE EXCESS (CALCULATED): -2.4 MMOL/L (ref -2.5–2.5)
BASE EXCESS (CALCULATED): 8.3 MMOL/L (ref -2.5–2.5)
BASE EXCESS MIXED: -2.3 MMOL/L (ref -2–3)
BASE EXCESS MIXED: 1.7 MMOL/L (ref -2–3)
BASOPHILIA: ABNORMAL
BASOPHILS # BLD: 0 %
BASOPHILS # BLD: 0.1 %
BASOPHILS # BLD: 0.2 %
BASOPHILS # BLD: 0.3 %
BASOPHILS # BLD: 0.4 %
BASOPHILS # BLD: 0.5 %
BASOPHILS # BLD: 0.6 %
BASOPHILS # BLD: 0.7 %
BASOPHILS # BLD: 0.8 %
BASOPHILS ABSOLUTE: 0 THOU/MM3 (ref 0–0.1)
BASOPHILS ABSOLUTE: 0.1 THOU/MM3 (ref 0–0.1)
BENZODIAZEPINE QUANTITATIVE URINE: NEGATIVE
BILIRUB SERPL-MCNC: 0.2 MG/DL (ref 0.3–1.2)
BILIRUB SERPL-MCNC: 0.3 MG/DL (ref 0.3–1.2)
BILIRUB SERPL-MCNC: 0.4 MG/DL (ref 0.3–1.2)
BILIRUB SERPL-MCNC: 0.5 MG/DL (ref 0.3–1.2)
BILIRUB SERPL-MCNC: 0.6 MG/DL (ref 0.3–1.2)
BILIRUB SERPL-MCNC: 0.7 MG/DL (ref 0.3–1.2)
BILIRUB SERPL-MCNC: 0.8 MG/DL (ref 0.3–1.2)
BILIRUB SERPL-MCNC: 0.8 MG/DL (ref 0.3–1.2)
BILIRUB SERPL-MCNC: 0.9 MG/DL (ref 0.3–1.2)
BILIRUB SERPL-MCNC: 0.9 MG/DL (ref 0.3–1.2)
BILIRUB SERPL-MCNC: 1.1 MG/DL (ref 0.3–1.2)
BILIRUB SERPL-MCNC: 1.3 MG/DL (ref 0.3–1.2)
BILIRUBIN DIRECT: 0.3 MG/DL (ref 0–0.3)
BILIRUBIN DIRECT: 0.3 MG/DL (ref 0–0.3)
BILIRUBIN DIRECT: 0.4 MG/DL (ref 0–0.3)
BILIRUBIN DIRECT: 0.4 MG/DL (ref 0–0.3)
BILIRUBIN DIRECT: 0.5 MG/DL (ref 0–0.3)
BILIRUBIN DIRECT: 0.6 MG/DL (ref 0–0.3)
BILIRUBIN DIRECT: < 0.2 MG/DL (ref 0–0.3)
BILIRUBIN URINE: ABNORMAL
BILIRUBIN URINE: NEGATIVE
BLOOD CULTURE, ROUTINE: NORMAL
BLOOD, URINE: ABNORMAL
BUN BLDV-MCNC: 13 MG/DL (ref 7–22)
BUN BLDV-MCNC: 19 MG/DL (ref 7–22)
BUN BLDV-MCNC: 20 MG/DL (ref 7–22)
BUN BLDV-MCNC: 20 MG/DL (ref 7–22)
BUN BLDV-MCNC: 22 MG/DL (ref 7–22)
BUN BLDV-MCNC: 22 MG/DL (ref 7–22)
BUN BLDV-MCNC: 24 MG/DL (ref 7–22)
BUN BLDV-MCNC: 24 MG/DL (ref 7–22)
BUN BLDV-MCNC: 25 MG/DL (ref 7–22)
BUN BLDV-MCNC: 26 MG/DL (ref 7–22)
BUN BLDV-MCNC: 27 MG/DL (ref 7–22)
BUN BLDV-MCNC: 28 MG/DL (ref 7–22)
BUN BLDV-MCNC: 29 MG/DL (ref 7–22)
BUN BLDV-MCNC: 29 MG/DL (ref 7–22)
BUN BLDV-MCNC: 31 MG/DL (ref 7–22)
BUN BLDV-MCNC: 32 MG/DL (ref 7–22)
BUN BLDV-MCNC: 33 MG/DL (ref 7–22)
BUN BLDV-MCNC: 34 MG/DL (ref 7–22)
BUN BLDV-MCNC: 35 MG/DL (ref 7–22)
BUN BLDV-MCNC: 36 MG/DL (ref 7–22)
BUN BLDV-MCNC: 36 MG/DL (ref 7–22)
BUN BLDV-MCNC: 37 MG/DL (ref 7–22)
BUN BLDV-MCNC: 38 MG/DL (ref 7–22)
BUN BLDV-MCNC: 39 MG/DL (ref 7–22)
BUN BLDV-MCNC: 41 MG/DL (ref 7–22)
BUN BLDV-MCNC: 42 MG/DL (ref 7–22)
BUN BLDV-MCNC: 42 MG/DL (ref 7–22)
BUN BLDV-MCNC: 43 MG/DL (ref 7–22)
BUN BLDV-MCNC: 44 MG/DL (ref 7–22)
BUN BLDV-MCNC: 45 MG/DL (ref 7–22)
BUN BLDV-MCNC: 46 MG/DL (ref 7–22)
BUN BLDV-MCNC: 46 MG/DL (ref 7–22)
BUN BLDV-MCNC: 47 MG/DL (ref 7–22)
BUN BLDV-MCNC: 48 MG/DL (ref 7–22)
BUN BLDV-MCNC: 48 MG/DL (ref 7–22)
BUN BLDV-MCNC: 49 MG/DL (ref 7–22)
BUN BLDV-MCNC: 50 MG/DL (ref 7–22)
BUN BLDV-MCNC: 51 MG/DL (ref 7–22)
BUN BLDV-MCNC: 54 MG/DL (ref 7–22)
BUN BLDV-MCNC: 55 MG/DL (ref 7–22)
BUN BLDV-MCNC: 55 MG/DL (ref 7–22)
BUN BLDV-MCNC: 57 MG/DL (ref 7–22)
BUN BLDV-MCNC: 57 MG/DL (ref 7–22)
CALCIUM IONIZED: 1.08 MMOL/L (ref 1.12–1.32)
CALCIUM IONIZED: 1.08 MMOL/L (ref 1.12–1.32)
CALCIUM IONIZED: 1.13 MMOL/L (ref 1.12–1.32)
CALCIUM IONIZED: 1.13 MMOL/L (ref 1.12–1.32)
CALCIUM IONIZED: 1.16 MMOL/L (ref 1.12–1.32)
CALCIUM IONIZED: 1.19 MMOL/L (ref 1.12–1.32)
CALCIUM IONIZED: 1.2 MMOL/L (ref 1.12–1.32)
CALCIUM SERPL-MCNC: 7.2 MG/DL (ref 8.5–10.5)
CALCIUM SERPL-MCNC: 7.4 MG/DL (ref 8.5–10.5)
CALCIUM SERPL-MCNC: 7.4 MG/DL (ref 8.5–10.5)
CALCIUM SERPL-MCNC: 7.5 MG/DL (ref 8.5–10.5)
CALCIUM SERPL-MCNC: 7.6 MG/DL (ref 8.5–10.5)
CALCIUM SERPL-MCNC: 7.7 MG/DL (ref 8.5–10.5)
CALCIUM SERPL-MCNC: 7.8 MG/DL (ref 8.5–10.5)
CALCIUM SERPL-MCNC: 7.9 MG/DL (ref 8.5–10.5)
CALCIUM SERPL-MCNC: 8 MG/DL (ref 8.5–10.5)
CALCIUM SERPL-MCNC: 8.1 MG/DL (ref 8.5–10.5)
CALCIUM SERPL-MCNC: 8.2 MG/DL (ref 8.5–10.5)
CALCIUM SERPL-MCNC: 8.3 MG/DL (ref 8.5–10.5)
CALCIUM SERPL-MCNC: 8.4 MG/DL (ref 8.5–10.5)
CALCIUM SERPL-MCNC: 8.4 MG/DL (ref 8.5–10.5)
CALCIUM SERPL-MCNC: 8.5 MG/DL (ref 8.5–10.5)
CALCIUM SERPL-MCNC: 8.6 MG/DL (ref 8.5–10.5)
CALCIUM SERPL-MCNC: 8.7 MG/DL (ref 8.5–10.5)
CALCIUM SERPL-MCNC: 8.8 MG/DL (ref 8.5–10.5)
CALCIUM SERPL-MCNC: 8.9 MG/DL (ref 8.5–10.5)
CALCIUM SERPL-MCNC: 9.1 MG/DL (ref 8.5–10.5)
CALCIUM SERPL-MCNC: 9.1 MG/DL (ref 8.5–10.5)
CALCIUM SERPL-MCNC: 9.2 MG/DL (ref 8.5–10.5)
CALCIUM SERPL-MCNC: 9.3 MG/DL (ref 8.5–10.5)
CALCIUM SERPL-MCNC: 9.3 MG/DL (ref 8.5–10.5)
CANNABINOID QUANTITATIVE URINE: NEGATIVE
CASTS 2: ABNORMAL /LPF
CASTS UA: ABNORMAL /LPF
CASTS: ABNORMAL /LPF
CHARACTER, URINE: ABNORMAL
CHARACTER, URINE: CLEAR
CHLAMYDIA PNEUMONIAE BY PCR: NOT DETECTED
CHLORIDE BLD-SCNC: 100 MEQ/L (ref 98–111)
CHLORIDE BLD-SCNC: 101 MEQ/L (ref 98–111)
CHLORIDE BLD-SCNC: 102 MEQ/L (ref 98–111)
CHLORIDE BLD-SCNC: 103 MEQ/L (ref 98–111)
CHLORIDE BLD-SCNC: 104 MEQ/L (ref 98–111)
CHLORIDE BLD-SCNC: 104 MEQ/L (ref 98–111)
CHLORIDE BLD-SCNC: 105 MEQ/L (ref 98–111)
CHLORIDE BLD-SCNC: 106 MEQ/L (ref 98–111)
CHLORIDE BLD-SCNC: 107 MEQ/L (ref 98–111)
CHLORIDE BLD-SCNC: 108 MEQ/L (ref 98–111)
CHLORIDE BLD-SCNC: 109 MEQ/L (ref 98–111)
CHLORIDE BLD-SCNC: 110 MEQ/L (ref 98–111)
CHLORIDE BLD-SCNC: 111 MEQ/L (ref 98–111)
CHLORIDE BLD-SCNC: 111 MEQ/L (ref 98–111)
CHLORIDE BLD-SCNC: 112 MEQ/L (ref 98–111)
CHLORIDE BLD-SCNC: 112 MEQ/L (ref 98–111)
CHLORIDE BLD-SCNC: 113 MEQ/L (ref 98–111)
CHLORIDE BLD-SCNC: 113 MEQ/L (ref 98–111)
CHLORIDE BLD-SCNC: 91 MEQ/L (ref 98–111)
CHLORIDE BLD-SCNC: 92 MEQ/L (ref 98–111)
CHLORIDE BLD-SCNC: 94 MEQ/L (ref 98–111)
CHLORIDE BLD-SCNC: 94 MEQ/L (ref 98–111)
CHLORIDE BLD-SCNC: 95 MEQ/L (ref 98–111)
CHLORIDE BLD-SCNC: 95 MEQ/L (ref 98–111)
CHLORIDE BLD-SCNC: 96 MEQ/L (ref 98–111)
CHLORIDE BLD-SCNC: 96 MEQ/L (ref 98–111)
CHLORIDE BLD-SCNC: 97 MEQ/L (ref 98–111)
CHLORIDE BLD-SCNC: 98 MEQ/L (ref 98–111)
CHLORIDE BLD-SCNC: 98 MEQ/L (ref 98–111)
CHLORIDE BLD-SCNC: 99 MEQ/L (ref 98–111)
CHOLESTEROL, TOTAL: 155 MG/DL (ref 100–199)
CHOLESTEROL, TOTAL: NORMAL
CHOLESTEROL/HDL RATIO: NORMAL
CHP ED QC CHECK: ABNORMAL
CHP ED QC CHECK: ABNORMAL
CHP ED QC CHECK: NORMAL
CO2: 16 MEQ/L (ref 23–33)
CO2: 20 MEQ/L (ref 23–33)
CO2: 21 MEQ/L (ref 23–33)
CO2: 22 MEQ/L (ref 23–33)
CO2: 22 MEQ/L (ref 23–33)
CO2: 23 MEQ/L (ref 23–33)
CO2: 24 MEQ/L (ref 23–33)
CO2: 25 MEQ/L (ref 23–33)
CO2: 26 MEQ/L (ref 23–33)
CO2: 27 MEQ/L (ref 23–33)
CO2: 28 MEQ/L (ref 23–33)
CO2: 29 MEQ/L (ref 23–33)
CO2: 30 MEQ/L (ref 23–33)
CO2: 31 MEQ/L (ref 23–33)
CO2: 32 MEQ/L (ref 23–33)
COCAINE METABOLITE QUANTITATIVE URINE: NEGATIVE
COLLECTED BY:: ABNORMAL
COLLECTED BY:: NORMAL
COLOR: ABNORMAL
COLOR: YELLOW
CORONAVIRUS PCR: NOT DETECTED
CORTISOL COLLECTION INFO: NORMAL
CORTISOL COLLECTION INFO: NORMAL
CORTISOL: 13.11 UG/DL
CORTISOL: 27.03 UG/DL
CORTISOL: 3.75 UG/DL
CREAT SERPL-MCNC: 0.4 MG/DL (ref 0.4–1.2)
CREAT SERPL-MCNC: 0.5 MG/DL (ref 0.4–1.2)
CREAT SERPL-MCNC: 0.6 MG/DL (ref 0.4–1.2)
CREAT SERPL-MCNC: 0.7 MG/DL (ref 0.4–1.2)
CREAT SERPL-MCNC: 0.8 MG/DL (ref 0.4–1.2)
CREAT SERPL-MCNC: 0.9 MG/DL (ref 0.4–1.2)
CREAT SERPL-MCNC: 1 MG/DL (ref 0.4–1.2)
CREAT SERPL-MCNC: 1.1 MG/DL (ref 0.4–1.2)
CREAT SERPL-MCNC: 1.2 MG/DL (ref 0.4–1.2)
CREAT SERPL-MCNC: 1.3 MG/DL (ref 0.4–1.2)
CREAT SERPL-MCNC: 1.3 MG/DL (ref 0.4–1.2)
CREAT SERPL-MCNC: 1.4 MG/DL (ref 0.4–1.2)
CREAT SERPL-MCNC: 1.4 MG/DL (ref 0.4–1.2)
CREAT SERPL-MCNC: 1.5 MG/DL (ref 0.4–1.2)
CREATININE URINE POCT: 10
CREATININE URINE: 165 MG/DL
CREATININE, URINE: 264
CRENATED RBC'S: ABNORMAL
CRYSTALS, UA: ABNORMAL
CRYSTALS: ABNORMAL
CRYSTALS: ABNORMAL
DEVICE: 3
DEVICE: ABNORMAL
DEVICE: ABNORMAL
EKG ATRIAL RATE: 112 BPM
EKG ATRIAL RATE: 315 BPM
EKG ATRIAL RATE: 63 BPM
EKG ATRIAL RATE: 64 BPM
EKG ATRIAL RATE: 64 BPM
EKG ATRIAL RATE: 81 BPM
EKG P AXIS: 30 DEGREES
EKG P AXIS: 45 DEGREES
EKG P AXIS: 57 DEGREES
EKG P AXIS: 65 DEGREES
EKG P-R INTERVAL: 138 MS
EKG P-R INTERVAL: 146 MS
EKG P-R INTERVAL: 170 MS
EKG P-R INTERVAL: 174 MS
EKG Q-T INTERVAL: 284 MS
EKG Q-T INTERVAL: 334 MS
EKG Q-T INTERVAL: 370 MS
EKG Q-T INTERVAL: 414 MS
EKG Q-T INTERVAL: 426 MS
EKG Q-T INTERVAL: 450 MS
EKG QRS DURATION: 76 MS
EKG QRS DURATION: 78 MS
EKG QRS DURATION: 82 MS
EKG QRS DURATION: 84 MS
EKG QTC CALCULATION (BAZETT): 429 MS
EKG QTC CALCULATION (BAZETT): 434 MS
EKG QTC CALCULATION (BAZETT): 439 MS
EKG QTC CALCULATION (BAZETT): 455 MS
EKG QTC CALCULATION (BAZETT): 464 MS
EKG QTC CALCULATION (BAZETT): 474 MS
EKG R AXIS: 27 DEGREES
EKG R AXIS: 35 DEGREES
EKG R AXIS: 36 DEGREES
EKG R AXIS: 40 DEGREES
EKG R AXIS: 40 DEGREES
EKG R AXIS: 48 DEGREES
EKG T AXIS: 11 DEGREES
EKG T AXIS: 11 DEGREES
EKG T AXIS: 13 DEGREES
EKG T AXIS: 14 DEGREES
EKG T AXIS: 18 DEGREES
EKG T AXIS: 21 DEGREES
EKG VENTRICULAR RATE: 112 BPM
EKG VENTRICULAR RATE: 141 BPM
EKG VENTRICULAR RATE: 64 BPM
EKG VENTRICULAR RATE: 64 BPM
EKG VENTRICULAR RATE: 79 BPM
EKG VENTRICULAR RATE: 81 BPM
ENTEROBACTER CLOACAE COMPLEX BY PCR: NOT DETECTED
EOSINOPHIL # BLD: 0 %
EOSINOPHIL # BLD: 0.1 %
EOSINOPHIL # BLD: 0.2 %
EOSINOPHIL # BLD: 0.3 %
EOSINOPHIL # BLD: 0.4 %
EOSINOPHIL # BLD: 0.5 %
EOSINOPHIL # BLD: 0.9 %
EOSINOPHIL # BLD: 1 %
EOSINOPHIL # BLD: 1.1 %
EOSINOPHIL # BLD: 1.2 %
EOSINOPHIL # BLD: 1.3 %
EOSINOPHIL # BLD: 1.4 %
EOSINOPHIL # BLD: 1.4 %
EOSINOPHIL # BLD: 1.5 %
EOSINOPHIL # BLD: 1.5 %
EOSINOPHIL # BLD: 1.6 %
EOSINOPHIL # BLD: 1.9 %
EOSINOPHIL # BLD: 1.9 %
EOSINOPHIL # BLD: 2 %
EOSINOPHIL # BLD: 2 %
EOSINOPHIL # BLD: 2.1 %
EOSINOPHIL # BLD: 2.2 %
EOSINOPHIL # BLD: 2.3 %
EOSINOPHIL # BLD: 2.7 %
EOSINOPHIL # BLD: 2.7 %
EOSINOPHIL # BLD: 2.8 %
EOSINOPHIL # BLD: 2.9 %
EOSINOPHIL # BLD: 3 %
EOSINOPHIL # BLD: 3 %
EOSINOPHIL # BLD: 3.1 %
EOSINOPHIL # BLD: 3.2 %
EOSINOPHIL # BLD: 3.3 %
EOSINOPHIL # BLD: 3.4 %
EOSINOPHIL # BLD: 3.4 %
EOSINOPHIL # BLD: 3.5 %
EOSINOPHIL # BLD: 3.5 %
EOSINOPHIL # BLD: 3.6 %
EOSINOPHIL # BLD: 3.6 %
EOSINOPHIL # BLD: 3.9 %
EOSINOPHIL # BLD: 4 %
EOSINOPHIL # BLD: 4.1 %
EOSINOPHIL # BLD: 4.1 %
EOSINOPHIL # BLD: 4.2 %
EOSINOPHIL # BLD: 4.4 %
EOSINOPHIL # BLD: 4.9 %
EOSINOPHIL # BLD: 5.2 %
EOSINOPHIL # BLD: 5.2 %
EOSINOPHIL # BLD: 6.3 %
EOSINOPHIL # BLD: 6.3 %
EOSINOPHIL # BLD: 6.8 %
EOSINOPHILS ABSOLUTE: 0 THOU/MM3 (ref 0–0.4)
EOSINOPHILS ABSOLUTE: 0.1 THOU/MM3 (ref 0–0.4)
EOSINOPHILS ABSOLUTE: 0.2 THOU/MM3 (ref 0–0.4)
EOSINOPHILS ABSOLUTE: 0.3 THOU/MM3 (ref 0–0.4)
EOSINOPHILS ABSOLUTE: 0.4 THOU/MM3 (ref 0–0.4)
EOSINOPHILS ABSOLUTE: 0.5 THOU/MM3 (ref 0–0.4)
EOSINOPHILS ABSOLUTE: 0.6 THOU/MM3 (ref 0–0.4)
EOSINOPHILS ABSOLUTE: 0.6 THOU/MM3 (ref 0–0.4)
EPITHELIAL CELLS, UA: ABNORMAL /HPF
ERYTHROCYTE [DISTWIDTH] IN BLOOD BY AUTOMATED COUNT: 13.9 % (ref 11.5–14.5)
ERYTHROCYTE [DISTWIDTH] IN BLOOD BY AUTOMATED COUNT: 14.2 % (ref 11.5–14.5)
ERYTHROCYTE [DISTWIDTH] IN BLOOD BY AUTOMATED COUNT: 14.6 % (ref 11.5–14.5)
ERYTHROCYTE [DISTWIDTH] IN BLOOD BY AUTOMATED COUNT: 14.7 % (ref 11.5–14.5)
ERYTHROCYTE [DISTWIDTH] IN BLOOD BY AUTOMATED COUNT: 16.4 % (ref 11.5–14.5)
ERYTHROCYTE [DISTWIDTH] IN BLOOD BY AUTOMATED COUNT: 16.5 % (ref 11.5–14.5)
ERYTHROCYTE [DISTWIDTH] IN BLOOD BY AUTOMATED COUNT: 16.6 % (ref 11.5–14.5)
ERYTHROCYTE [DISTWIDTH] IN BLOOD BY AUTOMATED COUNT: 16.7 % (ref 11.5–14.5)
ERYTHROCYTE [DISTWIDTH] IN BLOOD BY AUTOMATED COUNT: 16.7 % (ref 11.5–14.5)
ERYTHROCYTE [DISTWIDTH] IN BLOOD BY AUTOMATED COUNT: 16.8 % (ref 11.5–14.5)
ERYTHROCYTE [DISTWIDTH] IN BLOOD BY AUTOMATED COUNT: 16.9 % (ref 11.5–14.5)
ERYTHROCYTE [DISTWIDTH] IN BLOOD BY AUTOMATED COUNT: 17 % (ref 11.5–14.5)
ERYTHROCYTE [DISTWIDTH] IN BLOOD BY AUTOMATED COUNT: 17 % (ref 11.5–14.5)
ERYTHROCYTE [DISTWIDTH] IN BLOOD BY AUTOMATED COUNT: 17.1 % (ref 11.5–14.5)
ERYTHROCYTE [DISTWIDTH] IN BLOOD BY AUTOMATED COUNT: 17.2 % (ref 11.5–14.5)
ERYTHROCYTE [DISTWIDTH] IN BLOOD BY AUTOMATED COUNT: 17.3 % (ref 11.5–14.5)
ERYTHROCYTE [DISTWIDTH] IN BLOOD BY AUTOMATED COUNT: 17.4 % (ref 11.5–14.5)
ERYTHROCYTE [DISTWIDTH] IN BLOOD BY AUTOMATED COUNT: 17.5 % (ref 11.5–14.5)
ERYTHROCYTE [DISTWIDTH] IN BLOOD BY AUTOMATED COUNT: 17.5 % (ref 11.5–14.5)
ERYTHROCYTE [DISTWIDTH] IN BLOOD BY AUTOMATED COUNT: 17.6 % (ref 11.5–14.5)
ERYTHROCYTE [DISTWIDTH] IN BLOOD BY AUTOMATED COUNT: 17.6 % (ref 11.5–14.5)
ERYTHROCYTE [DISTWIDTH] IN BLOOD BY AUTOMATED COUNT: 17.7 % (ref 11.5–14.5)
ERYTHROCYTE [DISTWIDTH] IN BLOOD BY AUTOMATED COUNT: 17.7 % (ref 11.5–14.5)
ERYTHROCYTE [DISTWIDTH] IN BLOOD BY AUTOMATED COUNT: 17.8 % (ref 11.5–14.5)
ERYTHROCYTE [DISTWIDTH] IN BLOOD BY AUTOMATED COUNT: 17.8 % (ref 11.5–14.5)
ERYTHROCYTE [DISTWIDTH] IN BLOOD BY AUTOMATED COUNT: 17.9 % (ref 11.5–14.5)
ERYTHROCYTE [DISTWIDTH] IN BLOOD BY AUTOMATED COUNT: 17.9 % (ref 11.5–14.5)
ERYTHROCYTE [DISTWIDTH] IN BLOOD BY AUTOMATED COUNT: 18 % (ref 11.5–14.5)
ERYTHROCYTE [DISTWIDTH] IN BLOOD BY AUTOMATED COUNT: 18.1 % (ref 11.5–14.5)
ERYTHROCYTE [DISTWIDTH] IN BLOOD BY AUTOMATED COUNT: 18.1 % (ref 11.5–14.5)
ERYTHROCYTE [DISTWIDTH] IN BLOOD BY AUTOMATED COUNT: 18.3 % (ref 11.5–14.5)
ERYTHROCYTE [DISTWIDTH] IN BLOOD BY AUTOMATED COUNT: 18.4 % (ref 11.5–14.5)
ERYTHROCYTE [DISTWIDTH] IN BLOOD BY AUTOMATED COUNT: 18.4 % (ref 11.5–14.5)
ERYTHROCYTE [DISTWIDTH] IN BLOOD BY AUTOMATED COUNT: 18.5 % (ref 11.5–14.5)
ERYTHROCYTE [DISTWIDTH] IN BLOOD BY AUTOMATED COUNT: 18.5 % (ref 11.5–14.5)
ERYTHROCYTE [DISTWIDTH] IN BLOOD BY AUTOMATED COUNT: 18.6 % (ref 11.5–14.5)
ERYTHROCYTE [DISTWIDTH] IN BLOOD BY AUTOMATED COUNT: 18.8 % (ref 11.5–14.5)
ERYTHROCYTE [DISTWIDTH] IN BLOOD BY AUTOMATED COUNT: 18.8 % (ref 11.5–14.5)
ERYTHROCYTE [DISTWIDTH] IN BLOOD BY AUTOMATED COUNT: 18.9 % (ref 11.5–14.5)
ERYTHROCYTE [DISTWIDTH] IN BLOOD BY AUTOMATED COUNT: 18.9 % (ref 11.5–14.5)
ERYTHROCYTE [DISTWIDTH] IN BLOOD BY AUTOMATED COUNT: 19 % (ref 11.5–14.5)
ERYTHROCYTE [DISTWIDTH] IN BLOOD BY AUTOMATED COUNT: 19.1 % (ref 11.5–14.5)
ERYTHROCYTE [DISTWIDTH] IN BLOOD BY AUTOMATED COUNT: 19.1 % (ref 11.5–14.5)
ERYTHROCYTE [DISTWIDTH] IN BLOOD BY AUTOMATED COUNT: 19.3 % (ref 11.5–14.5)
ERYTHROCYTE [DISTWIDTH] IN BLOOD BY AUTOMATED COUNT: 19.4 % (ref 11.5–14.5)
ERYTHROCYTE [DISTWIDTH] IN BLOOD BY AUTOMATED COUNT: 19.5 % (ref 11.5–14.5)
ERYTHROCYTE [DISTWIDTH] IN BLOOD BY AUTOMATED COUNT: 19.6 % (ref 11.5–14.5)
ERYTHROCYTE [DISTWIDTH] IN BLOOD BY AUTOMATED COUNT: 19.7 % (ref 11.5–14.5)
ERYTHROCYTE [DISTWIDTH] IN BLOOD BY AUTOMATED COUNT: 19.9 % (ref 11.5–14.5)
ERYTHROCYTE [DISTWIDTH] IN BLOOD BY AUTOMATED COUNT: 19.9 % (ref 11.5–14.5)
ERYTHROCYTE [DISTWIDTH] IN BLOOD BY AUTOMATED COUNT: 20.3 % (ref 11.5–14.5)
ERYTHROCYTE [DISTWIDTH] IN BLOOD BY AUTOMATED COUNT: 50.9 FL (ref 35–45)
ERYTHROCYTE [DISTWIDTH] IN BLOOD BY AUTOMATED COUNT: 51.8 FL (ref 35–45)
ERYTHROCYTE [DISTWIDTH] IN BLOOD BY AUTOMATED COUNT: 54 FL (ref 35–45)
ERYTHROCYTE [DISTWIDTH] IN BLOOD BY AUTOMATED COUNT: 55.8 FL (ref 35–45)
ERYTHROCYTE [DISTWIDTH] IN BLOOD BY AUTOMATED COUNT: 57.2 FL (ref 35–45)
ERYTHROCYTE [DISTWIDTH] IN BLOOD BY AUTOMATED COUNT: 58.4 FL (ref 35–45)
ERYTHROCYTE [DISTWIDTH] IN BLOOD BY AUTOMATED COUNT: 58.5 FL (ref 35–45)
ERYTHROCYTE [DISTWIDTH] IN BLOOD BY AUTOMATED COUNT: 59.8 FL (ref 35–45)
ERYTHROCYTE [DISTWIDTH] IN BLOOD BY AUTOMATED COUNT: 59.9 FL (ref 35–45)
ERYTHROCYTE [DISTWIDTH] IN BLOOD BY AUTOMATED COUNT: 59.9 FL (ref 35–45)
ERYTHROCYTE [DISTWIDTH] IN BLOOD BY AUTOMATED COUNT: 60.5 FL (ref 35–45)
ERYTHROCYTE [DISTWIDTH] IN BLOOD BY AUTOMATED COUNT: 60.6 FL (ref 35–45)
ERYTHROCYTE [DISTWIDTH] IN BLOOD BY AUTOMATED COUNT: 61 FL (ref 35–45)
ERYTHROCYTE [DISTWIDTH] IN BLOOD BY AUTOMATED COUNT: 61.1 FL (ref 35–45)
ERYTHROCYTE [DISTWIDTH] IN BLOOD BY AUTOMATED COUNT: 61.1 FL (ref 35–45)
ERYTHROCYTE [DISTWIDTH] IN BLOOD BY AUTOMATED COUNT: 61.2 FL (ref 35–45)
ERYTHROCYTE [DISTWIDTH] IN BLOOD BY AUTOMATED COUNT: 61.3 FL (ref 35–45)
ERYTHROCYTE [DISTWIDTH] IN BLOOD BY AUTOMATED COUNT: 62.1 FL (ref 35–45)
ERYTHROCYTE [DISTWIDTH] IN BLOOD BY AUTOMATED COUNT: 62.3 FL (ref 35–45)
ERYTHROCYTE [DISTWIDTH] IN BLOOD BY AUTOMATED COUNT: 62.3 FL (ref 35–45)
ERYTHROCYTE [DISTWIDTH] IN BLOOD BY AUTOMATED COUNT: 62.4 FL (ref 35–45)
ERYTHROCYTE [DISTWIDTH] IN BLOOD BY AUTOMATED COUNT: 62.6 FL (ref 35–45)
ERYTHROCYTE [DISTWIDTH] IN BLOOD BY AUTOMATED COUNT: 62.8 FL (ref 35–45)
ERYTHROCYTE [DISTWIDTH] IN BLOOD BY AUTOMATED COUNT: 62.9 FL (ref 35–45)
ERYTHROCYTE [DISTWIDTH] IN BLOOD BY AUTOMATED COUNT: 62.9 FL (ref 35–45)
ERYTHROCYTE [DISTWIDTH] IN BLOOD BY AUTOMATED COUNT: 63 FL (ref 35–45)
ERYTHROCYTE [DISTWIDTH] IN BLOOD BY AUTOMATED COUNT: 63.2 FL (ref 35–45)
ERYTHROCYTE [DISTWIDTH] IN BLOOD BY AUTOMATED COUNT: 63.2 FL (ref 35–45)
ERYTHROCYTE [DISTWIDTH] IN BLOOD BY AUTOMATED COUNT: 63.4 FL (ref 35–45)
ERYTHROCYTE [DISTWIDTH] IN BLOOD BY AUTOMATED COUNT: 63.6 FL (ref 35–45)
ERYTHROCYTE [DISTWIDTH] IN BLOOD BY AUTOMATED COUNT: 63.7 FL (ref 35–45)
ERYTHROCYTE [DISTWIDTH] IN BLOOD BY AUTOMATED COUNT: 63.8 FL (ref 35–45)
ERYTHROCYTE [DISTWIDTH] IN BLOOD BY AUTOMATED COUNT: 63.9 FL (ref 35–45)
ERYTHROCYTE [DISTWIDTH] IN BLOOD BY AUTOMATED COUNT: 64 FL (ref 35–45)
ERYTHROCYTE [DISTWIDTH] IN BLOOD BY AUTOMATED COUNT: 64.1 FL (ref 35–45)
ERYTHROCYTE [DISTWIDTH] IN BLOOD BY AUTOMATED COUNT: 64.2 FL (ref 35–45)
ERYTHROCYTE [DISTWIDTH] IN BLOOD BY AUTOMATED COUNT: 64.2 FL (ref 35–45)
ERYTHROCYTE [DISTWIDTH] IN BLOOD BY AUTOMATED COUNT: 64.3 FL (ref 35–45)
ERYTHROCYTE [DISTWIDTH] IN BLOOD BY AUTOMATED COUNT: 64.4 FL (ref 35–45)
ERYTHROCYTE [DISTWIDTH] IN BLOOD BY AUTOMATED COUNT: 64.6 FL (ref 35–45)
ERYTHROCYTE [DISTWIDTH] IN BLOOD BY AUTOMATED COUNT: 64.9 FL (ref 35–45)
ERYTHROCYTE [DISTWIDTH] IN BLOOD BY AUTOMATED COUNT: 65 FL (ref 35–45)
ERYTHROCYTE [DISTWIDTH] IN BLOOD BY AUTOMATED COUNT: 65.1 FL (ref 35–45)
ERYTHROCYTE [DISTWIDTH] IN BLOOD BY AUTOMATED COUNT: 65.3 FL (ref 35–45)
ERYTHROCYTE [DISTWIDTH] IN BLOOD BY AUTOMATED COUNT: 65.4 FL (ref 35–45)
ERYTHROCYTE [DISTWIDTH] IN BLOOD BY AUTOMATED COUNT: 65.5 FL (ref 35–45)
ERYTHROCYTE [DISTWIDTH] IN BLOOD BY AUTOMATED COUNT: 65.5 FL (ref 35–45)
ERYTHROCYTE [DISTWIDTH] IN BLOOD BY AUTOMATED COUNT: 65.6 FL (ref 35–45)
ERYTHROCYTE [DISTWIDTH] IN BLOOD BY AUTOMATED COUNT: 65.6 FL (ref 35–45)
ERYTHROCYTE [DISTWIDTH] IN BLOOD BY AUTOMATED COUNT: 65.8 FL (ref 35–45)
ERYTHROCYTE [DISTWIDTH] IN BLOOD BY AUTOMATED COUNT: 65.9 FL (ref 35–45)
ERYTHROCYTE [DISTWIDTH] IN BLOOD BY AUTOMATED COUNT: 65.9 FL (ref 35–45)
ERYTHROCYTE [DISTWIDTH] IN BLOOD BY AUTOMATED COUNT: 66 FL (ref 35–45)
ERYTHROCYTE [DISTWIDTH] IN BLOOD BY AUTOMATED COUNT: 66.4 FL (ref 35–45)
ERYTHROCYTE [DISTWIDTH] IN BLOOD BY AUTOMATED COUNT: 66.6 FL (ref 35–45)
ERYTHROCYTE [DISTWIDTH] IN BLOOD BY AUTOMATED COUNT: 66.7 FL (ref 35–45)
ERYTHROCYTE [DISTWIDTH] IN BLOOD BY AUTOMATED COUNT: 66.7 FL (ref 35–45)
ERYTHROCYTE [DISTWIDTH] IN BLOOD BY AUTOMATED COUNT: 66.8 FL (ref 35–45)
ERYTHROCYTE [DISTWIDTH] IN BLOOD BY AUTOMATED COUNT: 66.9 FL (ref 35–45)
ERYTHROCYTE [DISTWIDTH] IN BLOOD BY AUTOMATED COUNT: 67.8 FL (ref 35–45)
ERYTHROCYTE [DISTWIDTH] IN BLOOD BY AUTOMATED COUNT: 68.2 FL (ref 35–45)
ERYTHROCYTE [DISTWIDTH] IN BLOOD BY AUTOMATED COUNT: 68.7 FL (ref 35–45)
ERYTHROCYTE [DISTWIDTH] IN BLOOD BY AUTOMATED COUNT: 68.8 FL (ref 35–45)
ERYTHROCYTE [DISTWIDTH] IN BLOOD BY AUTOMATED COUNT: 69 FL (ref 35–45)
ERYTHROCYTE [DISTWIDTH] IN BLOOD BY AUTOMATED COUNT: 74.4 FL (ref 35–45)
ESCHERICHIA COLI BY PCR: NOT DETECTED
FERRITIN: 251 NG/ML (ref 22–322)
FIO2, MIXED VENOUS: 40
FLU A ANTIGEN: NEGATIVE
FLU B ANTIGEN: NEGATIVE
GFR SERPL CREATININE-BSD FRML MDRD: 45 ML/MIN/1.73M2
GFR SERPL CREATININE-BSD FRML MDRD: 49 ML/MIN/1.73M2
GFR SERPL CREATININE-BSD FRML MDRD: 49 ML/MIN/1.73M2
GFR SERPL CREATININE-BSD FRML MDRD: 53 ML/MIN/1.73M2
GFR SERPL CREATININE-BSD FRML MDRD: 53 ML/MIN/1.73M2
GFR SERPL CREATININE-BSD FRML MDRD: 58 ML/MIN/1.73M2
GFR SERPL CREATININE-BSD FRML MDRD: 64 ML/MIN/1.73M2
GFR SERPL CREATININE-BSD FRML MDRD: 72 ML/MIN/1.73M2
GFR SERPL CREATININE-BSD FRML MDRD: 81 ML/MIN/1.73M2
GFR SERPL CREATININE-BSD FRML MDRD: > 90 ML/MIN/1.73M2
GLUCOSE BLD-MCNC: 101 MG/DL (ref 70–108)
GLUCOSE BLD-MCNC: 102 MG/DL (ref 70–108)
GLUCOSE BLD-MCNC: 102 MG/DL (ref 70–108)
GLUCOSE BLD-MCNC: 103 MG/DL (ref 70–108)
GLUCOSE BLD-MCNC: 105 MG/DL (ref 70–108)
GLUCOSE BLD-MCNC: 106 MG/DL (ref 70–108)
GLUCOSE BLD-MCNC: 107 MG/DL (ref 70–108)
GLUCOSE BLD-MCNC: 108 MG/DL (ref 70–108)
GLUCOSE BLD-MCNC: 109 MG/DL (ref 70–108)
GLUCOSE BLD-MCNC: 111 MG/DL (ref 70–108)
GLUCOSE BLD-MCNC: 112 MG/DL (ref 70–108)
GLUCOSE BLD-MCNC: 114 MG/DL (ref 70–108)
GLUCOSE BLD-MCNC: 115 MG/DL (ref 70–108)
GLUCOSE BLD-MCNC: 116 MG/DL (ref 70–108)
GLUCOSE BLD-MCNC: 117 MG/DL (ref 70–108)
GLUCOSE BLD-MCNC: 118 MG/DL (ref 70–108)
GLUCOSE BLD-MCNC: 122 MG/DL (ref 70–108)
GLUCOSE BLD-MCNC: 123 MG/DL (ref 70–108)
GLUCOSE BLD-MCNC: 123 MG/DL (ref 70–108)
GLUCOSE BLD-MCNC: 124 MG/DL (ref 70–108)
GLUCOSE BLD-MCNC: 124 MG/DL (ref 70–108)
GLUCOSE BLD-MCNC: 125 MG/DL (ref 70–108)
GLUCOSE BLD-MCNC: 127 MG/DL (ref 70–108)
GLUCOSE BLD-MCNC: 130 MG/DL (ref 70–108)
GLUCOSE BLD-MCNC: 134 MG/DL (ref 70–108)
GLUCOSE BLD-MCNC: 135 MG/DL (ref 70–108)
GLUCOSE BLD-MCNC: 137 MG/DL (ref 70–108)
GLUCOSE BLD-MCNC: 137 MG/DL (ref 70–108)
GLUCOSE BLD-MCNC: 138 MG/DL (ref 70–108)
GLUCOSE BLD-MCNC: 139 MG/DL (ref 70–108)
GLUCOSE BLD-MCNC: 139 MG/DL (ref 70–108)
GLUCOSE BLD-MCNC: 141 MG/DL
GLUCOSE BLD-MCNC: 141 MG/DL (ref 70–108)
GLUCOSE BLD-MCNC: 141 MG/DL (ref 70–108)
GLUCOSE BLD-MCNC: 143 MG/DL (ref 70–108)
GLUCOSE BLD-MCNC: 144 MG/DL (ref 70–108)
GLUCOSE BLD-MCNC: 146 MG/DL (ref 70–108)
GLUCOSE BLD-MCNC: 147 MG/DL
GLUCOSE BLD-MCNC: 149 MG/DL (ref 70–108)
GLUCOSE BLD-MCNC: 151 MG/DL (ref 70–108)
GLUCOSE BLD-MCNC: 151 MG/DL (ref 70–108)
GLUCOSE BLD-MCNC: 152 MG/DL (ref 70–108)
GLUCOSE BLD-MCNC: 152 MG/DL (ref 70–108)
GLUCOSE BLD-MCNC: 153 MG/DL (ref 70–108)
GLUCOSE BLD-MCNC: 154 MG/DL (ref 70–108)
GLUCOSE BLD-MCNC: 154 MG/DL (ref 70–108)
GLUCOSE BLD-MCNC: 155 MG/DL (ref 70–108)
GLUCOSE BLD-MCNC: 155 MG/DL (ref 70–108)
GLUCOSE BLD-MCNC: 156 MG/DL (ref 70–108)
GLUCOSE BLD-MCNC: 156 MG/DL (ref 70–108)
GLUCOSE BLD-MCNC: 157 MG/DL (ref 70–108)
GLUCOSE BLD-MCNC: 158 MG/DL (ref 70–108)
GLUCOSE BLD-MCNC: 158 MG/DL (ref 70–108)
GLUCOSE BLD-MCNC: 159 MG/DL (ref 70–108)
GLUCOSE BLD-MCNC: 160 MG/DL (ref 70–108)
GLUCOSE BLD-MCNC: 161 MG/DL (ref 70–108)
GLUCOSE BLD-MCNC: 163 MG/DL (ref 70–108)
GLUCOSE BLD-MCNC: 164 MG/DL (ref 70–108)
GLUCOSE BLD-MCNC: 164 MG/DL (ref 70–108)
GLUCOSE BLD-MCNC: 166 MG/DL (ref 70–108)
GLUCOSE BLD-MCNC: 167 MG/DL (ref 70–108)
GLUCOSE BLD-MCNC: 168 MG/DL (ref 70–108)
GLUCOSE BLD-MCNC: 170 MG/DL (ref 70–108)
GLUCOSE BLD-MCNC: 171 MG/DL (ref 70–108)
GLUCOSE BLD-MCNC: 173 MG/DL (ref 70–108)
GLUCOSE BLD-MCNC: 173 MG/DL (ref 70–108)
GLUCOSE BLD-MCNC: 175 MG/DL (ref 70–108)
GLUCOSE BLD-MCNC: 176 MG/DL (ref 70–108)
GLUCOSE BLD-MCNC: 177 MG/DL (ref 70–108)
GLUCOSE BLD-MCNC: 179 MG/DL (ref 70–108)
GLUCOSE BLD-MCNC: 180 MG/DL (ref 70–108)
GLUCOSE BLD-MCNC: 182 MG/DL (ref 70–108)
GLUCOSE BLD-MCNC: 183 MG/DL (ref 70–108)
GLUCOSE BLD-MCNC: 185 MG/DL (ref 70–108)
GLUCOSE BLD-MCNC: 186 MG/DL (ref 70–108)
GLUCOSE BLD-MCNC: 187 MG/DL (ref 70–108)
GLUCOSE BLD-MCNC: 190 MG/DL (ref 70–108)
GLUCOSE BLD-MCNC: 192 MG/DL (ref 70–108)
GLUCOSE BLD-MCNC: 194 MG/DL (ref 70–108)
GLUCOSE BLD-MCNC: 194 MG/DL (ref 70–108)
GLUCOSE BLD-MCNC: 195 MG/DL (ref 70–108)
GLUCOSE BLD-MCNC: 197 MG/DL (ref 70–108)
GLUCOSE BLD-MCNC: 198 MG/DL (ref 70–108)
GLUCOSE BLD-MCNC: 198 MG/DL (ref 70–108)
GLUCOSE BLD-MCNC: 200 MG/DL (ref 70–108)
GLUCOSE BLD-MCNC: 200 MG/DL (ref 70–108)
GLUCOSE BLD-MCNC: 202 MG/DL (ref 70–108)
GLUCOSE BLD-MCNC: 202 MG/DL (ref 70–108)
GLUCOSE BLD-MCNC: 203 MG/DL (ref 70–108)
GLUCOSE BLD-MCNC: 204 MG/DL (ref 70–108)
GLUCOSE BLD-MCNC: 205 MG/DL (ref 70–108)
GLUCOSE BLD-MCNC: 206 MG/DL (ref 70–108)
GLUCOSE BLD-MCNC: 206 MG/DL (ref 70–108)
GLUCOSE BLD-MCNC: 210 MG/DL (ref 70–108)
GLUCOSE BLD-MCNC: 214 MG/DL (ref 70–108)
GLUCOSE BLD-MCNC: 219 MG/DL (ref 70–108)
GLUCOSE BLD-MCNC: 220 MG/DL (ref 70–108)
GLUCOSE BLD-MCNC: 220 MG/DL (ref 70–108)
GLUCOSE BLD-MCNC: 222 MG/DL (ref 70–108)
GLUCOSE BLD-MCNC: 227 MG/DL (ref 70–108)
GLUCOSE BLD-MCNC: 228 MG/DL (ref 70–108)
GLUCOSE BLD-MCNC: 229 MG/DL (ref 70–108)
GLUCOSE BLD-MCNC: 231 MG/DL (ref 70–108)
GLUCOSE BLD-MCNC: 233 MG/DL (ref 70–108)
GLUCOSE BLD-MCNC: 235 MG/DL (ref 70–108)
GLUCOSE BLD-MCNC: 237 MG/DL (ref 70–108)
GLUCOSE BLD-MCNC: 238 MG/DL (ref 70–108)
GLUCOSE BLD-MCNC: 238 MG/DL (ref 70–108)
GLUCOSE BLD-MCNC: 239 MG/DL (ref 70–108)
GLUCOSE BLD-MCNC: 239 MG/DL (ref 70–108)
GLUCOSE BLD-MCNC: 242 MG/DL (ref 70–108)
GLUCOSE BLD-MCNC: 244 MG/DL (ref 70–108)
GLUCOSE BLD-MCNC: 246 MG/DL (ref 70–108)
GLUCOSE BLD-MCNC: 247 MG/DL (ref 70–108)
GLUCOSE BLD-MCNC: 247 MG/DL (ref 70–108)
GLUCOSE BLD-MCNC: 252 MG/DL (ref 70–108)
GLUCOSE BLD-MCNC: 260 MG/DL (ref 70–108)
GLUCOSE BLD-MCNC: 260 MG/DL (ref 70–108)
GLUCOSE BLD-MCNC: 262 MG/DL (ref 70–108)
GLUCOSE BLD-MCNC: 267 MG/DL (ref 70–108)
GLUCOSE BLD-MCNC: 268 MG/DL (ref 70–108)
GLUCOSE BLD-MCNC: 270 MG/DL (ref 70–108)
GLUCOSE BLD-MCNC: 271 MG/DL (ref 70–108)
GLUCOSE BLD-MCNC: 288 MG/DL (ref 70–108)
GLUCOSE BLD-MCNC: 294 MG/DL (ref 70–108)
GLUCOSE BLD-MCNC: 306 MG/DL (ref 70–108)
GLUCOSE BLD-MCNC: 311 MG/DL (ref 70–108)
GLUCOSE BLD-MCNC: 312 MG/DL (ref 70–108)
GLUCOSE BLD-MCNC: 312 MG/DL (ref 70–108)
GLUCOSE BLD-MCNC: 343 MG/DL (ref 70–108)
GLUCOSE BLD-MCNC: 356 MG/DL (ref 70–108)
GLUCOSE BLD-MCNC: 357 MG/DL (ref 70–108)
GLUCOSE BLD-MCNC: 359 MG/DL (ref 70–108)
GLUCOSE BLD-MCNC: 366 MG/DL (ref 70–108)
GLUCOSE BLD-MCNC: 370 MG/DL (ref 70–108)
GLUCOSE BLD-MCNC: 393 MG/DL (ref 70–108)
GLUCOSE BLD-MCNC: 397 MG/DL (ref 70–108)
GLUCOSE BLD-MCNC: 401 MG/DL (ref 70–108)
GLUCOSE BLD-MCNC: 425 MG/DL (ref 70–108)
GLUCOSE BLD-MCNC: 57 MG/DL (ref 70–108)
GLUCOSE BLD-MCNC: 59 MG/DL (ref 70–108)
GLUCOSE BLD-MCNC: 70 MG/DL (ref 70–108)
GLUCOSE BLD-MCNC: 72 MG/DL (ref 70–108)
GLUCOSE BLD-MCNC: 76 MG/DL (ref 70–108)
GLUCOSE BLD-MCNC: 76 MG/DL (ref 70–108)
GLUCOSE BLD-MCNC: 77 MG/DL (ref 70–108)
GLUCOSE BLD-MCNC: 80 MG/DL (ref 70–108)
GLUCOSE BLD-MCNC: 81 MG/DL (ref 70–108)
GLUCOSE BLD-MCNC: 81 MG/DL (ref 70–108)
GLUCOSE BLD-MCNC: 86 MG/DL (ref 70–108)
GLUCOSE BLD-MCNC: 87 MG/DL (ref 70–108)
GLUCOSE BLD-MCNC: 89 MG/DL
GLUCOSE BLD-MCNC: 90 MG/DL (ref 70–108)
GLUCOSE BLD-MCNC: 90 MG/DL (ref 70–108)
GLUCOSE BLD-MCNC: 91 MG/DL (ref 70–108)
GLUCOSE BLD-MCNC: 92 MG/DL (ref 70–108)
GLUCOSE BLD-MCNC: 93 MG/DL (ref 70–108)
GLUCOSE BLD-MCNC: 93 MG/DL (ref 70–108)
GLUCOSE BLD-MCNC: 94 MG/DL (ref 70–108)
GLUCOSE BLD-MCNC: 94 MG/DL (ref 70–108)
GLUCOSE BLD-MCNC: 95 MG/DL (ref 70–108)
GLUCOSE BLD-MCNC: 96 MG/DL (ref 70–108)
GLUCOSE BLD-MCNC: 96 MG/DL (ref 70–108)
GLUCOSE BLD-MCNC: 97 MG/DL (ref 70–108)
GLUCOSE URINE: NEGATIVE MG/DL
GLUCOSE, URINE: NEGATIVE MG/DL
GRAM STAIN RESULT: ABNORMAL
GRAM STAIN RESULT: NORMAL
HAEMOPHILUS INFLUENZAE BY PCR: NOT DETECTED
HBA1C MFR BLD: 6.3 %
HBA1C MFR BLD: 7.2 %
HBA1C MFR BLD: 7.5 %
HBA1C MFR BLD: 7.7 %
HCO3, MIXED: 27 MMOL/L (ref 23–28)
HCO3, MIXED: 28 MMOL/L (ref 23–28)
HCO3: 29 MMOL/L (ref 23–28)
HCO3: 30 MMOL/L (ref 23–28)
HCT VFR BLD CALC: 19.5 % (ref 42–52)
HCT VFR BLD CALC: 20.2 % (ref 42–52)
HCT VFR BLD CALC: 21.7 % (ref 42–52)
HCT VFR BLD CALC: 22.3 % (ref 42–52)
HCT VFR BLD CALC: 22.3 % (ref 42–52)
HCT VFR BLD CALC: 22.6 % (ref 42–52)
HCT VFR BLD CALC: 22.7 % (ref 42–52)
HCT VFR BLD CALC: 22.7 % (ref 42–52)
HCT VFR BLD CALC: 23.2 % (ref 42–52)
HCT VFR BLD CALC: 23.3 % (ref 42–52)
HCT VFR BLD CALC: 23.5 % (ref 42–52)
HCT VFR BLD CALC: 24.1 % (ref 42–52)
HCT VFR BLD CALC: 24.3 % (ref 42–52)
HCT VFR BLD CALC: 24.5 % (ref 42–52)
HCT VFR BLD CALC: 24.6 % (ref 42–52)
HCT VFR BLD CALC: 24.6 % (ref 42–52)
HCT VFR BLD CALC: 24.7 % (ref 42–52)
HCT VFR BLD CALC: 24.7 % (ref 42–52)
HCT VFR BLD CALC: 24.8 % (ref 42–52)
HCT VFR BLD CALC: 24.9 % (ref 42–52)
HCT VFR BLD CALC: 25 % (ref 42–52)
HCT VFR BLD CALC: 25.1 % (ref 42–52)
HCT VFR BLD CALC: 25.1 % (ref 42–52)
HCT VFR BLD CALC: 25.4 % (ref 42–52)
HCT VFR BLD CALC: 25.4 % (ref 42–52)
HCT VFR BLD CALC: 25.5 % (ref 42–52)
HCT VFR BLD CALC: 25.9 % (ref 42–52)
HCT VFR BLD CALC: 25.9 % (ref 42–52)
HCT VFR BLD CALC: 26 % (ref 42–52)
HCT VFR BLD CALC: 26 % (ref 42–52)
HCT VFR BLD CALC: 26.1 % (ref 42–52)
HCT VFR BLD CALC: 26.2 % (ref 42–52)
HCT VFR BLD CALC: 26.2 % (ref 42–52)
HCT VFR BLD CALC: 26.3 % (ref 42–52)
HCT VFR BLD CALC: 26.3 % (ref 42–52)
HCT VFR BLD CALC: 26.4 % (ref 42–52)
HCT VFR BLD CALC: 26.5 % (ref 42–52)
HCT VFR BLD CALC: 26.5 % (ref 42–52)
HCT VFR BLD CALC: 27 % (ref 42–52)
HCT VFR BLD CALC: 27.2 % (ref 42–52)
HCT VFR BLD CALC: 27.2 % (ref 42–52)
HCT VFR BLD CALC: 27.7 % (ref 42–52)
HCT VFR BLD CALC: 28 % (ref 42–52)
HCT VFR BLD CALC: 28.1 % (ref 42–52)
HCT VFR BLD CALC: 28.7 % (ref 42–52)
HCT VFR BLD CALC: 29.3 % (ref 42–52)
HCT VFR BLD CALC: 30.5 % (ref 42–52)
HCT VFR BLD CALC: 30.9 % (ref 42–52)
HCT VFR BLD CALC: 32.6 % (ref 42–52)
HCT VFR BLD CALC: 33.2 % (ref 42–52)
HCT VFR BLD CALC: 34.1 % (ref 42–52)
HCT VFR BLD CALC: 34.8 % (ref 42–52)
HCT VFR BLD CALC: 34.8 % (ref 42–52)
HCT VFR BLD CALC: 35.3 % (ref 42–52)
HCT VFR BLD CALC: 35.5 % (ref 42–52)
HCT VFR BLD CALC: 35.6 % (ref 42–52)
HCT VFR BLD CALC: 36 % (ref 42–52)
HCT VFR BLD CALC: 36.3 % (ref 42–52)
HCT VFR BLD CALC: 37.4 % (ref 42–52)
HCT VFR BLD CALC: 38.1 % (ref 42–52)
HCT VFR BLD CALC: 38.5 % (ref 42–52)
HCT VFR BLD CALC: 39.4 % (ref 42–52)
HCT VFR BLD CALC: 39.4 % (ref 42–52)
HCT VFR BLD CALC: 39.6 % (ref 42–52)
HCT VFR BLD CALC: 40.6 % (ref 42–52)
HCT VFR BLD CALC: 41.2 % (ref 42–52)
HCT VFR BLD CALC: 41.3 % (ref 42–52)
HCT VFR BLD CALC: 41.8 % (ref 42–52)
HCT VFR BLD CALC: 42.1 % (ref 42–52)
HCT VFR BLD CALC: 42.7 % (ref 42–52)
HCT VFR BLD CALC: 43.4 % (ref 42–52)
HCT VFR BLD CALC: 46.3 % (ref 42–52)
HCT VFR BLD CALC: 46.6 % (ref 42–52)
HDLC SERPL-MCNC: 51 MG/DL
HDLC SERPL-MCNC: NORMAL MG/DL
HEMOGLOBIN: 10.6 GM/DL (ref 14–18)
HEMOGLOBIN: 10.7 GM/DL (ref 14–18)
HEMOGLOBIN: 10.8 GM/DL (ref 14–18)
HEMOGLOBIN: 11.1 GM/DL (ref 14–18)
HEMOGLOBIN: 11.1 GM/DL (ref 14–18)
HEMOGLOBIN: 11.3 GM/DL (ref 14–18)
HEMOGLOBIN: 11.4 GM/DL (ref 14–18)
HEMOGLOBIN: 11.7 GM/DL (ref 14–18)
HEMOGLOBIN: 11.8 GM/DL (ref 14–18)
HEMOGLOBIN: 11.8 GM/DL (ref 14–18)
HEMOGLOBIN: 12.1 GM/DL (ref 14–18)
HEMOGLOBIN: 12.2 GM/DL (ref 14–18)
HEMOGLOBIN: 12.2 GM/DL (ref 14–18)
HEMOGLOBIN: 12.5 GM/DL (ref 14–18)
HEMOGLOBIN: 12.8 GM/DL (ref 14–18)
HEMOGLOBIN: 13 GM/DL (ref 14–18)
HEMOGLOBIN: 13.1 GM/DL (ref 14–18)
HEMOGLOBIN: 13.1 GM/DL (ref 14–18)
HEMOGLOBIN: 13.2 GM/DL (ref 14–18)
HEMOGLOBIN: 13.6 GM/DL (ref 14–18)
HEMOGLOBIN: 13.7 GM/DL (ref 14–18)
HEMOGLOBIN: 14.4 GM/DL (ref 14–18)
HEMOGLOBIN: 14.5 GM/DL (ref 14–18)
HEMOGLOBIN: 5.9 GM/DL (ref 14–18)
HEMOGLOBIN: 6.3 GM/DL (ref 14–18)
HEMOGLOBIN: 6.4 GM/DL (ref 14–18)
HEMOGLOBIN: 6.9 GM/DL (ref 14–18)
HEMOGLOBIN: 7 GM/DL (ref 14–18)
HEMOGLOBIN: 7 GM/DL (ref 14–18)
HEMOGLOBIN: 7.1 GM/DL (ref 14–18)
HEMOGLOBIN: 7.2 GM/DL (ref 14–18)
HEMOGLOBIN: 7.3 GM/DL (ref 14–18)
HEMOGLOBIN: 7.4 GM/DL (ref 14–18)
HEMOGLOBIN: 7.5 GM/DL (ref 14–18)
HEMOGLOBIN: 7.6 GM/DL (ref 14–18)
HEMOGLOBIN: 7.7 GM/DL (ref 14–18)
HEMOGLOBIN: 7.7 GM/DL (ref 14–18)
HEMOGLOBIN: 7.8 GM/DL (ref 14–18)
HEMOGLOBIN: 7.9 GM/DL (ref 14–18)
HEMOGLOBIN: 8 GM/DL (ref 14–18)
HEMOGLOBIN: 8.1 GM/DL (ref 14–18)
HEMOGLOBIN: 8.2 GM/DL (ref 14–18)
HEMOGLOBIN: 8.2 GM/DL (ref 14–18)
HEMOGLOBIN: 8.3 GM/DL (ref 14–18)
HEMOGLOBIN: 8.3 GM/DL (ref 14–18)
HEMOGLOBIN: 8.5 GM/DL (ref 14–18)
HEMOGLOBIN: 8.7 GM/DL (ref 14–18)
HEMOGLOBIN: 8.8 GM/DL (ref 14–18)
HEMOGLOBIN: 8.8 GM/DL (ref 14–18)
HEMOGLOBIN: 8.9 GM/DL (ref 14–18)
HEMOGLOBIN: 8.9 GM/DL (ref 14–18)
HEMOGLOBIN: 9.1 GM/DL (ref 14–18)
HEMOGLOBIN: 9.9 GM/DL (ref 14–18)
HYPOCHROMIA: PRESENT
ICTOTEST: NEGATIVE
IFIO2: 3
IFIO2: 35
IMMATURE GRANS (ABS): 0.02 THOU/MM3 (ref 0–0.07)
IMMATURE GRANS (ABS): 0.03 THOU/MM3 (ref 0–0.07)
IMMATURE GRANS (ABS): 0.04 THOU/MM3 (ref 0–0.07)
IMMATURE GRANS (ABS): 0.05 THOU/MM3 (ref 0–0.07)
IMMATURE GRANS (ABS): 0.06 THOU/MM3 (ref 0–0.07)
IMMATURE GRANS (ABS): 0.07 THOU/MM3 (ref 0–0.07)
IMMATURE GRANS (ABS): 0.08 THOU/MM3 (ref 0–0.07)
IMMATURE GRANS (ABS): 0.09 THOU/MM3 (ref 0–0.07)
IMMATURE GRANS (ABS): 0.09 THOU/MM3 (ref 0–0.07)
IMMATURE GRANS (ABS): 0.1 THOU/MM3 (ref 0–0.07)
IMMATURE GRANS (ABS): 0.11 THOU/MM3 (ref 0–0.07)
IMMATURE GRANS (ABS): 0.12 THOU/MM3 (ref 0–0.07)
IMMATURE GRANS (ABS): 0.13 THOU/MM3 (ref 0–0.07)
IMMATURE GRANS (ABS): 0.13 THOU/MM3 (ref 0–0.07)
IMMATURE GRANS (ABS): 0.15 THOU/MM3 (ref 0–0.07)
IMMATURE GRANS (ABS): 0.16 THOU/MM3 (ref 0–0.07)
IMMATURE GRANS (ABS): 0.17 THOU/MM3 (ref 0–0.07)
IMMATURE GRANS (ABS): 0.19 THOU/MM3 (ref 0–0.07)
IMMATURE GRANS (ABS): 0.21 THOU/MM3 (ref 0–0.07)
IMMATURE GRANS (ABS): 0.24 THOU/MM3 (ref 0–0.07)
IMMATURE GRANS (ABS): 0.4 THOU/MM3 (ref 0–0.07)
IMMATURE GRANS (ABS): 0.44 THOU/MM3 (ref 0–0.07)
IMMATURE GRANS (ABS): 0.48 THOU/MM3 (ref 0–0.07)
IMMATURE GRANS (ABS): 0.54 THOU/MM3 (ref 0–0.07)
IMMATURE GRANULOCYTES: 0.3 %
IMMATURE GRANULOCYTES: 0.4 %
IMMATURE GRANULOCYTES: 0.5 %
IMMATURE GRANULOCYTES: 0.6 %
IMMATURE GRANULOCYTES: 0.7 %
IMMATURE GRANULOCYTES: 0.8 %
IMMATURE GRANULOCYTES: 0.9 %
IMMATURE GRANULOCYTES: 1 %
IMMATURE GRANULOCYTES: 1.1 %
IMMATURE GRANULOCYTES: 1.2 %
IMMATURE GRANULOCYTES: 1.2 %
IMMATURE GRANULOCYTES: 1.3 %
IMMATURE GRANULOCYTES: 1.5 %
IMMATURE GRANULOCYTES: 1.5 %
IMMATURE GRANULOCYTES: 1.6 %
IMMATURE GRANULOCYTES: 1.7 %
IMMATURE GRANULOCYTES: 1.7 %
IMMATURE GRANULOCYTES: 1.9 %
IMMATURE GRANULOCYTES: 2 %
INFLUENZA A BY PCR: NOT DETECTED
INFLUENZA B BY PCR: NOT DETECTED
INR BLD: 1.12 (ref 0.85–1.13)
INR BLD: 1.14 (ref 0.85–1.13)
INR BLD: 1.16 (ref 0.85–1.13)
INR BLD: 1.19 (ref 0.85–1.13)
INR BLD: 1.22 (ref 0.85–1.13)
INR BLD: 1.23 (ref 0.85–1.13)
INR BLD: 1.25 (ref 0.85–1.13)
INR BLD: 1.25 (ref 0.85–1.13)
INR BLD: 1.26 (ref 0.85–1.13)
INR BLD: 1.26 (ref 0.85–1.13)
INR BLD: 1.27 (ref 0.85–1.13)
INR BLD: 1.28 (ref 0.85–1.13)
INR BLD: 1.3 (ref 0.85–1.13)
INR BLD: 1.32 (ref 0.85–1.13)
INR BLD: 1.33 (ref 0.85–1.13)
INR BLD: 1.34 (ref 0.85–1.13)
INR BLD: 1.35 (ref 0.85–1.13)
INR BLD: 1.39 (ref 0.85–1.13)
INR BLD: 1.46 (ref 0.85–1.13)
INR BLD: 1.47 (ref 0.85–1.13)
INR BLD: 1.5 (ref 0.85–1.13)
INR BLD: 1.52 (ref 0.85–1.13)
INR BLD: 1.52 (ref 0.85–1.13)
INR BLD: 1.54 (ref 0.85–1.13)
INR BLD: 1.59 (ref 0.85–1.13)
INR BLD: 1.73 (ref 0.85–1.13)
INR BLD: 1.83 (ref 0.85–1.13)
INR BLD: 1.97 (ref 0.85–1.13)
INR BLD: 1.98 (ref 0.85–1.13)
INR BLD: 2.08 (ref 0.85–1.13)
INR BLD: 2.19 (ref 0.85–1.13)
INR BLD: 2.44 (ref 0.85–1.13)
INR BLD: 2.45 (ref 0.85–1.13)
INR BLD: 2.57 (ref 0.85–1.13)
INR BLD: 2.68 (ref 0.85–1.13)
INR BLD: 2.78 (ref 0.85–1.13)
INR BLD: 2.79 (ref 0.85–1.13)
INR BLD: 3.82 (ref 0.85–1.13)
INR BLD: 5.34 (ref 0.85–1.13)
IRON: 183 UG/DL (ref 65–195)
KETONES, URINE: ABNORMAL
KETONES, URINE: NEGATIVE
KLEBSIELLA AEROGENES BY PCR: NOT DETECTED
KLEBSIELLA OXYTOCA BY PCR: NOT DETECTED
KLEBSIELLA PNEUMONIAE GROUP BY PCR: NOT DETECTED
LACTIC ACID, SEPSIS: 1.5 MMOL/L (ref 0.5–1.9)
LACTIC ACID, SEPSIS: 2.7 MMOL/L (ref 0.5–1.9)
LACTIC ACID, SEPSIS: 2.8 MMOL/L (ref 0.5–1.9)
LACTIC ACID, SEPSIS: 3.1 MMOL/L (ref 0.5–1.9)
LACTIC ACID: 1.5 MMOL/L (ref 0.5–2.2)
LACTIC ACID: 2.1 MMOL/L (ref 0.5–2.2)
LACTIC ACID: 2.2 MMOL/L (ref 0.5–2.2)
LACTIC ACID: 2.3 MMOL/L (ref 0.5–2.2)
LACTIC ACID: 2.9 MMOL/L (ref 0.5–2.2)
LD: 257 U/L (ref 100–190)
LDL CHOLESTEROL CALCULATED: 91 MG/DL
LDL CHOLESTEROL CALCULATED: 91 MG/DL (ref 0–160)
LEGIONELLA PNEUMOPHILIA AG, URINE: NEGATIVE
LEGIONELLA PNEUMOPHILIA BY PCR: DETECTED
LEGIONELLA PNEUMOPHILIA BY PCR: NOT DETECTED
LEGIONELLA PNEUMOPHILIA BY PCR: NOT DETECTED
LEUKOCYTE EST, POC: ABNORMAL
LEUKOCYTE EST, POC: NEGATIVE
LEUKOCYTE ESTERASE, URINE: ABNORMAL
LEUKOCYTE ESTERASE, URINE: NEGATIVE
LV EF: 55 %
LV EF: 55 %
LVEF MODALITY: NORMAL
LVEF MODALITY: NORMAL
LYMPHOCYTES # BLD: 1.7 %
LYMPHOCYTES # BLD: 1.8 %
LYMPHOCYTES # BLD: 10 %
LYMPHOCYTES # BLD: 10 %
LYMPHOCYTES # BLD: 10.1 %
LYMPHOCYTES # BLD: 10.1 %
LYMPHOCYTES # BLD: 10.4 %
LYMPHOCYTES # BLD: 10.7 %
LYMPHOCYTES # BLD: 11.5 %
LYMPHOCYTES # BLD: 12.8 %
LYMPHOCYTES # BLD: 15.2 %
LYMPHOCYTES # BLD: 16.5 %
LYMPHOCYTES # BLD: 2 %
LYMPHOCYTES # BLD: 2.1 %
LYMPHOCYTES # BLD: 2.5 %
LYMPHOCYTES # BLD: 2.5 %
LYMPHOCYTES # BLD: 2.7 %
LYMPHOCYTES # BLD: 2.8 %
LYMPHOCYTES # BLD: 2.8 %
LYMPHOCYTES # BLD: 3 %
LYMPHOCYTES # BLD: 3.1 %
LYMPHOCYTES # BLD: 3.4 %
LYMPHOCYTES # BLD: 3.7 %
LYMPHOCYTES # BLD: 3.7 %
LYMPHOCYTES # BLD: 4.2 %
LYMPHOCYTES # BLD: 4.6 %
LYMPHOCYTES # BLD: 4.7 %
LYMPHOCYTES # BLD: 5.7 %
LYMPHOCYTES # BLD: 5.8 %
LYMPHOCYTES # BLD: 5.9 %
LYMPHOCYTES # BLD: 6.2 %
LYMPHOCYTES # BLD: 6.2 %
LYMPHOCYTES # BLD: 6.7 %
LYMPHOCYTES # BLD: 6.9 %
LYMPHOCYTES # BLD: 7 %
LYMPHOCYTES # BLD: 7.1 %
LYMPHOCYTES # BLD: 7.4 %
LYMPHOCYTES # BLD: 7.4 %
LYMPHOCYTES # BLD: 7.8 %
LYMPHOCYTES # BLD: 7.9 %
LYMPHOCYTES # BLD: 8 %
LYMPHOCYTES # BLD: 8.2 %
LYMPHOCYTES # BLD: 8.5 %
LYMPHOCYTES # BLD: 8.6 %
LYMPHOCYTES # BLD: 8.6 %
LYMPHOCYTES # BLD: 8.8 %
LYMPHOCYTES # BLD: 8.8 %
LYMPHOCYTES # BLD: 9.1 %
LYMPHOCYTES # BLD: 9.3 %
LYMPHOCYTES # BLD: 9.4 %
LYMPHOCYTES # BLD: 9.6 %
LYMPHOCYTES ABSOLUTE: 0.2 THOU/MM3 (ref 1–4.8)
LYMPHOCYTES ABSOLUTE: 0.2 THOU/MM3 (ref 1–4.8)
LYMPHOCYTES ABSOLUTE: 0.3 THOU/MM3 (ref 1–4.8)
LYMPHOCYTES ABSOLUTE: 0.4 THOU/MM3 (ref 1–4.8)
LYMPHOCYTES ABSOLUTE: 0.5 THOU/MM3 (ref 1–4.8)
LYMPHOCYTES ABSOLUTE: 0.6 THOU/MM3 (ref 1–4.8)
LYMPHOCYTES ABSOLUTE: 0.7 THOU/MM3 (ref 1–4.8)
LYMPHOCYTES ABSOLUTE: 0.8 THOU/MM3 (ref 1–4.8)
LYMPHOCYTES ABSOLUTE: 0.9 THOU/MM3 (ref 1–4.8)
LYMPHOCYTES ABSOLUTE: 1 THOU/MM3 (ref 1–4.8)
LYMPHOCYTES ABSOLUTE: 1.1 THOU/MM3 (ref 1–4.8)
LYMPHOCYTES ABSOLUTE: 1.3 THOU/MM3 (ref 1–4.8)
MAGNESIUM: 1.5 MG/DL (ref 1.6–2.4)
MAGNESIUM: 1.6 MG/DL (ref 1.6–2.4)
MAGNESIUM: 1.6 MG/DL (ref 1.6–2.4)
MAGNESIUM: 1.7 MG/DL (ref 1.6–2.4)
MAGNESIUM: 1.8 MG/DL (ref 1.6–2.4)
MAGNESIUM: 1.8 MG/DL (ref 1.6–2.4)
MAGNESIUM: 1.9 MG/DL (ref 1.6–2.4)
MAGNESIUM: 1.9 MG/DL (ref 1.6–2.4)
MAGNESIUM: 2 MG/DL (ref 1.6–2.4)
MAGNESIUM: 2.1 MG/DL (ref 1.6–2.4)
MAGNESIUM: 2.2 MG/DL (ref 1.6–2.4)
MAGNESIUM: 2.3 MG/DL (ref 1.6–2.4)
MAGNESIUM: 2.3 MG/DL (ref 1.6–2.4)
MAGNESIUM: 2.5 MG/DL (ref 1.6–2.4)
MAGNESIUM: 2.5 MG/DL (ref 1.6–2.4)
MAGNESIUM: 2.6 MG/DL (ref 1.6–2.4)
MCH RBC QN AUTO: 28.9 PG (ref 26–33)
MCH RBC QN AUTO: 29 PG (ref 26–33)
MCH RBC QN AUTO: 29.1 PG (ref 26–33)
MCH RBC QN AUTO: 29.1 PG (ref 26–33)
MCH RBC QN AUTO: 29.2 PG (ref 26–33)
MCH RBC QN AUTO: 29.5 PG (ref 26–33)
MCH RBC QN AUTO: 29.6 PG (ref 26–33)
MCH RBC QN AUTO: 29.6 PG (ref 26–33)
MCH RBC QN AUTO: 29.7 PG (ref 26–33)
MCH RBC QN AUTO: 29.8 PG (ref 26–33)
MCH RBC QN AUTO: 29.9 PG (ref 26–33)
MCH RBC QN AUTO: 29.9 PG (ref 26–33)
MCH RBC QN AUTO: 30 PG (ref 26–33)
MCH RBC QN AUTO: 30.1 PG (ref 26–33)
MCH RBC QN AUTO: 30.2 PG (ref 26–33)
MCH RBC QN AUTO: 30.3 PG (ref 26–33)
MCH RBC QN AUTO: 30.4 PG (ref 26–33)
MCH RBC QN AUTO: 30.5 PG (ref 26–33)
MCH RBC QN AUTO: 30.5 PG (ref 26–33)
MCH RBC QN AUTO: 30.6 PG (ref 26–33)
MCH RBC QN AUTO: 30.7 PG (ref 26–33)
MCH RBC QN AUTO: 30.8 PG (ref 26–33)
MCH RBC QN AUTO: 30.8 PG (ref 26–33)
MCH RBC QN AUTO: 30.9 PG (ref 26–33)
MCH RBC QN AUTO: 31 PG (ref 26–33)
MCH RBC QN AUTO: 31.1 PG (ref 26–33)
MCH RBC QN AUTO: 31.3 PG (ref 26–33)
MCH RBC QN AUTO: 31.4 PG (ref 26–33)
MCH RBC QN AUTO: 31.4 PG (ref 26–33)
MCH RBC QN AUTO: 31.6 PG (ref 26–33)
MCH RBC QN AUTO: 31.6 PG (ref 26–33)
MCHC RBC AUTO-ENTMCNC: 28.8 GM/DL (ref 32.2–35.5)
MCHC RBC AUTO-ENTMCNC: 29.1 GM/DL (ref 32.2–35.5)
MCHC RBC AUTO-ENTMCNC: 29.3 GM/DL (ref 32.2–35.5)
MCHC RBC AUTO-ENTMCNC: 29.4 GM/DL (ref 32.2–35.5)
MCHC RBC AUTO-ENTMCNC: 29.5 GM/DL (ref 32.2–35.5)
MCHC RBC AUTO-ENTMCNC: 29.5 GM/DL (ref 32.2–35.5)
MCHC RBC AUTO-ENTMCNC: 29.6 GM/DL (ref 32.2–35.5)
MCHC RBC AUTO-ENTMCNC: 29.6 GM/DL (ref 32.2–35.5)
MCHC RBC AUTO-ENTMCNC: 29.7 GM/DL (ref 32.2–35.5)
MCHC RBC AUTO-ENTMCNC: 29.7 GM/DL (ref 32.2–35.5)
MCHC RBC AUTO-ENTMCNC: 29.8 GM/DL (ref 32.2–35.5)
MCHC RBC AUTO-ENTMCNC: 29.8 GM/DL (ref 32.2–35.5)
MCHC RBC AUTO-ENTMCNC: 29.9 GM/DL (ref 32.2–35.5)
MCHC RBC AUTO-ENTMCNC: 29.9 GM/DL (ref 32.2–35.5)
MCHC RBC AUTO-ENTMCNC: 30 GM/DL (ref 32.2–35.5)
MCHC RBC AUTO-ENTMCNC: 30.1 GM/DL (ref 32.2–35.5)
MCHC RBC AUTO-ENTMCNC: 30.3 GM/DL (ref 32.2–35.5)
MCHC RBC AUTO-ENTMCNC: 30.3 GM/DL (ref 32.2–35.5)
MCHC RBC AUTO-ENTMCNC: 30.4 GM/DL (ref 32.2–35.5)
MCHC RBC AUTO-ENTMCNC: 30.5 GM/DL (ref 32.2–35.5)
MCHC RBC AUTO-ENTMCNC: 30.5 GM/DL (ref 32.2–35.5)
MCHC RBC AUTO-ENTMCNC: 30.6 GM/DL (ref 32.2–35.5)
MCHC RBC AUTO-ENTMCNC: 30.7 GM/DL (ref 32.2–35.5)
MCHC RBC AUTO-ENTMCNC: 30.8 GM/DL (ref 32.2–35.5)
MCHC RBC AUTO-ENTMCNC: 30.8 GM/DL (ref 32.2–35.5)
MCHC RBC AUTO-ENTMCNC: 30.9 GM/DL (ref 32.2–35.5)
MCHC RBC AUTO-ENTMCNC: 31 GM/DL (ref 32.2–35.5)
MCHC RBC AUTO-ENTMCNC: 31 GM/DL (ref 32.2–35.5)
MCHC RBC AUTO-ENTMCNC: 31.1 GM/DL (ref 32.2–35.5)
MCHC RBC AUTO-ENTMCNC: 31.2 GM/DL (ref 32.2–35.5)
MCHC RBC AUTO-ENTMCNC: 31.3 GM/DL (ref 32.2–35.5)
MCHC RBC AUTO-ENTMCNC: 31.4 GM/DL (ref 32.2–35.5)
MCHC RBC AUTO-ENTMCNC: 31.5 GM/DL (ref 32.2–35.5)
MCHC RBC AUTO-ENTMCNC: 31.5 GM/DL (ref 32.2–35.5)
MCHC RBC AUTO-ENTMCNC: 31.6 GM/DL (ref 32.2–35.5)
MCHC RBC AUTO-ENTMCNC: 31.7 GM/DL (ref 32.2–35.5)
MCHC RBC AUTO-ENTMCNC: 31.7 GM/DL (ref 32.2–35.5)
MCHC RBC AUTO-ENTMCNC: 31.8 GM/DL (ref 32.2–35.5)
MCHC RBC AUTO-ENTMCNC: 31.9 GM/DL (ref 32.2–35.5)
MCHC RBC AUTO-ENTMCNC: 31.9 GM/DL (ref 32.2–35.5)
MCHC RBC AUTO-ENTMCNC: 32 GM/DL (ref 32.2–35.5)
MCHC RBC AUTO-ENTMCNC: 32.1 GM/DL (ref 32.2–35.5)
MCHC RBC AUTO-ENTMCNC: 32.3 GM/DL (ref 32.2–35.5)
MCHC RBC AUTO-ENTMCNC: 32.3 GM/DL (ref 32.2–35.5)
MCHC RBC AUTO-ENTMCNC: 32.5 GM/DL (ref 32.2–35.5)
MCHC RBC AUTO-ENTMCNC: 32.5 GM/DL (ref 32.2–35.5)
MCHC RBC AUTO-ENTMCNC: 32.6 GM/DL (ref 32.2–35.5)
MCHC RBC AUTO-ENTMCNC: 32.7 GM/DL (ref 32.2–35.5)
MCHC RBC AUTO-ENTMCNC: 32.8 GM/DL (ref 32.2–35.5)
MCHC RBC AUTO-ENTMCNC: 32.9 GM/DL (ref 32.2–35.5)
MCHC RBC AUTO-ENTMCNC: 33 GM/DL (ref 32.2–35.5)
MCHC RBC AUTO-ENTMCNC: 34.6 GM/DL (ref 32.2–35.5)
MCV RBC AUTO: 100 FL (ref 80–94)
MCV RBC AUTO: 100.4 FL (ref 80–94)
MCV RBC AUTO: 100.7 FL (ref 80–94)
MCV RBC AUTO: 100.8 FL (ref 80–94)
MCV RBC AUTO: 100.8 FL (ref 80–94)
MCV RBC AUTO: 100.9 FL (ref 80–94)
MCV RBC AUTO: 101 FL (ref 80–94)
MCV RBC AUTO: 101.1 FL (ref 80–94)
MCV RBC AUTO: 101.1 FL (ref 80–94)
MCV RBC AUTO: 101.2 FL (ref 80–94)
MCV RBC AUTO: 101.3 FL (ref 80–94)
MCV RBC AUTO: 101.3 FL (ref 80–94)
MCV RBC AUTO: 101.5 FL (ref 80–94)
MCV RBC AUTO: 101.6 FL (ref 80–94)
MCV RBC AUTO: 101.9 FL (ref 80–94)
MCV RBC AUTO: 102.1 FL (ref 80–94)
MCV RBC AUTO: 102.5 FL (ref 80–94)
MCV RBC AUTO: 102.7 FL (ref 80–94)
MCV RBC AUTO: 102.8 FL (ref 80–94)
MCV RBC AUTO: 102.9 FL (ref 80–94)
MCV RBC AUTO: 103.2 FL (ref 80–94)
MCV RBC AUTO: 103.3 FL (ref 80–94)
MCV RBC AUTO: 103.5 FL (ref 80–94)
MCV RBC AUTO: 86.1 FL (ref 80–94)
MCV RBC AUTO: 91.2 FL (ref 80–94)
MCV RBC AUTO: 91.9 FL (ref 80–94)
MCV RBC AUTO: 92.1 FL (ref 80–94)
MCV RBC AUTO: 92.2 FL (ref 80–94)
MCV RBC AUTO: 92.2 FL (ref 80–94)
MCV RBC AUTO: 92.3 FL (ref 80–94)
MCV RBC AUTO: 92.7 FL (ref 80–94)
MCV RBC AUTO: 93 FL (ref 80–94)
MCV RBC AUTO: 93 FL (ref 80–94)
MCV RBC AUTO: 93.2 FL (ref 80–94)
MCV RBC AUTO: 93.3 FL (ref 80–94)
MCV RBC AUTO: 93.5 FL (ref 80–94)
MCV RBC AUTO: 94.2 FL (ref 80–94)
MCV RBC AUTO: 94.9 FL (ref 80–94)
MCV RBC AUTO: 95 FL (ref 80–94)
MCV RBC AUTO: 95.7 FL (ref 80–94)
MCV RBC AUTO: 95.8 FL (ref 80–94)
MCV RBC AUTO: 95.9 FL (ref 80–94)
MCV RBC AUTO: 96 FL (ref 80–94)
MCV RBC AUTO: 96 FL (ref 80–94)
MCV RBC AUTO: 96.4 FL (ref 80–94)
MCV RBC AUTO: 96.5 FL (ref 80–94)
MCV RBC AUTO: 96.5 FL (ref 80–94)
MCV RBC AUTO: 96.6 FL (ref 80–94)
MCV RBC AUTO: 96.8 FL (ref 80–94)
MCV RBC AUTO: 97.2 FL (ref 80–94)
MCV RBC AUTO: 97.3 FL (ref 80–94)
MCV RBC AUTO: 97.3 FL (ref 80–94)
MCV RBC AUTO: 97.6 FL (ref 80–94)
MCV RBC AUTO: 97.7 FL (ref 80–94)
MCV RBC AUTO: 98.3 FL (ref 80–94)
MCV RBC AUTO: 98.5 FL (ref 80–94)
MCV RBC AUTO: 98.5 FL (ref 80–94)
MCV RBC AUTO: 98.6 FL (ref 80–94)
MCV RBC AUTO: 98.6 FL (ref 80–94)
MCV RBC AUTO: 98.7 FL (ref 80–94)
MCV RBC AUTO: 98.8 FL (ref 80–94)
MCV RBC AUTO: 98.9 FL (ref 80–94)
MCV RBC AUTO: 99 FL (ref 80–94)
MCV RBC AUTO: 99.2 FL (ref 80–94)
MCV RBC AUTO: 99.5 FL (ref 80–94)
MCV RBC AUTO: 99.6 FL (ref 80–94)
MCV RBC AUTO: 99.6 FL (ref 80–94)
METAPNEUMOVIRUS BY PCR: NOT DETECTED
MICROALBUMIN/CREAT 24H UR: 30 MG/G{CREAT}
MICROALBUMIN/CREAT 24H UR: 80 MG/G{CREAT}
MICROALBUMIN/CREAT UR-RTO: 11.4
MICROALBUMIN/CREAT UR-RTO: ABNORMAL
MISCELLANEOUS 2: ABNORMAL
MISCELLANEOUS LAB TEST RESULT: ABNORMAL
MISCELLANEOUS LAB TEST RESULT: ABNORMAL
MODE: ABNORMAL
MODE: AC
MONOCYTES # BLD: 10 %
MONOCYTES # BLD: 10.1 %
MONOCYTES # BLD: 10.5 %
MONOCYTES # BLD: 10.6 %
MONOCYTES # BLD: 10.7 %
MONOCYTES # BLD: 10.8 %
MONOCYTES # BLD: 11 %
MONOCYTES # BLD: 11 %
MONOCYTES # BLD: 11.3 %
MONOCYTES # BLD: 11.3 %
MONOCYTES # BLD: 11.4 %
MONOCYTES # BLD: 11.5 %
MONOCYTES # BLD: 11.5 %
MONOCYTES # BLD: 11.7 %
MONOCYTES # BLD: 11.9 %
MONOCYTES # BLD: 12.2 %
MONOCYTES # BLD: 12.3 %
MONOCYTES # BLD: 12.6 %
MONOCYTES # BLD: 12.6 %
MONOCYTES # BLD: 13.4 %
MONOCYTES # BLD: 15.9 %
MONOCYTES # BLD: 4 %
MONOCYTES # BLD: 4.9 %
MONOCYTES # BLD: 5 %
MONOCYTES # BLD: 5.3 %
MONOCYTES # BLD: 5.8 %
MONOCYTES # BLD: 6.1 %
MONOCYTES # BLD: 6.3 %
MONOCYTES # BLD: 6.5 %
MONOCYTES # BLD: 6.9 %
MONOCYTES # BLD: 7 %
MONOCYTES # BLD: 7 %
MONOCYTES # BLD: 7.2 %
MONOCYTES # BLD: 7.4 %
MONOCYTES # BLD: 8 %
MONOCYTES # BLD: 8.1 %
MONOCYTES # BLD: 8.2 %
MONOCYTES # BLD: 8.3 %
MONOCYTES # BLD: 8.4 %
MONOCYTES # BLD: 8.9 %
MONOCYTES # BLD: 9.1 %
MONOCYTES # BLD: 9.1 %
MONOCYTES # BLD: 9.6 %
MONOCYTES ABSOLUTE: 0.5 THOU/MM3 (ref 0.4–1.3)
MONOCYTES ABSOLUTE: 0.6 THOU/MM3 (ref 0.4–1.3)
MONOCYTES ABSOLUTE: 0.7 THOU/MM3 (ref 0.4–1.3)
MONOCYTES ABSOLUTE: 0.8 THOU/MM3 (ref 0.4–1.3)
MONOCYTES ABSOLUTE: 0.9 THOU/MM3 (ref 0.4–1.3)
MONOCYTES ABSOLUTE: 1 THOU/MM3 (ref 0.4–1.3)
MONOCYTES ABSOLUTE: 1.1 THOU/MM3 (ref 0.4–1.3)
MONOCYTES ABSOLUTE: 1.2 THOU/MM3 (ref 0.4–1.3)
MONOCYTES ABSOLUTE: 1.3 THOU/MM3 (ref 0.4–1.3)
MONOCYTES ABSOLUTE: 1.4 THOU/MM3 (ref 0.4–1.3)
MONOCYTES ABSOLUTE: 1.5 THOU/MM3 (ref 0.4–1.3)
MONOCYTES ABSOLUTE: 1.5 THOU/MM3 (ref 0.4–1.3)
MONOCYTES ABSOLUTE: 1.6 THOU/MM3 (ref 0.4–1.3)
MONOCYTES ABSOLUTE: 1.7 THOU/MM3 (ref 0.4–1.3)
MONOCYTES ABSOLUTE: 2.2 THOU/MM3 (ref 0.4–1.3)
MONOCYTES ABSOLUTE: 3.2 THOU/MM3 (ref 0.4–1.3)
MORAXELLA CATARRHALIS BY PCR: NOT DETECTED
MRSA SCREEN RT-PCR: NEGATIVE
MRSA SCREEN RT-PCR: NEGATIVE
MRSA SCREEN: NORMAL
MRSA SCREEN: NORMAL
MYCOPLASMA PNEUMONIAE BY PCR: NOT DETECTED
MYOGLOBIN: 65 NG/ML (ref 28–72)
NITRITE, URINE: NEGATIVE
NUCLEATED RED BLOOD CELLS: 0 /100 WBC
O2 SAT, MIXED: 49 %
O2 SAT, MIXED: 80 %
O2 SATURATION: 95 %
O2 SATURATION: 99 %
OPIATES, URINE: NEGATIVE
ORGANISM: ABNORMAL
OSMOLALITY CALCULATION: 275.9 MOSMOL/KG (ref 275–300)
OSMOLALITY CALCULATION: 277.8 MOSMOL/KG (ref 275–300)
OSMOLALITY CALCULATION: 287.6 MOSMOL/KG (ref 275–300)
OSMOLALITY URINE: 548 MOSMOL/KG (ref 250–750)
OSMOLALITY: 306 MOSMOL/KG (ref 275–295)
OXYCODONE: NEGATIVE
PARAINFLUENZA VIRUS BY PCR: NOT DETECTED
PATHOLOGIST REVIEW: ABNORMAL
PCO2, MIXED VENOUS: 44 MMHG (ref 41–51)
PCO2, MIXED VENOUS: 75 MMHG (ref 41–51)
PCO2: 29 MMHG (ref 35–45)
PCO2: 75 MMHG (ref 35–45)
PERFORMING LAB: NORMAL
PERFORMING LAB: NORMAL
PH BLOOD GAS: 7.19 (ref 7.35–7.45)
PH BLOOD GAS: 7.62 (ref 7.35–7.45)
PH UA: 5 (ref 5–9)
PH, MIXED: 7.19 (ref 7.31–7.41)
PH, MIXED: 7.4 (ref 7.31–7.41)
PHENCYCLIDINE QUANTITATIVE URINE: NEGATIVE
PHOSPHORUS: 1 MG/DL (ref 2.4–4.7)
PHOSPHORUS: 1.7 MG/DL (ref 2.4–4.7)
PHOSPHORUS: 1.7 MG/DL (ref 2.4–4.7)
PHOSPHORUS: 1.9 MG/DL (ref 2.4–4.7)
PHOSPHORUS: 2.1 MG/DL (ref 2.4–4.7)
PHOSPHORUS: 2.3 MG/DL (ref 2.4–4.7)
PHOSPHORUS: 2.3 MG/DL (ref 2.4–4.7)
PHOSPHORUS: 2.4 MG/DL (ref 2.4–4.7)
PHOSPHORUS: 2.5 MG/DL (ref 2.4–4.7)
PHOSPHORUS: 2.6 MG/DL (ref 2.4–4.7)
PHOSPHORUS: 2.7 MG/DL (ref 2.4–4.7)
PHOSPHORUS: 2.9 MG/DL (ref 2.4–4.7)
PHOSPHORUS: 3 MG/DL (ref 2.4–4.7)
PHOSPHORUS: 3 MG/DL (ref 2.4–4.7)
PHOSPHORUS: 3.2 MG/DL (ref 2.4–4.7)
PHOSPHORUS: 3.3 MG/DL (ref 2.4–4.7)
PHOSPHORUS: 3.3 MG/DL (ref 2.4–4.7)
PHOSPHORUS: 3.5 MG/DL (ref 2.4–4.7)
PHOSPHORUS: 3.5 MG/DL (ref 2.4–4.7)
PHOSPHORUS: 5.3 MG/DL (ref 2.4–4.7)
PLATELET # BLD: 158 THOU/MM3 (ref 130–400)
PLATELET # BLD: 166 THOU/MM3 (ref 130–400)
PLATELET # BLD: 170 THOU/MM3 (ref 130–400)
PLATELET # BLD: 170 THOU/MM3 (ref 130–400)
PLATELET # BLD: 176 THOU/MM3 (ref 130–400)
PLATELET # BLD: 178 THOU/MM3 (ref 130–400)
PLATELET # BLD: 182 THOU/MM3 (ref 130–400)
PLATELET # BLD: 190 THOU/MM3 (ref 130–400)
PLATELET # BLD: 202 THOU/MM3 (ref 130–400)
PLATELET # BLD: 211 THOU/MM3 (ref 130–400)
PLATELET # BLD: 217 THOU/MM3 (ref 130–400)
PLATELET # BLD: 218 THOU/MM3 (ref 130–400)
PLATELET # BLD: 222 THOU/MM3 (ref 130–400)
PLATELET # BLD: 232 THOU/MM3 (ref 130–400)
PLATELET # BLD: 241 THOU/MM3 (ref 130–400)
PLATELET # BLD: 241 THOU/MM3 (ref 130–400)
PLATELET # BLD: 254 THOU/MM3 (ref 130–400)
PLATELET # BLD: 254 THOU/MM3 (ref 130–400)
PLATELET # BLD: 266 THOU/MM3 (ref 130–400)
PLATELET # BLD: 281 THOU/MM3 (ref 130–400)
PLATELET # BLD: 294 THOU/MM3 (ref 130–400)
PLATELET # BLD: 301 THOU/MM3 (ref 130–400)
PLATELET # BLD: 302 THOU/MM3 (ref 130–400)
PLATELET # BLD: 307 THOU/MM3 (ref 130–400)
PLATELET # BLD: 313 THOU/MM3 (ref 130–400)
PLATELET # BLD: 331 THOU/MM3 (ref 130–400)
PLATELET # BLD: 337 THOU/MM3 (ref 130–400)
PLATELET # BLD: 338 THOU/MM3 (ref 130–400)
PLATELET # BLD: 338 THOU/MM3 (ref 130–400)
PLATELET # BLD: 353 THOU/MM3 (ref 130–400)
PLATELET # BLD: 357 THOU/MM3 (ref 130–400)
PLATELET # BLD: 373 THOU/MM3 (ref 130–400)
PLATELET # BLD: 373 THOU/MM3 (ref 130–400)
PLATELET # BLD: 378 THOU/MM3 (ref 130–400)
PLATELET # BLD: 392 THOU/MM3 (ref 130–400)
PLATELET # BLD: 392 THOU/MM3 (ref 130–400)
PLATELET # BLD: 394 THOU/MM3 (ref 130–400)
PLATELET # BLD: 396 THOU/MM3 (ref 130–400)
PLATELET # BLD: 402 THOU/MM3 (ref 130–400)
PLATELET # BLD: 405 THOU/MM3 (ref 130–400)
PLATELET # BLD: 411 THOU/MM3 (ref 130–400)
PLATELET # BLD: 429 THOU/MM3 (ref 130–400)
PLATELET # BLD: 442 THOU/MM3 (ref 130–400)
PLATELET # BLD: 449 THOU/MM3 (ref 130–400)
PLATELET # BLD: 450 THOU/MM3 (ref 130–400)
PLATELET # BLD: 459 THOU/MM3 (ref 130–400)
PLATELET # BLD: 464 THOU/MM3 (ref 130–400)
PLATELET # BLD: 470 THOU/MM3 (ref 130–400)
PLATELET # BLD: 492 THOU/MM3 (ref 130–400)
PLATELET # BLD: 499 THOU/MM3 (ref 130–400)
PLATELET # BLD: 503 THOU/MM3 (ref 130–400)
PLATELET # BLD: 503 THOU/MM3 (ref 130–400)
PLATELET # BLD: 505 THOU/MM3 (ref 130–400)
PLATELET # BLD: 515 THOU/MM3 (ref 130–400)
PLATELET # BLD: 516 THOU/MM3 (ref 130–400)
PLATELET # BLD: 517 THOU/MM3 (ref 130–400)
PLATELET # BLD: 524 THOU/MM3 (ref 130–400)
PLATELET # BLD: 532 THOU/MM3 (ref 130–400)
PLATELET # BLD: 558 THOU/MM3 (ref 130–400)
PLATELET # BLD: 560 THOU/MM3 (ref 130–400)
PLATELET # BLD: 573 THOU/MM3 (ref 130–400)
PLATELET # BLD: 574 THOU/MM3 (ref 130–400)
PLATELET # BLD: 575 THOU/MM3 (ref 130–400)
PLATELET # BLD: 575 THOU/MM3 (ref 130–400)
PLATELET # BLD: 581 THOU/MM3 (ref 130–400)
PLATELET # BLD: 584 THOU/MM3 (ref 130–400)
PLATELET # BLD: 594 THOU/MM3 (ref 130–400)
PLATELET # BLD: 597 THOU/MM3 (ref 130–400)
PLATELET # BLD: 640 THOU/MM3 (ref 130–400)
PLATELET # BLD: 644 THOU/MM3 (ref 130–400)
PLATELET # BLD: 645 THOU/MM3 (ref 130–400)
PLATELET # BLD: 660 THOU/MM3 (ref 130–400)
PLATELET # BLD: 668 THOU/MM3 (ref 130–400)
PLATELET # BLD: 674 THOU/MM3 (ref 130–400)
PLATELET # BLD: 699 THOU/MM3 (ref 130–400)
PLATELET # BLD: 704 THOU/MM3 (ref 130–400)
PLATELET ESTIMATE: ABNORMAL
PLATELET ESTIMATE: ADEQUATE
PLATELET ESTIMATE: ADEQUATE
PMV BLD AUTO: 10.1 FL (ref 9.4–12.4)
PMV BLD AUTO: 10.2 FL (ref 9.4–12.4)
PMV BLD AUTO: 10.3 FL (ref 9.4–12.4)
PMV BLD AUTO: 10.3 FL (ref 9.4–12.4)
PMV BLD AUTO: 10.4 FL (ref 9.4–12.4)
PMV BLD AUTO: 10.5 FL (ref 9.4–12.4)
PMV BLD AUTO: 10.5 FL (ref 9.4–12.4)
PMV BLD AUTO: 10.6 FL (ref 9.4–12.4)
PMV BLD AUTO: 10.7 FL (ref 9.4–12.4)
PMV BLD AUTO: 10.7 FL (ref 9.4–12.4)
PMV BLD AUTO: 10.8 FL (ref 9.4–12.4)
PMV BLD AUTO: 10.8 FL (ref 9.4–12.4)
PMV BLD AUTO: 10.9 FL (ref 9.4–12.4)
PMV BLD AUTO: 11 FL (ref 9.4–12.4)
PMV BLD AUTO: 11.1 FL (ref 9.4–12.4)
PMV BLD AUTO: 11.2 FL (ref 9.4–12.4)
PMV BLD AUTO: 11.3 FL (ref 9.4–12.4)
PMV BLD AUTO: 11.4 FL (ref 9.4–12.4)
PMV BLD AUTO: 11.4 FL (ref 9.4–12.4)
PMV BLD AUTO: 11.5 FL (ref 9.4–12.4)
PMV BLD AUTO: 11.5 FL (ref 9.4–12.4)
PMV BLD AUTO: 11.6 FL (ref 9.4–12.4)
PMV BLD AUTO: 11.7 FL (ref 9.4–12.4)
PMV BLD AUTO: 11.8 FL (ref 9.4–12.4)
PMV BLD AUTO: 12 FL (ref 9.4–12.4)
PMV BLD AUTO: 12.2 FL (ref 9.4–12.4)
PMV BLD AUTO: 12.2 FL (ref 9.4–12.4)
PMV BLD AUTO: 12.3 FL (ref 9.4–12.4)
PMV BLD AUTO: 12.6 FL (ref 9.4–12.4)
PMV BLD AUTO: 12.7 FL (ref 9.4–12.4)
PMV BLD AUTO: 12.7 FL (ref 9.4–12.4)
PMV BLD AUTO: 13 FL (ref 9.4–12.4)
PMV BLD AUTO: 13.2 FL (ref 9.4–12.4)
PMV BLD AUTO: 13.4 FL (ref 9.4–12.4)
PMV BLD AUTO: 9.2 FL (ref 9.4–12.4)
PMV BLD AUTO: 9.2 FL (ref 9.4–12.4)
PMV BLD AUTO: 9.3 FL (ref 9.4–12.4)
PMV BLD AUTO: 9.3 FL (ref 9.4–12.4)
PMV BLD AUTO: 9.6 FL (ref 9.4–12.4)
PMV BLD AUTO: 9.6 FL (ref 9.4–12.4)
PMV BLD AUTO: 9.7 FL (ref 9.4–12.4)
PMV BLD AUTO: 9.7 FL (ref 9.4–12.4)
PMV BLD AUTO: 9.8 FL (ref 9.4–12.4)
PMV BLD AUTO: 9.9 FL (ref 9.4–12.4)
PO2 MIXED: 27 MMHG (ref 25–40)
PO2 MIXED: 57 MMHG (ref 25–40)
PO2: 107 MMHG (ref 71–104)
PO2: 98 MMHG (ref 71–104)
POC INR: 1.6 (ref 0.8–1.2)
POC INR: 2 (ref 0.8–1.2)
POC INR: 2.3 (ref 0.8–1.2)
POC INR: 3 (ref 0.8–1.2)
POC INR: 3.3 (ref 0.8–1.2)
POC INR: 3.5 (ref 0.8–1.2)
POC INR: 4.4 (ref 0.8–1.2)
POC INR: 4.6 (ref 0.8–1.2)
POC INR: 5 (ref 0.8–1.2)
POIKILOCYTES: ABNORMAL
POIKILOCYTES: ABNORMAL
POTASSIUM REFLEX MAGNESIUM: 3.7 MEQ/L (ref 3.5–5.2)
POTASSIUM REFLEX MAGNESIUM: 3.9 MEQ/L (ref 3.5–5.2)
POTASSIUM REFLEX MAGNESIUM: 4 MEQ/L (ref 3.5–5.2)
POTASSIUM REFLEX MAGNESIUM: 4 MEQ/L (ref 3.5–5.2)
POTASSIUM REFLEX MAGNESIUM: 4.2 MEQ/L (ref 3.5–5.2)
POTASSIUM REFLEX MAGNESIUM: 4.3 MEQ/L (ref 3.5–5.2)
POTASSIUM REFLEX MAGNESIUM: 4.3 MEQ/L (ref 3.5–5.2)
POTASSIUM REFLEX MAGNESIUM: 5.1 MEQ/L (ref 3.5–5.2)
POTASSIUM REFLEX MAGNESIUM: 5.3 MEQ/L (ref 3.5–5.2)
POTASSIUM SERPL-SCNC: 3.2 MEQ/L (ref 3.5–5.2)
POTASSIUM SERPL-SCNC: 3.4 MEQ/L (ref 3.5–5.2)
POTASSIUM SERPL-SCNC: 3.6 MEQ/L (ref 3.5–5.2)
POTASSIUM SERPL-SCNC: 3.7 MEQ/L (ref 3.5–5.2)
POTASSIUM SERPL-SCNC: 3.7 MEQ/L (ref 3.5–5.2)
POTASSIUM SERPL-SCNC: 3.8 MEQ/L (ref 3.5–5.2)
POTASSIUM SERPL-SCNC: 3.8 MEQ/L (ref 3.5–5.2)
POTASSIUM SERPL-SCNC: 3.9 MEQ/L (ref 3.5–5.2)
POTASSIUM SERPL-SCNC: 4 MEQ/L (ref 3.5–5.2)
POTASSIUM SERPL-SCNC: 4.1 MEQ/L (ref 3.5–5.2)
POTASSIUM SERPL-SCNC: 4.2 MEQ/L (ref 3.5–5.2)
POTASSIUM SERPL-SCNC: 4.3 MEQ/L (ref 3.5–5.2)
POTASSIUM SERPL-SCNC: 4.4 MEQ/L (ref 3.5–5.2)
POTASSIUM SERPL-SCNC: 4.5 MEQ/L (ref 3.5–5.2)
POTASSIUM SERPL-SCNC: 4.6 MEQ/L (ref 3.5–5.2)
POTASSIUM SERPL-SCNC: 4.7 MEQ/L (ref 3.5–5.2)
POTASSIUM SERPL-SCNC: 4.9 MEQ/L (ref 3.5–5.2)
POTASSIUM SERPL-SCNC: 5 MEQ/L (ref 3.5–5.2)
POTASSIUM SERPL-SCNC: 5.3 MEQ/L (ref 3.5–5.2)
POTASSIUM SERPL-SCNC: 5.3 MEQ/L (ref 3.5–5.2)
POTASSIUM SERPL-SCNC: 5.6 MEQ/L (ref 3.5–5.2)
PREALBUMIN: 3.3 MG/DL (ref 20–40)
PREALBUMIN: 4.7 MG/DL (ref 20–40)
PREALBUMIN: 5 MG/DL (ref 20–40)
PREALBUMIN: 5.3 MG/DL (ref 20–40)
PREALBUMIN: 5.7 MG/DL (ref 20–40)
PREALBUMIN: 5.9 MG/DL (ref 20–40)
PREALBUMIN: 6.1 MG/DL (ref 20–40)
PRO-BNP: 5714 PG/ML (ref 0–1800)
PRO-BNP: 9715 PG/ML (ref 0–1800)
PROCALCITONIN: 0.46 NG/ML (ref 0.01–0.09)
PROCALCITONIN: 0.7 NG/ML (ref 0.01–0.09)
PROCALCITONIN: 1.15 NG/ML (ref 0.01–0.09)
PROCALCITONIN: 1.28 NG/ML (ref 0.01–0.09)
PROCALCITONIN: 1.54 NG/ML (ref 0.01–0.09)
PROCALCITONIN: 1.57 NG/ML (ref 0.01–0.09)
PROTEIN UA: 100 MG/DL
PROTEIN UA: 30
PROTEIN UA: ABNORMAL MG/DL
PROTEIN UA: NEGATIVE MG/DL
PROTEUS SPECIES BY PCR: NOT DETECTED
PSEUDOMONAS AERUGINOSA BY PCR: NOT DETECTED
RBC # BLD: 1.97 MILL/MM3 (ref 4.7–6.1)
RBC # BLD: 2.07 MILL/MM3 (ref 4.7–6.1)
RBC # BLD: 2.13 MILL/MM3 (ref 4.7–6.1)
RBC # BLD: 2.23 MILL/MM3 (ref 4.7–6.1)
RBC # BLD: 2.31 MILL/MM3 (ref 4.7–6.1)
RBC # BLD: 2.31 MILL/MM3 (ref 4.7–6.1)
RBC # BLD: 2.34 MILL/MM3 (ref 4.7–6.1)
RBC # BLD: 2.34 MILL/MM3 (ref 4.7–6.1)
RBC # BLD: 2.35 MILL/MM3 (ref 4.7–6.1)
RBC # BLD: 2.36 MILL/MM3 (ref 4.7–6.1)
RBC # BLD: 2.38 MILL/MM3 (ref 4.7–6.1)
RBC # BLD: 2.4 MILL/MM3 (ref 4.7–6.1)
RBC # BLD: 2.41 MILL/MM3 (ref 4.7–6.1)
RBC # BLD: 2.42 MILL/MM3 (ref 4.7–6.1)
RBC # BLD: 2.43 MILL/MM3 (ref 4.7–6.1)
RBC # BLD: 2.44 MILL/MM3 (ref 4.7–6.1)
RBC # BLD: 2.45 MILL/MM3 (ref 4.7–6.1)
RBC # BLD: 2.45 MILL/MM3 (ref 4.7–6.1)
RBC # BLD: 2.46 MILL/MM3 (ref 4.7–6.1)
RBC # BLD: 2.46 MILL/MM3 (ref 4.7–6.1)
RBC # BLD: 2.49 MILL/MM3 (ref 4.7–6.1)
RBC # BLD: 2.5 MILL/MM3 (ref 4.7–6.1)
RBC # BLD: 2.53 MILL/MM3 (ref 4.7–6.1)
RBC # BLD: 2.54 MILL/MM3 (ref 4.7–6.1)
RBC # BLD: 2.54 MILL/MM3 (ref 4.7–6.1)
RBC # BLD: 2.55 MILL/MM3 (ref 4.7–6.1)
RBC # BLD: 2.58 MILL/MM3 (ref 4.7–6.1)
RBC # BLD: 2.58 MILL/MM3 (ref 4.7–6.1)
RBC # BLD: 2.6 MILL/MM3 (ref 4.7–6.1)
RBC # BLD: 2.61 MILL/MM3 (ref 4.7–6.1)
RBC # BLD: 2.62 MILL/MM3 (ref 4.7–6.1)
RBC # BLD: 2.63 MILL/MM3 (ref 4.7–6.1)
RBC # BLD: 2.63 MILL/MM3 (ref 4.7–6.1)
RBC # BLD: 2.68 MILL/MM3 (ref 4.7–6.1)
RBC # BLD: 2.68 MILL/MM3 (ref 4.7–6.1)
RBC # BLD: 2.7 MILL/MM3 (ref 4.7–6.1)
RBC # BLD: 2.7 MILL/MM3 (ref 4.7–6.1)
RBC # BLD: 2.71 MILL/MM3 (ref 4.7–6.1)
RBC # BLD: 2.72 MILL/MM3 (ref 4.7–6.1)
RBC # BLD: 2.74 MILL/MM3 (ref 4.7–6.1)
RBC # BLD: 2.75 MILL/MM3 (ref 4.7–6.1)
RBC # BLD: 2.76 MILL/MM3 (ref 4.7–6.1)
RBC # BLD: 2.83 MILL/MM3 (ref 4.7–6.1)
RBC # BLD: 2.84 MILL/MM3 (ref 4.7–6.1)
RBC # BLD: 2.9 MILL/MM3 (ref 4.7–6.1)
RBC # BLD: 2.92 MILL/MM3 (ref 4.7–6.1)
RBC # BLD: 2.97 MILL/MM3 (ref 4.7–6.1)
RBC # BLD: 3.02 MILL/MM3 (ref 4.7–6.1)
RBC # BLD: 3.16 MILL/MM3 (ref 4.7–6.1)
RBC # BLD: 3.5 MILL/MM3 (ref 4.7–6.1)
RBC # BLD: 3.58 MILL/MM3 (ref 4.7–6.1)
RBC # BLD: 3.59 MILL/MM3 (ref 4.7–6.1)
RBC # BLD: 3.62 MILL/MM3 (ref 4.7–6.1)
RBC # BLD: 3.63 MILL/MM3 (ref 4.7–6.1)
RBC # BLD: 3.72 MILL/MM3 (ref 4.7–6.1)
RBC # BLD: 3.75 MILL/MM3 (ref 4.7–6.1)
RBC # BLD: 3.79 MILL/MM3 (ref 4.7–6.1)
RBC # BLD: 3.83 MILL/MM3 (ref 4.7–6.1)
RBC # BLD: 3.86 MILL/MM3 (ref 4.7–6.1)
RBC # BLD: 3.87 MILL/MM3 (ref 4.7–6.1)
RBC # BLD: 3.89 MILL/MM3 (ref 4.7–6.1)
RBC # BLD: 4.01 MILL/MM3 (ref 4.7–6.1)
RBC # BLD: 4.01 MILL/MM3 (ref 4.7–6.1)
RBC # BLD: 4.08 MILL/MM3 (ref 4.7–6.1)
RBC # BLD: 4.22 MILL/MM3 (ref 4.7–6.1)
RBC # BLD: 4.25 MILL/MM3 (ref 4.7–6.1)
RBC # BLD: 4.27 MILL/MM3 (ref 4.7–6.1)
RBC # BLD: 4.31 MILL/MM3 (ref 4.7–6.1)
RBC # BLD: 4.34 MILL/MM3 (ref 4.7–6.1)
RBC # BLD: 4.37 MILL/MM3 (ref 4.7–6.1)
RBC # BLD: 4.54 MILL/MM3 (ref 4.7–6.1)
RBC # BLD: 4.59 MILL/MM3 (ref 4.7–6.1)
RBC URINE: ABNORMAL /HPF
REASON FOR REJECTION: NORMAL
REJECTED TEST: NORMAL
RENAL EPITHELIAL, UA: ABNORMAL
REPORT: NORMAL
REPORT: NORMAL
RESISTANT GENE CTX-M BY PCR: ABNORMAL
RESISTANT GENE CTX-M BY PCR: NORMAL
RESISTANT GENE CTX-M BY PCR: NORMAL
RESISTANT GENE IMP BY PCR: ABNORMAL
RESISTANT GENE IMP BY PCR: NORMAL
RESISTANT GENE IMP BY PCR: NORMAL
RESISTANT GENE KPC BY PCR: ABNORMAL
RESISTANT GENE KPC BY PCR: NORMAL
RESISTANT GENE KPC BY PCR: NORMAL
RESISTANT GENE MECA/C & MREJ BY PCR: ABNORMAL
RESISTANT GENE MECA/C & MREJ BY PCR: NORMAL
RESISTANT GENE MECA/C & MREJ BY PCR: NORMAL
RESISTANT GENE NDM BY PCR: ABNORMAL
RESISTANT GENE NDM BY PCR: NORMAL
RESISTANT GENE NDM BY PCR: NORMAL
RESISTANT GENE OXA-48-LIKE BY PCR: ABNORMAL
RESISTANT GENE OXA-48-LIKE BY PCR: NORMAL
RESISTANT GENE OXA-48-LIKE BY PCR: NORMAL
RESISTANT GENE VIM BY PCR: ABNORMAL
RESISTANT GENE VIM BY PCR: NORMAL
RESISTANT GENE VIM BY PCR: NORMAL
RESPIRATORY CULTURE: ABNORMAL
RESPIRATORY CULTURE: NORMAL
RESPIRATORY SYNCYTIAL VIRUS BY PCR: NOT DETECTED
RH FACTOR: NORMAL
RHINOVIRUS ENTEROVIRUS PCR: NOT DETECTED
SARS-COV-2, NAAT: NOT DETECTED
SARS-COV-2: NOT DETECTED
SARS-COV-2: NOT DETECTED
SCAN OF BLOOD SMEAR: NORMAL
SCHISTOCYTES: ABNORMAL
SEG NEUTROPHILS: 65.9 %
SEG NEUTROPHILS: 67.6 %
SEG NEUTROPHILS: 69.8 %
SEG NEUTROPHILS: 70.1 %
SEG NEUTROPHILS: 70.4 %
SEG NEUTROPHILS: 72.2 %
SEG NEUTROPHILS: 72.6 %
SEG NEUTROPHILS: 73 %
SEG NEUTROPHILS: 73.5 %
SEG NEUTROPHILS: 73.6 %
SEG NEUTROPHILS: 73.7 %
SEG NEUTROPHILS: 74.6 %
SEG NEUTROPHILS: 74.7 %
SEG NEUTROPHILS: 75 %
SEG NEUTROPHILS: 75 %
SEG NEUTROPHILS: 75.5 %
SEG NEUTROPHILS: 75.6 %
SEG NEUTROPHILS: 75.9 %
SEG NEUTROPHILS: 76 %
SEG NEUTROPHILS: 76.1 %
SEG NEUTROPHILS: 76.4 %
SEG NEUTROPHILS: 76.8 %
SEG NEUTROPHILS: 77.1 %
SEG NEUTROPHILS: 77.3 %
SEG NEUTROPHILS: 77.5 %
SEG NEUTROPHILS: 77.8 %
SEG NEUTROPHILS: 78.7 %
SEG NEUTROPHILS: 78.9 %
SEG NEUTROPHILS: 79.4 %
SEG NEUTROPHILS: 79.5 %
SEG NEUTROPHILS: 79.6 %
SEG NEUTROPHILS: 79.8 %
SEG NEUTROPHILS: 80.1 %
SEG NEUTROPHILS: 80.2 %
SEG NEUTROPHILS: 80.9 %
SEG NEUTROPHILS: 81.1 %
SEG NEUTROPHILS: 81.1 %
SEG NEUTROPHILS: 83.3 %
SEG NEUTROPHILS: 84.1 %
SEG NEUTROPHILS: 84.8 %
SEG NEUTROPHILS: 85.2 %
SEG NEUTROPHILS: 85.8 %
SEG NEUTROPHILS: 86 %
SEG NEUTROPHILS: 86.2 %
SEG NEUTROPHILS: 86.3 %
SEG NEUTROPHILS: 86.5 %
SEG NEUTROPHILS: 87.7 %
SEG NEUTROPHILS: 87.8 %
SEG NEUTROPHILS: 88.4 %
SEG NEUTROPHILS: 88.6 %
SEG NEUTROPHILS: 89.4 %
SEG NEUTROPHILS: 89.5 %
SEG NEUTROPHILS: 90.5 %
SEG NEUTROPHILS: 90.7 %
SEG NEUTROPHILS: 91.4 %
SEG NEUTROPHILS: 92.8 %
SEG NEUTROPHILS: 93.6 %
SEGMENTED NEUTROPHILS ABSOLUTE COUNT: 10.2 THOU/MM3 (ref 1.8–7.7)
SEGMENTED NEUTROPHILS ABSOLUTE COUNT: 11.1 THOU/MM3 (ref 1.8–7.7)
SEGMENTED NEUTROPHILS ABSOLUTE COUNT: 11.6 THOU/MM3 (ref 1.8–7.7)
SEGMENTED NEUTROPHILS ABSOLUTE COUNT: 11.8 THOU/MM3 (ref 1.8–7.7)
SEGMENTED NEUTROPHILS ABSOLUTE COUNT: 12 THOU/MM3 (ref 1.8–7.7)
SEGMENTED NEUTROPHILS ABSOLUTE COUNT: 13.4 THOU/MM3 (ref 1.8–7.7)
SEGMENTED NEUTROPHILS ABSOLUTE COUNT: 13.4 THOU/MM3 (ref 1.8–7.7)
SEGMENTED NEUTROPHILS ABSOLUTE COUNT: 13.8 THOU/MM3 (ref 1.8–7.7)
SEGMENTED NEUTROPHILS ABSOLUTE COUNT: 14 THOU/MM3 (ref 1.8–7.7)
SEGMENTED NEUTROPHILS ABSOLUTE COUNT: 14.3 THOU/MM3 (ref 1.8–7.7)
SEGMENTED NEUTROPHILS ABSOLUTE COUNT: 15.4 THOU/MM3 (ref 1.8–7.7)
SEGMENTED NEUTROPHILS ABSOLUTE COUNT: 17.1 THOU/MM3 (ref 1.8–7.7)
SEGMENTED NEUTROPHILS ABSOLUTE COUNT: 19.2 THOU/MM3 (ref 1.8–7.7)
SEGMENTED NEUTROPHILS ABSOLUTE COUNT: 21.1 THOU/MM3 (ref 1.8–7.7)
SEGMENTED NEUTROPHILS ABSOLUTE COUNT: 21.7 THOU/MM3 (ref 1.8–7.7)
SEGMENTED NEUTROPHILS ABSOLUTE COUNT: 23.5 THOU/MM3 (ref 1.8–7.7)
SEGMENTED NEUTROPHILS ABSOLUTE COUNT: 27.1 THOU/MM3 (ref 1.8–7.7)
SEGMENTED NEUTROPHILS ABSOLUTE COUNT: 4.1 THOU/MM3 (ref 1.8–7.7)
SEGMENTED NEUTROPHILS ABSOLUTE COUNT: 4.8 THOU/MM3 (ref 1.8–7.7)
SEGMENTED NEUTROPHILS ABSOLUTE COUNT: 5 THOU/MM3 (ref 1.8–7.7)
SEGMENTED NEUTROPHILS ABSOLUTE COUNT: 5.4 THOU/MM3 (ref 1.8–7.7)
SEGMENTED NEUTROPHILS ABSOLUTE COUNT: 5.6 THOU/MM3 (ref 1.8–7.7)
SEGMENTED NEUTROPHILS ABSOLUTE COUNT: 5.8 THOU/MM3 (ref 1.8–7.7)
SEGMENTED NEUTROPHILS ABSOLUTE COUNT: 5.9 THOU/MM3 (ref 1.8–7.7)
SEGMENTED NEUTROPHILS ABSOLUTE COUNT: 5.9 THOU/MM3 (ref 1.8–7.7)
SEGMENTED NEUTROPHILS ABSOLUTE COUNT: 6.1 THOU/MM3 (ref 1.8–7.7)
SEGMENTED NEUTROPHILS ABSOLUTE COUNT: 6.3 THOU/MM3 (ref 1.8–7.7)
SEGMENTED NEUTROPHILS ABSOLUTE COUNT: 6.4 THOU/MM3 (ref 1.8–7.7)
SEGMENTED NEUTROPHILS ABSOLUTE COUNT: 6.4 THOU/MM3 (ref 1.8–7.7)
SEGMENTED NEUTROPHILS ABSOLUTE COUNT: 6.5 THOU/MM3 (ref 1.8–7.7)
SEGMENTED NEUTROPHILS ABSOLUTE COUNT: 6.8 THOU/MM3 (ref 1.8–7.7)
SEGMENTED NEUTROPHILS ABSOLUTE COUNT: 7 THOU/MM3 (ref 1.8–7.7)
SEGMENTED NEUTROPHILS ABSOLUTE COUNT: 7.4 THOU/MM3 (ref 1.8–7.7)
SEGMENTED NEUTROPHILS ABSOLUTE COUNT: 7.6 THOU/MM3 (ref 1.8–7.7)
SEGMENTED NEUTROPHILS ABSOLUTE COUNT: 7.7 THOU/MM3 (ref 1.8–7.7)
SEGMENTED NEUTROPHILS ABSOLUTE COUNT: 7.8 THOU/MM3 (ref 1.8–7.7)
SEGMENTED NEUTROPHILS ABSOLUTE COUNT: 7.9 THOU/MM3 (ref 1.8–7.7)
SEGMENTED NEUTROPHILS ABSOLUTE COUNT: 8 THOU/MM3 (ref 1.8–7.7)
SEGMENTED NEUTROPHILS ABSOLUTE COUNT: 8.1 THOU/MM3 (ref 1.8–7.7)
SEGMENTED NEUTROPHILS ABSOLUTE COUNT: 8.3 THOU/MM3 (ref 1.8–7.7)
SEGMENTED NEUTROPHILS ABSOLUTE COUNT: 8.3 THOU/MM3 (ref 1.8–7.7)
SEGMENTED NEUTROPHILS ABSOLUTE COUNT: 8.5 THOU/MM3 (ref 1.8–7.7)
SEGMENTED NEUTROPHILS ABSOLUTE COUNT: 8.6 THOU/MM3 (ref 1.8–7.7)
SEGMENTED NEUTROPHILS ABSOLUTE COUNT: 8.6 THOU/MM3 (ref 1.8–7.7)
SEGMENTED NEUTROPHILS ABSOLUTE COUNT: 8.7 THOU/MM3 (ref 1.8–7.7)
SEGMENTED NEUTROPHILS ABSOLUTE COUNT: 9.1 THOU/MM3 (ref 1.8–7.7)
SEGMENTED NEUTROPHILS ABSOLUTE COUNT: 9.1 THOU/MM3 (ref 1.8–7.7)
SEGMENTED NEUTROPHILS ABSOLUTE COUNT: 9.3 THOU/MM3 (ref 1.8–7.7)
SEGMENTED NEUTROPHILS ABSOLUTE COUNT: 9.4 THOU/MM3 (ref 1.8–7.7)
SEGMENTED NEUTROPHILS ABSOLUTE COUNT: 9.5 THOU/MM3 (ref 1.8–7.7)
SEGMENTED NEUTROPHILS ABSOLUTE COUNT: 9.6 THOU/MM3 (ref 1.8–7.7)
SEGMENTED NEUTROPHILS ABSOLUTE COUNT: 9.8 THOU/MM3 (ref 1.8–7.7)
SEGMENTED NEUTROPHILS ABSOLUTE COUNT: 9.9 THOU/MM3 (ref 1.8–7.7)
SERRATIA MARCESCENS BY PCR: NOT DETECTED
SET PEEP: 5 MMHG
SET PEEP: 6 MMHG
SET PRESS SUPP: 6 CMH2O
SET RESPIRATORY RATE: 16 BPM
SET TIDAL VOLUME: 450 ML
SODIUM BLD-SCNC: 123 MEQ/L (ref 135–145)
SODIUM BLD-SCNC: 128 MEQ/L (ref 135–145)
SODIUM BLD-SCNC: 129 MEQ/L (ref 135–145)
SODIUM BLD-SCNC: 131 MEQ/L (ref 135–145)
SODIUM BLD-SCNC: 133 MEQ/L (ref 135–145)
SODIUM BLD-SCNC: 134 MEQ/L (ref 135–145)
SODIUM BLD-SCNC: 135 MEQ/L (ref 135–145)
SODIUM BLD-SCNC: 136 MEQ/L (ref 135–145)
SODIUM BLD-SCNC: 137 MEQ/L (ref 135–145)
SODIUM BLD-SCNC: 138 MEQ/L (ref 135–145)
SODIUM BLD-SCNC: 139 MEQ/L (ref 135–145)
SODIUM BLD-SCNC: 140 MEQ/L (ref 135–145)
SODIUM BLD-SCNC: 141 MEQ/L (ref 135–145)
SODIUM BLD-SCNC: 142 MEQ/L (ref 135–145)
SODIUM BLD-SCNC: 143 MEQ/L (ref 135–145)
SODIUM BLD-SCNC: 144 MEQ/L (ref 135–145)
SODIUM BLD-SCNC: 145 MEQ/L (ref 135–145)
SODIUM BLD-SCNC: 146 MEQ/L (ref 135–145)
SODIUM BLD-SCNC: 147 MEQ/L (ref 135–145)
SODIUM BLD-SCNC: 148 MEQ/L (ref 135–145)
SODIUM URINE: 22 MEQ/L
SOURCE, BLOOD GAS: ABNORMAL
SOURCE, BLOOD GAS: ABNORMAL
SOURCE: ABNORMAL
SOURCE: NORMAL
SOURCE: NORMAL
SPECIFIC GRAVITY UA: 1.01 (ref 1–1.03)
SPECIFIC GRAVITY UA: 1.02 (ref 1–1.03)
SPECIFIC GRAVITY UA: 1.02 (ref 1–1.03)
SPECIFIC GRAVITY, URINE: 1.02 (ref 1–1.03)
SPECIMEN ACCEPTABILITY: ABNORMAL
SPECIMEN ACCEPTABILITY: NORMAL
SPECIMEN ACCEPTABILITY: NORMAL
STAPH AUREUS BY PCR: NOT DETECTED
STREP AGALACTIAE BY PCR: NOT DETECTED
STREP PNEUMO AG, UR: NEGATIVE
STREP PNEUMO AG, UR: NEGATIVE
STREP PNEUMONIAE BY PCR: NOT DETECTED
STREP PYOGENES BY PCR: NOT DETECTED
TARGET CELLS: ABNORMAL
TOTAL CK: 13 U/L (ref 55–170)
TOTAL IRON BINDING CAPACITY: 283 UG/DL (ref 171–450)
TOTAL PROTEIN: 4.2 G/DL (ref 6.1–8)
TOTAL PROTEIN: 4.3 G/DL (ref 6.1–8)
TOTAL PROTEIN: 4.4 G/DL (ref 6.1–8)
TOTAL PROTEIN: 4.5 G/DL (ref 6.1–8)
TOTAL PROTEIN: 4.6 G/DL (ref 6.1–8)
TOTAL PROTEIN: 4.8 G/DL (ref 6.1–8)
TOTAL PROTEIN: 4.9 G/DL (ref 6.1–8)
TOTAL PROTEIN: 5 G/DL (ref 6.1–8)
TOTAL PROTEIN: 5.1 G/DL (ref 6.1–8)
TOTAL PROTEIN: 5.6 G/DL (ref 6.1–8)
TOTAL PROTEIN: 6 G/DL (ref 6.1–8)
TOTAL PROTEIN: 6 G/DL (ref 6.1–8)
TOTAL PROTEIN: 6.1 G/DL (ref 6.1–8)
TOTAL PROTEIN: 6.2 G/DL (ref 6.1–8)
TOTAL PROTEIN: 6.3 G/DL (ref 6.1–8)
TOTAL PROTEIN: 6.7 G/DL (ref 6.1–8)
TOTAL PROTEIN: 6.8 G/DL (ref 6.1–8)
TOTAL PROTEIN: 6.8 G/DL (ref 6.1–8)
TOTAL PROTEIN: 6.9 G/DL (ref 6.1–8)
TOTAL PROTEIN: 7.3 G/DL (ref 6.1–8)
TOTAL PROTEIN: 7.4 G/DL (ref 6.1–8)
TOTAL PROTEIN: 7.5 G/DL (ref 6.1–8)
TOXIC GRANULATION: PRESENT
TRIGL SERPL-MCNC: 120 MG/DL (ref 0–199)
TRIGL SERPL-MCNC: 41 MG/DL (ref 0–199)
TRIGL SERPL-MCNC: 50 MG/DL (ref 0–199)
TRIGL SERPL-MCNC: 53 MG/DL (ref 0–199)
TRIGL SERPL-MCNC: 66 MG/DL (ref 0–199)
TRIGL SERPL-MCNC: 77 MG/DL (ref 0–199)
TRIGL SERPL-MCNC: 87 MG/DL (ref 0–199)
TRIGL SERPL-MCNC: 94 MG/DL (ref 0–199)
TRIGL SERPL-MCNC: NORMAL MG/DL
TROPONIN T: 0.01 NG/ML
TROPONIN T: 0.04 NG/ML
TROPONIN T: 0.04 NG/ML
TROPONIN T: < 0.01 NG/ML
TSH RECEPTOR AB: 4.01 IU/L
TSH SERPL DL<=0.05 MIU/L-ACNC: 2.24 UIU/ML (ref 0.4–4.2)
TSH SERPL DL<=0.05 MIU/L-ACNC: 3.07 UIU/ML (ref 0.4–4.2)
URINE CULTURE REFLEX: NORMAL
URINE CULTURE, ROUTINE: ABNORMAL
URINE CULTURE, ROUTINE: NORMAL
URINE CULTURE, ROUTINE: NORMAL
UROBILINOGEN, URINE: 0.2 EU/DL (ref 0–1)
UROBILINOGEN, URINE: 1 EU/DL (ref 0–1)
VANCOMYCIN RESISTANT ENTEROCOCCUS: NEGATIVE
VANCOMYCIN RESISTANT ENTEROCOCCUS: NEGATIVE
VLDLC SERPL CALC-MCNC: NORMAL MG/DL
WBC # BLD: 10 THOU/MM3 (ref 4.8–10.8)
WBC # BLD: 10 THOU/MM3 (ref 4.8–10.8)
WBC # BLD: 10.1 THOU/MM3 (ref 4.8–10.8)
WBC # BLD: 10.3 THOU/MM3 (ref 4.8–10.8)
WBC # BLD: 10.4 THOU/MM3 (ref 4.8–10.8)
WBC # BLD: 10.4 THOU/MM3 (ref 4.8–10.8)
WBC # BLD: 10.6 THOU/MM3 (ref 4.8–10.8)
WBC # BLD: 10.7 THOU/MM3 (ref 4.8–10.8)
WBC # BLD: 10.9 THOU/MM3 (ref 4.8–10.8)
WBC # BLD: 10.9 THOU/MM3 (ref 4.8–10.8)
WBC # BLD: 11.1 THOU/MM3 (ref 4.8–10.8)
WBC # BLD: 11.1 THOU/MM3 (ref 4.8–10.8)
WBC # BLD: 11.3 THOU/MM3 (ref 4.8–10.8)
WBC # BLD: 11.5 THOU/MM3 (ref 4.8–10.8)
WBC # BLD: 11.5 THOU/MM3 (ref 4.8–10.8)
WBC # BLD: 11.6 THOU/MM3 (ref 4.8–10.8)
WBC # BLD: 11.7 THOU/MM3 (ref 4.8–10.8)
WBC # BLD: 11.8 THOU/MM3 (ref 4.8–10.8)
WBC # BLD: 12 THOU/MM3 (ref 4.8–10.8)
WBC # BLD: 12.4 THOU/MM3 (ref 4.8–10.8)
WBC # BLD: 12.6 THOU/MM3 (ref 4.8–10.8)
WBC # BLD: 12.7 THOU/MM3 (ref 4.8–10.8)
WBC # BLD: 13 THOU/MM3 (ref 4.8–10.8)
WBC # BLD: 13.2 THOU/MM3 (ref 4.8–10.8)
WBC # BLD: 13.4 THOU/MM3 (ref 4.8–10.8)
WBC # BLD: 13.7 THOU/MM3 (ref 4.8–10.8)
WBC # BLD: 13.9 THOU/MM3 (ref 4.8–10.8)
WBC # BLD: 14 THOU/MM3 (ref 4.8–10.8)
WBC # BLD: 14.4 THOU/MM3 (ref 4.8–10.8)
WBC # BLD: 14.6 THOU/MM3 (ref 4.8–10.8)
WBC # BLD: 14.7 THOU/MM3 (ref 4.8–10.8)
WBC # BLD: 15 THOU/MM3 (ref 4.8–10.8)
WBC # BLD: 15.2 THOU/MM3 (ref 4.8–10.8)
WBC # BLD: 15.4 THOU/MM3 (ref 4.8–10.8)
WBC # BLD: 15.5 THOU/MM3 (ref 4.8–10.8)
WBC # BLD: 16.3 THOU/MM3 (ref 4.8–10.8)
WBC # BLD: 16.3 THOU/MM3 (ref 4.8–10.8)
WBC # BLD: 16.4 THOU/MM3 (ref 4.8–10.8)
WBC # BLD: 17.2 THOU/MM3 (ref 4.8–10.8)
WBC # BLD: 19.9 THOU/MM3 (ref 4.8–10.8)
WBC # BLD: 20.9 THOU/MM3 (ref 4.8–10.8)
WBC # BLD: 22.4 THOU/MM3 (ref 4.8–10.8)
WBC # BLD: 23.3 THOU/MM3 (ref 4.8–10.8)
WBC # BLD: 24.5 THOU/MM3 (ref 4.8–10.8)
WBC # BLD: 25 THOU/MM3 (ref 4.8–10.8)
WBC # BLD: 26 THOU/MM3 (ref 4.8–10.8)
WBC # BLD: 27.3 THOU/MM3 (ref 4.8–10.8)
WBC # BLD: 31.9 THOU/MM3 (ref 4.8–10.8)
WBC # BLD: 5.7 THOU/MM3 (ref 4.8–10.8)
WBC # BLD: 6.2 THOU/MM3 (ref 4.8–10.8)
WBC # BLD: 6.8 THOU/MM3 (ref 4.8–10.8)
WBC # BLD: 6.9 THOU/MM3 (ref 4.8–10.8)
WBC # BLD: 7.2 THOU/MM3 (ref 4.8–10.8)
WBC # BLD: 7.5 THOU/MM3 (ref 4.8–10.8)
WBC # BLD: 7.6 THOU/MM3 (ref 4.8–10.8)
WBC # BLD: 7.7 THOU/MM3 (ref 4.8–10.8)
WBC # BLD: 8 THOU/MM3 (ref 4.8–10.8)
WBC # BLD: 8.1 THOU/MM3 (ref 4.8–10.8)
WBC # BLD: 8.1 THOU/MM3 (ref 4.8–10.8)
WBC # BLD: 8.2 THOU/MM3 (ref 4.8–10.8)
WBC # BLD: 8.3 THOU/MM3 (ref 4.8–10.8)
WBC # BLD: 8.5 THOU/MM3 (ref 4.8–10.8)
WBC # BLD: 8.7 THOU/MM3 (ref 4.8–10.8)
WBC # BLD: 8.7 THOU/MM3 (ref 4.8–10.8)
WBC # BLD: 8.8 THOU/MM3 (ref 4.8–10.8)
WBC # BLD: 9.5 THOU/MM3 (ref 4.8–10.8)
WBC # BLD: 9.6 THOU/MM3 (ref 4.8–10.8)
WBC # BLD: 9.7 THOU/MM3 (ref 4.8–10.8)
WBC # BLD: 9.7 THOU/MM3 (ref 4.8–10.8)
WBC # BLD: 9.9 THOU/MM3 (ref 4.8–10.8)
WBC UA: > 100 /HPF
WBC UA: ABNORMAL /HPF
YEAST: ABNORMAL

## 2020-01-01 PROCEDURE — 2700000000 HC OXYGEN THERAPY PER DAY

## 2020-01-01 PROCEDURE — 2000000000 HC ICU R&B

## 2020-01-01 PROCEDURE — 83605 ASSAY OF LACTIC ACID: CPT

## 2020-01-01 PROCEDURE — 94003 VENT MGMT INPAT SUBQ DAY: CPT

## 2020-01-01 PROCEDURE — 80048 BASIC METABOLIC PNL TOTAL CA: CPT

## 2020-01-01 PROCEDURE — 6370000000 HC RX 637 (ALT 250 FOR IP): Performed by: SURGERY

## 2020-01-01 PROCEDURE — 94640 AIRWAY INHALATION TREATMENT: CPT

## 2020-01-01 PROCEDURE — 71045 X-RAY EXAM CHEST 1 VIEW: CPT

## 2020-01-01 PROCEDURE — 6370000000 HC RX 637 (ALT 250 FOR IP): Performed by: NURSE PRACTITIONER

## 2020-01-01 PROCEDURE — 99211 OFF/OP EST MAY X REQ PHY/QHP: CPT

## 2020-01-01 PROCEDURE — 2580000003 HC RX 258: Performed by: SURGERY

## 2020-01-01 PROCEDURE — 89220 SPUTUM SPECIMEN COLLECTION: CPT

## 2020-01-01 PROCEDURE — 99214 OFFICE O/P EST MOD 30 MIN: CPT | Performed by: EMERGENCY MEDICINE

## 2020-01-01 PROCEDURE — 85025 COMPLETE CBC W/AUTO DIFF WBC: CPT

## 2020-01-01 PROCEDURE — 85027 COMPLETE CBC AUTOMATED: CPT

## 2020-01-01 PROCEDURE — 36415 COLL VENOUS BLD VENIPUNCTURE: CPT

## 2020-01-01 PROCEDURE — 6360000002 HC RX W HCPCS: Performed by: NURSE PRACTITIONER

## 2020-01-01 PROCEDURE — 85610 PROTHROMBIN TIME: CPT

## 2020-01-01 PROCEDURE — 2500000003 HC RX 250 WO HCPCS: Performed by: NURSE PRACTITIONER

## 2020-01-01 PROCEDURE — 87541 LEGION PNEUMO DNA AMP PROB: CPT

## 2020-01-01 PROCEDURE — 99291 CRITICAL CARE FIRST HOUR: CPT | Performed by: INTERNAL MEDICINE

## 2020-01-01 PROCEDURE — 6360000002 HC RX W HCPCS: Performed by: PHYSICIAN ASSISTANT

## 2020-01-01 PROCEDURE — C9113 INJ PANTOPRAZOLE SODIUM, VIA: HCPCS | Performed by: NURSE PRACTITIONER

## 2020-01-01 PROCEDURE — 51798 US URINE CAPACITY MEASURE: CPT

## 2020-01-01 PROCEDURE — 6370000000 HC RX 637 (ALT 250 FOR IP): Performed by: STUDENT IN AN ORGANIZED HEALTH CARE EDUCATION/TRAINING PROGRAM

## 2020-01-01 PROCEDURE — 6370000000 HC RX 637 (ALT 250 FOR IP): Performed by: PHARMACIST

## 2020-01-01 PROCEDURE — 3600000002 HC SURGERY LEVEL 2 BASE: Performed by: SURGERY

## 2020-01-01 PROCEDURE — 49460 FIX G/COLON TUBE W/DEVICE: CPT | Performed by: RADIOLOGY

## 2020-01-01 PROCEDURE — 82948 REAGENT STRIP/BLOOD GLUCOSE: CPT

## 2020-01-01 PROCEDURE — 2580000003 HC RX 258: Performed by: NURSE PRACTITIONER

## 2020-01-01 PROCEDURE — 31500 INSERT EMERGENCY AIRWAY: CPT | Performed by: NURSE PRACTITIONER

## 2020-01-01 PROCEDURE — 99223 1ST HOSP IP/OBS HIGH 75: CPT | Performed by: INTERNAL MEDICINE

## 2020-01-01 PROCEDURE — C1769 GUIDE WIRE: HCPCS

## 2020-01-01 PROCEDURE — 99232 SBSQ HOSP IP/OBS MODERATE 35: CPT | Performed by: INTERNAL MEDICINE

## 2020-01-01 PROCEDURE — 2580000003 HC RX 258: Performed by: STUDENT IN AN ORGANIZED HEALTH CARE EDUCATION/TRAINING PROGRAM

## 2020-01-01 PROCEDURE — 2580000003 HC RX 258: Performed by: INTERNAL MEDICINE

## 2020-01-01 PROCEDURE — 6360000002 HC RX W HCPCS: Performed by: STUDENT IN AN ORGANIZED HEALTH CARE EDUCATION/TRAINING PROGRAM

## 2020-01-01 PROCEDURE — 31600 PLANNED TRACHEOSTOMY: CPT

## 2020-01-01 PROCEDURE — 74018 RADEX ABDOMEN 1 VIEW: CPT

## 2020-01-01 PROCEDURE — 99024 POSTOP FOLLOW-UP VISIT: CPT | Performed by: SURGERY

## 2020-01-01 PROCEDURE — 94761 N-INVAS EAR/PLS OXIMETRY MLT: CPT

## 2020-01-01 PROCEDURE — 84100 ASSAY OF PHOSPHORUS: CPT

## 2020-01-01 PROCEDURE — 84300 ASSAY OF URINE SODIUM: CPT

## 2020-01-01 PROCEDURE — 6360000002 HC RX W HCPCS: Performed by: INTERNAL MEDICINE

## 2020-01-01 PROCEDURE — 99285 EMERGENCY DEPT VISIT HI MDM: CPT

## 2020-01-01 PROCEDURE — 2709999900 HC NON-CHARGEABLE SUPPLY

## 2020-01-01 PROCEDURE — 83880 ASSAY OF NATRIURETIC PEPTIDE: CPT

## 2020-01-01 PROCEDURE — 94760 N-INVAS EAR/PLS OXIMETRY 1: CPT

## 2020-01-01 PROCEDURE — 92526 ORAL FUNCTION THERAPY: CPT

## 2020-01-01 PROCEDURE — 2709999900 HC NON-CHARGEABLE SUPPLY: Performed by: SURGERY

## 2020-01-01 PROCEDURE — 87641 MR-STAPH DNA AMP PROBE: CPT

## 2020-01-01 PROCEDURE — APPSS180 APP SPLIT SHARED TIME > 60 MINUTES: Performed by: NURSE PRACTITIONER

## 2020-01-01 PROCEDURE — 99233 SBSQ HOSP IP/OBS HIGH 50: CPT | Performed by: INTERNAL MEDICINE

## 2020-01-01 PROCEDURE — 84145 PROCALCITONIN (PCT): CPT

## 2020-01-01 PROCEDURE — 87070 CULTURE OTHR SPECIMN AEROBIC: CPT

## 2020-01-01 PROCEDURE — 94002 VENT MGMT INPAT INIT DAY: CPT

## 2020-01-01 PROCEDURE — 3600000012 HC SURGERY LEVEL 2 ADDTL 15MIN: Performed by: SURGERY

## 2020-01-01 PROCEDURE — 81001 URINALYSIS AUTO W/SCOPE: CPT

## 2020-01-01 PROCEDURE — G8420 CALC BMI NORM PARAMETERS: HCPCS | Performed by: EMERGENCY MEDICINE

## 2020-01-01 PROCEDURE — 6360000004 HC RX CONTRAST MEDICATION: Performed by: INTERNAL MEDICINE

## 2020-01-01 PROCEDURE — 6360000002 HC RX W HCPCS

## 2020-01-01 PROCEDURE — P9047 ALBUMIN (HUMAN), 25%, 50ML: HCPCS | Performed by: NURSE PRACTITIONER

## 2020-01-01 PROCEDURE — 94660 CPAP INITIATION&MGMT: CPT

## 2020-01-01 PROCEDURE — 2580000003 HC RX 258: Performed by: PHYSICIAN ASSISTANT

## 2020-01-01 PROCEDURE — 49465 FLUORO EXAM OF G/COLON TUBE: CPT

## 2020-01-01 PROCEDURE — 87040 BLOOD CULTURE FOR BACTERIA: CPT

## 2020-01-01 PROCEDURE — 1111F DSCHRG MED/CURRENT MED MERGE: CPT | Performed by: EMERGENCY MEDICINE

## 2020-01-01 PROCEDURE — 87486 CHLMYD PNEUM DNA AMP PROBE: CPT

## 2020-01-01 PROCEDURE — 82962 GLUCOSE BLOOD TEST: CPT | Performed by: EMERGENCY MEDICINE

## 2020-01-01 PROCEDURE — 6370000000 HC RX 637 (ALT 250 FOR IP): Performed by: PHYSICIAN ASSISTANT

## 2020-01-01 PROCEDURE — 87106 FUNGI IDENTIFICATION YEAST: CPT

## 2020-01-01 PROCEDURE — 99211 OFF/OP EST MAY X REQ PHY/QHP: CPT | Performed by: PHARMACIST

## 2020-01-01 PROCEDURE — 73562 X-RAY EXAM OF KNEE 3: CPT

## 2020-01-01 PROCEDURE — 97530 THERAPEUTIC ACTIVITIES: CPT

## 2020-01-01 PROCEDURE — 99232 SBSQ HOSP IP/OBS MODERATE 35: CPT | Performed by: SURGERY

## 2020-01-01 PROCEDURE — 80307 DRUG TEST PRSMV CHEM ANLYZR: CPT

## 2020-01-01 PROCEDURE — G8420 CALC BMI NORM PARAMETERS: HCPCS | Performed by: NURSE PRACTITIONER

## 2020-01-01 PROCEDURE — 97110 THERAPEUTIC EXERCISES: CPT

## 2020-01-01 PROCEDURE — 82803 BLOOD GASES ANY COMBINATION: CPT

## 2020-01-01 PROCEDURE — 87581 M.PNEUMON DNA AMP PROBE: CPT

## 2020-01-01 PROCEDURE — 6360000002 HC RX W HCPCS: Performed by: EMERGENCY MEDICINE

## 2020-01-01 PROCEDURE — 2060000000 HC ICU INTERMEDIATE R&B

## 2020-01-01 PROCEDURE — 2500000003 HC RX 250 WO HCPCS

## 2020-01-01 PROCEDURE — 7100000000 HC PACU RECOVERY - FIRST 15 MIN: Performed by: SURGERY

## 2020-01-01 PROCEDURE — 83735 ASSAY OF MAGNESIUM: CPT

## 2020-01-01 PROCEDURE — 94770 HC ETCO2 MONITOR DAILY: CPT

## 2020-01-01 PROCEDURE — 6360000004 HC RX CONTRAST MEDICATION: Performed by: NURSE PRACTITIONER

## 2020-01-01 PROCEDURE — 3600000004 HC SURGERY LEVEL 4 BASE: Performed by: SURGERY

## 2020-01-01 PROCEDURE — 87899 AGENT NOS ASSAY W/OPTIC: CPT

## 2020-01-01 PROCEDURE — 85610 PROTHROMBIN TIME: CPT | Performed by: PHARMACIST

## 2020-01-01 PROCEDURE — 6360000002 HC RX W HCPCS: Performed by: ANESTHESIOLOGY

## 2020-01-01 PROCEDURE — 51702 INSERT TEMP BLADDER CATH: CPT

## 2020-01-01 PROCEDURE — 83550 IRON BINDING TEST: CPT

## 2020-01-01 PROCEDURE — 86900 BLOOD TYPING SEROLOGIC ABO: CPT

## 2020-01-01 PROCEDURE — 44300 OPEN BOWEL TO SKIN: CPT | Performed by: SURGERY

## 2020-01-01 PROCEDURE — P9045 ALBUMIN (HUMAN), 5%, 250 ML: HCPCS | Performed by: NURSE PRACTITIONER

## 2020-01-01 PROCEDURE — 83874 ASSAY OF MYOGLOBIN: CPT

## 2020-01-01 PROCEDURE — 1123F ACP DISCUSS/DSCN MKR DOCD: CPT | Performed by: NURSE PRACTITIONER

## 2020-01-01 PROCEDURE — 84484 ASSAY OF TROPONIN QUANT: CPT

## 2020-01-01 PROCEDURE — 1200000003 HC TELEMETRY R&B

## 2020-01-01 PROCEDURE — 02HV33Z INSERTION OF INFUSION DEVICE INTO SUPERIOR VENA CAVA, PERCUTANEOUS APPROACH: ICD-10-PCS | Performed by: STUDENT IN AN ORGANIZED HEALTH CARE EDUCATION/TRAINING PROGRAM

## 2020-01-01 PROCEDURE — 99213 OFFICE O/P EST LOW 20 MIN: CPT | Performed by: EMERGENCY MEDICINE

## 2020-01-01 PROCEDURE — 85014 HEMATOCRIT: CPT

## 2020-01-01 PROCEDURE — 3600000014 HC SURGERY LEVEL 4 ADDTL 15MIN: Performed by: SURGERY

## 2020-01-01 PROCEDURE — 1200000000 HC SEMI PRIVATE

## 2020-01-01 PROCEDURE — 5A1935Z RESPIRATORY VENTILATION, LESS THAN 24 CONSECUTIVE HOURS: ICD-10-PCS | Performed by: INTERNAL MEDICINE

## 2020-01-01 PROCEDURE — 2500000003 HC RX 250 WO HCPCS: Performed by: ORTHOPAEDIC SURGERY

## 2020-01-01 PROCEDURE — 74177 CT ABD & PELVIS W/CONTRAST: CPT

## 2020-01-01 PROCEDURE — 99222 1ST HOSP IP/OBS MODERATE 55: CPT | Performed by: SURGERY

## 2020-01-01 PROCEDURE — 44314 REVISION OF ILEOSTOMY: CPT | Performed by: SURGERY

## 2020-01-01 PROCEDURE — 87205 SMEAR GRAM STAIN: CPT

## 2020-01-01 PROCEDURE — 7100000001 HC PACU RECOVERY - ADDTL 15 MIN: Performed by: ORTHOPAEDIC SURGERY

## 2020-01-01 PROCEDURE — 86850 RBC ANTIBODY SCREEN: CPT

## 2020-01-01 PROCEDURE — 0BH18EZ INSERTION OF ENDOTRACHEAL AIRWAY INTO TRACHEA, VIA NATURAL OR ARTIFICIAL OPENING ENDOSCOPIC: ICD-10-PCS | Performed by: INTERNAL MEDICINE

## 2020-01-01 PROCEDURE — G0008 ADMIN INFLUENZA VIRUS VAC: HCPCS | Performed by: EMERGENCY MEDICINE

## 2020-01-01 PROCEDURE — 80053 COMPREHEN METABOLIC PANEL: CPT

## 2020-01-01 PROCEDURE — 05HM33Z INSERTION OF INFUSION DEVICE INTO RIGHT INTERNAL JUGULAR VEIN, PERCUTANEOUS APPROACH: ICD-10-PCS | Performed by: INTERNAL MEDICINE

## 2020-01-01 PROCEDURE — 3700000001 HC ADD 15 MINUTES (ANESTHESIA): Performed by: SURGERY

## 2020-01-01 PROCEDURE — 0SRS0JA REPLACEMENT OF LEFT HIP JOINT, FEMORAL SURFACE WITH SYNTHETIC SUBSTITUTE, UNCEMENTED, OPEN APPROACH: ICD-10-PCS | Performed by: ORTHOPAEDIC SURGERY

## 2020-01-01 PROCEDURE — 82330 ASSAY OF CALCIUM: CPT

## 2020-01-01 PROCEDURE — 87631 RESP VIRUS 3-5 TARGETS: CPT

## 2020-01-01 PROCEDURE — 2500000003 HC RX 250 WO HCPCS: Performed by: PHARMACIST

## 2020-01-01 PROCEDURE — 36416 COLLJ CAPILLARY BLOOD SPEC: CPT

## 2020-01-01 PROCEDURE — 97116 GAIT TRAINING THERAPY: CPT

## 2020-01-01 PROCEDURE — 83036 HEMOGLOBIN GLYCOSYLATED A1C: CPT | Performed by: EMERGENCY MEDICINE

## 2020-01-01 PROCEDURE — 2580000003 HC RX 258: Performed by: EMERGENCY MEDICINE

## 2020-01-01 PROCEDURE — 97535 SELF CARE MNGMENT TRAINING: CPT

## 2020-01-01 PROCEDURE — 86901 BLOOD TYPING SEROLOGIC RH(D): CPT

## 2020-01-01 PROCEDURE — 93005 ELECTROCARDIOGRAM TRACING: CPT | Performed by: NURSE PRACTITIONER

## 2020-01-01 PROCEDURE — 1036F TOBACCO NON-USER: CPT | Performed by: EMERGENCY MEDICINE

## 2020-01-01 PROCEDURE — 87449 NOS EACH ORGANISM AG IA: CPT

## 2020-01-01 PROCEDURE — 3023F SPIROM DOC REV: CPT | Performed by: NURSE PRACTITIONER

## 2020-01-01 PROCEDURE — 2580000003 HC RX 258: Performed by: PHARMACIST

## 2020-01-01 PROCEDURE — 1123F ACP DISCUSS/DSCN MKR DOCD: CPT | Performed by: EMERGENCY MEDICINE

## 2020-01-01 PROCEDURE — 87798 DETECT AGENT NOS DNA AMP: CPT

## 2020-01-01 PROCEDURE — 71250 CT THORAX DX C-: CPT

## 2020-01-01 PROCEDURE — 74176 CT ABD & PELVIS W/O CONTRAST: CPT

## 2020-01-01 PROCEDURE — 44500 INTRO GASTROINTESTINAL TUBE: CPT

## 2020-01-01 PROCEDURE — 51701 INSERT BLADDER CATHETER: CPT

## 2020-01-01 PROCEDURE — 36556 INSERT NON-TUNNEL CV CATH: CPT

## 2020-01-01 PROCEDURE — 99214 OFFICE O/P EST MOD 30 MIN: CPT | Performed by: NURSE PRACTITIONER

## 2020-01-01 PROCEDURE — 36592 COLLECT BLOOD FROM PICC: CPT

## 2020-01-01 PROCEDURE — 92611 MOTION FLUOROSCOPY/SWALLOW: CPT

## 2020-01-01 PROCEDURE — 87086 URINE CULTURE/COLONY COUNT: CPT

## 2020-01-01 PROCEDURE — 6360000002 HC RX W HCPCS: Performed by: SURGERY

## 2020-01-01 PROCEDURE — 93010 ELECTROCARDIOGRAM REPORT: CPT | Performed by: INTERNAL MEDICINE

## 2020-01-01 PROCEDURE — 83615 LACTATE (LD) (LDH) ENZYME: CPT

## 2020-01-01 PROCEDURE — 3700000000 HC ANESTHESIA ATTENDED CARE: Performed by: SURGERY

## 2020-01-01 PROCEDURE — 2720000010 HC SURG SUPPLY STERILE: Performed by: SURGERY

## 2020-01-01 PROCEDURE — 36416 COLLJ CAPILLARY BLOOD SPEC: CPT | Performed by: PHARMACIST

## 2020-01-01 PROCEDURE — 43752 NASAL/OROGASTRIC W/TUBE PLMT: CPT

## 2020-01-01 PROCEDURE — APPSS30 APP SPLIT SHARED TIME 16-30 MINUTES: Performed by: NURSE PRACTITIONER

## 2020-01-01 PROCEDURE — P9047 ALBUMIN (HUMAN), 25%, 50ML: HCPCS | Performed by: STUDENT IN AN ORGANIZED HEALTH CARE EDUCATION/TRAINING PROGRAM

## 2020-01-01 PROCEDURE — 93306 TTE W/DOPPLER COMPLETE: CPT

## 2020-01-01 PROCEDURE — 82533 TOTAL CORTISOL: CPT

## 2020-01-01 PROCEDURE — 3609027000 HC BRONCHOSCOPY

## 2020-01-01 PROCEDURE — C1776 JOINT DEVICE (IMPLANTABLE): HCPCS | Performed by: ORTHOPAEDIC SURGERY

## 2020-01-01 PROCEDURE — 36597 REPOSITION VENOUS CATHETER: CPT | Performed by: RADIOLOGY

## 2020-01-01 PROCEDURE — 90688 IIV4 VACCINE SPLT 0.5 ML IM: CPT | Performed by: EMERGENCY MEDICINE

## 2020-01-01 PROCEDURE — 74230 X-RAY XM SWLNG FUNCJ C+: CPT

## 2020-01-01 PROCEDURE — 3600000016 HC SURGERY LEVEL 6 ADDTL 15MIN: Performed by: SURGERY

## 2020-01-01 PROCEDURE — 6360000002 HC RX W HCPCS: Performed by: NURSE ANESTHETIST, CERTIFIED REGISTERED

## 2020-01-01 PROCEDURE — 3600000005 HC SURGERY LEVEL 5 BASE: Performed by: ORTHOPAEDIC SURGERY

## 2020-01-01 PROCEDURE — 96374 THER/PROPH/DIAG INJ IV PUSH: CPT

## 2020-01-01 PROCEDURE — 93010 ELECTROCARDIOGRAM REPORT: CPT | Performed by: NUCLEAR MEDICINE

## 2020-01-01 PROCEDURE — 82570 ASSAY OF URINE CREATININE: CPT

## 2020-01-01 PROCEDURE — 96361 HYDRATE IV INFUSION ADD-ON: CPT

## 2020-01-01 PROCEDURE — 1036F TOBACCO NON-USER: CPT | Performed by: NURSE PRACTITIONER

## 2020-01-01 PROCEDURE — 87500 VANOMYCIN DNA AMP PROBE: CPT

## 2020-01-01 PROCEDURE — 83540 ASSAY OF IRON: CPT

## 2020-01-01 PROCEDURE — 2720000010 HC SURG SUPPLY STERILE

## 2020-01-01 PROCEDURE — 2500000003 HC RX 250 WO HCPCS: Performed by: NURSE ANESTHETIST, CERTIFIED REGISTERED

## 2020-01-01 PROCEDURE — 72170 X-RAY EXAM OF PELVIS: CPT

## 2020-01-01 PROCEDURE — 0B113F4 BYPASS TRACHEA TO CUTANEOUS WITH TRACHEOSTOMY DEVICE, PERCUTANEOUS APPROACH: ICD-10-PCS | Performed by: STUDENT IN AN ORGANIZED HEALTH CARE EDUCATION/TRAINING PROGRAM

## 2020-01-01 PROCEDURE — C1751 CATH, INF, PER/CENT/MIDLINE: HCPCS

## 2020-01-01 PROCEDURE — 96375 TX/PRO/DX INJ NEW DRUG ADDON: CPT

## 2020-01-01 PROCEDURE — 6820000001 HC L2 TRAUMA SURGERY EVALUATION: Performed by: NURSE ANESTHETIST, CERTIFIED REGISTERED

## 2020-01-01 PROCEDURE — 97163 PT EVAL HIGH COMPLEX 45 MIN: CPT

## 2020-01-01 PROCEDURE — APPNB180 APP NON BILLABLE TIME > 60 MINS: Performed by: NURSE PRACTITIONER

## 2020-01-01 PROCEDURE — 6360000004 HC RX CONTRAST MEDICATION: Performed by: SURGERY

## 2020-01-01 PROCEDURE — 05HP33Z INSERTION OF INFUSION DEVICE INTO RIGHT EXTERNAL JUGULAR VEIN, PERCUTANEOUS APPROACH: ICD-10-PCS | Performed by: STUDENT IN AN ORGANIZED HEALTH CARE EDUCATION/TRAINING PROGRAM

## 2020-01-01 PROCEDURE — 4040F PNEUMOC VAC/ADMIN/RCVD: CPT | Performed by: EMERGENCY MEDICINE

## 2020-01-01 PROCEDURE — 76937 US GUIDE VASCULAR ACCESS: CPT

## 2020-01-01 PROCEDURE — 96365 THER/PROPH/DIAG IV INF INIT: CPT

## 2020-01-01 PROCEDURE — 99441 PR PHYS/QHP TELEPHONE EVALUATION 5-10 MIN: CPT | Performed by: NURSE PRACTITIONER

## 2020-01-01 PROCEDURE — 97165 OT EVAL LOW COMPLEX 30 MIN: CPT

## 2020-01-01 PROCEDURE — 80061 LIPID PANEL: CPT

## 2020-01-01 PROCEDURE — 93005 ELECTROCARDIOGRAM TRACING: CPT

## 2020-01-01 PROCEDURE — 0BH18EZ INSERTION OF ENDOTRACHEAL AIRWAY INTO TRACHEA, VIA NATURAL OR ARTIFICIAL OPENING ENDOSCOPIC: ICD-10-PCS | Performed by: STUDENT IN AN ORGANIZED HEALTH CARE EDUCATION/TRAINING PROGRAM

## 2020-01-01 PROCEDURE — 3700000000 HC ANESTHESIA ATTENDED CARE: Performed by: ORTHOPAEDIC SURGERY

## 2020-01-01 PROCEDURE — 86923 COMPATIBILITY TEST ELECTRIC: CPT

## 2020-01-01 PROCEDURE — 82728 ASSAY OF FERRITIN: CPT

## 2020-01-01 PROCEDURE — 6360000004 HC RX CONTRAST MEDICATION: Performed by: RADIOLOGY

## 2020-01-01 PROCEDURE — 87075 CULTR BACTERIA EXCEPT BLOOD: CPT

## 2020-01-01 PROCEDURE — 31500 INSERT EMERGENCY AIRWAY: CPT

## 2020-01-01 PROCEDURE — 2580000003 HC RX 258: Performed by: ANESTHESIOLOGY

## 2020-01-01 PROCEDURE — C9113 INJ PANTOPRAZOLE SODIUM, VIA: HCPCS | Performed by: SURGERY

## 2020-01-01 PROCEDURE — 85018 HEMOGLOBIN: CPT

## 2020-01-01 PROCEDURE — 82044 UR ALBUMIN SEMIQUANTITATIVE: CPT | Performed by: EMERGENCY MEDICINE

## 2020-01-01 PROCEDURE — G8427 DOCREV CUR MEDS BY ELIG CLIN: HCPCS | Performed by: EMERGENCY MEDICINE

## 2020-01-01 PROCEDURE — U0003 INFECTIOUS AGENT DETECTION BY NUCLEIC ACID (DNA OR RNA); SEVERE ACUTE RESPIRATORY SYNDROME CORONAVIRUS 2 (SARS-COV-2) (CORONAVIRUS DISEASE [COVID-19]), AMPLIFIED PROBE TECHNIQUE, MAKING USE OF HIGH THROUGHPUT TECHNOLOGIES AS DESCRIBED BY CMS-2020-01-R: HCPCS

## 2020-01-01 PROCEDURE — 2500000003 HC RX 250 WO HCPCS: Performed by: INTERNAL MEDICINE

## 2020-01-01 PROCEDURE — 77012 CT SCAN FOR NEEDLE BIOPSY: CPT

## 2020-01-01 PROCEDURE — 87081 CULTURE SCREEN ONLY: CPT

## 2020-01-01 PROCEDURE — 99221 1ST HOSP IP/OBS SF/LOW 40: CPT | Performed by: NURSE PRACTITIONER

## 2020-01-01 PROCEDURE — 7100000001 HC PACU RECOVERY - ADDTL 15 MIN: Performed by: SURGERY

## 2020-01-01 PROCEDURE — 2709999900 HC NON-CHARGEABLE SUPPLY: Performed by: ORTHOPAEDIC SURGERY

## 2020-01-01 PROCEDURE — 6370000000 HC RX 637 (ALT 250 FOR IP): Performed by: ORTHOPAEDIC SURGERY

## 2020-01-01 PROCEDURE — 36600 WITHDRAWAL OF ARTERIAL BLOOD: CPT

## 2020-01-01 PROCEDURE — 2500000003 HC RX 250 WO HCPCS: Performed by: SURGERY

## 2020-01-01 PROCEDURE — 31720 CLEARANCE OF AIRWAYS: CPT

## 2020-01-01 PROCEDURE — 5A1955Z RESPIRATORY VENTILATION, GREATER THAN 96 CONSECUTIVE HOURS: ICD-10-PCS | Performed by: STUDENT IN AN ORGANIZED HEALTH CARE EDUCATION/TRAINING PROGRAM

## 2020-01-01 PROCEDURE — 7100000000 HC PACU RECOVERY - FIRST 15 MIN: Performed by: ORTHOPAEDIC SURGERY

## 2020-01-01 PROCEDURE — APPSS45 APP SPLIT SHARED TIME 31-45 MINUTES: Performed by: NURSE PRACTITIONER

## 2020-01-01 PROCEDURE — 0DHA0UZ INSERTION OF FEEDING DEVICE INTO JEJUNUM, OPEN APPROACH: ICD-10-PCS | Performed by: SURGERY

## 2020-01-01 PROCEDURE — U0002 COVID-19 LAB TEST NON-CDC: HCPCS

## 2020-01-01 PROCEDURE — 2500000003 HC RX 250 WO HCPCS: Performed by: STUDENT IN AN ORGANIZED HEALTH CARE EDUCATION/TRAINING PROGRAM

## 2020-01-01 PROCEDURE — 31645 BRNCHSC W/THER ASPIR 1ST: CPT | Performed by: INTERNAL MEDICINE

## 2020-01-01 PROCEDURE — 73502 X-RAY EXAM HIP UNI 2-3 VIEWS: CPT

## 2020-01-01 PROCEDURE — 83935 ASSAY OF URINE OSMOLALITY: CPT

## 2020-01-01 PROCEDURE — G8427 DOCREV CUR MEDS BY ELIG CLIN: HCPCS | Performed by: NURSE PRACTITIONER

## 2020-01-01 PROCEDURE — 49000 EXPLORATION OF ABDOMEN: CPT | Performed by: SURGERY

## 2020-01-01 PROCEDURE — 83930 ASSAY OF BLOOD OSMOLALITY: CPT

## 2020-01-01 PROCEDURE — 31500 INSERT EMERGENCY AIRWAY: CPT | Performed by: INTERNAL MEDICINE

## 2020-01-01 PROCEDURE — 36561 INSERT TUNNELED CV CATH: CPT | Performed by: NURSE PRACTITIONER

## 2020-01-01 PROCEDURE — 49406 IMAGE CATH FLUID PERI/RETRO: CPT

## 2020-01-01 PROCEDURE — 96367 TX/PROPH/DG ADDL SEQ IV INF: CPT

## 2020-01-01 PROCEDURE — 93005 ELECTROCARDIOGRAM TRACING: CPT | Performed by: STUDENT IN AN ORGANIZED HEALTH CARE EDUCATION/TRAINING PROGRAM

## 2020-01-01 PROCEDURE — 31600 PLANNED TRACHEOSTOMY: CPT | Performed by: INTERNAL MEDICINE

## 2020-01-01 PROCEDURE — 82550 ASSAY OF CK (CPK): CPT

## 2020-01-01 PROCEDURE — 2500000003 HC RX 250 WO HCPCS: Performed by: ANESTHESIOLOGY

## 2020-01-01 PROCEDURE — 3600000015 HC SURGERY LEVEL 5 ADDTL 15MIN: Performed by: ORTHOPAEDIC SURGERY

## 2020-01-01 PROCEDURE — 93005 ELECTROCARDIOGRAM TRACING: CPT | Performed by: EMERGENCY MEDICINE

## 2020-01-01 PROCEDURE — 93005 ELECTROCARDIOGRAM TRACING: CPT | Performed by: FAMILY MEDICINE

## 2020-01-01 PROCEDURE — 92610 EVALUATE SWALLOWING FUNCTION: CPT

## 2020-01-01 PROCEDURE — 3600000006 HC SURGERY LEVEL 6 BASE: Performed by: SURGERY

## 2020-01-01 PROCEDURE — 3700000001 HC ADD 15 MINUTES (ANESTHESIA): Performed by: ORTHOPAEDIC SURGERY

## 2020-01-01 PROCEDURE — 5A1945Z RESPIRATORY VENTILATION, 24-96 CONSECUTIVE HOURS: ICD-10-PCS | Performed by: STUDENT IN AN ORGANIZED HEALTH CARE EDUCATION/TRAINING PROGRAM

## 2020-01-01 PROCEDURE — A4641 RADIOPHARM DX AGENT NOC: HCPCS | Performed by: INTERNAL MEDICINE

## 2020-01-01 PROCEDURE — 84443 ASSAY THYROID STIM HORMONE: CPT

## 2020-01-01 PROCEDURE — 2580000003 HC RX 258: Performed by: NURSE ANESTHETIST, CERTIFIED REGISTERED

## 2020-01-01 PROCEDURE — 87804 INFLUENZA ASSAY W/OPTIC: CPT

## 2020-01-01 PROCEDURE — 4040F PNEUMOC VAC/ADMIN/RCVD: CPT | Performed by: NURSE PRACTITIONER

## 2020-01-01 PROCEDURE — 97167 OT EVAL HIGH COMPLEX 60 MIN: CPT

## 2020-01-01 PROCEDURE — G8926 SPIRO NO PERF OR DOC: HCPCS | Performed by: NURSE PRACTITIONER

## 2020-01-01 DEVICE — TANDEM BIPOLAR COBALT CHROME 51MM                                    OUTER DIAMETER 28MM INNER DIAMETER
Type: IMPLANTABLE DEVICE | Site: HIP | Status: FUNCTIONAL
Brand: TANDEM

## 2020-01-01 DEVICE — SYNERGY POROUS FEMORAL COMPONENT SZ 18
Type: IMPLANTABLE DEVICE | Site: HIP | Status: FUNCTIONAL
Brand: SYNERGY

## 2020-01-01 DEVICE — COBALT CHROME 12/14 TAPER FEMORAL                                    HEAD 28MM + 0: Type: IMPLANTABLE DEVICE | Site: HIP | Status: FUNCTIONAL

## 2020-01-01 RX ORDER — SODIUM CHLORIDE 0.9 % (FLUSH) 0.9 %
10 SYRINGE (ML) INJECTION EVERY 12 HOURS SCHEDULED
Status: DISCONTINUED | OUTPATIENT
Start: 2020-01-01 | End: 2020-01-01 | Stop reason: SDUPTHER

## 2020-01-01 RX ORDER — FENTANYL CITRATE 50 UG/ML
INJECTION, SOLUTION INTRAMUSCULAR; INTRAVENOUS PRN
Status: DISCONTINUED | OUTPATIENT
Start: 2020-01-01 | End: 2020-01-01 | Stop reason: SDUPTHER

## 2020-01-01 RX ORDER — PANTOPRAZOLE SODIUM 40 MG/10ML
40 INJECTION, POWDER, LYOPHILIZED, FOR SOLUTION INTRAVENOUS 2 TIMES DAILY
Status: DISCONTINUED | OUTPATIENT
Start: 2020-01-01 | End: 2020-01-01

## 2020-01-01 RX ORDER — FUROSEMIDE 40 MG/1
TABLET ORAL
Qty: 90 TABLET | Refills: 1 | Status: CANCELLED | OUTPATIENT
Start: 2020-01-01

## 2020-01-01 RX ORDER — SODIUM CHLORIDE 9 MG/ML
INJECTION, SOLUTION INTRAVENOUS CONTINUOUS
Status: ACTIVE | OUTPATIENT
Start: 2020-01-01 | End: 2020-01-01

## 2020-01-01 RX ORDER — SODIUM CHLORIDE 0.9 % (FLUSH) 0.9 %
10 SYRINGE (ML) INJECTION PRN
Status: DISCONTINUED | OUTPATIENT
Start: 2020-01-01 | End: 2020-01-01 | Stop reason: HOSPADM

## 2020-01-01 RX ORDER — MORPHINE SULFATE 4 MG/ML
4 INJECTION, SOLUTION INTRAMUSCULAR; INTRAVENOUS EVERY 4 HOURS PRN
Status: DISCONTINUED | OUTPATIENT
Start: 2020-01-01 | End: 2020-01-01 | Stop reason: HOSPADM

## 2020-01-01 RX ORDER — FUROSEMIDE 10 MG/ML
20 INJECTION INTRAMUSCULAR; INTRAVENOUS ONCE
Status: COMPLETED | OUTPATIENT
Start: 2020-01-01 | End: 2020-01-01

## 2020-01-01 RX ORDER — MORPHINE SULFATE 2 MG/ML
1 INJECTION, SOLUTION INTRAMUSCULAR; INTRAVENOUS EVERY 5 MIN PRN
Status: DISCONTINUED | OUTPATIENT
Start: 2020-01-01 | End: 2020-01-01 | Stop reason: HOSPADM

## 2020-01-01 RX ORDER — SODIUM CHLORIDE 0.9 % (FLUSH) 0.9 %
10 SYRINGE (ML) INJECTION EVERY 12 HOURS SCHEDULED
Status: DISCONTINUED | OUTPATIENT
Start: 2020-01-01 | End: 2020-01-01 | Stop reason: HOSPADM

## 2020-01-01 RX ORDER — ROCURONIUM BROMIDE 10 MG/ML
INJECTION, SOLUTION INTRAVENOUS PRN
Status: DISCONTINUED | OUTPATIENT
Start: 2020-01-01 | End: 2020-01-01 | Stop reason: SDUPTHER

## 2020-01-01 RX ORDER — ARFORMOTEROL TARTRATE 15 UG/2ML
15 SOLUTION RESPIRATORY (INHALATION) 2 TIMES DAILY
Qty: 120 ML | Refills: 11 | Status: ON HOLD | OUTPATIENT
Start: 2020-01-01 | End: 2020-01-01 | Stop reason: HOSPADM

## 2020-01-01 RX ORDER — LIDOCAINE HYDROCHLORIDE 10 MG/ML
5 INJECTION, SOLUTION EPIDURAL; INFILTRATION; INTRACAUDAL; PERINEURAL ONCE
Status: DISCONTINUED | OUTPATIENT
Start: 2020-01-01 | End: 2020-01-01

## 2020-01-01 RX ORDER — INSULIN GLARGINE 100 [IU]/ML
15 INJECTION, SOLUTION SUBCUTANEOUS 2 TIMES DAILY
Status: DISCONTINUED | OUTPATIENT
Start: 2020-01-01 | End: 2020-01-01 | Stop reason: HOSPADM

## 2020-01-01 RX ORDER — ALBUTEROL SULFATE 90 UG/1
1 AEROSOL, METERED RESPIRATORY (INHALATION) EVERY 6 HOURS PRN
Status: DISCONTINUED | OUTPATIENT
Start: 2020-01-01 | End: 2020-01-01 | Stop reason: HOSPADM

## 2020-01-01 RX ORDER — ALBUMIN (HUMAN) 12.5 G/50ML
25 SOLUTION INTRAVENOUS EVERY 6 HOURS
Status: COMPLETED | OUTPATIENT
Start: 2020-01-01 | End: 2020-01-01

## 2020-01-01 RX ORDER — ARFORMOTEROL TARTRATE 15 UG/2ML
15 SOLUTION RESPIRATORY (INHALATION) 2 TIMES DAILY
Status: DISCONTINUED | OUTPATIENT
Start: 2020-01-01 | End: 2020-01-01

## 2020-01-01 RX ORDER — 0.9 % SODIUM CHLORIDE 0.9 %
2000 INTRAVENOUS SOLUTION INTRAVENOUS ONCE
Status: COMPLETED | OUTPATIENT
Start: 2020-01-01 | End: 2020-01-01

## 2020-01-01 RX ORDER — ALBUTEROL SULFATE 90 UG/1
1 AEROSOL, METERED RESPIRATORY (INHALATION) EVERY 6 HOURS PRN
Status: DISCONTINUED | OUTPATIENT
Start: 2020-01-01 | End: 2020-01-01

## 2020-01-01 RX ORDER — BUDESONIDE 0.5 MG/2ML
500 INHALANT ORAL 2 TIMES DAILY
Status: DISCONTINUED | OUTPATIENT
Start: 2020-01-01 | End: 2020-01-01

## 2020-01-01 RX ORDER — FUROSEMIDE 20 MG/1
20 TABLET ORAL DAILY
Qty: 90 TABLET | Refills: 1 | Status: ON HOLD | OUTPATIENT
Start: 2020-01-01 | End: 2020-01-01 | Stop reason: HOSPADM

## 2020-01-01 RX ORDER — FENTANYL CITRATE 50 UG/ML
50 INJECTION, SOLUTION INTRAMUSCULAR; INTRAVENOUS ONCE
Status: COMPLETED | OUTPATIENT
Start: 2020-01-01 | End: 2020-01-01

## 2020-01-01 RX ORDER — ALBUMIN (HUMAN) 12.5 G/50ML
50 SOLUTION INTRAVENOUS ONCE
Status: DISCONTINUED | OUTPATIENT
Start: 2020-01-01 | End: 2020-01-01 | Stop reason: SDUPTHER

## 2020-01-01 RX ORDER — METFORMIN HYDROCHLORIDE 500 MG/1
500 TABLET, EXTENDED RELEASE ORAL DAILY
Qty: 90 TABLET | Refills: 1 | Status: SHIPPED | OUTPATIENT
Start: 2020-01-01 | End: 2020-01-01

## 2020-01-01 RX ORDER — 0.9 % SODIUM CHLORIDE 0.9 %
500 INTRAVENOUS SOLUTION INTRAVENOUS ONCE
Status: COMPLETED | OUTPATIENT
Start: 2020-01-01 | End: 2020-01-01

## 2020-01-01 RX ORDER — EPHEDRINE SULFATE/0.9% NACL/PF 50 MG/5 ML
SYRINGE (ML) INTRAVENOUS PRN
Status: DISCONTINUED | OUTPATIENT
Start: 2020-01-01 | End: 2020-01-01 | Stop reason: SDUPTHER

## 2020-01-01 RX ORDER — CLOPIDOGREL BISULFATE 75 MG/1
75 TABLET ORAL DAILY
Status: DISCONTINUED | OUTPATIENT
Start: 2020-01-01 | End: 2020-01-01

## 2020-01-01 RX ORDER — TAMSULOSIN HYDROCHLORIDE 0.4 MG/1
0.4 CAPSULE ORAL NIGHTLY
Qty: 90 CAPSULE | Refills: 1 | Status: ON HOLD | OUTPATIENT
Start: 2020-01-01 | End: 2020-01-01 | Stop reason: HOSPADM

## 2020-01-01 RX ORDER — PROPOFOL 10 MG/ML
INJECTION, EMULSION INTRAVENOUS
Status: COMPLETED
Start: 2020-01-01 | End: 2020-01-01

## 2020-01-01 RX ORDER — LABETALOL 20 MG/4 ML (5 MG/ML) INTRAVENOUS SYRINGE
10 EVERY 10 MIN PRN
Status: DISCONTINUED | OUTPATIENT
Start: 2020-01-01 | End: 2020-01-01 | Stop reason: HOSPADM

## 2020-01-01 RX ORDER — NICOTINE POLACRILEX 4 MG
15 LOZENGE BUCCAL PRN
Status: DISCONTINUED | OUTPATIENT
Start: 2020-01-01 | End: 2020-01-01 | Stop reason: HOSPADM

## 2020-01-01 RX ORDER — WARFARIN SODIUM 4 MG/1
4 TABLET ORAL ONCE
Status: COMPLETED | OUTPATIENT
Start: 2020-01-01 | End: 2020-01-01

## 2020-01-01 RX ORDER — HYDROCODONE BITARTRATE AND ACETAMINOPHEN 5; 325 MG/1; MG/1
1 TABLET ORAL ONCE
Status: DISCONTINUED | OUTPATIENT
Start: 2020-01-01 | End: 2020-01-01

## 2020-01-01 RX ORDER — POLYETHYLENE GLYCOL 3350 17 G/17G
17 POWDER, FOR SOLUTION ORAL DAILY PRN
Status: DISCONTINUED | OUTPATIENT
Start: 2020-01-01 | End: 2020-01-01 | Stop reason: HOSPADM

## 2020-01-01 RX ORDER — FENTANYL CITRATE 50 UG/ML
25 INJECTION, SOLUTION INTRAMUSCULAR; INTRAVENOUS
Status: DISCONTINUED | OUTPATIENT
Start: 2020-01-01 | End: 2020-01-01 | Stop reason: SDUPTHER

## 2020-01-01 RX ORDER — FENTANYL CITRATE 50 UG/ML
50 INJECTION, SOLUTION INTRAMUSCULAR; INTRAVENOUS EVERY 5 MIN PRN
Status: DISCONTINUED | OUTPATIENT
Start: 2020-01-01 | End: 2020-01-01 | Stop reason: HOSPADM

## 2020-01-01 RX ORDER — LIDOCAINE HYDROCHLORIDE AND EPINEPHRINE 10; 10 MG/ML; UG/ML
20 INJECTION, SOLUTION INFILTRATION; PERINEURAL ONCE
Status: COMPLETED | OUTPATIENT
Start: 2020-01-01 | End: 2020-01-01

## 2020-01-01 RX ORDER — SODIUM CHLORIDE 9 MG/ML
INJECTION, SOLUTION INTRAVENOUS CONTINUOUS
Status: DISCONTINUED | OUTPATIENT
Start: 2020-01-01 | End: 2020-01-01

## 2020-01-01 RX ORDER — SODIUM CHLORIDE 0.9 % (FLUSH) 0.9 %
10 SYRINGE (ML) INJECTION PRN
Status: DISCONTINUED | OUTPATIENT
Start: 2020-01-01 | End: 2020-01-01 | Stop reason: SDUPTHER

## 2020-01-01 RX ORDER — 0.9 % SODIUM CHLORIDE 0.9 %
250 INTRAVENOUS SOLUTION INTRAVENOUS ONCE
Status: DISCONTINUED | OUTPATIENT
Start: 2020-01-01 | End: 2020-01-01

## 2020-01-01 RX ORDER — PROPOFOL 10 MG/ML
10 INJECTION, EMULSION INTRAVENOUS
Status: DISCONTINUED | OUTPATIENT
Start: 2020-01-01 | End: 2020-01-01

## 2020-01-01 RX ORDER — ATENOLOL 100 MG/1
100 TABLET ORAL DAILY
Status: DISCONTINUED | OUTPATIENT
Start: 2020-01-01 | End: 2020-01-01

## 2020-01-01 RX ORDER — ETOMIDATE 2 MG/ML
INJECTION INTRAVENOUS
Status: COMPLETED
Start: 2020-01-01 | End: 2020-01-01

## 2020-01-01 RX ORDER — ONDANSETRON 2 MG/ML
4 INJECTION INTRAMUSCULAR; INTRAVENOUS EVERY 6 HOURS PRN
Status: DISCONTINUED | OUTPATIENT
Start: 2020-01-01 | End: 2020-01-01

## 2020-01-01 RX ORDER — DEXAMETHASONE SODIUM PHOSPHATE 4 MG/ML
10 INJECTION, SOLUTION INTRA-ARTICULAR; INTRALESIONAL; INTRAMUSCULAR; INTRAVENOUS; SOFT TISSUE ONCE
Status: COMPLETED | OUTPATIENT
Start: 2020-01-01 | End: 2020-01-01

## 2020-01-01 RX ORDER — FUROSEMIDE 20 MG/1
20 TABLET ORAL 2 TIMES DAILY
Status: DISCONTINUED | OUTPATIENT
Start: 2020-01-01 | End: 2020-01-01 | Stop reason: HOSPADM

## 2020-01-01 RX ORDER — ACETAMINOPHEN 325 MG/1
650 TABLET ORAL EVERY 4 HOURS PRN
Status: DISCONTINUED | OUTPATIENT
Start: 2020-01-01 | End: 2020-01-01 | Stop reason: HOSPADM

## 2020-01-01 RX ORDER — OXYCODONE HYDROCHLORIDE 5 MG/1
5 TABLET ORAL EVERY 4 HOURS PRN
Status: DISCONTINUED | OUTPATIENT
Start: 2020-01-01 | End: 2020-01-01 | Stop reason: HOSPADM

## 2020-01-01 RX ORDER — PANTOPRAZOLE SODIUM 40 MG/10ML
40 INJECTION, POWDER, LYOPHILIZED, FOR SOLUTION INTRAVENOUS ONCE
Status: DISCONTINUED | OUTPATIENT
Start: 2020-01-01 | End: 2020-01-01 | Stop reason: HOSPADM

## 2020-01-01 RX ORDER — INSULIN GLARGINE 100 [IU]/ML
15 INJECTION, SOLUTION SUBCUTANEOUS 2 TIMES DAILY
Qty: 1 VIAL | Refills: 3 | Status: SHIPPED | OUTPATIENT
Start: 2020-01-01

## 2020-01-01 RX ORDER — MIDAZOLAM HYDROCHLORIDE 1 MG/ML
INJECTION INTRAMUSCULAR; INTRAVENOUS
Status: COMPLETED | OUTPATIENT
Start: 2020-01-01 | End: 2020-01-01

## 2020-01-01 RX ORDER — DEXAMETHASONE SODIUM PHOSPHATE 4 MG/ML
INJECTION, SOLUTION INTRA-ARTICULAR; INTRALESIONAL; INTRAMUSCULAR; INTRAVENOUS; SOFT TISSUE PRN
Status: DISCONTINUED | OUTPATIENT
Start: 2020-01-01 | End: 2020-01-01 | Stop reason: SDUPTHER

## 2020-01-01 RX ORDER — ETOMIDATE 2 MG/ML
INJECTION INTRAVENOUS
Status: COMPLETED | OUTPATIENT
Start: 2020-01-01 | End: 2020-01-01

## 2020-01-01 RX ORDER — POTASSIUM CHLORIDE 20 MEQ/1
40 TABLET, EXTENDED RELEASE ORAL PRN
Status: DISCONTINUED | OUTPATIENT
Start: 2020-01-01 | End: 2020-01-01 | Stop reason: HOSPADM

## 2020-01-01 RX ORDER — DEXAMETHASONE SODIUM PHOSPHATE 4 MG/ML
6 INJECTION, SOLUTION INTRA-ARTICULAR; INTRALESIONAL; INTRAMUSCULAR; INTRAVENOUS; SOFT TISSUE DAILY
Status: COMPLETED | OUTPATIENT
Start: 2020-01-01 | End: 2020-01-01

## 2020-01-01 RX ORDER — ONDANSETRON 2 MG/ML
4 INJECTION INTRAMUSCULAR; INTRAVENOUS EVERY 6 HOURS PRN
Status: DISCONTINUED | OUTPATIENT
Start: 2020-01-01 | End: 2020-01-01 | Stop reason: HOSPADM

## 2020-01-01 RX ORDER — ATENOLOL 100 MG/1
100 TABLET ORAL DAILY
Status: DISCONTINUED | OUTPATIENT
Start: 2020-01-01 | End: 2020-01-01 | Stop reason: HOSPADM

## 2020-01-01 RX ORDER — IPRATROPIUM BROMIDE AND ALBUTEROL SULFATE 2.5; .5 MG/3ML; MG/3ML
1 SOLUTION RESPIRATORY (INHALATION) 4 TIMES DAILY PRN
Status: DISCONTINUED | OUTPATIENT
Start: 2020-01-01 | End: 2020-01-01 | Stop reason: HOSPADM

## 2020-01-01 RX ORDER — CEFAZOLIN SODIUM 1 G/3ML
INJECTION, POWDER, FOR SOLUTION INTRAMUSCULAR; INTRAVENOUS PRN
Status: DISCONTINUED | OUTPATIENT
Start: 2020-01-01 | End: 2020-01-01 | Stop reason: SDUPTHER

## 2020-01-01 RX ORDER — TAMSULOSIN HYDROCHLORIDE 0.4 MG/1
0.4 CAPSULE ORAL NIGHTLY
Status: DISCONTINUED | OUTPATIENT
Start: 2020-01-01 | End: 2020-01-01 | Stop reason: HOSPADM

## 2020-01-01 RX ORDER — ATENOLOL 100 MG/1
TABLET ORAL
Qty: 90 TABLET | Refills: 1 | Status: ON HOLD | OUTPATIENT
Start: 2020-01-01 | End: 2020-01-01 | Stop reason: HOSPADM

## 2020-01-01 RX ORDER — DOPAMINE HYDROCHLORIDE 160 MG/100ML
2.5 INJECTION, SOLUTION INTRAVENOUS CONTINUOUS
Status: DISCONTINUED | OUTPATIENT
Start: 2020-01-01 | End: 2020-01-01

## 2020-01-01 RX ORDER — LIDOCAINE HYDROCHLORIDE 20 MG/ML
INJECTION, SOLUTION INTRAVENOUS PRN
Status: DISCONTINUED | OUTPATIENT
Start: 2020-01-01 | End: 2020-01-01 | Stop reason: SDUPTHER

## 2020-01-01 RX ORDER — HYDROCODONE BITARTRATE AND ACETAMINOPHEN 5; 325 MG/1; MG/1
1 TABLET ORAL EVERY 4 HOURS PRN
Status: DISCONTINUED | OUTPATIENT
Start: 2020-01-01 | End: 2020-01-01 | Stop reason: ALTCHOICE

## 2020-01-01 RX ORDER — CISATRACURIUM BESYLATE 2 MG/ML
10 INJECTION, SOLUTION INTRAVENOUS ONCE
Status: COMPLETED | OUTPATIENT
Start: 2020-01-01 | End: 2020-01-01

## 2020-01-01 RX ORDER — 0.9 % SODIUM CHLORIDE 0.9 %
1000 INTRAVENOUS SOLUTION INTRAVENOUS ONCE
Status: COMPLETED | OUTPATIENT
Start: 2020-01-01 | End: 2020-01-01

## 2020-01-01 RX ORDER — DEXTROSE MONOHYDRATE 50 MG/ML
100 INJECTION, SOLUTION INTRAVENOUS PRN
Status: DISCONTINUED | OUTPATIENT
Start: 2020-01-01 | End: 2020-01-01 | Stop reason: HOSPADM

## 2020-01-01 RX ORDER — AMOXICILLIN AND CLAVULANATE POTASSIUM 875; 125 MG/1; MG/1
1 TABLET, FILM COATED ORAL 2 TIMES DAILY
Qty: 20 TABLET | Refills: 0 | Status: SHIPPED | OUTPATIENT
Start: 2020-01-01 | End: 2020-01-01

## 2020-01-01 RX ORDER — 0.9 % SODIUM CHLORIDE 0.9 %
500 INTRAVENOUS SOLUTION INTRAVENOUS ONCE
Status: DISCONTINUED | OUTPATIENT
Start: 2020-01-01 | End: 2020-01-01 | Stop reason: HOSPADM

## 2020-01-01 RX ORDER — WARFARIN SODIUM 2.5 MG/1
TABLET ORAL EVERY EVENING
Status: ON HOLD | COMMUNITY
End: 2020-01-01 | Stop reason: HOSPADM

## 2020-01-01 RX ORDER — PROMETHAZINE HYDROCHLORIDE 25 MG/1
12.5 TABLET ORAL EVERY 6 HOURS PRN
Status: DISCONTINUED | OUTPATIENT
Start: 2020-01-01 | End: 2020-01-01

## 2020-01-01 RX ORDER — SODIUM CHLORIDE 9 MG/ML
INJECTION, SOLUTION INTRAVENOUS CONTINUOUS
Status: DISCONTINUED | OUTPATIENT
Start: 2020-01-01 | End: 2020-01-01 | Stop reason: HOSPADM

## 2020-01-01 RX ORDER — POTASSIUM CHLORIDE 7.45 MG/ML
10 INJECTION INTRAVENOUS PRN
Status: DISCONTINUED | OUTPATIENT
Start: 2020-01-01 | End: 2020-01-01 | Stop reason: HOSPADM

## 2020-01-01 RX ORDER — DEXAMETHASONE SODIUM PHOSPHATE 4 MG/ML
6 INJECTION, SOLUTION INTRA-ARTICULAR; INTRALESIONAL; INTRAMUSCULAR; INTRAVENOUS; SOFT TISSUE ONCE
Status: COMPLETED | OUTPATIENT
Start: 2020-01-01 | End: 2020-01-01

## 2020-01-01 RX ORDER — WARFARIN SODIUM 5 MG/1
5 TABLET ORAL ONCE
Status: COMPLETED | OUTPATIENT
Start: 2020-01-01 | End: 2020-01-01

## 2020-01-01 RX ORDER — MIDODRINE HYDROCHLORIDE 5 MG/1
5 TABLET ORAL ONCE
Status: DISCONTINUED | OUTPATIENT
Start: 2020-01-01 | End: 2020-01-01

## 2020-01-01 RX ORDER — FINASTERIDE 5 MG/1
5 TABLET, FILM COATED ORAL DAILY
Status: DISCONTINUED | OUTPATIENT
Start: 2020-01-01 | End: 2020-01-01

## 2020-01-01 RX ORDER — SODIUM CHLORIDE, SODIUM LACTATE, POTASSIUM CHLORIDE, AND CALCIUM CHLORIDE .6; .31; .03; .02 G/100ML; G/100ML; G/100ML; G/100ML
500 INJECTION, SOLUTION INTRAVENOUS ONCE
Status: COMPLETED | OUTPATIENT
Start: 2020-01-01 | End: 2020-01-01

## 2020-01-01 RX ORDER — TRAMADOL HYDROCHLORIDE 50 MG/1
TABLET ORAL
COMMUNITY
Start: 2020-01-01

## 2020-01-01 RX ORDER — FINASTERIDE 5 MG/1
5 TABLET, FILM COATED ORAL DAILY
Status: DISCONTINUED | OUTPATIENT
Start: 2020-01-01 | End: 2020-01-01 | Stop reason: HOSPADM

## 2020-01-01 RX ORDER — POLYETHYLENE GLYCOL 3350 17 G/17G
17 POWDER, FOR SOLUTION ORAL DAILY PRN
Status: DISCONTINUED | OUTPATIENT
Start: 2020-01-01 | End: 2020-01-01

## 2020-01-01 RX ORDER — INSULIN GLARGINE 100 [IU]/ML
10 INJECTION, SOLUTION SUBCUTANEOUS ONCE
Status: COMPLETED | OUTPATIENT
Start: 2020-01-01 | End: 2020-01-01

## 2020-01-01 RX ORDER — ONDANSETRON 2 MG/ML
4 INJECTION INTRAMUSCULAR; INTRAVENOUS ONCE
Status: COMPLETED | OUTPATIENT
Start: 2020-01-01 | End: 2020-01-01

## 2020-01-01 RX ORDER — FUROSEMIDE 10 MG/ML
20 INJECTION INTRAMUSCULAR; INTRAVENOUS 2 TIMES DAILY
Status: DISCONTINUED | OUTPATIENT
Start: 2020-01-01 | End: 2020-01-01 | Stop reason: HOSPADM

## 2020-01-01 RX ORDER — LIDOCAINE HCL/PF 100 MG/5ML
SYRINGE (ML) INJECTION PRN
Status: DISCONTINUED | OUTPATIENT
Start: 2020-01-01 | End: 2020-01-01 | Stop reason: SDUPTHER

## 2020-01-01 RX ORDER — ONDANSETRON 2 MG/ML
INJECTION INTRAMUSCULAR; INTRAVENOUS PRN
Status: DISCONTINUED | OUTPATIENT
Start: 2020-01-01 | End: 2020-01-01 | Stop reason: SDUPTHER

## 2020-01-01 RX ORDER — ATENOLOL 100 MG/1
100 TABLET ORAL ONCE
Status: DISCONTINUED | OUTPATIENT
Start: 2020-01-01 | End: 2020-01-01

## 2020-01-01 RX ORDER — SODIUM CHLORIDE 9 MG/ML
INJECTION, SOLUTION INTRAVENOUS ONCE
Status: COMPLETED | OUTPATIENT
Start: 2020-01-01 | End: 2020-01-01

## 2020-01-01 RX ORDER — 0.9 % SODIUM CHLORIDE 0.9 %
250 INTRAVENOUS SOLUTION INTRAVENOUS ONCE
Status: COMPLETED | OUTPATIENT
Start: 2020-01-01 | End: 2020-01-01

## 2020-01-01 RX ORDER — MIDAZOLAM HYDROCHLORIDE 1 MG/ML
INJECTION INTRAMUSCULAR; INTRAVENOUS
Status: DISPENSED
Start: 2020-01-01 | End: 2020-01-01

## 2020-01-01 RX ORDER — LEUCINE, PHENYLALANINE, LYSINE, METHIONINE, ISOLEUCINE, VALINE, HISTIDINE, THREONINE, TRYPTOPHAN, ALANINE, GLYCINE, ARGININE, PROLINE, SERINE, TYROSINE, DEXTROSE 365; 280; 290; 200; 300; 290; 240; 210; 90; 1035; 515; 575; 340; 250; 20; 15 MG/100ML; MG/100ML; MG/100ML; MG/100ML; MG/100ML; MG/100ML; MG/100ML; MG/100ML; MG/100ML; MG/100ML; MG/100ML; MG/100ML; MG/100ML; MG/100ML; MG/100ML; G/100ML
45 INJECTION INTRAVENOUS CONTINUOUS
Status: DISPENSED | OUTPATIENT
Start: 2020-01-01 | End: 2020-01-01

## 2020-01-01 RX ORDER — CLOPIDOGREL BISULFATE 75 MG/1
75 TABLET ORAL DAILY
Status: DISCONTINUED | OUTPATIENT
Start: 2020-01-01 | End: 2020-01-01 | Stop reason: HOSPADM

## 2020-01-01 RX ORDER — LORAZEPAM 2 MG/ML
INJECTION INTRAMUSCULAR
Status: COMPLETED
Start: 2020-01-01 | End: 2020-01-01

## 2020-01-01 RX ORDER — PROMETHAZINE HYDROCHLORIDE 25 MG/ML
12.5 INJECTION, SOLUTION INTRAMUSCULAR; INTRAVENOUS
Status: DISCONTINUED | OUTPATIENT
Start: 2020-01-01 | End: 2020-01-01 | Stop reason: HOSPADM

## 2020-01-01 RX ORDER — ACETAMINOPHEN 650 MG/1
650 SUPPOSITORY RECTAL EVERY 6 HOURS PRN
Status: DISCONTINUED | OUTPATIENT
Start: 2020-01-01 | End: 2020-01-01 | Stop reason: HOSPADM

## 2020-01-01 RX ORDER — DEXAMETHASONE SODIUM PHOSPHATE 4 MG/ML
4 INJECTION, SOLUTION INTRA-ARTICULAR; INTRALESIONAL; INTRAMUSCULAR; INTRAVENOUS; SOFT TISSUE DAILY
Status: COMPLETED | OUTPATIENT
Start: 2020-01-01 | End: 2020-01-01

## 2020-01-01 RX ORDER — INSULIN GLARGINE 100 [IU]/ML
15 INJECTION, SOLUTION SUBCUTANEOUS 2 TIMES DAILY
Status: DISCONTINUED | OUTPATIENT
Start: 2020-01-01 | End: 2020-01-01

## 2020-01-01 RX ORDER — FENTANYL CITRATE 50 UG/ML
100 INJECTION, SOLUTION INTRAMUSCULAR; INTRAVENOUS ONCE
Status: COMPLETED | OUTPATIENT
Start: 2020-01-01 | End: 2020-01-01

## 2020-01-01 RX ORDER — LORAZEPAM 2 MG/ML
4 INJECTION INTRAMUSCULAR ONCE
Status: COMPLETED | OUTPATIENT
Start: 2020-01-01 | End: 2020-01-01

## 2020-01-01 RX ORDER — ACETAMINOPHEN 650 MG/1
650 SUPPOSITORY RECTAL EVERY 6 HOURS PRN
Status: DISCONTINUED | OUTPATIENT
Start: 2020-01-01 | End: 2020-01-01 | Stop reason: SDUPTHER

## 2020-01-01 RX ORDER — FUROSEMIDE 10 MG/ML
40 INJECTION INTRAMUSCULAR; INTRAVENOUS ONCE
Status: COMPLETED | OUTPATIENT
Start: 2020-01-01 | End: 2020-01-01

## 2020-01-01 RX ORDER — ALBUMIN (HUMAN) 12.5 G/50ML
25 SOLUTION INTRAVENOUS
Status: COMPLETED | OUTPATIENT
Start: 2020-01-01 | End: 2020-01-01

## 2020-01-01 RX ORDER — KETAMINE HCL IN NACL, ISO-OSM 100MG/10ML
SYRINGE (ML) INJECTION
Status: COMPLETED
Start: 2020-01-01 | End: 2020-01-01

## 2020-01-01 RX ORDER — PANTOPRAZOLE SODIUM 40 MG/1
40 TABLET, DELAYED RELEASE ORAL 2 TIMES DAILY
Status: DISCONTINUED | OUTPATIENT
Start: 2020-01-01 | End: 2020-01-01

## 2020-01-01 RX ORDER — ISOSORBIDE MONONITRATE 30 MG/1
30 TABLET, EXTENDED RELEASE ORAL DAILY
Status: DISCONTINUED | OUTPATIENT
Start: 2020-01-01 | End: 2020-01-01 | Stop reason: HOSPADM

## 2020-01-01 RX ORDER — NEOSTIGMINE METHYLSULFATE 5 MG/5 ML
SYRINGE (ML) INTRAVENOUS PRN
Status: DISCONTINUED | OUTPATIENT
Start: 2020-01-01 | End: 2020-01-01 | Stop reason: SDUPTHER

## 2020-01-01 RX ORDER — FENTANYL CITRATE 50 UG/ML
INJECTION, SOLUTION INTRAMUSCULAR; INTRAVENOUS
Status: COMPLETED
Start: 2020-01-01 | End: 2020-01-01

## 2020-01-01 RX ORDER — ALBUMIN, HUMAN INJ 5% 5 %
12.5 SOLUTION INTRAVENOUS
Status: COMPLETED | OUTPATIENT
Start: 2020-01-01 | End: 2020-01-01

## 2020-01-01 RX ORDER — MORPHINE SULFATE 2 MG/ML
1 INJECTION, SOLUTION INTRAMUSCULAR; INTRAVENOUS
Status: DISCONTINUED | OUTPATIENT
Start: 2020-01-01 | End: 2020-01-01 | Stop reason: HOSPADM

## 2020-01-01 RX ORDER — CHLORHEXIDINE GLUCONATE 0.12 MG/ML
15 RINSE ORAL 2 TIMES DAILY
Status: DISCONTINUED | OUTPATIENT
Start: 2020-01-01 | End: 2020-01-01 | Stop reason: SDUPTHER

## 2020-01-01 RX ORDER — INSULIN GLARGINE 100 [IU]/ML
15 INJECTION, SOLUTION SUBCUTANEOUS NIGHTLY
Status: DISCONTINUED | OUTPATIENT
Start: 2020-01-01 | End: 2020-01-01

## 2020-01-01 RX ORDER — FUROSEMIDE 10 MG/ML
20 INJECTION INTRAMUSCULAR; INTRAVENOUS 2 TIMES DAILY
Status: DISCONTINUED | OUTPATIENT
Start: 2020-01-01 | End: 2020-01-01

## 2020-01-01 RX ORDER — SODIUM CHLORIDE 9 MG/ML
INJECTION, SOLUTION INTRAVENOUS CONTINUOUS PRN
Status: DISCONTINUED | OUTPATIENT
Start: 2020-01-01 | End: 2020-01-01 | Stop reason: SDUPTHER

## 2020-01-01 RX ORDER — WARFARIN SODIUM 2.5 MG/1
1.25 TABLET ORAL
Status: COMPLETED | OUTPATIENT
Start: 2020-01-01 | End: 2020-01-01

## 2020-01-01 RX ORDER — METFORMIN HYDROCHLORIDE 500 MG/1
500 TABLET, EXTENDED RELEASE ORAL
Status: DISCONTINUED | OUTPATIENT
Start: 2020-01-01 | End: 2020-01-01 | Stop reason: HOSPADM

## 2020-01-01 RX ORDER — DEXTROSE MONOHYDRATE 25 G/50ML
12.5 INJECTION, SOLUTION INTRAVENOUS PRN
Status: DISCONTINUED | OUTPATIENT
Start: 2020-01-01 | End: 2020-01-01 | Stop reason: HOSPADM

## 2020-01-01 RX ORDER — FENTANYL CITRATE 50 UG/ML
25 INJECTION, SOLUTION INTRAMUSCULAR; INTRAVENOUS EVERY 5 MIN PRN
Status: DISCONTINUED | OUTPATIENT
Start: 2020-01-01 | End: 2020-01-01 | Stop reason: HOSPADM

## 2020-01-01 RX ORDER — INSULIN GLARGINE 100 [IU]/ML
30 INJECTION, SOLUTION SUBCUTANEOUS NIGHTLY
Status: DISCONTINUED | OUTPATIENT
Start: 2020-01-01 | End: 2020-01-01 | Stop reason: HOSPADM

## 2020-01-01 RX ORDER — PROPOFOL 10 MG/ML
INJECTION, EMULSION INTRAVENOUS PRN
Status: DISCONTINUED | OUTPATIENT
Start: 2020-01-01 | End: 2020-01-01 | Stop reason: SDUPTHER

## 2020-01-01 RX ORDER — KETAMINE HCL IN NACL, ISO-OSM 100MG/10ML
100 SYRINGE (ML) INJECTION ONCE
Status: COMPLETED | OUTPATIENT
Start: 2020-01-01 | End: 2020-01-01

## 2020-01-01 RX ORDER — MIDODRINE HYDROCHLORIDE 2.5 MG/1
2.5 TABLET ORAL 2 TIMES DAILY WITH MEALS
Status: DISCONTINUED | OUTPATIENT
Start: 2020-01-01 | End: 2020-01-01

## 2020-01-01 RX ORDER — LEVOFLOXACIN 5 MG/ML
750 INJECTION, SOLUTION INTRAVENOUS EVERY 24 HOURS
Status: DISCONTINUED | OUTPATIENT
Start: 2020-01-01 | End: 2020-01-01

## 2020-01-01 RX ORDER — METFORMIN HYDROCHLORIDE 500 MG/1
500 TABLET, EXTENDED RELEASE ORAL
Qty: 90 TABLET | Refills: 1 | Status: ON HOLD | OUTPATIENT
Start: 2020-01-01 | End: 2020-01-01 | Stop reason: HOSPADM

## 2020-01-01 RX ORDER — HYDROCODONE BITARTRATE AND ACETAMINOPHEN 5; 325 MG/1; MG/1
2 TABLET ORAL EVERY 4 HOURS PRN
Status: DISCONTINUED | OUTPATIENT
Start: 2020-01-01 | End: 2020-01-01 | Stop reason: ALTCHOICE

## 2020-01-01 RX ORDER — PHENYLEPHRINE HCL IN 0.9% NACL 1 MG/10 ML
SYRINGE (ML) INTRAVENOUS PRN
Status: DISCONTINUED | OUTPATIENT
Start: 2020-01-01 | End: 2020-01-01 | Stop reason: SDUPTHER

## 2020-01-01 RX ORDER — CHLORHEXIDINE GLUCONATE 0.12 MG/ML
15 RINSE ORAL 2 TIMES DAILY
Status: DISCONTINUED | OUTPATIENT
Start: 2020-01-01 | End: 2020-01-01 | Stop reason: HOSPADM

## 2020-01-01 RX ORDER — TRAMADOL HYDROCHLORIDE 50 MG/1
50 TABLET ORAL EVERY 6 HOURS PRN
Status: DISCONTINUED | OUTPATIENT
Start: 2020-01-01 | End: 2020-01-01 | Stop reason: HOSPADM

## 2020-01-01 RX ORDER — ACETAMINOPHEN 325 MG/1
650 TABLET ORAL EVERY 6 HOURS PRN
Status: DISCONTINUED | OUTPATIENT
Start: 2020-01-01 | End: 2020-01-01

## 2020-01-01 RX ORDER — NOREPINEPHRINE BIT/0.9 % NACL 16MG/250ML
2 INFUSION BOTTLE (ML) INTRAVENOUS CONTINUOUS
Status: DISCONTINUED | OUTPATIENT
Start: 2020-01-01 | End: 2020-01-01 | Stop reason: ALTCHOICE

## 2020-01-01 RX ORDER — ACETAMINOPHEN 325 MG/1
650 TABLET ORAL ONCE
Status: COMPLETED | OUTPATIENT
Start: 2020-01-01 | End: 2020-01-01

## 2020-01-01 RX ORDER — BUDESONIDE 0.5 MG/2ML
1 INHALANT ORAL 2 TIMES DAILY
Qty: 60 AMPULE | Refills: 11 | Status: ON HOLD | OUTPATIENT
Start: 2020-01-01 | End: 2020-01-01 | Stop reason: HOSPADM

## 2020-01-01 RX ORDER — FENTANYL CITRATE 50 UG/ML
25 INJECTION, SOLUTION INTRAMUSCULAR; INTRAVENOUS
Status: DISCONTINUED | OUTPATIENT
Start: 2020-01-01 | End: 2020-01-01

## 2020-01-01 RX ORDER — TRANEXAMIC ACID 100 MG/ML
INJECTION, SOLUTION INTRAVENOUS PRN
Status: DISCONTINUED | OUTPATIENT
Start: 2020-01-01 | End: 2020-01-01 | Stop reason: ALTCHOICE

## 2020-01-01 RX ORDER — CLOPIDOGREL BISULFATE 75 MG/1
75 TABLET ORAL DAILY
Status: ON HOLD | COMMUNITY
End: 2020-01-01 | Stop reason: HOSPADM

## 2020-01-01 RX ORDER — SENNA AND DOCUSATE SODIUM 50; 8.6 MG/1; MG/1
1 TABLET, FILM COATED ORAL 2 TIMES DAILY
Status: DISCONTINUED | OUTPATIENT
Start: 2020-01-01 | End: 2020-01-01 | Stop reason: HOSPADM

## 2020-01-01 RX ORDER — FUROSEMIDE 10 MG/ML
40 INJECTION INTRAMUSCULAR; INTRAVENOUS 2 TIMES DAILY
Status: DISCONTINUED | OUTPATIENT
Start: 2020-01-01 | End: 2020-01-01

## 2020-01-01 RX ORDER — ONDANSETRON 2 MG/ML
4 INJECTION INTRAMUSCULAR; INTRAVENOUS EVERY 6 HOURS PRN
Status: DISCONTINUED | OUTPATIENT
Start: 2020-01-01 | End: 2020-01-01 | Stop reason: SDUPTHER

## 2020-01-01 RX ORDER — PROMETHAZINE HYDROCHLORIDE 25 MG/1
12.5 TABLET ORAL EVERY 6 HOURS PRN
Status: DISCONTINUED | OUTPATIENT
Start: 2020-01-01 | End: 2020-01-01 | Stop reason: HOSPADM

## 2020-01-01 RX ORDER — ISOSORBIDE MONONITRATE 30 MG/1
TABLET, EXTENDED RELEASE ORAL
Status: ON HOLD | COMMUNITY
Start: 2020-01-01 | End: 2020-01-01 | Stop reason: HOSPADM

## 2020-01-01 RX ORDER — LIDOCAINE HYDROCHLORIDE 10 MG/ML
INJECTION, SOLUTION INFILTRATION; PERINEURAL
Status: DISCONTINUED
Start: 2020-01-01 | End: 2020-01-01 | Stop reason: WASHOUT

## 2020-01-01 RX ORDER — PHYTONADIONE 10 MG/ML
10 INJECTION, EMULSION INTRAMUSCULAR; INTRAVENOUS; SUBCUTANEOUS ONCE
Status: DISCONTINUED | OUTPATIENT
Start: 2020-01-01 | End: 2020-01-01 | Stop reason: ALTCHOICE

## 2020-01-01 RX ORDER — ATENOLOL 100 MG/1
TABLET ORAL
Qty: 90 TABLET | Refills: 1 | Status: SHIPPED | OUTPATIENT
Start: 2020-01-01 | End: 2020-01-01 | Stop reason: SDUPTHER

## 2020-01-01 RX ORDER — MORPHINE SULFATE 2 MG/ML
2 INJECTION, SOLUTION INTRAMUSCULAR; INTRAVENOUS EVERY 4 HOURS PRN
Status: DISCONTINUED | OUTPATIENT
Start: 2020-01-01 | End: 2020-01-01 | Stop reason: HOSPADM

## 2020-01-01 RX ORDER — GLYCOPYRROLATE 1 MG/5 ML
SYRINGE (ML) INTRAVENOUS PRN
Status: DISCONTINUED | OUTPATIENT
Start: 2020-01-01 | End: 2020-01-01 | Stop reason: SDUPTHER

## 2020-01-01 RX ORDER — PROMETHAZINE HYDROCHLORIDE 25 MG/ML
12.5 INJECTION, SOLUTION INTRAMUSCULAR; INTRAVENOUS EVERY 6 HOURS PRN
Status: DISCONTINUED | OUTPATIENT
Start: 2020-01-01 | End: 2020-01-01 | Stop reason: HOSPADM

## 2020-01-01 RX ORDER — INSULIN GLARGINE 100 [IU]/ML
10 INJECTION, SOLUTION SUBCUTANEOUS NIGHTLY
Status: DISCONTINUED | OUTPATIENT
Start: 2020-01-01 | End: 2020-01-01

## 2020-01-01 RX ORDER — OXYCODONE HYDROCHLORIDE AND ACETAMINOPHEN 5; 325 MG/1; MG/1
1 TABLET ORAL EVERY 4 HOURS PRN
COMMUNITY
End: 2020-01-01 | Stop reason: ALTCHOICE

## 2020-01-01 RX ORDER — ACETAMINOPHEN 325 MG/1
650 TABLET ORAL EVERY 6 HOURS PRN
Status: DISCONTINUED | OUTPATIENT
Start: 2020-01-01 | End: 2020-01-01 | Stop reason: HOSPADM

## 2020-01-01 RX ORDER — ALBUTEROL SULFATE 2.5 MG/3ML
2.5 SOLUTION RESPIRATORY (INHALATION) EVERY 6 HOURS PRN
Status: DISCONTINUED | OUTPATIENT
Start: 2020-01-01 | End: 2020-01-01 | Stop reason: HOSPADM

## 2020-01-01 RX ORDER — BUDESONIDE 0.5 MG/2ML
0.5 INHALANT ORAL 2 TIMES DAILY
Status: DISCONTINUED | OUTPATIENT
Start: 2020-01-01 | End: 2020-01-01 | Stop reason: HOSPADM

## 2020-01-01 RX ORDER — LIDOCAINE HYDROCHLORIDE AND EPINEPHRINE 10; 10 MG/ML; UG/ML
INJECTION, SOLUTION INFILTRATION; PERINEURAL
Status: COMPLETED
Start: 2020-01-01 | End: 2020-01-01

## 2020-01-01 RX ORDER — METOPROLOL TARTRATE 5 MG/5ML
2.5 INJECTION INTRAVENOUS EVERY 6 HOURS PRN
Status: DISCONTINUED | OUTPATIENT
Start: 2020-01-01 | End: 2020-01-01 | Stop reason: HOSPADM

## 2020-01-01 RX ORDER — FUROSEMIDE 40 MG/1
TABLET ORAL
Qty: 90 TABLET | Refills: 1 | OUTPATIENT
Start: 2020-01-01

## 2020-01-01 RX ORDER — SODIUM CHLORIDE, SODIUM LACTATE, POTASSIUM CHLORIDE, AND CALCIUM CHLORIDE .6; .31; .03; .02 G/100ML; G/100ML; G/100ML; G/100ML
1000 INJECTION, SOLUTION INTRAVENOUS
Status: COMPLETED | OUTPATIENT
Start: 2020-01-01 | End: 2020-01-01

## 2020-01-01 RX ORDER — FINASTERIDE 5 MG/1
5 TABLET, FILM COATED ORAL DAILY
Qty: 90 TABLET | Refills: 1 | Status: ON HOLD | OUTPATIENT
Start: 2020-01-01 | End: 2020-01-01 | Stop reason: HOSPADM

## 2020-01-01 RX ORDER — ARFORMOTEROL TARTRATE 15 UG/2ML
15 SOLUTION RESPIRATORY (INHALATION) 2 TIMES DAILY
Status: DISCONTINUED | OUTPATIENT
Start: 2020-01-01 | End: 2020-01-01 | Stop reason: HOSPADM

## 2020-01-01 RX ORDER — WARFARIN SODIUM 2.5 MG/1
2.5 TABLET ORAL ONCE
Status: COMPLETED | OUTPATIENT
Start: 2020-01-01 | End: 2020-01-01

## 2020-01-01 RX ORDER — ACETAMINOPHEN 325 MG/1
650 TABLET ORAL EVERY 6 HOURS
Status: DISCONTINUED | OUTPATIENT
Start: 2020-01-01 | End: 2020-01-01 | Stop reason: HOSPADM

## 2020-01-01 RX ORDER — SENNA PLUS 8.6 MG/1
1 TABLET ORAL NIGHTLY PRN
Status: DISCONTINUED | OUTPATIENT
Start: 2020-01-01 | End: 2020-01-01 | Stop reason: HOSPADM

## 2020-01-01 RX ORDER — METFORMIN HYDROCHLORIDE 500 MG/1
500 TABLET, EXTENDED RELEASE ORAL
COMMUNITY
End: 2020-01-01 | Stop reason: SDUPTHER

## 2020-01-01 RX ORDER — SUCCINYLCHOLINE/SOD CL,ISO/PF 200MG/10ML
SYRINGE (ML) INTRAVENOUS PRN
Status: DISCONTINUED | OUTPATIENT
Start: 2020-01-01 | End: 2020-01-01 | Stop reason: SDUPTHER

## 2020-01-01 RX ORDER — FENTANYL CITRATE 50 UG/ML
INJECTION, SOLUTION INTRAMUSCULAR; INTRAVENOUS
Status: DISPENSED
Start: 2020-01-01 | End: 2020-01-01

## 2020-01-01 RX ADMIN — FUROSEMIDE 20 MG: 10 INJECTION, SOLUTION INTRAMUSCULAR; INTRAVENOUS at 09:08

## 2020-01-01 RX ADMIN — PANTOPRAZOLE SODIUM 40 MG: 40 INJECTION, POWDER, FOR SOLUTION INTRAVENOUS at 08:26

## 2020-01-01 RX ADMIN — FENTANYL CITRATE 25 MCG: 50 INJECTION, SOLUTION INTRAMUSCULAR; INTRAVENOUS at 20:51

## 2020-01-01 RX ADMIN — ARFORMOTEROL TARTRATE 15 MCG: 15 SOLUTION RESPIRATORY (INHALATION) at 17:49

## 2020-01-01 RX ADMIN — ACETAMINOPHEN 650 MG: 325 TABLET ORAL at 01:25

## 2020-01-01 RX ADMIN — INSULIN GLARGINE 15 UNITS: 100 INJECTION, SOLUTION SUBCUTANEOUS at 09:15

## 2020-01-01 RX ADMIN — PIPERACILLIN AND TAZOBACTAM 3.38 G: 3; .375 INJECTION, POWDER, LYOPHILIZED, FOR SOLUTION INTRAVENOUS at 21:09

## 2020-01-01 RX ADMIN — Medication 10 ML: at 20:09

## 2020-01-01 RX ADMIN — Medication 10 ML: at 07:46

## 2020-01-01 RX ADMIN — TAMSULOSIN HYDROCHLORIDE 0.4 MG: 0.4 CAPSULE ORAL at 04:15

## 2020-01-01 RX ADMIN — FUROSEMIDE 20 MG: 10 INJECTION, SOLUTION INTRAMUSCULAR; INTRAVENOUS at 08:58

## 2020-01-01 RX ADMIN — IOPAMIDOL 80 ML: 755 INJECTION, SOLUTION INTRAVENOUS at 14:38

## 2020-01-01 RX ADMIN — POLYETHYLENE GLYCOL 3350 17 G: 17 POWDER, FOR SOLUTION ORAL at 08:25

## 2020-01-01 RX ADMIN — ACETAMINOPHEN 650 MG: 325 TABLET ORAL at 12:44

## 2020-01-01 RX ADMIN — BUDESONIDE 500 MCG: 0.5 INHALANT RESPIRATORY (INHALATION) at 06:07

## 2020-01-01 RX ADMIN — INSULIN GLARGINE 15 UNITS: 100 INJECTION, SOLUTION SUBCUTANEOUS at 08:43

## 2020-01-01 RX ADMIN — INSULIN LISPRO 4 UNITS: 100 INJECTION, SOLUTION INTRAVENOUS; SUBCUTANEOUS at 12:22

## 2020-01-01 RX ADMIN — DEXAMETHASONE SODIUM PHOSPHATE 6 MG: 4 INJECTION, SOLUTION INTRAMUSCULAR; INTRAVENOUS at 08:26

## 2020-01-01 RX ADMIN — LEVOFLOXACIN 750 MG: 750 INJECTION, SOLUTION INTRAVENOUS at 08:28

## 2020-01-01 RX ADMIN — ALBUMIN (HUMAN) 25 G: 0.25 INJECTION, SOLUTION INTRAVENOUS at 00:54

## 2020-01-01 RX ADMIN — Medication 10 ML: at 08:36

## 2020-01-01 RX ADMIN — INSULIN GLARGINE 15 UNITS: 100 INJECTION, SOLUTION SUBCUTANEOUS at 21:30

## 2020-01-01 RX ADMIN — LEVOFLOXACIN 750 MG: 750 INJECTION, SOLUTION INTRAVENOUS at 09:06

## 2020-01-01 RX ADMIN — PANTOPRAZOLE SODIUM 40 MG: 40 INJECTION, POWDER, FOR SOLUTION INTRAVENOUS at 08:20

## 2020-01-01 RX ADMIN — ARFORMOTEROL TARTRATE 15 MCG: 15 SOLUTION RESPIRATORY (INHALATION) at 20:44

## 2020-01-01 RX ADMIN — CLOPIDOGREL BISULFATE 75 MG: 75 TABLET ORAL at 07:21

## 2020-01-01 RX ADMIN — Medication 10 ML: at 08:43

## 2020-01-01 RX ADMIN — HYDROCORTISONE SODIUM SUCCINATE 50 MG: 100 INJECTION, POWDER, FOR SOLUTION INTRAMUSCULAR; INTRAVENOUS at 08:26

## 2020-01-01 RX ADMIN — ACETAMINOPHEN 650 MG: 325 TABLET ORAL at 06:20

## 2020-01-01 RX ADMIN — SODIUM CHLORIDE: 9 INJECTION, SOLUTION INTRAVENOUS at 02:21

## 2020-01-01 RX ADMIN — Medication 10 ML: at 21:03

## 2020-01-01 RX ADMIN — FENTANYL CITRATE 50 MCG: 50 INJECTION, SOLUTION INTRAMUSCULAR; INTRAVENOUS at 04:47

## 2020-01-01 RX ADMIN — POTASSIUM PHOSPHATE, MONOBASIC POTASSIUM PHOSPHATE, DIBASIC 10 MMOL: 224; 236 INJECTION, SOLUTION, CONCENTRATE INTRAVENOUS at 08:44

## 2020-01-01 RX ADMIN — Medication 10 ML: at 20:56

## 2020-01-01 RX ADMIN — AMIODARONE HYDROCHLORIDE 1 MG/MIN: 1.8 INJECTION, SOLUTION INTRAVENOUS at 01:58

## 2020-01-01 RX ADMIN — LEVOFLOXACIN 750 MG: 750 INJECTION, SOLUTION INTRAVENOUS at 08:14

## 2020-01-01 RX ADMIN — BUDESONIDE 500 MCG: 0.5 INHALANT RESPIRATORY (INHALATION) at 08:27

## 2020-01-01 RX ADMIN — ARFORMOTEROL TARTRATE 15 MCG: 15 SOLUTION RESPIRATORY (INHALATION) at 08:04

## 2020-01-01 RX ADMIN — Medication 10 ML: at 21:15

## 2020-01-01 RX ADMIN — Medication 15 ML: at 08:30

## 2020-01-01 RX ADMIN — INSULIN GLARGINE 10 UNITS: 100 INJECTION, SOLUTION SUBCUTANEOUS at 07:27

## 2020-01-01 RX ADMIN — DEXAMETHASONE SODIUM PHOSPHATE 4 MG: 4 INJECTION, SOLUTION INTRAMUSCULAR; INTRAVENOUS at 08:08

## 2020-01-01 RX ADMIN — HYDROCORTISONE SODIUM SUCCINATE 50 MG: 100 INJECTION, POWDER, FOR SOLUTION INTRAMUSCULAR; INTRAVENOUS at 21:02

## 2020-01-01 RX ADMIN — Medication 10 ML: at 20:53

## 2020-01-01 RX ADMIN — BARIUM SULFATE 5 ML: 0.81 POWDER, FOR SUSPENSION ORAL at 10:06

## 2020-01-01 RX ADMIN — FUROSEMIDE 40 MG: 10 INJECTION, SOLUTION INTRAMUSCULAR; INTRAVENOUS at 12:19

## 2020-01-01 RX ADMIN — DEXAMETHASONE SODIUM PHOSPHATE 4 MG: 4 INJECTION, SOLUTION INTRAMUSCULAR; INTRAVENOUS at 09:03

## 2020-01-01 RX ADMIN — Medication 10 ML: at 22:29

## 2020-01-01 RX ADMIN — BARIUM SULFATE 5 ML: 400 SUSPENSION ORAL at 10:05

## 2020-01-01 RX ADMIN — FINASTERIDE 5 MG: 5 TABLET, FILM COATED ORAL at 07:46

## 2020-01-01 RX ADMIN — FAMOTIDINE 20 MG: 10 INJECTION INTRAVENOUS at 20:51

## 2020-01-01 RX ADMIN — INSULIN LISPRO 4 UNITS: 100 INJECTION, SOLUTION INTRAVENOUS; SUBCUTANEOUS at 00:09

## 2020-01-01 RX ADMIN — GLYCOPYRROLATE AND FORMOTEROL FUMARATE 2 PUFF: 9; 4.8 AEROSOL, METERED RESPIRATORY (INHALATION) at 17:40

## 2020-01-01 RX ADMIN — PHENYLEPHRINE HYDROCHLORIDE 100 MCG: 10 INJECTION INTRAVENOUS at 17:27

## 2020-01-01 RX ADMIN — INSULIN GLARGINE 15 UNITS: 100 INJECTION, SOLUTION SUBCUTANEOUS at 20:21

## 2020-01-01 RX ADMIN — Medication 15 ML: at 21:28

## 2020-01-01 RX ADMIN — ATENOLOL 100 MG: 100 TABLET ORAL at 08:36

## 2020-01-01 RX ADMIN — Medication 15 ML: at 08:37

## 2020-01-01 RX ADMIN — VASOPRESSIN 0.04 UNITS/MIN: 20 INJECTION INTRAVENOUS at 07:42

## 2020-01-01 RX ADMIN — ARFORMOTEROL TARTRATE 15 MCG: 15 SOLUTION RESPIRATORY (INHALATION) at 08:55

## 2020-01-01 RX ADMIN — SODIUM CHLORIDE 1000 ML: 9 INJECTION, SOLUTION INTRAVENOUS at 20:45

## 2020-01-01 RX ADMIN — DEXMEDETOMIDINE 0.4 MCG/KG/HR: 100 INJECTION, SOLUTION, CONCENTRATE INTRAVENOUS at 16:47

## 2020-01-01 RX ADMIN — FAMOTIDINE 20 MG: 10 INJECTION INTRAVENOUS at 20:52

## 2020-01-01 RX ADMIN — PHENYLEPHRINE HYDROCHLORIDE 200 MCG: 10 INJECTION INTRAVENOUS at 17:34

## 2020-01-01 RX ADMIN — FUROSEMIDE 20 MG: 10 INJECTION, SOLUTION INTRAMUSCULAR; INTRAVENOUS at 08:41

## 2020-01-01 RX ADMIN — LIDOCAINE HYDROCHLORIDE,EPINEPHRINE BITARTRATE 20 ML: 10; .01 INJECTION, SOLUTION INFILTRATION; PERINEURAL at 14:56

## 2020-01-01 RX ADMIN — DEXMEDETOMIDINE 0.4 MCG/KG/HR: 100 INJECTION, SOLUTION, CONCENTRATE INTRAVENOUS at 17:47

## 2020-01-01 RX ADMIN — SODIUM CHLORIDE: 9 INJECTION, SOLUTION INTRAVENOUS at 16:46

## 2020-01-01 RX ADMIN — Medication 15 ML: at 08:08

## 2020-01-01 RX ADMIN — Medication 100 MCG: at 02:21

## 2020-01-01 RX ADMIN — PROPOFOL 30 MCG/KG/MIN: 10 INJECTION, EMULSION INTRAVENOUS at 06:46

## 2020-01-01 RX ADMIN — BARIUM SULFATE 5 ML: 400 PASTE ORAL at 10:06

## 2020-01-01 RX ADMIN — CLOPIDOGREL BISULFATE 75 MG: 75 TABLET ORAL at 08:24

## 2020-01-01 RX ADMIN — INSULIN LISPRO 4 UNITS: 100 INJECTION, SOLUTION INTRAVENOUS; SUBCUTANEOUS at 18:27

## 2020-01-01 RX ADMIN — BUDESONIDE 500 MCG: 0.5 INHALANT RESPIRATORY (INHALATION) at 08:04

## 2020-01-01 RX ADMIN — FINASTERIDE 5 MG: 5 TABLET, FILM COATED ORAL at 08:26

## 2020-01-01 RX ADMIN — LEUCINE, PHENYLALANINE, LYSINE, METHIONINE, ISOLEUCINE, VALINE, HISTIDINE, THREONINE, TRYPTOPHAN, ALANINE, GLYCINE, ARGININE, PROLINE, SERINE, TYROSINE, DEXTROSE 45 ML/HR: 365; 280; 290; 200; 300; 290; 240; 210; 90; 1035; 515; 575; 340; 250; 20; 15 INJECTION INTRAVENOUS at 17:10

## 2020-01-01 RX ADMIN — Medication 10 MG: at 16:06

## 2020-01-01 RX ADMIN — DEXMEDETOMIDINE 0.4 MCG/KG/HR: 100 INJECTION, SOLUTION, CONCENTRATE INTRAVENOUS at 04:40

## 2020-01-01 RX ADMIN — FENTANYL CITRATE 100 MCG: 50 INJECTION, SOLUTION INTRAMUSCULAR; INTRAVENOUS at 14:22

## 2020-01-01 RX ADMIN — GLYCOPYRROLATE AND FORMOTEROL FUMARATE 2 PUFF: 9; 4.8 AEROSOL, METERED RESPIRATORY (INHALATION) at 08:02

## 2020-01-01 RX ADMIN — GLYCOPYRROLATE AND FORMOTEROL FUMARATE 2 PUFF: 9; 4.8 AEROSOL, METERED RESPIRATORY (INHALATION) at 20:29

## 2020-01-01 RX ADMIN — PHENYLEPHRINE HYDROCHLORIDE 100 MCG: 10 INJECTION INTRAVENOUS at 09:41

## 2020-01-01 RX ADMIN — INSULIN GLARGINE 30 UNITS: 100 INJECTION, SOLUTION SUBCUTANEOUS at 20:20

## 2020-01-01 RX ADMIN — CEFAZOLIN 2 G: 1 INJECTION, POWDER, FOR SOLUTION INTRAMUSCULAR; INTRAVENOUS at 14:34

## 2020-01-01 RX ADMIN — SODIUM CHLORIDE: 9 INJECTION, SOLUTION INTRAVENOUS at 05:07

## 2020-01-01 RX ADMIN — SENNOSIDES AND DOCUSATE SODIUM 1 TABLET: 8.6; 5 TABLET ORAL at 19:58

## 2020-01-01 RX ADMIN — GLYCOPYRROLATE AND FORMOTEROL FUMARATE 2 PUFF: 9; 4.8 AEROSOL, METERED RESPIRATORY (INHALATION) at 10:10

## 2020-01-01 RX ADMIN — AMIODARONE HYDROCHLORIDE 0.5 MG/MIN: 1.8 INJECTION, SOLUTION INTRAVENOUS at 03:49

## 2020-01-01 RX ADMIN — FINASTERIDE 5 MG: 5 TABLET, FILM COATED ORAL at 09:23

## 2020-01-01 RX ADMIN — PANTOPRAZOLE SODIUM 40 MG: 40 INJECTION, POWDER, FOR SOLUTION INTRAVENOUS at 21:53

## 2020-01-01 RX ADMIN — HYDROCORTISONE SODIUM SUCCINATE 50 MG: 100 INJECTION, POWDER, FOR SOLUTION INTRAMUSCULAR; INTRAVENOUS at 20:53

## 2020-01-01 RX ADMIN — SODIUM CHLORIDE: 9 INJECTION, SOLUTION INTRAVENOUS at 14:40

## 2020-01-01 RX ADMIN — SODIUM CHLORIDE: 9 INJECTION, SOLUTION INTRAVENOUS at 06:15

## 2020-01-01 RX ADMIN — LORAZEPAM 4 MG: 2 INJECTION INTRAMUSCULAR at 14:22

## 2020-01-01 RX ADMIN — MORPHINE SULFATE 2 MG: 2 INJECTION, SOLUTION INTRAMUSCULAR; INTRAVENOUS at 19:15

## 2020-01-01 RX ADMIN — PROPOFOL 20 MCG/KG/MIN: 10 INJECTION, EMULSION INTRAVENOUS at 06:32

## 2020-01-01 RX ADMIN — PHENYLEPHRINE HYDROCHLORIDE 200 MCG: 10 INJECTION INTRAVENOUS at 10:30

## 2020-01-01 RX ADMIN — Medication 15 ML: at 20:27

## 2020-01-01 RX ADMIN — Medication 15 ML: at 09:56

## 2020-01-01 RX ADMIN — INSULIN LISPRO 10 UNITS: 100 INJECTION, SOLUTION INTRAVENOUS; SUBCUTANEOUS at 06:38

## 2020-01-01 RX ADMIN — Medication 100 MCG: at 02:17

## 2020-01-01 RX ADMIN — PHENYLEPHRINE HYDROCHLORIDE 200 MCG: 10 INJECTION INTRAVENOUS at 17:15

## 2020-01-01 RX ADMIN — Medication 15 ML: at 08:00

## 2020-01-01 RX ADMIN — INSULIN LISPRO 2 UNITS: 100 INJECTION, SOLUTION INTRAVENOUS; SUBCUTANEOUS at 05:58

## 2020-01-01 RX ADMIN — PROPOFOL 100 MG: 10 INJECTION, EMULSION INTRAVENOUS at 16:16

## 2020-01-01 RX ADMIN — INSULIN LISPRO 2 UNITS: 100 INJECTION, SOLUTION INTRAVENOUS; SUBCUTANEOUS at 01:04

## 2020-01-01 RX ADMIN — PIPERACILLIN AND TAZOBACTAM 3.38 G: 3; .375 INJECTION, POWDER, LYOPHILIZED, FOR SOLUTION INTRAVENOUS at 13:03

## 2020-01-01 RX ADMIN — Medication 15 ML: at 08:42

## 2020-01-01 RX ADMIN — DEXAMETHASONE SODIUM PHOSPHATE 6 MG: 4 INJECTION, SOLUTION INTRAMUSCULAR; INTRAVENOUS at 08:14

## 2020-01-01 RX ADMIN — ENOXAPARIN SODIUM 70 MG: 80 INJECTION SUBCUTANEOUS at 09:11

## 2020-01-01 RX ADMIN — FAMOTIDINE 20 MG: 10 INJECTION INTRAVENOUS at 20:21

## 2020-01-01 RX ADMIN — DEXTROSE MONOHYDRATE 200 MG: 50 INJECTION, SOLUTION INTRAVENOUS at 17:19

## 2020-01-01 RX ADMIN — PHENYLEPHRINE HYDROCHLORIDE 200 MCG: 10 INJECTION INTRAVENOUS at 16:18

## 2020-01-01 RX ADMIN — CEFAZOLIN 2000 MG: 1 INJECTION, POWDER, FOR SOLUTION INTRAMUSCULAR; INTRAVENOUS; PARENTERAL at 09:59

## 2020-01-01 RX ADMIN — Medication 10 ML: at 08:08

## 2020-01-01 RX ADMIN — HYDROMORPHONE HYDROCHLORIDE 0.5 MG: 1 INJECTION, SOLUTION INTRAMUSCULAR; INTRAVENOUS; SUBCUTANEOUS at 08:23

## 2020-01-01 RX ADMIN — CEFTRIAXONE SODIUM 1 G: 1 INJECTION, POWDER, FOR SOLUTION INTRAMUSCULAR; INTRAVENOUS at 14:38

## 2020-01-01 RX ADMIN — Medication 10 ML: at 20:13

## 2020-01-01 RX ADMIN — TAMSULOSIN HYDROCHLORIDE 0.4 MG: 0.4 CAPSULE ORAL at 20:27

## 2020-01-01 RX ADMIN — AMIODARONE HYDROCHLORIDE 0.5 MG/MIN: 1.8 INJECTION, SOLUTION INTRAVENOUS at 15:02

## 2020-01-01 RX ADMIN — TAMSULOSIN HYDROCHLORIDE 0.4 MG: 0.4 CAPSULE ORAL at 22:27

## 2020-01-01 RX ADMIN — ACETAMINOPHEN 650 MG: 325 TABLET ORAL at 06:34

## 2020-01-01 RX ADMIN — INSULIN LISPRO 12 UNITS: 100 INJECTION, SOLUTION INTRAVENOUS; SUBCUTANEOUS at 06:10

## 2020-01-01 RX ADMIN — Medication 10 ML: at 21:31

## 2020-01-01 RX ADMIN — ARFORMOTEROL TARTRATE 15 MCG: 15 SOLUTION RESPIRATORY (INHALATION) at 09:45

## 2020-01-01 RX ADMIN — INSULIN LISPRO 4 UNITS: 100 INJECTION, SOLUTION INTRAVENOUS; SUBCUTANEOUS at 18:01

## 2020-01-01 RX ADMIN — GLYCOPYRROLATE AND FORMOTEROL FUMARATE 2 PUFF: 9; 4.8 AEROSOL, METERED RESPIRATORY (INHALATION) at 21:02

## 2020-01-01 RX ADMIN — FAMOTIDINE 20 MG: 10 INJECTION INTRAVENOUS at 08:45

## 2020-01-01 RX ADMIN — Medication 15 ML: at 07:46

## 2020-01-01 RX ADMIN — Medication 15 ML: at 08:14

## 2020-01-01 RX ADMIN — PROPOFOL 20 MCG/KG/MIN: 10 INJECTION, EMULSION INTRAVENOUS at 04:53

## 2020-01-01 RX ADMIN — PHENYLEPHRINE HYDROCHLORIDE 200 MCG: 10 INJECTION INTRAVENOUS at 15:53

## 2020-01-01 RX ADMIN — VASOPRESSIN 0.04 UNITS/MIN: 20 INJECTION INTRAVENOUS at 01:03

## 2020-01-01 RX ADMIN — ARFORMOTEROL TARTRATE 15 MCG: 15 SOLUTION RESPIRATORY (INHALATION) at 08:36

## 2020-01-01 RX ADMIN — PHENYLEPHRINE HYDROCHLORIDE 100 MCG: 10 INJECTION INTRAVENOUS at 09:46

## 2020-01-01 RX ADMIN — PROPOFOL 60 MG: 10 INJECTION, EMULSION INTRAVENOUS at 15:53

## 2020-01-01 RX ADMIN — FENTANYL CITRATE 50 MCG: 50 INJECTION, SOLUTION INTRAMUSCULAR; INTRAVENOUS at 16:05

## 2020-01-01 RX ADMIN — PIPERACILLIN AND TAZOBACTAM 3.38 G: 3; .375 INJECTION, POWDER, LYOPHILIZED, FOR SOLUTION INTRAVENOUS at 05:03

## 2020-01-01 RX ADMIN — OXYCODONE 5 MG: 5 TABLET ORAL at 02:25

## 2020-01-01 RX ADMIN — FINASTERIDE 5 MG: 5 TABLET, FILM COATED ORAL at 08:12

## 2020-01-01 RX ADMIN — DEXAMETHASONE SODIUM PHOSPHATE 4 MG: 4 INJECTION, SOLUTION INTRAMUSCULAR; INTRAVENOUS at 08:42

## 2020-01-01 RX ADMIN — DIATRIZOATE MEGLUMINE AND DIATRIZOATE SODIUM 30 ML: 600; 100 SOLUTION ORAL; RECTAL at 15:14

## 2020-01-01 RX ADMIN — Medication 15 ML: at 21:01

## 2020-01-01 RX ADMIN — VASOPRESSIN 0.04 UNITS/MIN: 20 INJECTION INTRAVENOUS at 06:32

## 2020-01-01 RX ADMIN — PIPERACILLIN AND TAZOBACTAM 3.38 G: 3; .375 INJECTION, POWDER, LYOPHILIZED, FOR SOLUTION INTRAVENOUS at 04:57

## 2020-01-01 RX ADMIN — INSULIN LISPRO 2 UNITS: 100 INJECTION, SOLUTION INTRAVENOUS; SUBCUTANEOUS at 13:52

## 2020-01-01 RX ADMIN — PANTOPRAZOLE SODIUM 40 MG: 40 INJECTION, POWDER, FOR SOLUTION INTRAVENOUS at 20:56

## 2020-01-01 RX ADMIN — INSULIN LISPRO 4 UNITS: 100 INJECTION, SOLUTION INTRAVENOUS; SUBCUTANEOUS at 19:33

## 2020-01-01 RX ADMIN — ARFORMOTEROL TARTRATE 15 MCG: 15 SOLUTION RESPIRATORY (INHALATION) at 21:09

## 2020-01-01 RX ADMIN — SODIUM CHLORIDE: 234 INJECTION INTRAMUSCULAR; INTRAVENOUS; SUBCUTANEOUS at 18:03

## 2020-01-01 RX ADMIN — ACETAMINOPHEN 650 MG: 325 TABLET ORAL at 12:21

## 2020-01-01 RX ADMIN — ONDANSETRON 4 MG: 2 INJECTION INTRAMUSCULAR; INTRAVENOUS at 12:13

## 2020-01-01 RX ADMIN — Medication 25 MCG/HR: at 19:32

## 2020-01-01 RX ADMIN — ETOMIDATE 20 MG: 2 INJECTION INTRAVENOUS at 02:04

## 2020-01-01 RX ADMIN — GLYCOPYRROLATE AND FORMOTEROL FUMARATE 2 PUFF: 9; 4.8 AEROSOL, METERED RESPIRATORY (INHALATION) at 10:49

## 2020-01-01 RX ADMIN — ACETAMINOPHEN 650 MG: 325 TABLET ORAL at 08:14

## 2020-01-01 RX ADMIN — CLOPIDOGREL BISULFATE 75 MG: 75 TABLET ORAL at 18:22

## 2020-01-01 RX ADMIN — PANTOPRAZOLE SODIUM 40 MG: 40 INJECTION, POWDER, FOR SOLUTION INTRAVENOUS at 08:14

## 2020-01-01 RX ADMIN — INSULIN LISPRO 8 UNITS: 100 INJECTION, SOLUTION INTRAVENOUS; SUBCUTANEOUS at 11:49

## 2020-01-01 RX ADMIN — METFORMIN HYDROCHLORIDE 500 MG: 500 TABLET, EXTENDED RELEASE ORAL at 08:23

## 2020-01-01 RX ADMIN — ONDANSETRON 4 MG: 2 INJECTION INTRAMUSCULAR; INTRAVENOUS at 18:22

## 2020-01-01 RX ADMIN — ISOSORBIDE MONONITRATE 30 MG: 30 TABLET ORAL at 08:36

## 2020-01-01 RX ADMIN — BUDESONIDE 500 MCG: 0.5 INHALANT RESPIRATORY (INHALATION) at 17:49

## 2020-01-01 RX ADMIN — BARIUM SULFATE 3 ML: 400 SUSPENSION ORAL at 10:05

## 2020-01-01 RX ADMIN — DEXTROSE 50 % IN WATER (D50W) INTRAVENOUS SYRINGE 12.5 G: at 18:05

## 2020-01-01 RX ADMIN — Medication 10 ML: at 21:27

## 2020-01-01 RX ADMIN — VASOPRESSIN 0.04 UNITS/MIN: 20 INJECTION INTRAVENOUS at 06:43

## 2020-01-01 RX ADMIN — FAMOTIDINE 20 MG: 10 INJECTION INTRAVENOUS at 21:14

## 2020-01-01 RX ADMIN — FAMOTIDINE 20 MG: 10 INJECTION INTRAVENOUS at 20:22

## 2020-01-01 RX ADMIN — BUDESONIDE 500 MCG: 0.5 INHALANT RESPIRATORY (INHALATION) at 18:52

## 2020-01-01 RX ADMIN — METOPROLOL TARTRATE 2.5 MG: 1 INJECTION, SOLUTION INTRAVENOUS at 15:22

## 2020-01-01 RX ADMIN — BUDESONIDE 500 MCG: 0.5 INHALANT RESPIRATORY (INHALATION) at 20:33

## 2020-01-01 RX ADMIN — Medication 15 ML: at 08:55

## 2020-01-01 RX ADMIN — INSULIN LISPRO 4 UNITS: 100 INJECTION, SOLUTION INTRAVENOUS; SUBCUTANEOUS at 17:57

## 2020-01-01 RX ADMIN — ACETAMINOPHEN 650 MG: 325 TABLET ORAL at 10:16

## 2020-01-01 RX ADMIN — FAMOTIDINE 20 MG: 10 INJECTION INTRAVENOUS at 21:06

## 2020-01-01 RX ADMIN — PIPERACILLIN AND TAZOBACTAM 3.38 G: 3; .375 INJECTION, POWDER, LYOPHILIZED, FOR SOLUTION INTRAVENOUS at 21:01

## 2020-01-01 RX ADMIN — Medication 10 ML: at 07:59

## 2020-01-01 RX ADMIN — FUROSEMIDE 20 MG: 10 INJECTION, SOLUTION INTRAMUSCULAR; INTRAVENOUS at 18:37

## 2020-01-01 RX ADMIN — INSULIN LISPRO 6 UNITS: 100 INJECTION, SOLUTION INTRAVENOUS; SUBCUTANEOUS at 18:30

## 2020-01-01 RX ADMIN — DEXAMETHASONE SODIUM PHOSPHATE 4 MG: 4 INJECTION, SOLUTION INTRAMUSCULAR; INTRAVENOUS at 09:18

## 2020-01-01 RX ADMIN — FAMOTIDINE 20 MG: 10 INJECTION INTRAVENOUS at 09:44

## 2020-01-01 RX ADMIN — GLYCOPYRROLATE AND FORMOTEROL FUMARATE 2 PUFF: 9; 4.8 AEROSOL, METERED RESPIRATORY (INHALATION) at 21:53

## 2020-01-01 RX ADMIN — ACETAMINOPHEN 650 MG: 650 SUPPOSITORY RECTAL at 16:08

## 2020-01-01 RX ADMIN — Medication 10 ML: at 17:48

## 2020-01-01 RX ADMIN — NOREPINEPHRINE BITARTRATE 2 MCG/MIN: 1 INJECTION, SOLUTION, CONCENTRATE INTRAVENOUS at 02:40

## 2020-01-01 RX ADMIN — HYDROCORTISONE SODIUM SUCCINATE 50 MG: 100 INJECTION, POWDER, FOR SOLUTION INTRAMUSCULAR; INTRAVENOUS at 02:40

## 2020-01-01 RX ADMIN — PROPOFOL 10 MCG/KG/MIN: 10 INJECTION, EMULSION INTRAVENOUS at 02:16

## 2020-01-01 RX ADMIN — ROCURONIUM BROMIDE 20 MG: 10 INJECTION INTRAVENOUS at 02:19

## 2020-01-01 RX ADMIN — BUDESONIDE 500 MCG: 0.5 INHALANT RESPIRATORY (INHALATION) at 08:00

## 2020-01-01 RX ADMIN — Medication 15 ML: at 20:15

## 2020-01-01 RX ADMIN — ARFORMOTEROL TARTRATE 15 MCG: 15 SOLUTION RESPIRATORY (INHALATION) at 19:55

## 2020-01-01 RX ADMIN — HYDROCORTISONE SODIUM SUCCINATE 50 MG: 100 INJECTION, POWDER, FOR SOLUTION INTRAMUSCULAR; INTRAVENOUS at 03:56

## 2020-01-01 RX ADMIN — CEFAZOLIN 1000 MG: 1 INJECTION, POWDER, FOR SOLUTION INTRAMUSCULAR; INTRAVENOUS; PARENTERAL at 16:38

## 2020-01-01 RX ADMIN — Medication 50 MCG/HR: at 08:28

## 2020-01-01 RX ADMIN — DEXAMETHASONE SODIUM PHOSPHATE 6 MG: 4 INJECTION, SOLUTION INTRAMUSCULAR; INTRAVENOUS at 13:05

## 2020-01-01 RX ADMIN — INSULIN LISPRO 2 UNITS: 100 INJECTION, SOLUTION INTRAVENOUS; SUBCUTANEOUS at 13:55

## 2020-01-01 RX ADMIN — Medication 10 ML: at 08:04

## 2020-01-01 RX ADMIN — INSULIN LISPRO 1 UNITS: 100 INJECTION, SOLUTION INTRAVENOUS; SUBCUTANEOUS at 20:48

## 2020-01-01 RX ADMIN — PROPOFOL 20 MCG/KG/MIN: 10 INJECTION, EMULSION INTRAVENOUS at 14:40

## 2020-01-01 RX ADMIN — SODIUM CHLORIDE: 9 INJECTION, SOLUTION INTRAVENOUS at 20:57

## 2020-01-01 RX ADMIN — ONDANSETRON 4 MG: 2 INJECTION INTRAMUSCULAR; INTRAVENOUS at 23:14

## 2020-01-01 RX ADMIN — METFORMIN HYDROCHLORIDE 500 MG: 500 TABLET, EXTENDED RELEASE ORAL at 08:14

## 2020-01-01 RX ADMIN — PHENYLEPHRINE HYDROCHLORIDE 100 MCG: 10 INJECTION INTRAVENOUS at 16:57

## 2020-01-01 RX ADMIN — GLYCOPYRROLATE AND FORMOTEROL FUMARATE 2 PUFF: 9; 4.8 AEROSOL, METERED RESPIRATORY (INHALATION) at 08:37

## 2020-01-01 RX ADMIN — GLYCOPYRROLATE AND FORMOTEROL FUMARATE 2 PUFF: 9; 4.8 AEROSOL, METERED RESPIRATORY (INHALATION) at 09:24

## 2020-01-01 RX ADMIN — OXYCODONE 5 MG: 5 TABLET ORAL at 11:50

## 2020-01-01 RX ADMIN — FUROSEMIDE 20 MG: 10 INJECTION, SOLUTION INTRAMUSCULAR; INTRAVENOUS at 20:03

## 2020-01-01 RX ADMIN — ARFORMOTEROL TARTRATE 15 MCG: 15 SOLUTION RESPIRATORY (INHALATION) at 08:27

## 2020-01-01 RX ADMIN — INSULIN LISPRO 10 UNITS: 100 INJECTION, SOLUTION INTRAVENOUS; SUBCUTANEOUS at 00:21

## 2020-01-01 RX ADMIN — INSULIN LISPRO 6 UNITS: 100 INJECTION, SOLUTION INTRAVENOUS; SUBCUTANEOUS at 12:56

## 2020-01-01 RX ADMIN — INSULIN LISPRO 2 UNITS: 100 INJECTION, SOLUTION INTRAVENOUS; SUBCUTANEOUS at 16:01

## 2020-01-01 RX ADMIN — Medication 15 ML: at 20:22

## 2020-01-01 RX ADMIN — SUGAMMADEX 200 MG: 100 INJECTION, SOLUTION INTRAVENOUS at 10:40

## 2020-01-01 RX ADMIN — MORPHINE SULFATE 1 MG: 2 INJECTION, SOLUTION INTRAMUSCULAR; INTRAVENOUS at 22:15

## 2020-01-01 RX ADMIN — LORAZEPAM 4 MG: 2 INJECTION INTRAMUSCULAR; INTRAVENOUS at 14:22

## 2020-01-01 RX ADMIN — Medication 9 MCG/MIN: at 09:45

## 2020-01-01 RX ADMIN — PROPOFOL 120 MG: 10 INJECTION, EMULSION INTRAVENOUS at 15:32

## 2020-01-01 RX ADMIN — Medication 100 MG: at 14:23

## 2020-01-01 RX ADMIN — PHENYLEPHRINE HYDROCHLORIDE 200 MCG: 10 INJECTION INTRAVENOUS at 15:45

## 2020-01-01 RX ADMIN — Medication 15 ML: at 22:28

## 2020-01-01 RX ADMIN — PROPOFOL 15 MCG/KG/MIN: 10 INJECTION, EMULSION INTRAVENOUS at 15:29

## 2020-01-01 RX ADMIN — FINASTERIDE 5 MG: 5 TABLET, FILM COATED ORAL at 08:01

## 2020-01-01 RX ADMIN — FAMOTIDINE 20 MG: 10 INJECTION INTRAVENOUS at 08:30

## 2020-01-01 RX ADMIN — FAMOTIDINE 20 MG: 10 INJECTION INTRAVENOUS at 09:18

## 2020-01-01 RX ADMIN — HYDROMORPHONE HYDROCHLORIDE 0.5 MG: 1 INJECTION, SOLUTION INTRAMUSCULAR; INTRAVENOUS; SUBCUTANEOUS at 00:44

## 2020-01-01 RX ADMIN — Medication 15 ML: at 08:58

## 2020-01-01 RX ADMIN — ACETAMINOPHEN 650 MG: 650 SUPPOSITORY RECTAL at 20:27

## 2020-01-01 RX ADMIN — WARFARIN SODIUM 2.5 MG: 2.5 TABLET ORAL at 17:59

## 2020-01-01 RX ADMIN — BUDESONIDE 500 MCG: 0.5 INHALANT RESPIRATORY (INHALATION) at 21:58

## 2020-01-01 RX ADMIN — AMIODARONE HYDROCHLORIDE 0.5 MG/MIN: 1.8 INJECTION, SOLUTION INTRAVENOUS at 11:41

## 2020-01-01 RX ADMIN — OXYCODONE 5 MG: 5 TABLET ORAL at 05:17

## 2020-01-01 RX ADMIN — FUROSEMIDE 20 MG: 10 INJECTION, SOLUTION INTRAMUSCULAR; INTRAVENOUS at 13:48

## 2020-01-01 RX ADMIN — CLOPIDOGREL BISULFATE 75 MG: 75 TABLET ORAL at 08:01

## 2020-01-01 RX ADMIN — Medication 10 ML: at 21:35

## 2020-01-01 RX ADMIN — Medication 15 ML: at 08:45

## 2020-01-01 RX ADMIN — ENOXAPARIN SODIUM 40 MG: 40 INJECTION SUBCUTANEOUS at 08:29

## 2020-01-01 RX ADMIN — WARFARIN SODIUM 5 MG: 5 TABLET ORAL at 18:22

## 2020-01-01 RX ADMIN — FUROSEMIDE 20 MG: 10 INJECTION, SOLUTION INTRAMUSCULAR; INTRAVENOUS at 08:49

## 2020-01-01 RX ADMIN — ARFORMOTEROL TARTRATE 15 MCG: 15 SOLUTION RESPIRATORY (INHALATION) at 06:07

## 2020-01-01 RX ADMIN — GLYCOPYRROLATE AND FORMOTEROL FUMARATE 2 PUFF: 9; 4.8 AEROSOL, METERED RESPIRATORY (INHALATION) at 07:50

## 2020-01-01 RX ADMIN — ROCURONIUM BROMIDE 30 MG: 10 INJECTION INTRAVENOUS at 09:36

## 2020-01-01 RX ADMIN — ACETAMINOPHEN 650 MG: 325 TABLET ORAL at 20:06

## 2020-01-01 RX ADMIN — CLOPIDOGREL BISULFATE 75 MG: 75 TABLET ORAL at 08:14

## 2020-01-01 RX ADMIN — Medication 1 MCG/MIN: at 01:37

## 2020-01-01 RX ADMIN — LEVOFLOXACIN 750 MG: 750 INJECTION, SOLUTION INTRAVENOUS at 08:04

## 2020-01-01 RX ADMIN — PANTOPRAZOLE SODIUM 40 MG: 40 INJECTION, POWDER, FOR SOLUTION INTRAVENOUS at 21:07

## 2020-01-01 RX ADMIN — Medication 10 ML: at 21:07

## 2020-01-01 RX ADMIN — ROCURONIUM BROMIDE 20 MG: 10 INJECTION INTRAVENOUS at 15:55

## 2020-01-01 RX ADMIN — VASOPRESSIN 0.04 UNITS/MIN: 20 INJECTION INTRAVENOUS at 21:25

## 2020-01-01 RX ADMIN — INSULIN LISPRO 2 UNITS: 100 INJECTION, SOLUTION INTRAVENOUS; SUBCUTANEOUS at 06:58

## 2020-01-01 RX ADMIN — ONDANSETRON HYDROCHLORIDE 4 MG: 4 INJECTION, SOLUTION INTRAMUSCULAR; INTRAVENOUS at 16:00

## 2020-01-01 RX ADMIN — PROPOFOL 50 MG: 10 INJECTION, EMULSION INTRAVENOUS at 09:36

## 2020-01-01 RX ADMIN — GLYCOPYRROLATE AND FORMOTEROL FUMARATE 2 PUFF: 9; 4.8 AEROSOL, METERED RESPIRATORY (INHALATION) at 07:45

## 2020-01-01 RX ADMIN — AMIODARONE HYDROCHLORIDE 0.5 MG/MIN: 1.8 INJECTION, SOLUTION INTRAVENOUS at 01:53

## 2020-01-01 RX ADMIN — INSULIN LISPRO 8 UNITS: 100 INJECTION, SOLUTION INTRAVENOUS; SUBCUTANEOUS at 06:05

## 2020-01-01 RX ADMIN — FENTANYL CITRATE 50 MCG: 50 INJECTION, SOLUTION INTRAMUSCULAR; INTRAVENOUS at 04:42

## 2020-01-01 RX ADMIN — PHENYLEPHRINE HYDROCHLORIDE 200 MCG: 10 INJECTION INTRAVENOUS at 16:01

## 2020-01-01 RX ADMIN — INSULIN LISPRO 2 UNITS: 100 INJECTION, SOLUTION INTRAVENOUS; SUBCUTANEOUS at 18:05

## 2020-01-01 RX ADMIN — INSULIN LISPRO 4 UNITS: 100 INJECTION, SOLUTION INTRAVENOUS; SUBCUTANEOUS at 07:52

## 2020-01-01 RX ADMIN — PHENYLEPHRINE HYDROCHLORIDE 100 MCG: 10 INJECTION INTRAVENOUS at 09:39

## 2020-01-01 RX ADMIN — Medication 15 ML: at 21:08

## 2020-01-01 RX ADMIN — Medication 15 ML: at 09:24

## 2020-01-01 RX ADMIN — ACETAMINOPHEN 650 MG: 325 TABLET ORAL at 20:56

## 2020-01-01 RX ADMIN — Medication 15 ML: at 08:26

## 2020-01-01 RX ADMIN — ARFORMOTEROL TARTRATE 15 MCG: 15 SOLUTION RESPIRATORY (INHALATION) at 18:52

## 2020-01-01 RX ADMIN — DOPAMINE HYDROCHLORIDE 2.5 MCG/KG/MIN: 160 INJECTION, SOLUTION INTRAVENOUS at 00:28

## 2020-01-01 RX ADMIN — GLYCOPYRROLATE AND FORMOTEROL FUMARATE 2 PUFF: 9; 4.8 AEROSOL, METERED RESPIRATORY (INHALATION) at 07:33

## 2020-01-01 RX ADMIN — BUDESONIDE 500 MCG: 0.5 INHALANT RESPIRATORY (INHALATION) at 21:09

## 2020-01-01 RX ADMIN — Medication 15 ML: at 21:14

## 2020-01-01 RX ADMIN — PHENYLEPHRINE HYDROCHLORIDE 200 MCG: 10 INJECTION INTRAVENOUS at 16:22

## 2020-01-01 RX ADMIN — PHENYLEPHRINE HYDROCHLORIDE 200 MCG: 10 INJECTION INTRAVENOUS at 16:08

## 2020-01-01 RX ADMIN — CLOPIDOGREL BISULFATE 75 MG: 75 TABLET ORAL at 08:23

## 2020-01-01 RX ADMIN — PIPERACILLIN AND TAZOBACTAM 3.38 G: 3; .375 INJECTION, POWDER, LYOPHILIZED, FOR SOLUTION INTRAVENOUS at 20:21

## 2020-01-01 RX ADMIN — INSULIN LISPRO 4 UNITS: 100 INJECTION, SOLUTION INTRAVENOUS; SUBCUTANEOUS at 00:04

## 2020-01-01 RX ADMIN — SODIUM CHLORIDE: 9 INJECTION, SOLUTION INTRAVENOUS at 17:39

## 2020-01-01 RX ADMIN — CALCIUM GLUCONATE: 98 INJECTION, SOLUTION INTRAVENOUS at 18:01

## 2020-01-01 RX ADMIN — ALBUMIN (HUMAN) 25 G: 0.25 INJECTION, SOLUTION INTRAVENOUS at 00:46

## 2020-01-01 RX ADMIN — Medication 10 ML: at 08:58

## 2020-01-01 RX ADMIN — Medication 10 ML: at 21:44

## 2020-01-01 RX ADMIN — BARIUM SULFATE 30 ML: 400 SUSPENSION ORAL at 12:00

## 2020-01-01 RX ADMIN — DEXTROSE MONOHYDRATE 100 ML/HR: 50 INJECTION, SOLUTION INTRAVENOUS at 03:07

## 2020-01-01 RX ADMIN — ACETAMINOPHEN 650 MG: 325 TABLET ORAL at 12:14

## 2020-01-01 RX ADMIN — PIPERACILLIN AND TAZOBACTAM 3.38 G: 3; .375 INJECTION, POWDER, LYOPHILIZED, FOR SOLUTION INTRAVENOUS at 21:34

## 2020-01-01 RX ADMIN — PROPOFOL 40 MG: 10 INJECTION, EMULSION INTRAVENOUS at 16:17

## 2020-01-01 RX ADMIN — ACETAMINOPHEN 650 MG: 325 TABLET ORAL at 06:30

## 2020-01-01 RX ADMIN — PIPERACILLIN AND TAZOBACTAM 3.38 G: 3; .375 INJECTION, POWDER, LYOPHILIZED, FOR SOLUTION INTRAVENOUS at 13:39

## 2020-01-01 RX ADMIN — GLYCOPYRROLATE AND FORMOTEROL FUMARATE 2 PUFF: 9; 4.8 AEROSOL, METERED RESPIRATORY (INHALATION) at 18:35

## 2020-01-01 RX ADMIN — HYDROCORTISONE SODIUM SUCCINATE 50 MG: 100 INJECTION, POWDER, FOR SOLUTION INTRAMUSCULAR; INTRAVENOUS at 04:41

## 2020-01-01 RX ADMIN — GLYCOPYRROLATE AND FORMOTEROL FUMARATE 2 PUFF: 9; 4.8 AEROSOL, METERED RESPIRATORY (INHALATION) at 22:03

## 2020-01-01 RX ADMIN — TAMSULOSIN HYDROCHLORIDE 0.4 MG: 0.4 CAPSULE ORAL at 20:07

## 2020-01-01 RX ADMIN — ARFORMOTEROL TARTRATE 15 MCG: 15 SOLUTION RESPIRATORY (INHALATION) at 17:50

## 2020-01-01 RX ADMIN — FAMOTIDINE 20 MG: 10 INJECTION INTRAVENOUS at 09:11

## 2020-01-01 RX ADMIN — ROCURONIUM BROMIDE 20 MG: 10 INJECTION INTRAVENOUS at 09:58

## 2020-01-01 RX ADMIN — INSULIN GLARGINE 15 UNITS: 100 INJECTION, SOLUTION SUBCUTANEOUS at 22:05

## 2020-01-01 RX ADMIN — INSULIN LISPRO 1 UNITS: 100 INJECTION, SOLUTION INTRAVENOUS; SUBCUTANEOUS at 22:28

## 2020-01-01 RX ADMIN — SODIUM CHLORIDE 250 ML: 9 INJECTION, SOLUTION INTRAVENOUS at 00:37

## 2020-01-01 RX ADMIN — Medication 15 ML: at 20:23

## 2020-01-01 RX ADMIN — FINASTERIDE 5 MG: 5 TABLET, FILM COATED ORAL at 08:23

## 2020-01-01 RX ADMIN — Medication 1 MG: at 15:12

## 2020-01-01 RX ADMIN — METFORMIN HYDROCHLORIDE 500 MG: 500 TABLET, EXTENDED RELEASE ORAL at 07:21

## 2020-01-01 RX ADMIN — BUDESONIDE 500 MCG: 0.5 INHALANT RESPIRATORY (INHALATION) at 09:19

## 2020-01-01 RX ADMIN — GLYCOPYRROLATE AND FORMOTEROL FUMARATE 2 PUFF: 9; 4.8 AEROSOL, METERED RESPIRATORY (INHALATION) at 10:04

## 2020-01-01 RX ADMIN — Medication 10 ML: at 09:09

## 2020-01-01 RX ADMIN — DEXAMETHASONE SODIUM PHOSPHATE 6 MG: 4 INJECTION, SOLUTION INTRAMUSCULAR; INTRAVENOUS at 09:49

## 2020-01-01 RX ADMIN — TRAMADOL HYDROCHLORIDE 50 MG: 50 TABLET, FILM COATED ORAL at 14:55

## 2020-01-01 RX ADMIN — HYDROCORTISONE SODIUM SUCCINATE 50 MG: 100 INJECTION, POWDER, FOR SOLUTION INTRAMUSCULAR; INTRAVENOUS at 09:23

## 2020-01-01 RX ADMIN — GLYCOPYRROLATE AND FORMOTEROL FUMARATE 2 PUFF: 9; 4.8 AEROSOL, METERED RESPIRATORY (INHALATION) at 21:46

## 2020-01-01 RX ADMIN — VASOPRESSIN 0.04 UNITS/MIN: 20 INJECTION INTRAVENOUS at 02:51

## 2020-01-01 RX ADMIN — BUDESONIDE 500 MCG: 0.5 INHALANT RESPIRATORY (INHALATION) at 08:41

## 2020-01-01 RX ADMIN — Medication 10 MG: at 15:45

## 2020-01-01 RX ADMIN — DEXTROSE MONOHYDRATE 2 G: 50 INJECTION, SOLUTION INTRAVENOUS at 00:58

## 2020-01-01 RX ADMIN — DEXMEDETOMIDINE 0.2 MCG/KG/HR: 100 INJECTION, SOLUTION, CONCENTRATE INTRAVENOUS at 16:29

## 2020-01-01 RX ADMIN — PHENYLEPHRINE HYDROCHLORIDE 100 MCG: 10 INJECTION INTRAVENOUS at 09:52

## 2020-01-01 RX ADMIN — HYDROCORTISONE SODIUM SUCCINATE 50 MG: 100 INJECTION, POWDER, FOR SOLUTION INTRAMUSCULAR; INTRAVENOUS at 20:52

## 2020-01-01 RX ADMIN — INSULIN LISPRO 4 UNITS: 100 INJECTION, SOLUTION INTRAVENOUS; SUBCUTANEOUS at 01:18

## 2020-01-01 RX ADMIN — INSULIN LISPRO 4 UNITS: 100 INJECTION, SOLUTION INTRAVENOUS; SUBCUTANEOUS at 00:36

## 2020-01-01 RX ADMIN — FENTANYL CITRATE 25 MCG: 50 INJECTION, SOLUTION INTRAMUSCULAR; INTRAVENOUS at 04:22

## 2020-01-01 RX ADMIN — TRAMADOL HYDROCHLORIDE 50 MG: 50 TABLET, FILM COATED ORAL at 08:05

## 2020-01-01 RX ADMIN — AZITHROMYCIN 500 MG: 500 INJECTION, POWDER, LYOPHILIZED, FOR SOLUTION INTRAVENOUS at 15:24

## 2020-01-01 RX ADMIN — BUDESONIDE 500 MCG: 0.5 INHALANT RESPIRATORY (INHALATION) at 20:44

## 2020-01-01 RX ADMIN — Medication 15 MCG/MIN: at 16:41

## 2020-01-01 RX ADMIN — Medication 10 ML: at 21:08

## 2020-01-01 RX ADMIN — DEXAMETHASONE SODIUM PHOSPHATE 8 MG: 4 INJECTION, SOLUTION INTRAMUSCULAR; INTRAVENOUS at 16:47

## 2020-01-01 RX ADMIN — GLYCOPYRROLATE AND FORMOTEROL FUMARATE 2 PUFF: 9; 4.8 AEROSOL, METERED RESPIRATORY (INHALATION) at 17:47

## 2020-01-01 RX ADMIN — INSULIN GLARGINE 15 UNITS: 100 INJECTION, SOLUTION SUBCUTANEOUS at 08:06

## 2020-01-01 RX ADMIN — PHENYLEPHRINE HYDROCHLORIDE 200 MCG: 10 INJECTION INTRAVENOUS at 17:35

## 2020-01-01 RX ADMIN — FAMOTIDINE 20 MG: 10 INJECTION INTRAVENOUS at 08:42

## 2020-01-01 RX ADMIN — ONDANSETRON 4 MG: 2 INJECTION INTRAMUSCULAR; INTRAVENOUS at 16:12

## 2020-01-01 RX ADMIN — PHENYLEPHRINE HYDROCHLORIDE 200 MCG: 10 INJECTION INTRAVENOUS at 10:11

## 2020-01-01 RX ADMIN — HYDROCORTISONE SODIUM SUCCINATE 50 MG: 100 INJECTION, POWDER, FOR SOLUTION INTRAMUSCULAR; INTRAVENOUS at 15:30

## 2020-01-01 RX ADMIN — SUGAMMADEX 200 MG: 100 INJECTION, SOLUTION INTRAVENOUS at 17:46

## 2020-01-01 RX ADMIN — ENOXAPARIN SODIUM 40 MG: 40 INJECTION SUBCUTANEOUS at 15:42

## 2020-01-01 RX ADMIN — ALBUMIN (HUMAN) 25 G: 0.25 INJECTION, SOLUTION INTRAVENOUS at 06:05

## 2020-01-01 RX ADMIN — SUGAMMADEX 200 MG: 100 INJECTION, SOLUTION INTRAVENOUS at 16:35

## 2020-01-01 RX ADMIN — SODIUM CHLORIDE: 9 INJECTION, SOLUTION INTRAVENOUS at 22:34

## 2020-01-01 RX ADMIN — GLYCOPYRROLATE AND FORMOTEROL FUMARATE 2 PUFF: 9; 4.8 AEROSOL, METERED RESPIRATORY (INHALATION) at 17:38

## 2020-01-01 RX ADMIN — Medication 15 ML: at 09:08

## 2020-01-01 RX ADMIN — Medication 10 ML: at 09:14

## 2020-01-01 RX ADMIN — DEXAMETHASONE SODIUM PHOSPHATE 6 MG: 4 INJECTION, SOLUTION INTRAMUSCULAR; INTRAVENOUS at 08:01

## 2020-01-01 RX ADMIN — SODIUM CHLORIDE: 9 INJECTION, SOLUTION INTRAVENOUS at 07:59

## 2020-01-01 RX ADMIN — DEXTROSE MONOHYDRATE 2 G: 50 INJECTION, SOLUTION INTRAVENOUS at 08:06

## 2020-01-01 RX ADMIN — HYDROMORPHONE HYDROCHLORIDE 0.5 MG: 1 INJECTION, SOLUTION INTRAMUSCULAR; INTRAVENOUS; SUBCUTANEOUS at 10:34

## 2020-01-01 RX ADMIN — FUROSEMIDE 20 MG: 10 INJECTION, SOLUTION INTRAMUSCULAR; INTRAVENOUS at 17:31

## 2020-01-01 RX ADMIN — INSULIN GLARGINE 15 UNITS: 100 INJECTION, SOLUTION SUBCUTANEOUS at 21:28

## 2020-01-01 RX ADMIN — Medication 120 MG: at 15:32

## 2020-01-01 RX ADMIN — INSULIN LISPRO 4 UNITS: 100 INJECTION, SOLUTION INTRAVENOUS; SUBCUTANEOUS at 12:31

## 2020-01-01 RX ADMIN — GLYCOPYRROLATE AND FORMOTEROL FUMARATE 2 PUFF: 9; 4.8 AEROSOL, METERED RESPIRATORY (INHALATION) at 07:38

## 2020-01-01 RX ADMIN — SODIUM CHLORIDE: 9 INJECTION, SOLUTION INTRAVENOUS at 08:35

## 2020-01-01 RX ADMIN — ATENOLOL 100 MG: 100 TABLET ORAL at 07:46

## 2020-01-01 RX ADMIN — LIDOCAINE HYDROCHLORIDE 60 MG: 20 INJECTION, SOLUTION INTRAVENOUS at 16:16

## 2020-01-01 RX ADMIN — PROPOFOL 10 MCG/KG/MIN: 10 INJECTION, EMULSION INTRAVENOUS at 02:10

## 2020-01-01 RX ADMIN — PIPERACILLIN AND TAZOBACTAM 3.38 G: 3; .375 INJECTION, POWDER, LYOPHILIZED, FOR SOLUTION INTRAVENOUS at 12:27

## 2020-01-01 RX ADMIN — PANTOPRAZOLE SODIUM 40 MG: 40 INJECTION, POWDER, FOR SOLUTION INTRAVENOUS at 08:37

## 2020-01-01 RX ADMIN — DIATRIZOATE MEGLUMINE AND DIATRIZOATE SODIUM 30 ML: 600; 100 SOLUTION ORAL; RECTAL at 13:15

## 2020-01-01 RX ADMIN — PIPERACILLIN AND TAZOBACTAM 3.38 G: 3; .375 INJECTION, POWDER, LYOPHILIZED, FOR SOLUTION INTRAVENOUS at 20:23

## 2020-01-01 RX ADMIN — FINASTERIDE 5 MG: 5 TABLET, FILM COATED ORAL at 08:36

## 2020-01-01 RX ADMIN — BUDESONIDE 500 MCG: 0.5 INHALANT RESPIRATORY (INHALATION) at 18:10

## 2020-01-01 RX ADMIN — PROPOFOL 25 MCG/KG/MIN: 10 INJECTION, EMULSION INTRAVENOUS at 23:38

## 2020-01-01 RX ADMIN — PANTOPRAZOLE SODIUM 40 MG: 40 INJECTION, POWDER, FOR SOLUTION INTRAVENOUS at 20:27

## 2020-01-01 RX ADMIN — SODIUM CHLORIDE: 234 INJECTION INTRAMUSCULAR; INTRAVENOUS; SUBCUTANEOUS at 17:52

## 2020-01-01 RX ADMIN — GLYCOPYRROLATE AND FORMOTEROL FUMARATE 2 PUFF: 9; 4.8 AEROSOL, METERED RESPIRATORY (INHALATION) at 07:43

## 2020-01-01 RX ADMIN — PANTOPRAZOLE SODIUM 40 MG: 40 INJECTION, POWDER, FOR SOLUTION INTRAVENOUS at 21:31

## 2020-01-01 RX ADMIN — ACETAMINOPHEN 650 MG: 325 TABLET ORAL at 13:07

## 2020-01-01 RX ADMIN — VASOPRESSIN 0.04 UNITS/MIN: 20 INJECTION INTRAVENOUS at 16:46

## 2020-01-01 RX ADMIN — Medication 10 ML: at 09:06

## 2020-01-01 RX ADMIN — ACETAMINOPHEN 650 MG: 650 SUPPOSITORY RECTAL at 00:40

## 2020-01-01 RX ADMIN — Medication 10 ML: at 08:42

## 2020-01-01 RX ADMIN — Medication 10 MCG/MIN: at 21:26

## 2020-01-01 RX ADMIN — PIPERACILLIN AND TAZOBACTAM 3.38 G: 3; .375 INJECTION, POWDER, LYOPHILIZED, FOR SOLUTION INTRAVENOUS at 04:15

## 2020-01-01 RX ADMIN — DEXAMETHASONE SODIUM PHOSPHATE 10 MG: 4 INJECTION, SOLUTION INTRAMUSCULAR; INTRAVENOUS at 16:01

## 2020-01-01 RX ADMIN — ACETAMINOPHEN 650 MG: 325 TABLET ORAL at 17:59

## 2020-01-01 RX ADMIN — ROCURONIUM BROMIDE 40 MG: 10 INJECTION INTRAVENOUS at 16:16

## 2020-01-01 RX ADMIN — GLYCOPYRROLATE AND FORMOTEROL FUMARATE 2 PUFF: 9; 4.8 AEROSOL, METERED RESPIRATORY (INHALATION) at 08:50

## 2020-01-01 RX ADMIN — SODIUM CHLORIDE: 9 INJECTION, SOLUTION INTRAVENOUS at 16:19

## 2020-01-01 RX ADMIN — ACETAMINOPHEN 650 MG: 325 TABLET ORAL at 00:45

## 2020-01-01 RX ADMIN — INSULIN LISPRO 10 UNITS: 100 INJECTION, SOLUTION INTRAVENOUS; SUBCUTANEOUS at 00:24

## 2020-01-01 RX ADMIN — SODIUM CHLORIDE 500 ML: 9 INJECTION, SOLUTION INTRAVENOUS at 21:09

## 2020-01-01 RX ADMIN — SODIUM CHLORIDE: 9 INJECTION, SOLUTION INTRAVENOUS at 13:57

## 2020-01-01 RX ADMIN — INSULIN LISPRO 10 UNITS: 100 INJECTION, SOLUTION INTRAVENOUS; SUBCUTANEOUS at 13:39

## 2020-01-01 RX ADMIN — METFORMIN HYDROCHLORIDE 500 MG: 500 TABLET, EXTENDED RELEASE ORAL at 19:58

## 2020-01-01 RX ADMIN — HYDROCORTISONE SODIUM SUCCINATE 50 MG: 100 INJECTION, POWDER, FOR SOLUTION INTRAMUSCULAR; INTRAVENOUS at 15:41

## 2020-01-01 RX ADMIN — MIDAZOLAM 2 MG: 1 INJECTION INTRAMUSCULAR; INTRAVENOUS at 02:09

## 2020-01-01 RX ADMIN — CLOPIDOGREL BISULFATE 75 MG: 75 TABLET ORAL at 08:08

## 2020-01-01 RX ADMIN — PIPERACILLIN AND TAZOBACTAM 3.38 G: 3; .375 INJECTION, POWDER, LYOPHILIZED, FOR SOLUTION INTRAVENOUS at 12:17

## 2020-01-01 RX ADMIN — AMIODARONE HYDROCHLORIDE 150 MG: 1.5 INJECTION, SOLUTION INTRAVENOUS at 19:12

## 2020-01-01 RX ADMIN — SODIUM CHLORIDE: 9 INJECTION, SOLUTION INTRAVENOUS at 01:45

## 2020-01-01 RX ADMIN — Medication 100 MCG: at 02:23

## 2020-01-01 RX ADMIN — PIPERACILLIN AND TAZOBACTAM 3.38 G: 3; .375 INJECTION, POWDER, LYOPHILIZED, FOR SOLUTION INTRAVENOUS at 15:28

## 2020-01-01 RX ADMIN — ARFORMOTEROL TARTRATE 15 MCG: 15 SOLUTION RESPIRATORY (INHALATION) at 09:04

## 2020-01-01 RX ADMIN — ONDANSETRON 4 MG: 2 INJECTION INTRAMUSCULAR; INTRAVENOUS at 22:26

## 2020-01-01 RX ADMIN — LIDOCAINE HYDROCHLORIDE AND EPINEPHRINE 20 ML: 10; 10 INJECTION, SOLUTION INFILTRATION; PERINEURAL at 14:56

## 2020-01-01 RX ADMIN — FUROSEMIDE 20 MG: 10 INJECTION, SOLUTION INTRAMUSCULAR; INTRAVENOUS at 18:33

## 2020-01-01 RX ADMIN — Medication 40 MG: at 15:32

## 2020-01-01 RX ADMIN — BUDESONIDE 500 MCG: 0.5 INHALANT RESPIRATORY (INHALATION) at 19:55

## 2020-01-01 RX ADMIN — FUROSEMIDE 20 MG: 10 INJECTION, SOLUTION INTRAMUSCULAR; INTRAVENOUS at 17:47

## 2020-01-01 RX ADMIN — GLYCOPYRROLATE AND FORMOTEROL FUMARATE 2 PUFF: 9; 4.8 AEROSOL, METERED RESPIRATORY (INHALATION) at 17:44

## 2020-01-01 RX ADMIN — DEXAMETHASONE SODIUM PHOSPHATE 10 MG: 4 INJECTION, SOLUTION INTRAMUSCULAR; INTRAVENOUS at 06:43

## 2020-01-01 RX ADMIN — ARFORMOTEROL TARTRATE 15 MCG: 15 SOLUTION RESPIRATORY (INHALATION) at 21:58

## 2020-01-01 RX ADMIN — Medication 6 MCG/MIN: at 01:42

## 2020-01-01 RX ADMIN — PANTOPRAZOLE SODIUM 40 MG: 40 INJECTION, POWDER, FOR SOLUTION INTRAVENOUS at 08:36

## 2020-01-01 RX ADMIN — Medication 10 ML: at 22:26

## 2020-01-01 RX ADMIN — FENTANYL CITRATE 50 MCG: 50 INJECTION, SOLUTION INTRAMUSCULAR; INTRAVENOUS at 10:49

## 2020-01-01 RX ADMIN — TAMSULOSIN HYDROCHLORIDE 0.4 MG: 0.4 CAPSULE ORAL at 20:13

## 2020-01-01 RX ADMIN — Medication 15 ML: at 20:56

## 2020-01-01 RX ADMIN — ARFORMOTEROL TARTRATE 15 MCG: 15 SOLUTION RESPIRATORY (INHALATION) at 18:04

## 2020-01-01 RX ADMIN — INSULIN GLARGINE 30 UNITS: 100 INJECTION, SOLUTION SUBCUTANEOUS at 21:53

## 2020-01-01 RX ADMIN — DEXTROSE MONOHYDRATE 100 ML/HR: 50 INJECTION, SOLUTION INTRAVENOUS at 17:28

## 2020-01-01 RX ADMIN — BUDESONIDE 500 MCG: 0.5 INHALANT RESPIRATORY (INHALATION) at 21:37

## 2020-01-01 RX ADMIN — POTASSIUM PHOSPHATE, MONOBASIC POTASSIUM PHOSPHATE, DIBASIC 12 MMOL: 224; 236 INJECTION, SOLUTION, CONCENTRATE INTRAVENOUS at 17:44

## 2020-01-01 RX ADMIN — ARFORMOTEROL TARTRATE 15 MCG: 15 SOLUTION RESPIRATORY (INHALATION) at 07:56

## 2020-01-01 RX ADMIN — INSULIN GLARGINE 10 UNITS: 100 INJECTION, SOLUTION SUBCUTANEOUS at 21:02

## 2020-01-01 RX ADMIN — PIPERACILLIN AND TAZOBACTAM 3.38 G: 3; .375 INJECTION, POWDER, LYOPHILIZED, FOR SOLUTION INTRAVENOUS at 12:16

## 2020-01-01 RX ADMIN — DEXMEDETOMIDINE 0.4 MCG/KG/HR: 100 INJECTION, SOLUTION, CONCENTRATE INTRAVENOUS at 06:51

## 2020-01-01 RX ADMIN — FUROSEMIDE 20 MG: 10 INJECTION, SOLUTION INTRAMUSCULAR; INTRAVENOUS at 10:22

## 2020-01-01 RX ADMIN — FINASTERIDE 5 MG: 5 TABLET, FILM COATED ORAL at 08:08

## 2020-01-01 RX ADMIN — INSULIN LISPRO 2 UNITS: 100 INJECTION, SOLUTION INTRAVENOUS; SUBCUTANEOUS at 06:07

## 2020-01-01 RX ADMIN — BUDESONIDE 500 MCG: 0.5 INHALANT RESPIRATORY (INHALATION) at 09:04

## 2020-01-01 RX ADMIN — TAMSULOSIN HYDROCHLORIDE 0.4 MG: 0.4 CAPSULE ORAL at 20:34

## 2020-01-01 RX ADMIN — Medication 15 ML: at 23:19

## 2020-01-01 RX ADMIN — ARFORMOTEROL TARTRATE 15 MCG: 15 SOLUTION RESPIRATORY (INHALATION) at 21:37

## 2020-01-01 RX ADMIN — INSULIN LISPRO 2 UNITS: 100 INJECTION, SOLUTION INTRAVENOUS; SUBCUTANEOUS at 12:06

## 2020-01-01 RX ADMIN — DEXMEDETOMIDINE 0.3 MCG/KG/HR: 100 INJECTION, SOLUTION, CONCENTRATE INTRAVENOUS at 09:02

## 2020-01-01 RX ADMIN — BUDESONIDE 500 MCG: 0.5 INHALANT RESPIRATORY (INHALATION) at 09:01

## 2020-01-01 RX ADMIN — SODIUM CHLORIDE: 9 INJECTION, SOLUTION INTRAVENOUS at 15:46

## 2020-01-01 RX ADMIN — ENOXAPARIN SODIUM 70 MG: 80 INJECTION SUBCUTANEOUS at 20:51

## 2020-01-01 RX ADMIN — Medication 7 MCG/MIN: at 08:16

## 2020-01-01 RX ADMIN — Medication 10 ML: at 08:01

## 2020-01-01 RX ADMIN — INSULIN LISPRO 8 UNITS: 100 INJECTION, SOLUTION INTRAVENOUS; SUBCUTANEOUS at 12:21

## 2020-01-01 RX ADMIN — AMIODARONE HYDROCHLORIDE 0.5 MG/MIN: 1.8 INJECTION, SOLUTION INTRAVENOUS at 14:36

## 2020-01-01 RX ADMIN — FENTANYL CITRATE 50 MCG: 50 INJECTION, SOLUTION INTRAMUSCULAR; INTRAVENOUS at 09:36

## 2020-01-01 RX ADMIN — PANTOPRAZOLE SODIUM 40 MG: 40 INJECTION, POWDER, FOR SOLUTION INTRAVENOUS at 20:34

## 2020-01-01 RX ADMIN — LEVOFLOXACIN 750 MG: 750 INJECTION, SOLUTION INTRAVENOUS at 09:08

## 2020-01-01 RX ADMIN — ENOXAPARIN SODIUM 70 MG: 80 INJECTION SUBCUTANEOUS at 20:22

## 2020-01-01 RX ADMIN — BUDESONIDE 500 MCG: 0.5 INHALANT RESPIRATORY (INHALATION) at 07:58

## 2020-01-01 RX ADMIN — TRAMADOL HYDROCHLORIDE 50 MG: 50 TABLET, FILM COATED ORAL at 16:10

## 2020-01-01 RX ADMIN — Medication 15 ML: at 20:21

## 2020-01-01 RX ADMIN — PANTOPRAZOLE SODIUM 40 MG: 40 INJECTION, POWDER, FOR SOLUTION INTRAVENOUS at 09:08

## 2020-01-01 RX ADMIN — FINASTERIDE 5 MG: 5 TABLET, FILM COATED ORAL at 09:50

## 2020-01-01 RX ADMIN — Medication 10 ML: at 08:54

## 2020-01-01 RX ADMIN — HYDROCORTISONE SODIUM SUCCINATE 50 MG: 100 INJECTION, POWDER, FOR SOLUTION INTRAMUSCULAR; INTRAVENOUS at 16:03

## 2020-01-01 RX ADMIN — Medication 10 ML: at 20:15

## 2020-01-01 RX ADMIN — MORPHINE SULFATE 1 MG: 2 INJECTION, SOLUTION INTRAMUSCULAR; INTRAVENOUS at 17:33

## 2020-01-01 RX ADMIN — PIPERACILLIN AND TAZOBACTAM 3.38 G: 3; .375 INJECTION, POWDER, LYOPHILIZED, FOR SOLUTION INTRAVENOUS at 04:10

## 2020-01-01 RX ADMIN — FINASTERIDE 5 MG: 5 TABLET, FILM COATED ORAL at 08:24

## 2020-01-01 RX ADMIN — PHENYLEPHRINE HYDROCHLORIDE 200 MCG: 10 INJECTION INTRAVENOUS at 17:25

## 2020-01-01 RX ADMIN — HYDROCORTISONE SODIUM SUCCINATE 50 MG: 100 INJECTION, POWDER, FOR SOLUTION INTRAMUSCULAR; INTRAVENOUS at 08:54

## 2020-01-01 RX ADMIN — BUDESONIDE 500 MCG: 0.5 INHALANT RESPIRATORY (INHALATION) at 07:52

## 2020-01-01 RX ADMIN — FINASTERIDE 5 MG: 5 TABLET, FILM COATED ORAL at 07:21

## 2020-01-01 RX ADMIN — FINASTERIDE 5 MG: 5 TABLET, FILM COATED ORAL at 08:00

## 2020-01-01 RX ADMIN — ACETAMINOPHEN 650 MG: 325 TABLET ORAL at 16:21

## 2020-01-01 RX ADMIN — ENOXAPARIN SODIUM 70 MG: 80 INJECTION SUBCUTANEOUS at 09:55

## 2020-01-01 RX ADMIN — BUDESONIDE 500 MCG: 0.5 INHALANT RESPIRATORY (INHALATION) at 09:45

## 2020-01-01 RX ADMIN — PROPOFOL 10 MCG/KG/MIN: 10 INJECTION, EMULSION INTRAVENOUS at 05:50

## 2020-01-01 RX ADMIN — PHYTONADIONE 10 MG: 10 INJECTION, EMULSION INTRAMUSCULAR; INTRAVENOUS; SUBCUTANEOUS at 10:02

## 2020-01-01 RX ADMIN — INSULIN LISPRO 4 UNITS: 100 INJECTION, SOLUTION INTRAVENOUS; SUBCUTANEOUS at 17:45

## 2020-01-01 RX ADMIN — ALBUMIN (HUMAN) 25 G: 0.25 INJECTION, SOLUTION INTRAVENOUS at 12:34

## 2020-01-01 RX ADMIN — ALBUMIN (HUMAN) 12.5 G: 12.5 INJECTION, SOLUTION INTRAVENOUS at 06:49

## 2020-01-01 RX ADMIN — PIPERACILLIN AND TAZOBACTAM 3.38 G: 3; .375 INJECTION, POWDER, LYOPHILIZED, FOR SOLUTION INTRAVENOUS at 13:19

## 2020-01-01 RX ADMIN — INSULIN LISPRO 6 UNITS: 100 INJECTION, SOLUTION INTRAVENOUS; SUBCUTANEOUS at 06:34

## 2020-01-01 RX ADMIN — DEXTROSE MONOHYDRATE 100 MG: 50 INJECTION, SOLUTION INTRAVENOUS at 16:50

## 2020-01-01 RX ADMIN — PANTOPRAZOLE SODIUM 40 MG: 40 INJECTION, POWDER, FOR SOLUTION INTRAVENOUS at 21:28

## 2020-01-01 RX ADMIN — PANTOPRAZOLE SODIUM 40 MG: 40 INJECTION, POWDER, FOR SOLUTION INTRAVENOUS at 09:24

## 2020-01-01 RX ADMIN — PIPERACILLIN AND TAZOBACTAM 3.38 G: 3; .375 INJECTION, POWDER, LYOPHILIZED, FOR SOLUTION INTRAVENOUS at 14:23

## 2020-01-01 RX ADMIN — PHENYLEPHRINE HYDROCHLORIDE 200 MCG: 10 INJECTION INTRAVENOUS at 15:43

## 2020-01-01 RX ADMIN — SODIUM CHLORIDE 2000 ML: 9 INJECTION, SOLUTION INTRAVENOUS at 14:46

## 2020-01-01 RX ADMIN — INSULIN LISPRO 2 UNITS: 100 INJECTION, SOLUTION INTRAVENOUS; SUBCUTANEOUS at 12:22

## 2020-01-01 RX ADMIN — ARFORMOTEROL TARTRATE 15 MCG: 15 SOLUTION RESPIRATORY (INHALATION) at 07:55

## 2020-01-01 RX ADMIN — ACETAMINOPHEN 650 MG: 325 TABLET ORAL at 07:35

## 2020-01-01 RX ADMIN — ALBUMIN (HUMAN) 12.5 G: 12.5 INJECTION, SOLUTION INTRAVENOUS at 07:59

## 2020-01-01 RX ADMIN — Medication 10 ML: at 09:24

## 2020-01-01 RX ADMIN — PIPERACILLIN AND TAZOBACTAM 3.38 G: 3; .375 INJECTION, POWDER, LYOPHILIZED, FOR SOLUTION INTRAVENOUS at 12:22

## 2020-01-01 RX ADMIN — ONDANSETRON HYDROCHLORIDE 4 MG: 4 INJECTION, SOLUTION INTRAMUSCULAR; INTRAVENOUS at 10:40

## 2020-01-01 RX ADMIN — FENTANYL CITRATE 50 MCG: 50 INJECTION, SOLUTION INTRAMUSCULAR; INTRAVENOUS at 01:53

## 2020-01-01 RX ADMIN — ARFORMOTEROL TARTRATE 15 MCG: 15 SOLUTION RESPIRATORY (INHALATION) at 09:19

## 2020-01-01 RX ADMIN — OXYCODONE 5 MG: 5 TABLET ORAL at 10:02

## 2020-01-01 RX ADMIN — PANTOPRAZOLE SODIUM 40 MG: 40 INJECTION, POWDER, FOR SOLUTION INTRAVENOUS at 05:50

## 2020-01-01 RX ADMIN — ONDANSETRON 4 MG: 2 INJECTION INTRAMUSCULAR; INTRAVENOUS at 00:00

## 2020-01-01 RX ADMIN — FENTANYL CITRATE 50 MCG: 50 INJECTION, SOLUTION INTRAMUSCULAR; INTRAVENOUS at 01:52

## 2020-01-01 RX ADMIN — PIPERACILLIN AND TAZOBACTAM 3.38 G: 3; .375 INJECTION, POWDER, LYOPHILIZED, FOR SOLUTION INTRAVENOUS at 04:09

## 2020-01-01 RX ADMIN — BARIUM SULFATE 100 ML: 980 POWDER, FOR SUSPENSION ORAL at 12:01

## 2020-01-01 RX ADMIN — GLYCOPYRROLATE AND FORMOTEROL FUMARATE 2 PUFF: 9; 4.8 AEROSOL, METERED RESPIRATORY (INHALATION) at 19:42

## 2020-01-01 RX ADMIN — PANTOPRAZOLE SODIUM 40 MG: 40 INJECTION, POWDER, FOR SOLUTION INTRAVENOUS at 20:23

## 2020-01-01 RX ADMIN — PANTOPRAZOLE SODIUM 40 MG: 40 INJECTION, POWDER, FOR SOLUTION INTRAVENOUS at 07:45

## 2020-01-01 RX ADMIN — INSULIN LISPRO 2 UNITS: 100 INJECTION, SOLUTION INTRAVENOUS; SUBCUTANEOUS at 23:42

## 2020-01-01 RX ADMIN — Medication 15 ML: at 08:49

## 2020-01-01 RX ADMIN — Medication 15 ML: at 21:32

## 2020-01-01 RX ADMIN — SODIUM CHLORIDE: 9 INJECTION, SOLUTION INTRAVENOUS at 23:19

## 2020-01-01 RX ADMIN — PHENYLEPHRINE HYDROCHLORIDE 200 MCG: 10 INJECTION INTRAVENOUS at 09:59

## 2020-01-01 RX ADMIN — Medication 15 ML: at 09:15

## 2020-01-01 RX ADMIN — PHENYLEPHRINE HYDROCHLORIDE 200 MCG: 10 INJECTION INTRAVENOUS at 15:41

## 2020-01-01 RX ADMIN — Medication 15 ML: at 21:27

## 2020-01-01 RX ADMIN — PHENYLEPHRINE HYDROCHLORIDE 200 MCG: 10 INJECTION INTRAVENOUS at 16:17

## 2020-01-01 RX ADMIN — INSULIN GLARGINE 30 UNITS: 100 INJECTION, SOLUTION SUBCUTANEOUS at 22:30

## 2020-01-01 RX ADMIN — SENNOSIDES AND DOCUSATE SODIUM 1 TABLET: 8.6; 5 TABLET ORAL at 20:56

## 2020-01-01 RX ADMIN — POLYETHYLENE GLYCOL 3350 17 G: 17 POWDER, FOR SOLUTION ORAL at 08:14

## 2020-01-01 RX ADMIN — Medication 2 MCG/MIN: at 02:06

## 2020-01-01 RX ADMIN — PIPERACILLIN AND TAZOBACTAM 3.38 G: 3; .375 INJECTION, POWDER, LYOPHILIZED, FOR SOLUTION INTRAVENOUS at 04:00

## 2020-01-01 RX ADMIN — CISATRACURIUM BESYLATE 10 MG: 2 INJECTION INTRAVENOUS at 14:23

## 2020-01-01 RX ADMIN — PANTOPRAZOLE SODIUM 40 MG: 40 INJECTION, POWDER, FOR SOLUTION INTRAVENOUS at 09:50

## 2020-01-01 RX ADMIN — ACETAMINOPHEN 650 MG: 325 TABLET ORAL at 17:49

## 2020-01-01 RX ADMIN — ALBUMIN (HUMAN) 25 G: 0.25 INJECTION, SOLUTION INTRAVENOUS at 01:00

## 2020-01-01 RX ADMIN — INSULIN LISPRO 2 UNITS: 100 INJECTION, SOLUTION INTRAVENOUS; SUBCUTANEOUS at 17:06

## 2020-01-01 RX ADMIN — DIATRIZOATE MEGLUMINE AND DIATRIZOATE SODIUM 30 ML: 600; 100 SOLUTION ORAL; RECTAL at 13:53

## 2020-01-01 RX ADMIN — SENNOSIDES AND DOCUSATE SODIUM 1 TABLET: 8.6; 5 TABLET ORAL at 20:13

## 2020-01-01 RX ADMIN — SENNOSIDES AND DOCUSATE SODIUM 1 TABLET: 8.6; 5 TABLET ORAL at 08:05

## 2020-01-01 RX ADMIN — INSULIN LISPRO 2 UNITS: 100 INJECTION, SOLUTION INTRAVENOUS; SUBCUTANEOUS at 12:18

## 2020-01-01 RX ADMIN — FENTANYL CITRATE 50 MCG: 50 INJECTION, SOLUTION INTRAMUSCULAR; INTRAVENOUS at 16:16

## 2020-01-01 RX ADMIN — Medication 10 ML: at 09:58

## 2020-01-01 RX ADMIN — Medication 10 ML: at 08:14

## 2020-01-01 RX ADMIN — INSULIN LISPRO 4 UNITS: 100 INJECTION, SOLUTION INTRAVENOUS; SUBCUTANEOUS at 00:02

## 2020-01-01 RX ADMIN — LEVOFLOXACIN 750 MG: 750 INJECTION, SOLUTION INTRAVENOUS at 08:11

## 2020-01-01 RX ADMIN — VASOPRESSIN 0.04 UNITS/MIN: 20 INJECTION INTRAVENOUS at 07:57

## 2020-01-01 RX ADMIN — WARFARIN SODIUM 4 MG: 4 TABLET ORAL at 17:49

## 2020-01-01 RX ADMIN — FAMOTIDINE 20 MG: 10 INJECTION INTRAVENOUS at 09:56

## 2020-01-01 RX ADMIN — SODIUM CHLORIDE, POTASSIUM CHLORIDE, SODIUM LACTATE AND CALCIUM CHLORIDE 1000 ML: 600; 310; 30; 20 INJECTION, SOLUTION INTRAVENOUS at 09:01

## 2020-01-01 RX ADMIN — PANTOPRAZOLE SODIUM 40 MG: 40 INJECTION, POWDER, FOR SOLUTION INTRAVENOUS at 11:25

## 2020-01-01 RX ADMIN — FUROSEMIDE 20 MG: 10 INJECTION, SOLUTION INTRAMUSCULAR; INTRAVENOUS at 18:35

## 2020-01-01 RX ADMIN — METFORMIN HYDROCHLORIDE 500 MG: 500 TABLET, EXTENDED RELEASE ORAL at 08:24

## 2020-01-01 RX ADMIN — PROPOFOL 20 MCG/KG/MIN: 10 INJECTION, EMULSION INTRAVENOUS at 19:26

## 2020-01-01 RX ADMIN — SODIUM CHLORIDE: 9 INJECTION, SOLUTION INTRAVENOUS at 09:27

## 2020-01-01 RX ADMIN — INSULIN LISPRO 6 UNITS: 100 INJECTION, SOLUTION INTRAVENOUS; SUBCUTANEOUS at 17:59

## 2020-01-01 RX ADMIN — BARIUM SULFATE 100 ML: 0.6 SUSPENSION ORAL at 12:00

## 2020-01-01 RX ADMIN — Medication 15 ML: at 20:51

## 2020-01-01 RX ADMIN — INSULIN LISPRO 2 UNITS: 100 INJECTION, SOLUTION INTRAVENOUS; SUBCUTANEOUS at 00:57

## 2020-01-01 RX ADMIN — SODIUM CHLORIDE: 9 INJECTION, SOLUTION INTRAVENOUS at 05:51

## 2020-01-01 RX ADMIN — INSULIN LISPRO 2 UNITS: 100 INJECTION, SOLUTION INTRAVENOUS; SUBCUTANEOUS at 06:15

## 2020-01-01 RX ADMIN — SODIUM CHLORIDE 1000 ML: 9 INJECTION, SOLUTION INTRAVENOUS at 16:00

## 2020-01-01 RX ADMIN — Medication 10 ML: at 08:27

## 2020-01-01 RX ADMIN — PANTOPRAZOLE SODIUM 40 MG: 40 INJECTION, POWDER, FOR SOLUTION INTRAVENOUS at 08:08

## 2020-01-01 RX ADMIN — ONDANSETRON 4 MG: 2 INJECTION INTRAMUSCULAR; INTRAVENOUS at 07:45

## 2020-01-01 RX ADMIN — Medication 0.4 MG: at 16:20

## 2020-01-01 RX ADMIN — TRAMADOL HYDROCHLORIDE 50 MG: 50 TABLET, FILM COATED ORAL at 08:54

## 2020-01-01 RX ADMIN — INSULIN LISPRO 4 UNITS: 100 INJECTION, SOLUTION INTRAVENOUS; SUBCUTANEOUS at 01:52

## 2020-01-01 RX ADMIN — Medication 3 MG: at 16:20

## 2020-01-01 RX ADMIN — SODIUM CHLORIDE: 9 INJECTION, SOLUTION INTRAVENOUS at 21:30

## 2020-01-01 RX ADMIN — INSULIN LISPRO 2 UNITS: 100 INJECTION, SOLUTION INTRAVENOUS; SUBCUTANEOUS at 05:53

## 2020-01-01 RX ADMIN — VASOPRESSIN 0.04 UNITS/MIN: 20 INJECTION INTRAVENOUS at 11:49

## 2020-01-01 RX ADMIN — GLYCOPYRROLATE AND FORMOTEROL FUMARATE 2 PUFF: 9; 4.8 AEROSOL, METERED RESPIRATORY (INHALATION) at 19:51

## 2020-01-01 RX ADMIN — ONDANSETRON HYDROCHLORIDE 4 MG: 4 INJECTION, SOLUTION INTRAMUSCULAR; INTRAVENOUS at 17:28

## 2020-01-01 RX ADMIN — ETOMIDATE: 2 INJECTION INTRAVENOUS at 02:00

## 2020-01-01 RX ADMIN — BUDESONIDE 500 MCG: 0.5 INHALANT RESPIRATORY (INHALATION) at 17:50

## 2020-01-01 RX ADMIN — GLYCOPYRROLATE AND FORMOTEROL FUMARATE 2 PUFF: 9; 4.8 AEROSOL, METERED RESPIRATORY (INHALATION) at 08:32

## 2020-01-01 RX ADMIN — PIPERACILLIN AND TAZOBACTAM 3.38 G: 3; .375 INJECTION, POWDER, LYOPHILIZED, FOR SOLUTION INTRAVENOUS at 03:56

## 2020-01-01 RX ADMIN — AMIODARONE HYDROCHLORIDE 1 MG/MIN: 1.8 INJECTION, SOLUTION INTRAVENOUS at 19:52

## 2020-01-01 RX ADMIN — PIPERACILLIN AND TAZOBACTAM 3.38 G: 3; .375 INJECTION, POWDER, LYOPHILIZED, FOR SOLUTION INTRAVENOUS at 20:51

## 2020-01-01 RX ADMIN — Medication 100 MCG: at 01:33

## 2020-01-01 RX ADMIN — HYDROCORTISONE SODIUM SUCCINATE 50 MG: 100 INJECTION, POWDER, FOR SOLUTION INTRAMUSCULAR; INTRAVENOUS at 09:56

## 2020-01-01 RX ADMIN — LEVOFLOXACIN 750 MG: 750 INJECTION, SOLUTION INTRAVENOUS at 08:08

## 2020-01-01 RX ADMIN — AMIODARONE HYDROCHLORIDE 0.5 MG/MIN: 1.8 INJECTION, SOLUTION INTRAVENOUS at 22:32

## 2020-01-01 RX ADMIN — Medication 15 ML: at 09:11

## 2020-01-01 RX ADMIN — LEVOFLOXACIN 750 MG: 750 INJECTION, SOLUTION INTRAVENOUS at 08:49

## 2020-01-01 RX ADMIN — SODIUM CHLORIDE, POTASSIUM CHLORIDE, SODIUM LACTATE AND CALCIUM CHLORIDE 500 ML: 600; 310; 30; 20 INJECTION, SOLUTION INTRAVENOUS at 20:50

## 2020-01-01 RX ADMIN — VASOPRESSIN 0.04 UNITS/MIN: 20 INJECTION INTRAVENOUS at 03:33

## 2020-01-01 RX ADMIN — SODIUM CHLORIDE 500 ML: 9 INJECTION, SOLUTION INTRAVENOUS at 12:13

## 2020-01-01 RX ADMIN — ACETAMINOPHEN 650 MG: 325 TABLET ORAL at 02:25

## 2020-01-01 RX ADMIN — PIPERACILLIN AND TAZOBACTAM 3.38 G: 3; .375 INJECTION, POWDER, LYOPHILIZED, FOR SOLUTION INTRAVENOUS at 03:51

## 2020-01-01 RX ADMIN — PANTOPRAZOLE SODIUM 40 MG: 40 INJECTION, POWDER, FOR SOLUTION INTRAVENOUS at 22:30

## 2020-01-01 RX ADMIN — INSULIN LISPRO 1 UNITS: 100 INJECTION, SOLUTION INTRAVENOUS; SUBCUTANEOUS at 19:45

## 2020-01-01 RX ADMIN — GLYCOPYRROLATE AND FORMOTEROL FUMARATE 2 PUFF: 9; 4.8 AEROSOL, METERED RESPIRATORY (INHALATION) at 07:32

## 2020-01-01 RX ADMIN — INSULIN LISPRO 6 UNITS: 100 INJECTION, SOLUTION INTRAVENOUS; SUBCUTANEOUS at 17:47

## 2020-01-01 RX ADMIN — DEXAMETHASONE SODIUM PHOSPHATE 4 MG: 4 INJECTION, SOLUTION INTRAMUSCULAR; INTRAVENOUS at 15:41

## 2020-01-01 RX ADMIN — Medication 10 ML: at 20:23

## 2020-01-01 RX ADMIN — FAMOTIDINE 20 MG: 10 INJECTION INTRAVENOUS at 08:08

## 2020-01-01 RX ADMIN — Medication 10 ML: at 20:35

## 2020-01-01 RX ADMIN — ARFORMOTEROL TARTRATE 15 MCG: 15 SOLUTION RESPIRATORY (INHALATION) at 07:47

## 2020-01-01 RX ADMIN — Medication 25 MCG/HR: at 04:52

## 2020-01-01 RX ADMIN — VASOPRESSIN 0.04 UNITS/MIN: 20 INJECTION INTRAVENOUS at 20:56

## 2020-01-01 RX ADMIN — TRAMADOL HYDROCHLORIDE 50 MG: 50 TABLET, FILM COATED ORAL at 04:13

## 2020-01-01 RX ADMIN — SENNOSIDES AND DOCUSATE SODIUM 1 TABLET: 8.6; 5 TABLET ORAL at 08:23

## 2020-01-01 RX ADMIN — INSULIN LISPRO 2 UNITS: 100 INJECTION, SOLUTION INTRAVENOUS; SUBCUTANEOUS at 23:47

## 2020-01-01 RX ADMIN — NOREPINEPHRINE BITARTRATE 2 MCG/MIN: 1 INJECTION, SOLUTION, CONCENTRATE INTRAVENOUS at 00:01

## 2020-01-01 RX ADMIN — SODIUM CHLORIDE: 9 INJECTION, SOLUTION INTRAVENOUS at 01:50

## 2020-01-01 RX ADMIN — FUROSEMIDE 20 MG: 10 INJECTION, SOLUTION INTRAMUSCULAR; INTRAVENOUS at 12:12

## 2020-01-01 RX ADMIN — GLYCOPYRROLATE AND FORMOTEROL FUMARATE 2 PUFF: 9; 4.8 AEROSOL, METERED RESPIRATORY (INHALATION) at 20:22

## 2020-01-01 RX ADMIN — POTASSIUM PHOSPHATE, MONOBASIC POTASSIUM PHOSPHATE, DIBASIC 20 MMOL: 224; 236 INJECTION, SOLUTION, CONCENTRATE INTRAVENOUS at 10:29

## 2020-01-01 RX ADMIN — PIPERACILLIN AND TAZOBACTAM 3.38 G: 3; .375 INJECTION, POWDER, LYOPHILIZED, FOR SOLUTION INTRAVENOUS at 21:08

## 2020-01-01 RX ADMIN — WARFARIN SODIUM 1.25 MG: 2.5 TABLET ORAL at 18:59

## 2020-01-01 RX ADMIN — LEVOFLOXACIN 750 MG: 750 INJECTION, SOLUTION INTRAVENOUS at 08:40

## 2020-01-01 RX ADMIN — DEXAMETHASONE SODIUM PHOSPHATE 6 MG: 4 INJECTION, SOLUTION INTRAMUSCULAR; INTRAVENOUS at 16:36

## 2020-01-01 RX ADMIN — Medication 15 ML: at 20:52

## 2020-01-01 RX ADMIN — INSULIN LISPRO 6 UNITS: 100 INJECTION, SOLUTION INTRAVENOUS; SUBCUTANEOUS at 18:07

## 2020-01-01 RX ADMIN — ARFORMOTEROL TARTRATE 15 MCG: 15 SOLUTION RESPIRATORY (INHALATION) at 20:33

## 2020-01-01 RX ADMIN — HYDROMORPHONE HYDROCHLORIDE 1 MG: 1 INJECTION, SOLUTION INTRAMUSCULAR; INTRAVENOUS; SUBCUTANEOUS at 15:12

## 2020-01-01 RX ADMIN — WARFARIN SODIUM 4 MG: 4 TABLET ORAL at 17:58

## 2020-01-01 RX ADMIN — FINASTERIDE 5 MG: 5 TABLET, FILM COATED ORAL at 08:13

## 2020-01-01 RX ADMIN — SODIUM CHLORIDE 250 ML: 9 INJECTION, SOLUTION INTRAVENOUS at 01:12

## 2020-01-01 RX ADMIN — Medication 25 MCG/HR: at 21:13

## 2020-01-01 RX ADMIN — INSULIN LISPRO 8 UNITS: 100 INJECTION, SOLUTION INTRAVENOUS; SUBCUTANEOUS at 02:15

## 2020-01-01 RX ADMIN — GLYCOPYRROLATE AND FORMOTEROL FUMARATE 2 PUFF: 9; 4.8 AEROSOL, METERED RESPIRATORY (INHALATION) at 21:49

## 2020-01-01 RX ADMIN — ACETAMINOPHEN 650 MG: 325 TABLET ORAL at 17:58

## 2020-01-01 RX ADMIN — ONDANSETRON 4 MG: 2 INJECTION INTRAMUSCULAR; INTRAVENOUS at 16:45

## 2020-01-01 RX ADMIN — TAMSULOSIN HYDROCHLORIDE 0.4 MG: 0.4 CAPSULE ORAL at 20:48

## 2020-01-01 RX ADMIN — OXYCODONE 5 MG: 5 TABLET ORAL at 05:08

## 2020-01-01 RX ADMIN — ACETAMINOPHEN 650 MG: 325 TABLET ORAL at 00:58

## 2020-01-01 RX ADMIN — Medication 5 MCG/MIN: at 18:36

## 2020-01-01 RX ADMIN — INSULIN LISPRO 4 UNITS: 100 INJECTION, SOLUTION INTRAVENOUS; SUBCUTANEOUS at 05:55

## 2020-01-01 RX ADMIN — VASOPRESSIN 0.04 UNITS/MIN: 20 INJECTION INTRAVENOUS at 15:28

## 2020-01-01 RX ADMIN — AMIODARONE HYDROCHLORIDE 0.5 MG/MIN: 1.8 INJECTION, SOLUTION INTRAVENOUS at 12:25

## 2020-01-01 RX ADMIN — ACETAMINOPHEN 650 MG: 325 TABLET ORAL at 14:55

## 2020-01-01 RX ADMIN — ROCURONIUM BROMIDE 30 MG: 10 INJECTION INTRAVENOUS at 01:53

## 2020-01-01 ASSESSMENT — PULMONARY FUNCTION TESTS
PIF_VALUE: 37
PIF_VALUE: 19
PIF_VALUE: 25
PIF_VALUE: 32
PIF_VALUE: 12
PIF_VALUE: 32
PIF_VALUE: 36
PIF_VALUE: 24
PIF_VALUE: 19
PIF_VALUE: 26
PIF_VALUE: 27
PIF_VALUE: 29
PIF_VALUE: 29
PIF_VALUE: 35
PIF_VALUE: 32
PIF_VALUE: 27
PIF_VALUE: 25
PIF_VALUE: 37
PIF_VALUE: 34
PIF_VALUE: 28
PIF_VALUE: 29
PIF_VALUE: 23
PIF_VALUE: 36
PIF_VALUE: 2
PIF_VALUE: 26
PIF_VALUE: 26
PIF_VALUE: 31
PIF_VALUE: 35
PIF_VALUE: 32
PIF_VALUE: 32
PIF_VALUE: 34
PIF_VALUE: 28
PIF_VALUE: 29
PIF_VALUE: 35
PIF_VALUE: 27
PIF_VALUE: 27
PIF_VALUE: 36
PIF_VALUE: 1
PIF_VALUE: 35
PIF_VALUE: 20
PIF_VALUE: 34
PIF_VALUE: 28
PIF_VALUE: 35
PIF_VALUE: 0
PIF_VALUE: 36
PIF_VALUE: 36
PIF_VALUE: 27
PIF_VALUE: 12
PIF_VALUE: 36
PIF_VALUE: 35
PIF_VALUE: 0
PIF_VALUE: 12
PIF_VALUE: 27
PIF_VALUE: 2
PIF_VALUE: 21
PIF_VALUE: 29
PIF_VALUE: 29
PIF_VALUE: 18
PIF_VALUE: 0
PIF_VALUE: 28
PIF_VALUE: 28
PIF_VALUE: 27
PIF_VALUE: 36
PIF_VALUE: 0
PIF_VALUE: 2
PIF_VALUE: 30
PIF_VALUE: 36
PIF_VALUE: 35
PIF_VALUE: 28
PIF_VALUE: 32
PIF_VALUE: 37
PIF_VALUE: 26
PIF_VALUE: 37
PIF_VALUE: 36
PIF_VALUE: 37
PIF_VALUE: 28
PIF_VALUE: 36
PIF_VALUE: 37
PIF_VALUE: 26
PIF_VALUE: 30
PIF_VALUE: 14
PIF_VALUE: 0
PIF_VALUE: 36
PIF_VALUE: 29
PIF_VALUE: 28
PIF_VALUE: 25
PIF_VALUE: 26
PIF_VALUE: 27
PIF_VALUE: 31
PIF_VALUE: 24
PIF_VALUE: 23
PIF_VALUE: 35
PIF_VALUE: 36
PIF_VALUE: 36
PIF_VALUE: 25
PIF_VALUE: 36
PIF_VALUE: 17
PIF_VALUE: 28
PIF_VALUE: 37
PIF_VALUE: 32
PIF_VALUE: 34
PIF_VALUE: 25
PIF_VALUE: 0
PIF_VALUE: 25
PIF_VALUE: 29
PIF_VALUE: 37
PIF_VALUE: 25
PIF_VALUE: 27
PIF_VALUE: 24
PIF_VALUE: 36
PIF_VALUE: 29
PIF_VALUE: 34
PIF_VALUE: 19
PIF_VALUE: 26
PIF_VALUE: 33
PIF_VALUE: 18
PIF_VALUE: 30
PIF_VALUE: 22
PIF_VALUE: 34
PIF_VALUE: 34
PIF_VALUE: 26
PIF_VALUE: 36
PIF_VALUE: 28
PIF_VALUE: 34
PIF_VALUE: 26
PIF_VALUE: 36
PIF_VALUE: 35
PIF_VALUE: 27
PIF_VALUE: 36
PIF_VALUE: 28
PIF_VALUE: 35
PIF_VALUE: 36
PIF_VALUE: 36
PIF_VALUE: 26
PIF_VALUE: 27
PIF_VALUE: 27
PIF_VALUE: 31
PIF_VALUE: 28
PIF_VALUE: 26
PIF_VALUE: 32
PIF_VALUE: 13
PIF_VALUE: 15
PIF_VALUE: 30
PIF_VALUE: 26
PIF_VALUE: 34
PIF_VALUE: 1
PIF_VALUE: 37
PIF_VALUE: 36
PIF_VALUE: 24
PIF_VALUE: 36
PIF_VALUE: 36
PIF_VALUE: 28
PIF_VALUE: 36
PIF_VALUE: 26
PIF_VALUE: 3
PIF_VALUE: 29
PIF_VALUE: 29
PIF_VALUE: 17
PIF_VALUE: 26
PIF_VALUE: 37
PIF_VALUE: 36
PIF_VALUE: 34
PIF_VALUE: 37
PIF_VALUE: 21
PIF_VALUE: 25
PIF_VALUE: 30
PIF_VALUE: 35
PIF_VALUE: 30
PIF_VALUE: 30
PIF_VALUE: 34
PIF_VALUE: 35
PIF_VALUE: 29
PIF_VALUE: 28
PIF_VALUE: 28
PIF_VALUE: 27
PIF_VALUE: 25
PIF_VALUE: 23
PIF_VALUE: 38
PIF_VALUE: 36
PIF_VALUE: 36
PIF_VALUE: 28
PIF_VALUE: 36
PIF_VALUE: 25
PIF_VALUE: 27
PIF_VALUE: 28
PIF_VALUE: 0
PIF_VALUE: 36
PIF_VALUE: 29
PIF_VALUE: 38
PIF_VALUE: 36
PIF_VALUE: 31
PIF_VALUE: 32
PIF_VALUE: 37
PIF_VALUE: 33
PIF_VALUE: 35
PIF_VALUE: 19
PIF_VALUE: 21
PIF_VALUE: 36
PIF_VALUE: 5
PIF_VALUE: 28
PIF_VALUE: 34
PIF_VALUE: 35
PIF_VALUE: 36
PIF_VALUE: 27
PIF_VALUE: 29
PIF_VALUE: 34
PIF_VALUE: 25
PIF_VALUE: 34
PIF_VALUE: 34
PIF_VALUE: 26
PIF_VALUE: 2
PIF_VALUE: 29
PIF_VALUE: 36
PIF_VALUE: 31
PIF_VALUE: 36
PIF_VALUE: 37
PIF_VALUE: 0
PIF_VALUE: 30
PIF_VALUE: 31
PIF_VALUE: 27
PIF_VALUE: 31
PIF_VALUE: 26
PIF_VALUE: 33
PIF_VALUE: 36
PIF_VALUE: 28
PIF_VALUE: 28
PIF_VALUE: 30
PIF_VALUE: 35
PIF_VALUE: 33
PIF_VALUE: 23
PIF_VALUE: 28
PIF_VALUE: 35
PIF_VALUE: 37
PIF_VALUE: 34
PIF_VALUE: 29
PIF_VALUE: 24
PIF_VALUE: 25
PIF_VALUE: 32
PIF_VALUE: 37
PIF_VALUE: 2
PIF_VALUE: 37
PIF_VALUE: 36
PIF_VALUE: 36
PIF_VALUE: 29
PIF_VALUE: 31
PIF_VALUE: 35
PIF_VALUE: 33
PIF_VALUE: 27
PIF_VALUE: 36
PIF_VALUE: 28
PIF_VALUE: 27
PIF_VALUE: 37
PIF_VALUE: 36
PIF_VALUE: 30
PIF_VALUE: 28
PIF_VALUE: 36
PIF_VALUE: 36
PIF_VALUE: 34
PIF_VALUE: 37
PIF_VALUE: 36
PIF_VALUE: 36
PIF_VALUE: 30
PIF_VALUE: 1
PIF_VALUE: 32
PIF_VALUE: 6
PIF_VALUE: 35
PIF_VALUE: 0
PIF_VALUE: 36
PIF_VALUE: 36
PIF_VALUE: 28
PIF_VALUE: 31
PIF_VALUE: 6
PIF_VALUE: 26
PIF_VALUE: 24
PIF_VALUE: 29
PIF_VALUE: 34
PIF_VALUE: 0
PIF_VALUE: 16
PIF_VALUE: 27
PIF_VALUE: 35
PIF_VALUE: 36
PIF_VALUE: 36
PIF_VALUE: 13
PIF_VALUE: 27
PIF_VALUE: 36
PIF_VALUE: 28
PIF_VALUE: 28
PIF_VALUE: 37
PIF_VALUE: 37
PIF_VALUE: 23
PIF_VALUE: 1
PIF_VALUE: 36
PIF_VALUE: 35
PIF_VALUE: 24
PIF_VALUE: 20
PIF_VALUE: 25
PIF_VALUE: 21
PIF_VALUE: 30
PIF_VALUE: 36
PIF_VALUE: 29
PIF_VALUE: 34
PIF_VALUE: 26
PIF_VALUE: 28
PIF_VALUE: 36
PIF_VALUE: 25
PIF_VALUE: 36
PIF_VALUE: 27
PIF_VALUE: 36
PIF_VALUE: 0
PIF_VALUE: 32
PIF_VALUE: 36
PIF_VALUE: 28
PIF_VALUE: 35
PIF_VALUE: 28
PIF_VALUE: 35
PIF_VALUE: 34
PIF_VALUE: 3
PIF_VALUE: 36
PIF_VALUE: 26
PIF_VALUE: 21
PIF_VALUE: 28
PIF_VALUE: 36
PIF_VALUE: 28
PIF_VALUE: 35
PIF_VALUE: 25
PIF_VALUE: 8
PIF_VALUE: 34
PIF_VALUE: 24
PIF_VALUE: 16
PIF_VALUE: 0
PIF_VALUE: 35
PIF_VALUE: 1
PIF_VALUE: 24
PIF_VALUE: 2
PIF_VALUE: 36
PIF_VALUE: 35
PIF_VALUE: 36
PIF_VALUE: 31
PIF_VALUE: 8
PIF_VALUE: 35
PIF_VALUE: 7
PIF_VALUE: 14
PIF_VALUE: 28
PIF_VALUE: 24
PIF_VALUE: 35
PIF_VALUE: 36
PIF_VALUE: 28
PIF_VALUE: 34
PIF_VALUE: 35
PIF_VALUE: 35
PIF_VALUE: 28
PIF_VALUE: 34
PIF_VALUE: 33
PIF_VALUE: 27
PIF_VALUE: 35
PIF_VALUE: 25
PIF_VALUE: 25
PIF_VALUE: 27
PIF_VALUE: 34
PIF_VALUE: 3
PIF_VALUE: 28
PIF_VALUE: 27
PIF_VALUE: 37
PIF_VALUE: 29
PIF_VALUE: 0
PIF_VALUE: 35
PIF_VALUE: 30
PIF_VALUE: 32
PIF_VALUE: 24
PIF_VALUE: 7
PIF_VALUE: 36
PIF_VALUE: 36
PIF_VALUE: 34
PIF_VALUE: 28
PIF_VALUE: 31
PIF_VALUE: 36
PIF_VALUE: 36
PIF_VALUE: 37
PIF_VALUE: 36
PIF_VALUE: 25
PIF_VALUE: 28
PIF_VALUE: 36
PIF_VALUE: 36
PIF_VALUE: 29
PIF_VALUE: 28
PIF_VALUE: 29
PIF_VALUE: 30
PIF_VALUE: 27
PIF_VALUE: 27
PIF_VALUE: 30
PIF_VALUE: 4
PIF_VALUE: 26
PIF_VALUE: 36
PIF_VALUE: 27
PIF_VALUE: 28
PIF_VALUE: 36
PIF_VALUE: 28
PIF_VALUE: 35
PIF_VALUE: 25
PIF_VALUE: 16
PIF_VALUE: 23
PIF_VALUE: 31
PIF_VALUE: 15
PIF_VALUE: 25
PIF_VALUE: 28
PIF_VALUE: 33
PIF_VALUE: 34

## 2020-01-01 ASSESSMENT — PAIN DESCRIPTION - PROGRESSION
CLINICAL_PROGRESSION: NOT CHANGED
CLINICAL_PROGRESSION: GRADUALLY WORSENING
CLINICAL_PROGRESSION: GRADUALLY IMPROVING
CLINICAL_PROGRESSION: NOT CHANGED
CLINICAL_PROGRESSION: NOT CHANGED
CLINICAL_PROGRESSION: GRADUALLY WORSENING
CLINICAL_PROGRESSION: NOT CHANGED
CLINICAL_PROGRESSION: GRADUALLY WORSENING
CLINICAL_PROGRESSION: NOT CHANGED
CLINICAL_PROGRESSION: NOT CHANGED

## 2020-01-01 ASSESSMENT — PAIN SCALES - GENERAL
PAINLEVEL_OUTOF10: 4
PAINLEVEL_OUTOF10: 8
PAINLEVEL_OUTOF10: 8
PAINLEVEL_OUTOF10: 6
PAINLEVEL_OUTOF10: 0
PAINLEVEL_OUTOF10: 5
PAINLEVEL_OUTOF10: 4
PAINLEVEL_OUTOF10: 0
PAINLEVEL_OUTOF10: 0
PAINLEVEL_OUTOF10: 3
PAINLEVEL_OUTOF10: 4
PAINLEVEL_OUTOF10: 5
PAINLEVEL_OUTOF10: 0
PAINLEVEL_OUTOF10: 3
PAINLEVEL_OUTOF10: 0
PAINLEVEL_OUTOF10: 0
PAINLEVEL_OUTOF10: 1
PAINLEVEL_OUTOF10: 0
PAINLEVEL_OUTOF10: 1
PAINLEVEL_OUTOF10: 2
PAINLEVEL_OUTOF10: 5
PAINLEVEL_OUTOF10: 1
PAINLEVEL_OUTOF10: 2
PAINLEVEL_OUTOF10: 5
PAINLEVEL_OUTOF10: 8
PAINLEVEL_OUTOF10: 0
PAINLEVEL_OUTOF10: 2
PAINLEVEL_OUTOF10: 5
PAINLEVEL_OUTOF10: 0
PAINLEVEL_OUTOF10: 5
PAINLEVEL_OUTOF10: 5
PAINLEVEL_OUTOF10: 0
PAINLEVEL_OUTOF10: 0
PAINLEVEL_OUTOF10: 5
PAINLEVEL_OUTOF10: 3
PAINLEVEL_OUTOF10: 4
PAINLEVEL_OUTOF10: 4
PAINLEVEL_OUTOF10: 0
PAINLEVEL_OUTOF10: 3
PAINLEVEL_OUTOF10: 0
PAINLEVEL_OUTOF10: 8
PAINLEVEL_OUTOF10: 0
PAINLEVEL_OUTOF10: 0
PAINLEVEL_OUTOF10: 2
PAINLEVEL_OUTOF10: 0
PAINLEVEL_OUTOF10: 8
PAINLEVEL_OUTOF10: 0
PAINLEVEL_OUTOF10: 0
PAINLEVEL_OUTOF10: 6
PAINLEVEL_OUTOF10: 0
PAINLEVEL_OUTOF10: 5
PAINLEVEL_OUTOF10: 0
PAINLEVEL_OUTOF10: 0
PAINLEVEL_OUTOF10: 5
PAINLEVEL_OUTOF10: 0
PAINLEVEL_OUTOF10: 6
PAINLEVEL_OUTOF10: 6
PAINLEVEL_OUTOF10: 3
PAINLEVEL_OUTOF10: 4
PAINLEVEL_OUTOF10: 0
PAINLEVEL_OUTOF10: 5
PAINLEVEL_OUTOF10: 0
PAINLEVEL_OUTOF10: 1
PAINLEVEL_OUTOF10: 0
PAINLEVEL_OUTOF10: 3
PAINLEVEL_OUTOF10: 3
PAINLEVEL_OUTOF10: 5
PAINLEVEL_OUTOF10: 0
PAINLEVEL_OUTOF10: 0
PAINLEVEL_OUTOF10: 1
PAINLEVEL_OUTOF10: 2
PAINLEVEL_OUTOF10: 6
PAINLEVEL_OUTOF10: 0
PAINLEVEL_OUTOF10: 0
PAINLEVEL_OUTOF10: 4
PAINLEVEL_OUTOF10: 0
PAINLEVEL_OUTOF10: 6
PAINLEVEL_OUTOF10: 0
PAINLEVEL_OUTOF10: 2
PAINLEVEL_OUTOF10: 0
PAINLEVEL_OUTOF10: 0
PAINLEVEL_OUTOF10: 4
PAINLEVEL_OUTOF10: 3
PAINLEVEL_OUTOF10: 0
PAINLEVEL_OUTOF10: 2
PAINLEVEL_OUTOF10: 0
PAINLEVEL_OUTOF10: 0
PAINLEVEL_OUTOF10: 7
PAINLEVEL_OUTOF10: 3
PAINLEVEL_OUTOF10: 0
PAINLEVEL_OUTOF10: 1
PAINLEVEL_OUTOF10: 3
PAINLEVEL_OUTOF10: 6
PAINLEVEL_OUTOF10: 2
PAINLEVEL_OUTOF10: 2
PAINLEVEL_OUTOF10: 0
PAINLEVEL_OUTOF10: 2
PAINLEVEL_OUTOF10: 10
PAINLEVEL_OUTOF10: 7
PAINLEVEL_OUTOF10: 0
PAINLEVEL_OUTOF10: 4
PAINLEVEL_OUTOF10: 3
PAINLEVEL_OUTOF10: 6
PAINLEVEL_OUTOF10: 0
PAINLEVEL_OUTOF10: 7

## 2020-01-01 ASSESSMENT — PAIN DESCRIPTION - ONSET
ONSET: ON-GOING
ONSET: GRADUAL
ONSET: ON-GOING

## 2020-01-01 ASSESSMENT — PAIN DESCRIPTION - LOCATION
LOCATION: HIP
LOCATION: NECK
LOCATION: ABDOMEN
LOCATION: ABDOMEN
LOCATION: KNEE
LOCATION: ABDOMEN
LOCATION: HIP
LOCATION: KNEE

## 2020-01-01 ASSESSMENT — PAIN DESCRIPTION - PAIN TYPE
TYPE: SURGICAL PAIN
TYPE: ACUTE PAIN
TYPE: ACUTE PAIN
TYPE: SURGICAL PAIN
TYPE: ACUTE PAIN
TYPE: SURGICAL PAIN
TYPE: ACUTE PAIN
TYPE: SURGICAL PAIN
TYPE: SURGICAL PAIN
TYPE: CHRONIC PAIN
TYPE: SURGICAL PAIN
TYPE: SURGICAL PAIN

## 2020-01-01 ASSESSMENT — ENCOUNTER SYMPTOMS
SHORTNESS OF BREATH: 1
CONSTIPATION: 0
NAUSEA: 0
BACK PAIN: 0
WHEEZING: 0
COLOR CHANGE: 0
RHINORRHEA: 0
BLOOD IN STOOL: 0
VOMITING: 0
DIARRHEA: 0
SHORTNESS OF BREATH: 0
SHORTNESS OF BREATH: 0
COUGH: 0
CONSTIPATION: 0
COLOR CHANGE: 0
VOMITING: 0
SORE THROAT: 0
NAUSEA: 0
ABDOMINAL PAIN: 0
COUGH: 1
CHEST TIGHTNESS: 0
VOMITING: 0
TROUBLE SWALLOWING: 0
DYSPNEA ACTIVITY LEVEL: AFTER AMBULATING 1 FLIGHT OF STAIRS
EYE PAIN: 0
WHEEZING: 0
WHEEZING: 0
NAUSEA: 0
VOMITING: 0
COUGH: 0
COUGH: 0
SHORTNESS OF BREATH: 0
RHINORRHEA: 0
ABDOMINAL PAIN: 0
VOICE CHANGE: 0
SINUS PRESSURE: 0
ABDOMINAL PAIN: 0
CHEST TIGHTNESS: 0
BACK PAIN: 0
SPUTUM PRODUCTION: 0
EYE REDNESS: 0
ABDOMINAL PAIN: 0
BACK PAIN: 0
SORE THROAT: 0
TROUBLE SWALLOWING: 0
ABDOMINAL PAIN: 0
SORE THROAT: 0
ABDOMINAL DISTENTION: 0
CONSTIPATION: 0
SORE THROAT: 0
WHEEZING: 0
VOMITING: 0
SHORTNESS OF BREATH: 1
CONSTIPATION: 0
ABDOMINAL PAIN: 0
CHEST TIGHTNESS: 0
WHEEZING: 0
PHOTOPHOBIA: 0
VOICE CHANGE: 0
VOMITING: 0
DIARRHEA: 0
BACK PAIN: 0
SINUS PRESSURE: 0
SINUS PRESSURE: 0
NAUSEA: 0
VOMITING: 0
SHORTNESS OF BREATH: 1
COUGH: 0
ABDOMINAL PAIN: 0
WHEEZING: 0
SHORTNESS OF BREATH: 1
COUGH: 1
NAUSEA: 0
EYE REDNESS: 0
SHORTNESS OF BREATH: 0
CHEST TIGHTNESS: 0
COUGH: 0
DIARRHEA: 0
VOMITING: 0
COLOR CHANGE: 0
NAUSEA: 0
RHINORRHEA: 0
BACK PAIN: 0
TROUBLE SWALLOWING: 0
PHOTOPHOBIA: 0
NAUSEA: 0
CHEST TIGHTNESS: 0
DIARRHEA: 0
BACK PAIN: 0
NAUSEA: 0
VOICE CHANGE: 0
RHINORRHEA: 0
VOMITING: 0
COLOR CHANGE: 0
DIARRHEA: 0
SHORTNESS OF BREATH: 1
HEMOPTYSIS: 0
WHEEZING: 0
DIARRHEA: 0
COUGH: 0
SORE THROAT: 0
VOICE CHANGE: 0
TROUBLE SWALLOWING: 0
SHORTNESS OF BREATH: 0
CONSTIPATION: 0
BACK PAIN: 0
STRIDOR: 0
CONSTIPATION: 0
PHOTOPHOBIA: 0
VOMITING: 0
CHEST TIGHTNESS: 0
COUGH: 0
DIARRHEA: 0
SHORTNESS OF BREATH: 1
ABDOMINAL PAIN: 0
SORE THROAT: 0
SINUS PRESSURE: 0
CHEST TIGHTNESS: 0
SHORTNESS OF BREATH: 0
ABDOMINAL PAIN: 0
TROUBLE SWALLOWING: 1
COUGH: 0

## 2020-01-01 ASSESSMENT — PAIN DESCRIPTION - DESCRIPTORS
DESCRIPTORS: DULL
DESCRIPTORS: ACHING
DESCRIPTORS: SHOOTING
DESCRIPTORS: DISCOMFORT
DESCRIPTORS: ACHING;DISCOMFORT
DESCRIPTORS: ACHING
DESCRIPTORS: SHARP;ACHING
DESCRIPTORS: ACHING;SHARP

## 2020-01-01 ASSESSMENT — PAIN SCALES - WONG BAKER
WONGBAKER_NUMERICALRESPONSE: 0

## 2020-01-01 ASSESSMENT — PAIN - FUNCTIONAL ASSESSMENT
PAIN_FUNCTIONAL_ASSESSMENT: PREVENTS OR INTERFERES SOME ACTIVE ACTIVITIES AND ADLS
PAIN_FUNCTIONAL_ASSESSMENT: ACTIVITIES ARE NOT PREVENTED
PAIN_FUNCTIONAL_ASSESSMENT: ACTIVITIES ARE NOT PREVENTED
PAIN_FUNCTIONAL_ASSESSMENT: PREVENTS OR INTERFERES SOME ACTIVE ACTIVITIES AND ADLS

## 2020-01-01 ASSESSMENT — PAIN DESCRIPTION - DIRECTION
RADIATING_TOWARDS: KNEE
RADIATING_TOWARDS: KNEE

## 2020-01-01 ASSESSMENT — PAIN DESCRIPTION - ORIENTATION
ORIENTATION: RIGHT
ORIENTATION: RIGHT;INNER
ORIENTATION: RIGHT

## 2020-01-01 ASSESSMENT — PAIN DESCRIPTION - FREQUENCY
FREQUENCY: INTERMITTENT
FREQUENCY: INTERMITTENT
FREQUENCY: CONTINUOUS
FREQUENCY: INTERMITTENT
FREQUENCY: INTERMITTENT
FREQUENCY: CONTINUOUS
FREQUENCY: OTHER (COMMENT)
FREQUENCY: CONTINUOUS
FREQUENCY: INTERMITTENT
FREQUENCY: CONTINUOUS

## 2020-01-01 ASSESSMENT — PATIENT HEALTH QUESTIONNAIRE - PHQ9
1. LITTLE INTEREST OR PLEASURE IN DOING THINGS: 0
2. FEELING DOWN, DEPRESSED OR HOPELESS: 0
SUM OF ALL RESPONSES TO PHQ QUESTIONS 1-9: 0
SUM OF ALL RESPONSES TO PHQ9 QUESTIONS 1 & 2: 0
SUM OF ALL RESPONSES TO PHQ QUESTIONS 1-9: 0

## 2020-01-01 ASSESSMENT — COPD QUESTIONNAIRES
COPD: 1
CAT_SEVERITY: MODERATE
COPD: 1

## 2020-01-02 ENCOUNTER — HOSPITAL ENCOUNTER (OUTPATIENT)
Dept: PHARMACY | Age: 80
Setting detail: THERAPIES SERIES
Discharge: HOME | End: 2020-01-02
Payer: MEDICARE

## 2020-01-02 LAB — POC INR: 3 (ref 0.8–1.2)

## 2020-01-02 PROCEDURE — 99211 OFF/OP EST MAY X REQ PHY/QHP: CPT

## 2020-01-02 PROCEDURE — 36416 COLLJ CAPILLARY BLOOD SPEC: CPT

## 2020-01-02 PROCEDURE — 85610 PROTHROMBIN TIME: CPT

## 2020-01-28 NOTE — PROGRESS NOTES
Visit Date: 1/28/2020     Lyudmila Davis is a [de-identified] y.o. male who presents today for:  Chief Complaint   Patient presents with    Check-Up    Diabetes         HPI:       doing well, No SOB, No chest pain , No fatigue. No nausea nor abdominal pain      Diabetes   He has type 2 diabetes mellitus. Pertinent negatives for hypoglycemia include no dizziness or headaches. Pertinent negatives for diabetes include no chest pain, no fatigue and no weakness. His breakfast blood glucose range is generally 130-140 mg/dl. (Lately it is 140's to 160)   Hypertension   Pertinent negatives include no chest pain, headaches, neck pain, palpitations or shortness of breath. Gastroesophageal Reflux   He complains of coughing. He reports no abdominal pain, no chest pain, no nausea, no sore throat or no wheezing. Pertinent negatives include no fatigue. COPD   He complains of cough. There is no shortness of breath or wheezing. Pertinent negatives include no appetite change, chest pain, ear pain, fever, headaches, myalgias, postnasal drip, rhinorrhea, sore throat or trouble swallowing. Current Medications:  Current Outpatient Medications   Medication Sig Dispense Refill    atenolol (TENORMIN) 100 MG tablet take 1 tablet by mouth once daily 90 tablet 1    metFORMIN (GLUCOPHAGE-XR) 500 MG extended release tablet Take 1 tablet by mouth daily 90 tablet 1    furosemide (LASIX) 40 MG tablet take 1 tablet by mouth once daily 90 tablet 1    warfarin (COUMADIN) 2.5 MG tablet Take as directed by coumadin clinic, 105 tablets = 90 days 105 tablet 3    albuterol-ipratropium (COMBIVENT RESPIMAT)  MCG/ACT AERS inhaler Inhale 1 puff into the lungs every 6 hours as needed for Wheezing 1 Inhaler 5    finasteride (PROSCAR) 5 MG tablet Take 1 tablet by mouth daily 90 tablet 3    tamsulosin (FLOMAX) 0.4 MG capsule Take 1 capsule by mouth nightly 90 capsule 3    ONE TOUCH ULTRA TEST strip Check blood sugar once daily.  Dx: E11.9 100 strip 0    pantoprazole (PROTONIX) 40 MG tablet Take 40 mg by mouth 2 times daily   1    ipratropium-albuterol (DUONEB) 0.5-2.5 (3) MG/3ML SOLN nebulizer solution Inhale 3 mLs into the lungs every 6 hours as needed for Shortness of Breath (dyspnea or wheezes. ) . 360 mL 7    budesonide (PULMICORT) 0.5 MG/2ML nebulizer suspension Take 2 mLs by nebulization 2 times daily 60 ampule 11    Arformoterol Tartrate (BROVANA) 15 MCG/2ML NEBU Take 2 mLs by nebulization 2 times daily 120 mL 11    Blood Glucose Monitoring Suppl (ONE TOUCH ULTRA SYSTEM KIT) w/Device KIT Test blood sugar daily Dx E11.9 1 kit 0    clopidogrel (PLAVIX) 75 MG tablet take 1 tablet by mouth once daily  0    acetaminophen (TYLENOL) 325 MG tablet Take 650 mg by mouth every 6 hours as needed for Pain Don't take more than 3,000 mg per day.  isosorbide mononitrate (IMDUR) 30 MG CR tablet Take 30 mg by mouth daily. Indications: Chest Pain      nitroGLYCERIN (NITROSTAT) 0.4 MG SL tablet Place 0.4 mg under the tongue every 5 minutes as needed for Chest pain. If third one does not relieve pain, call 9-1-1.  Respiratory Therapy Supplies (NEBULIZER COMPRESSOR) KIT by Does not apply route. 1 kit 0     No current facility-administered medications for this visit. Subjective:     Review of Systems   Constitutional: Negative for appetite change, chills, diaphoresis, fatigue and fever. HENT: Negative for congestion, ear discharge, ear pain, postnasal drip, rhinorrhea, sinus pressure, sore throat, trouble swallowing and voice change. Respiratory: Positive for cough. Negative for chest tightness, shortness of breath and wheezing. Cardiovascular: Negative for chest pain, palpitations and leg swelling. Gastrointestinal: Negative for abdominal pain, constipation, diarrhea, nausea and vomiting. Musculoskeletal: Negative for arthralgias, back pain, joint swelling, myalgias, neck pain and neck stiffness. Skin: Negative for rash.    Neurological: Comprehensive Metabolic Panel    Lipid Panel   2. Gastroesophageal reflux disease, esophagitis presence not specified     3. COPD, severe     4. History of esophageal cancer     5. History of colon cancer     6. History of pulmonary embolism         Plan:      Medications Prescribed:  Orders Placed This Encounter   Medications    atenolol (TENORMIN) 100 MG tablet     Sig: take 1 tablet by mouth once daily     Dispense:  90 tablet     Refill:  1    metFORMIN (GLUCOPHAGE-XR) 500 MG extended release tablet     Sig: Take 1 tablet by mouth daily     Dispense:  90 tablet     Refill:  1       Orders Placed:  Orders Placed This Encounter   Procedures    CBC Auto Differential     Laboratory Test to be done in 3 months     Standing Status:   Future     Standing Expiration Date:   1/27/2021    Comprehensive Metabolic Panel     Laboratory Test to be done in 3 months     Standing Status:   Future     Standing Expiration Date:   1/27/2021    Lipid Panel     Laboratory Test to be done in 3 months     Standing Status:   Future     Standing Expiration Date:   1/27/2021     Order Specific Question:   Is Patient Fasting?/# of Hours     Answer:   12    POCT glycosylated hemoglobin (Hb A1C)    POCT Glucose    POCT microalbumin     Lab Results   Component Value Date    LABA1C 7.5 (A) 01/28/2020    LABA1C 6.9 10/22/2019    LABA1C 6.8 07/18/2019     Lab Results   Component Value Date    LDLCALC 75 07/31/2019    CREATININE 0.9 07/31/2019       Continue to monitor blood sugars 1times a day. Return in about 3 months (around 4/30/2020), or DM. Discussed use, benefit, and side effectsof prescribed medications. All patient questions answered. Pt voiced understanding. Instructed to continue current medications, diet and exercise. Patient agreedwith treatment plan.

## 2020-01-28 NOTE — PROGRESS NOTES
Medication Management [facility]  Anticoagulation Clinic  [phone] (phone)  [phone] (fax)      Mr. Manjeet Amado is a [de-identified] y.o.  male with history of PE/DVT, per Dr. Mekhi Bartlett referral, who presents today for Warfarin monitoring and adjustment (4 week visit). Patient verifies current dosing regimen and tablet strength. No missed or extra doses. Patient denies bleeding/swelling/chest pain. Has usual SOB, but less bruising. No blood in urine or stool. No dietary changes, but plans to have small amount of green beans today (first in awhile). Denies having green tea/V8 juice/Boost/Ensure/Glucerna. No changes in medication/OTC agents/herbals. No change in alcohol use or tobacco use. No change in activity level, except for more activity just today. Patient denies headaches/dizziness/lightheadedness/falls. No vomiting/diarrhea or acute illness. No procedures scheduled in the future at this time. Assessment:   Lab Results   Component Value Date    INR 1.60 (H) 01/28/2020    INR 3.00 (H) 01/02/2020    INR 1.90 (H) 12/12/2019    PROTIME 1.89 (H) 09/03/2011    PROTIME 2.20 (H) 09/02/2011     INR subtherapeutic - goal 2-3. Recent Labs     01/28/20  1048   INR 1.60*   Trend of low INR every other visit continues. Plan:  POCT INR ordered/performed/result reviewed. 3.75 mg today, T, 2.5 mg tomorrow, then increase PO Coumadin to 3.75 mg W, 2.5 mg MTThFSS (from 2.5 mg daily=7.1% increase). Recheck INR in nearly 2.5 weeks. (Report given - orders entered by CEM Tucker, PharmD.)   Patient reminded to caUll the Anticoagulatio Clinic with any signs or symptoms of bleeding or with any medication changes. Patient given instructions utilizing the teach back method. Given routine H&H order. Discharged ambulatory in no apparent distress, with cane and wife. Sees PCP next. After visit summary printed and reviewed with patient.       Medications reviewed and updated on home medication list     Influenza vaccine:     [] given    [x] declined   [x] received previously   [] plans to receive at a later time   [] refused    [x] documented in EPIC

## 2020-02-13 NOTE — PROGRESS NOTES
[address] [facility] Medication Management  Anticoagulation Clinic  [phone] (phone)  [phone] (fax)      Mr. Jailyn Fu is a [de-identified] y.o.  male with history of DVT, PE who presents today for anticoagulation monitoring and adjustment. Patient verifies current dosing regimen and tablet strength. No missed or extra doses. Has some bruising but is going away and healing up  No blood in urine or stool. No dietary changes. Metformin just started 1-2 weeks ago. No change in alcohol use or tobacco use. No change in activity level. Patient denies headaches/dizziness/lightheadedness/falls. Patient reports having diarrhea one day (several times @4-5 days ago) Has resolved. No Procedures scheduled in the future at this time. Assessment:   Lab Results   Component Value Date    INR 4.40 (H) 02/13/2020    INR 1.60 (H) 01/28/2020    INR 3.00 (H) 01/02/2020    PROTIME 1.89 (H) 09/03/2011    PROTIME 2.20 (H) 09/02/2011     INR supratherapeutic   Recent Labs     02/13/20  1106   INR 4.40*     Patient interview completed and discussed with pharmacist by Myriam Taylor, PharmD Candidate [id number]    Patient's regimen was increased at last visit where he was subtherapeutic. Patient had been going between therapeutic and subtherapeutic which is why dose was increased. Explained to patient INR may be elevated due to his illness (diahrrea) earlier this week. Will try new regimen again and re-evaluate if dose needs decreased soon. Plan:  Hold Coumadin today, half tablet tomorrow then continue Coumadin 3.75 mg Wed and 2.5 mg [id number]. Recheck INR in 2 weeks. Patient reminded to call the Anticoagulation Clinic with any signs or symptoms of bleeding or with any medication changes. Patient given instructions utilizing the teach back method. Discharged ambulatory with cane in no apparent distress with wife. After visit summary printed and reviewed with patient.       Medications reviewed and updated on home medication list Yes    Influenza vaccine:     [] given    [x] declined   [x] received previously   [] plans to receive at a later time   [] refused    [] documented in EPIC

## 2020-03-17 NOTE — TELEPHONE ENCOUNTER
Jeniffer Chris called requesting a refill on the following medications:  Requested Prescriptions     Pending Prescriptions Disp Refills    finasteride (PROSCAR) 5 MG tablet 90 tablet 3     Sig: Take 1 tablet by mouth daily    tamsulosin (FLOMAX) 0.4 MG capsule 90 capsule 3     Sig: Take 1 capsule by mouth nightly       Date of last visit: 3/28/19  Date of next visit (if applicable): 5/9/68

## 2020-03-19 NOTE — PROGRESS NOTES
Medication Management [address] [place] [address]  [phone] (phone)  [phone] (fax)      Mr. Manjeet Amado is a [de-identified] y.o.  male with history of PE/DVT, per Dr. Mekhi Bartlett referral, who presents today for Warfarin monitoring and adjustment (10 day visit after decreasing dose by 7.1% - second decrease in a row). Patient verifies tablet strength. Followed printed instructions for dose. No missed (except as ordered last visit) or extra doses. Patient denies bleeding/swelling/chest pain. Has usual SOB/easy bruising. No blood in urine or stool. No dietary changes. No changes in medication/OTC agents/herbals. No change in alcohol use or tobacco use. No change in activity level. Patient denies headaches/dizziness/lightheadedness/falls. No vomiting/diarrhea or acute illness. No procedures scheduled in the future at this time. Assessment:   Lab Results   Component Value Date    INR 3.00 (H) 03/19/2020    INR 3.50 (H) 03/09/2020    INR 4.60 (H) 02/27/2020    PROTIME 1.89 (H) 09/03/2011    PROTIME 2.20 (H) 09/02/2011     INR therapeutic - goal 2-3. Recent Labs     03/19/20  1045   INR 3.00*     Plan:  POCT INR ordered/performed/result reviewed. Continue PO Coumadin 1.25 mg W, 2.5 mg MTThFSS. Recheck INR in 2-3 weeks. (Report given - orders entered by CEM Tucker, PharmD.)  Patient reminded to call the Anticoagulation Clinic with any signs or symptoms of bleeding or with any medication changes. Patient given instructions utilizing the teach back method. Discharged ambulatory in no apparent distress, with cane. After visit summary printed and reviewed with patient.       Medications reviewed and updated on home medication list.    Influenza vaccine:     [] given    [] declined   [x] received previously   [] plans to receive at a later time   [] refused    [x] documented in EPIC

## 2020-04-03 NOTE — PROGRESS NOTES
Irina Smith is a [de-identified] y.o. male evaluated via telephone on 4/3/2020. Consent:  He and/or health care decision maker is aware that that he may receive a bill for this telephone service, depending on his insurance coverage, and has provided verbal consent to proceed: Yes    Documentation:  I communicated with the patient and/or health care decision maker about BPH with LUTs and kidney stones. Details of this discussion including any medical advice provided:     Pt has a hx of kidney stones with most recent ESWL in march of 2017 by Dr. Celia Barnes. Pt reports since that time he passed a few fragments off and on. He denies any gross hematuria or flank pain. He notes he is urinating well on finasteride and flomax with only LUT symptom of nocturia 1- 2 x per night. He denies any weakened stream or feeling of incomplete emptying. He notes he passed a little fragment of something that looked like a stone ~ 1 week ago but he hasn't had any symptoms of kidney stone passage. He denies dysuria, frequency, urgency, gross hematuria, abdominal pain, back pain, fever, chills. We will continue finasteride and flomax at this time (recently refilled) and have pt follow up in 6-9 months with [facility] a few days prior to appt. Pt advised to call the office if he has any change or worsening in his symptoms and he acknowledged understanding. I affirm this is a Patient Initiated Episode with an Established Patient who has not had a related appointment within my department in the past 7 days or scheduled within the next 24 hours.     Total Time: minutes: 5-10 minutes    Note: not billable if this call serves to triage the patient into an appointment for the relevant concern    Zenon JUAREZ-RICA

## 2020-04-20 NOTE — PROGRESS NOTES
Medication Management [address]   [phone] (phone)  [phone] (fax)      Anticoagulation encounter completed by telephone. Patient had lab result completed at outside lab. Mr. Paulie Wills is a [de-identified] y.o.  male with history of PE, DVT. Patient verifies current dosing regimen and tablet strength. No missed or extra doses. Patient denies s/s bleeding/bruising/swelling/chest pain - usual SOB   No blood in urine or stool. No dietary changes. No changes in medication/OTC agents/Herbals. No change in alcohol use or tobacco use. No change in activity level. Patient denies headaches/dizziness/lightheadedness/falls. No vomiting/diarrhea or acute illness. No Procedures scheduled in the future at this time. Assessment:   Lab Results   Component Value Date    INR 2.44 (H) 04/20/2020    INR 1.98 (H) 04/06/2020    INR 3.00 (H) 03/19/2020    PROTIME 1.89 (H) 09/03/2011    PROTIME 2.20 (H) 09/02/2011     INR therapeutic   Recent Labs     04/20/20  0951   INR 2.44*     Plan:  Continue Coumadin 1.25 mg W and 2.5 mg MTThFSSu. Recheck INR in 2.5 weeks. Patient reminded to call the Anticoagulation Clinic with any signs or symptoms of bleeding or with any medication changes. Patient given instructions utilizing the teach back method.     Josias Sanchez, PharmD, BCPS  4/20/2020 11:44 AM    CLINICAL PHARMACY CONSULT: MED RECONCILIATION/REVIEW ADDENDUM    For Pharmacy Admin Tracking Only    PHSO: No  Total # of Interventions Recommended: 0  - Maintenance Safety Lab Monitoring #: 1  Total Interventions Accepted: 0  Time Spent (min): 15

## 2020-05-06 NOTE — PROGRESS NOTES
Medication Management [address]  [phone] (phone)  [phone] (fax)      Anticoagulation encounter completed by telephone. Patient had lab result completed at outside lab. Mr. Samantha Chin is a [de-identified] y.o.  male with history of PE, DVT. Patient verifies current dosing regimen and tablet strength. No missed or extra doses. Patient denies s/s bleeding/bruising/swelling/SOB/chest pain  No blood in urine or stool. No dietary changes. No changes in medication/OTC agents/Herbals. No change in alcohol use or tobacco use. No change in activity level. Patient denies headaches/dizziness/lightheadedness/falls. No vomiting/diarrhea or acute illness. No Procedures scheduled in the future at this time. Assessment:   Lab Results   Component Value Date    INR 2.79 (H) 05/06/2020    INR 2.44 (H) 04/20/2020    INR 1.98 (H) 04/06/2020    PROTIME 1.89 (H) 09/03/2011    PROTIME 2.20 (H) 09/02/2011     INR therapeutic   Recent Labs     05/06/20  0948   INR 2.79*     Patient has had a therapeutic INR at three of the past four checks while on current regimen. Plan:  Continue Coumadin 1.25 mg W and 2.5 mg [id number]. Recheck INR in 4 weeks. Patient reminded to call the Anticoagulation Clinic with any signs or symptoms of bleeding or with any medication changes. Patient given instructions utilizing the teach back method.     Gabriel MultaniD, BCPS  5/6/2020  11:46 AM    CLINICAL PHARMACY CONSULT: MED RECONCILIATION/REVIEW ADDENDUM    For Pharmacy Admin Tracking Only    PHSO: No  Total # of Interventions Recommended: 0  - Maintenance Safety Lab Monitoring #: 1  Total Interventions Accepted: 0  Time Spent (min): [address], PharmD

## 2020-05-20 PROBLEM — S72.001A HIP FRACTURE REQUIRING OPERATIVE REPAIR, RIGHT, CLOSED, INITIAL ENCOUNTER (HCC): Status: ACTIVE | Noted: 2020-01-01

## 2020-05-20 PROBLEM — S72.001A CLOSED RIGHT HIP FRACTURE, INITIAL ENCOUNTER (HCC): Status: ACTIVE | Noted: 2020-01-01

## 2020-05-20 NOTE — H&P
to remove build up after chemo/radiation    TONSILLECTOMY      TOTAL KNEE ARTHROPLASTY  4/2009    right    UPPER GASTROINTESTINAL ENDOSCOPY  5/06/2008    Dr Edna Palacios     Current Medications:   Current Facility-Administered Medications: HYDROcodone-acetaminophen (NORCO) 5-325 MG per tablet 1 tablet, 1 tablet, Oral, Once  sodium chloride flush 0.9 % injection 10 mL, 10 mL, Intravenous, 2 times per day  sodium chloride flush 0.9 % injection 10 mL, 10 mL, Intravenous, PRN  polyethylene glycol (GLYCOLAX) packet 17 g, 17 g, Oral, Daily PRN  promethazine (PHENERGAN) tablet 12.5 mg, 12.5 mg, Oral, Q6H PRN **OR** ondansetron (ZOFRAN) injection 4 mg, 4 mg, Intravenous, Q6H PRN  acetaminophen (TYLENOL) tablet 650 mg, 650 mg, Oral, Q4H PRN  morphine (PF) injection 1 mg, 1 mg, Intravenous, Q3H PRN  oxyCODONE (ROXICODONE) immediate release tablet 5 mg, 5 mg, Oral, Q4H PRN  Allergies:  Norco [hydrocodone-acetaminophen]    Social History:   noncontributory  Family History:       Problem Relation Age of Onset    Diabetes Mother     Stroke Mother     Cancer Father     Cancer Brother     Cancer Brother      REVIEW OF SYSTEMS:    CONSTITUTIONAL:  negative  MUSCULOSKELETAL:  positive for  myalgias, arthralgias and pain    PHYSICAL EXAM:    VITALS:  /61   Pulse 74   Temp 97.9 °F (36.6 °C) (Oral)   Resp 16   Ht 5' 11\" (1.803 m)   Wt 165 lb (74.8 kg)   SpO2 94%   BMI 23.01 kg/m²   CONSTITUTIONAL:  awake, alert, cooperative, no apparent distress, and appears stated age  MUSCULOSKELETAL:  On exam of right leg, skin intact right hip and knee. Tender to palpation lateral hip and knee. Well healed incision anterior knee. On exam of knee, slight laxity with solid endpoints. Significant pain with hip rotation. DF, PF, EHL 5/5 and pedal pulses palpable bilaterally. Negative calf tenderness bilaterally.     DATA:    CBC:   Lab Results   Component Value Date    WBC 15.2 05/20/2020    RBC 4.22 05/20/2020    RBC 4.56 09/03/2011 minor errors which are inherent in voice recognition technology. **       Final report electronically signed by Dr. Domingo Benjamin on 5/20/2020 1:50 PM         IMPRESSION/RECOMMENDATIONS:    Right hip femoral neck fracture    Discussed patient's findings with Dr. Del Medley, recommending right hip hemiarthroplasty. Risks and benefits as well as expected outcomes and recovery discussed with patient. Questions and concerns addressed. Pain control and activity restrictions given. Hospitalist consult for medical clearance and post-op management ordered. Anticipating surgery tomorrow afternoon pending clearance. Anticoagulants and Metformin held preoperatively.  This will serve as patient's preoperative H&P.

## 2020-05-20 NOTE — ED PROVIDER NOTES
tablet, Refills: 1      furosemide (LASIX) 40 MG tablet take 1 tablet by mouth once daily  Qty: 90 tablet, Refills: 1      albuterol-ipratropium (COMBIVENT RESPIMAT)  MCG/ACT AERS inhaler Inhale 1 puff into the lungs every 6 hours as needed for Wheezing  Qty: 1 Inhaler, Refills: 5    Associated Diagnoses: Moderate COPD (chronic obstructive pulmonary disease) ([facility])      ONE TOUCH ULTRA TEST strip Check blood sugar once daily. Dx: E11.9  Qty: 100 strip, Refills: 0      pantoprazole (PROTONIX) 40 MG tablet Take 40 mg by mouth 2 times daily   Refills: 1      ipratropium-albuterol (DUONEB) 0.5-2.5 (3) MG/3ML SOLN nebulizer solution Inhale 3 mLs into the lungs every 6 hours as needed for Shortness of Breath (dyspnea or wheezes. ) . Qty: 360 mL, Refills: 7    Associated Diagnoses: History of tobacco use; Moderate COPD (chronic obstructive pulmonary disease) ([facility])      budesonide (PULMICORT) 0.5 MG/2ML nebulizer suspension Take 2 mLs by nebulization 2 times daily  Qty: 60 ampule, Refills: 11    Associated Diagnoses: Chronic respiratory failure with hypoxia ([facility] [address] Moderate COPD (chronic obstructive pulmonary disease) ([facility])      Arformoterol Tartrate (BROVANA) 15 MCG/2ML NEBU Take 2 mLs by nebulization 2 times daily  Qty: 120 mL, Refills: 11    Associated Diagnoses: Chronic respiratory failure with hypoxia ([facility] [address] Moderate COPD (chronic obstructive pulmonary disease) ([facility])      Blood Glucose Monitoring Suppl (ONE TOUCH ULTRA SYSTEM KIT) w/Device KIT Test blood sugar daily Dx E11.9  Qty: 1 kit, Refills: 0      acetaminophen (TYLENOL) 325 MG tablet Take 650 mg by mouth every 6 hours as needed for Pain Don't take more than 3,000 mg per day. isosorbide mononitrate (IMDUR) 30 MG CR tablet Take 30 mg by mouth daily. Indications: Chest Pain      nitroGLYCERIN (NITROSTAT) 0.4 MG SL tablet Place 0.4 mg under the tongue every 5 minutes as needed for Chest pain. If third one does not relieve pain, call 9-1-1.       Respiratory Therapy Supplies (NEBULIZER COMPRESSOR) KIT by Does not apply route. Qty: 1 kit, Refills: 0    Associated Diagnoses: COPD, severe ([address] Diffusion capacity of lung (dl), decreased; Restrictive lung disease; History of tobacco use; Hypoxia; Chronic respiratory failure with hypoxia (H[facility])             ALLERGIES     is allergic to norco [hydrocodone-acetaminophen]. FAMILY HISTORY     He indicated that his mother is . He indicated that his father is . He indicated that only one of his two sisters is alive. He indicated that only one of his two brothers is alive. family history includes Cancer in his brother, brother, and father; Diabetes in his mother; Stroke in his mother. SOCIAL HISTORY      reports that he quit smoking about 50 years ago. His smoking use included cigarettes. He has a 25.50 pack-year smoking history. He has never used smokeless tobacco. He reports that he does not drink alcohol or use drugs. PHYSICAL EXAM     INITIAL VITALS:  height is 5' 11\" (1.803 m) and weight is 165 lb (74.8 kg). His oral temperature is 97.9 °F (36.6 °C). His blood pressure is 111/61 and his pulse is 74. His respiration is 16 and oxygen saturation is 94%. Physical Exam  Vitals signs and nursing note reviewed. Constitutional:       Appearance: Normal appearance. He is well-developed. HENT:      Head: Normocephalic. Right Ear: External ear normal.      Left Ear: External ear normal.      Nose: Nose normal.      Mouth/Throat:      Pharynx: Uvula midline. Eyes:      Conjunctiva/sclera: Conjunctivae normal.   Neck:      Musculoskeletal: Normal range of motion and neck supple. Cardiovascular:      Rate and Rhythm: Normal rate and regular rhythm. Heart sounds: Normal heart sounds, S1 normal and S2 normal.   Pulmonary:      Effort: Pulmonary effort is normal. No respiratory distress. Breath sounds: Normal breath sounds. Chest:      Chest wall: No tenderness.    Abdominal:      General: Bowel sounds are normal. There is no distension. Palpations: Abdomen is soft. Tenderness: There is no abdominal tenderness. Musculoskeletal: Normal range of motion. Right hip: He exhibits tenderness. He exhibits no deformity. Right knee: He exhibits no deformity. Tenderness found. Comments: No contusion or hematoma present. No abrasions present. No neck pain. Skin:     General: Skin is warm and dry. Coloration: Skin is not pale. Findings: No erythema or rash. Neurological:      Mental Status: He is alert and oriented to person, place, and time. Psychiatric:         Behavior: Behavior normal.         Thought Content: Thought content normal.         Judgment: Judgment normal.         DIFFERENTIAL DIAGNOSIS:   Including but no limited to: hip fracture, knee fracture     DIAGNOSTIC RESULTS     EKG: All EKG's are interpreted by the Emergency Department Physician who either signs or Co-signs this chart in the absence of a cardiologist.  EKG interpreted by Dinorah Lange, DO:    Vent. Rate: 64 bpm  VT interval: 174 ms  QRS duration: 78 ms  QTc: 439 ms  P-R-T axes: 65, 27, 21  Normal sinus rhythm  No STEMI        RADIOLOGY: non-plain film images(s) such as CT, Ultrasound and MRI are read by the radiologist.    XR KNEE RIGHT (3 VIEWS)   Final Result    IMPRESSION:   No acute osseous findings. **This report has been created using voice recognition software. It may contain minor errors which are inherent in voice recognition technology. **      Final report electronically signed by Dr. Jeremiah Harmon on 5/20/2020 1:50 PM      XR HIP 2-3 VW W PELVIS RIGHT   Final Result    IMPRESSION:   1. Acute right femoral neck fracture without dislocation. **This report has been created using voice recognition software. It may contain minor errors which are inherent in voice recognition technology. **      Final report electronically signed by Dr. Jeremiah Harmon on 5/20/2020 1:46 PM

## 2020-05-20 NOTE — TELEPHONE ENCOUNTER
Date of last visit:  1/28/2020  Date of next visit:  Visit date not found    Requested Prescriptions     Pending Prescriptions Disp Refills    furosemide (LASIX) 40 MG tablet 90 tablet 1

## 2020-05-21 PROBLEM — I25.10 PRESENCE OF STENT IN CORONARY ARTERY IN PATIENT WITH CORONARY ARTERY DISEASE: Status: ACTIVE | Noted: 2020-01-01

## 2020-05-21 PROBLEM — Z95.5 PRESENCE OF STENT IN CORONARY ARTERY IN PATIENT WITH CORONARY ARTERY DISEASE: Status: ACTIVE | Noted: 2020-01-01

## 2020-05-21 NOTE — PLAN OF CARE
Problem: DISCHARGE BARRIERS  Goal: Patient's continuum of care needs are met  Note: Patient discharge home with spouse and new HH. See  notes 5/21/20.
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
## 2020-05-21 NOTE — PROGRESS NOTES
1755: pt arrived to pacu. Pt awake alert and oriented. Right hip dressing cdi. Ice applied.  Xray paged   [phone]: RN attempted report to   no answer   1816: RN called report to [facility]

## 2020-05-21 NOTE — CARE COORDINATION
20, 9:24 AM EDT  DISCHARGE PLANNING EVALUATION:    Jovani Sauer       Admitted from: ED 2020/  [facility] day: 1   [facility] Reason for admit: Hip fracture requiring operative repair, right, closed, initial encounter ([facility] [address] [S72.001A]  Closed right hip fracture, initial encounter ([facility] [address] Aundria Esters Status: IP  Admit order signed?: yes  PMH:  has a past medical history of Arthritis, Hunt syndrome, CAD (coronary artery disease), Clotting disorder ([facility] [address] COPD (chronic obstructive pulmonary disease) ([facility] [address] COPD (chronic obstructive pulmonary disease) [facility] GERD (gastroesophageal reflux disease), History of colon cancer, History of esophageal cancer, Hx of blood clots, Hypertension, Kidney stone on left side, Nausea & vomiting, Pericarditis, Personal history of DVT (deep vein thrombosis), Personal history of pulmonary embolism, Pseudogout, and Type II or unspecified type diabetes mellitus without mention of complication, not stated as uncontrolled. Procedure: Planning OR once cleared for hip repair. Pertinent abnormal Imagin/21CXR:   1. Increase of interstitial markings with persistent bibasilar atelectasis and bilateral effusions. Changes of congestive failure increase are suspected. Correlation with clinical exam to exclude pneumonitis changes.          XR right hip: Acute right femoral neck fracture without dislocation. Medications:  Scheduled Meds:   budesonide  0.5 mg Nebulization BID    pantoprazole  40 mg Intravenous Once    furosemide  20 mg Intravenous Once    atenolol  100 mg Oral Daily    Arformoterol Tartrate  15 mcg Nebulization BID    isosorbide mononitrate  30 mg Oral Daily    phytonadione (VITAMIN K)  IVPB  10 mg Intravenous Once    sodium chloride flush  10 mL Intravenous 2 times per day     Continuous Infusions:   sodium chloride 100 mL/hr at 20 0759      Pertinent Info/Orders/Treatment Plan: Presented to ED for evaluation post fall.  Ortho following. PCP to clear for OR. Telemetry. PT/OT post-op, currently strict bedrest. Don. K+ 5.3. BNP elevated. Hgb stable. IVF @ 100ml/hr. 1 x dose of 20mg IV lasix. IV protonix. 92% on 1L NC. Diet: Diet NPO Time Specified   Smoking status:  reports that he quit smoking about 50 years ago. His smoking use included cigarettes. He has a 25.50 pack-year smoking history. He has never used smokeless tobacco.   PCP: Carlos Wallace MD  Readmission 30 days or less: no  Readmission Risk Score: 12%    Discharge Planning Evaluation  Current Residence:  Private Residence  Living Arrangements:  Spouse/Significant Other   Support Systems:  Spouse/Significant Other, Children, Family Members  Current Services PTA:     Potential Assistance Needed:  Transitional Care  Potential Assistance Purchasing Medications:  No  Does patient want to participate in local refill/ meds to beds program?  Yes  Type of Home Care Services:  None  Patient expects to be discharged to:  Home with wife  Expected Discharge date:  05/23/20  Follow Up Appointment: Best Day/ Time: Tuesday PM    Patient Goals/Plan/Treatment Preferences: SW spoke with patient, prefers to return home with wife and formerly [facility] services at discharge. Will continue to follow and wait for therapy evals post-op. Transportation/Food Security/Housekeeping Addressed:  No issues identified.     Evaluation: yes

## 2020-05-21 NOTE — CONSULTS
Family Medicine consult Notes/Coverage    Today's Date: 5/21/20  [id number]  Medical Record # [id number]  Account # [de-identified]      Mr. Riri Maguire admitted on 5/20/2020    Patient is admitted due to   Chief Complaint   Patient presents with   [facility] Knee Injury     right     Lost footing last night while going to bed , fell + right femoral neck fracture    History of esophagectomy due to esophageal cancer and colon resection due to colon cancer.  ASHD with stent, COPD GERD, Hx of PE/DVT, diet controlled DM    Physical Exam:  Patient Vitals for the past 24 hrs:   BP Temp Temp src Pulse Resp SpO2 Height Weight   05/21/20 0800 100/61 98.1 °F (36.7 °C) Oral 71 16 93 % -- --   05/21/20 0535 -- -- -- -- -- 94 % -- 167 lb (75.8 kg)   05/21/20 0530 -- -- -- -- -- (!) 88 % -- --   05/21/20 0345 101/60 98 °F (36.7 °C) Oral 69 17 96 % -- --   05/21/20 0030 122/66 98.2 °F (36.8 °C) Oral 98 24 98 % -- --   05/20/20 2223 -- -- -- -- -- 90 % -- --   05/20/20 2220 -- -- -- -- -- (!) 87 % -- --   05/20/20 2215 108/60 98 °F (36.7 °C) Oral 82 18 (!) 88 % -- --   05/20/20 1658 111/61 97.9 °F (36.6 °C) Oral 74 16 94 % -- --   05/20/20 1623 -- -- -- -- 18 95 % -- --   05/20/20 1457 (!) 120/59 -- -- 62 18 93 % -- --   05/20/20 1418 118/64 -- -- 84 16 95 % -- --   05/20/20 1306 112/69 -- -- 88 -- 94 % -- --   05/20/20 1259 -- 97.7 °F (36.5 °C) Oral -- 20 -- 5' 11\" (1.803 m) 165 lb (74.8 kg)       General Appearance:  Alert, cooperative, no distress, appears stated age   HEENT:  Neck:      Chest/Lungs:  Heart: Decreased, Few Crackles at the Bases  Heart regular rate and rhythm   Abdomen:  Back: Soft nontender normal bowel sounds  No CVA tenderness   Extremities:  Neurological Exam: Tenderness in the right hip  Motor 5/5 cranial nerves II through XII are all intact       Assessment:     Active Hospital Problems    Diagnosis Date Noted    Hip fracture

## 2020-05-21 NOTE — OP NOTE
Operative Note      Operative Report  Elaine Greer  [id number]  5/21/2020    Pre-operative diagnosis: right femoral neck fracture    Post-operative diagnosis: right femoral neck fracture    Procedure: right Hip hemiarthroplasty    Components utilized:  [employer] press-fit stem, standard neck length, bipolar cup with 28mm spherical head. Surgeon: Wai Alejandro MD    Assistant:  Robbie Rock PA-C    Anesthesia:  General    Operative Indication:  [de-identified] y.o. male who fell sustaining a displaced left femoral neck fracture. Patient was admitted for pain control, medical optimization and definitive surgical treatment. The rationale behind hip hemiarthroplasty as a means of fracture treatment and early mobilization / rehabilitation was explained and informed consent was obtained following a thorough review of risks, benefits, and alternative treatment options. The typical post-operative recovery process was also outlined. EBL: [id number]    Complications: None    Operative Procedure: Following anesthesia, the patient was positioned lateral decubitus with the body supported by a peg board. The affected leg and hip were prepped and draped in the usual standard fashion. The down leg was padded and protected. The hip and groin were sealed with Jeannene Buddle. An intra-operative time out was called to confirm the planned surgical procedure and administration of IV antibiotic. A skin incision was made centered over the greater trochanter. A anteriolateral approach to the hip joint was used. The IT band and gluteus fascia were split and a Charnley retractor was placed for deep exposure. Bursal tissue was cleared over the trochanter and short external rotators. An assistant internally rotated the hip and a right angle retractor was placed beneath the gluteus medius muscle. The plane between gluteus minimus and the piriformis tendon was developed using electrocautery.   The anterior 1/3 of gluteus medius and minimus tendons were released and tagged with #5 ethibond, along with release of a portion of the rectus femoris muscle. The capsule was then incised in a T-fashion. It was tagged with #1 ethibond and deep retractors were repositioned. The fracture pattern was that of a displaced subcapital femoral neck with mild comminution at the primary fracture site. The bone appeared non-pathologic. The femoral neck was cut one fingerbreadth above the lesser trochanter using an oscillating saw. The femoral head was removed and was sized. A lollipop trial was then placed to confirm head size. With a trial head, a good suction fit was obtained. The acetabulum was inspected to make sure there were no free bone fragments. The labrum was intact. The articular cartilage of the cup was in good condition. Attention was now turned to the femoral canal.  The canal was entered using a box chisel, followed by placement of a . Broaching was initiated with a size 10mm broach and was taken up to the appropriate size broach in 1mm increments. With the final broach in place, there was good canal fill and the implant was stable with rotation. This was decided to be the appropriate size stem. [facility] press fit stem was then impacted and trialed with a neutral neck length and a bipolar head. With these components, the hip was stable and had excellent ROM. There was <2-3mm longitudinal play with shuck testing and no impingement with external rotation. At 70 degrees of hip flexion, I could internally rotate almost 90 degrees without dislocating and at 90 degrees of hip flexion I could internally rotate almost 70 degrees before dislocating the components. Final components were then impacted onto the stem and the hip was again reduced. The surgical site was irrigated using Irrisept lavage. The capsule was repaired using #1 ethibond suture.   The gluteus medius and minimus tendon were repaired back to the lateral

## 2020-05-22 PROBLEM — I95.81 POSTOPERATIVE HYPOTENSION: Status: ACTIVE | Noted: 2020-01-01

## 2020-05-22 NOTE — PLAN OF CARE
Hospitalist does not need to follow outside of the ICU, notification to Dr. Stephanie Tolbert that patient was transferring out of the ICU. Electronically signed by Alexa Dennis.  LARRY Barnes CNP on 5/22/2020 at 5:10 PM

## 2020-05-22 NOTE — PROGRESS NOTES
unit  Bathroom Toilet: Standard    Receives Help From: Family  ADL Assistance: Needs assistance  Homemaking Assistance: Needs assistance  Homemaking Responsibilities: No  Ambulation Assistance: Independent  Transfer Assistance: Independent    Active : Yes(wife mainly drives )  Occupation: Retired  Leisure & Hobbies: Reading, being with family- going out to eat  Additional Comments: Pt was using a rolling walker prior to admission. He did not use any O2. His spouse assisted him with dressing at times. He was taking spongebaths only. Pt assisted with some of the housekeeping. OBJECTIVE:  Range of Motion:  Bilateral Lower Extremity: Impaired - limited at right hip due to fx and THR sx   WFL's at the left LE  Strength:  Right Lower Extremity: Impaired - hip limited due to hip pain and sx   Left [facility] Ext[facility]    Balance:  Static Sitting Balance:  Contact Guard Assistance  Static Standing Balance: Contact Guard Assistance  Dynamic Standing Balance: Minimal Assistance    Bed Mobility:  Sit to Supine: Moderate Assistance, X 1, with head of bed raised, with increased time for completion, Not tested   Scooting: Imin A     Transfers:  Sit to Stand: Minimal Assistance, X 1, with increased time for completion, cues for hand placement  Stand to Sit:Minimal Assistance, X 1, with increased time for completion, to/from chair with arms    Ambulation:  Minimal Assistance  Distance: 10 feet   Surface: Level Tile  Device:st w since no RW available. rec RW next time  Gait Deviations: Forward Flexed Posture, Slow Анна, Decreased Step Length Bilaterally, Decreased Weight Shift Right, Decreased Gait Speed, Unsteady Gait and step too pattern, shuffled short steps. Exercise:  Patient was guided in 1 set(s) 10 reps of exercise to both lower extremities. Ankle pumps, Glut sets and Quad sets. Exercises were completed for increased independence with functional mobility.     Functional Outcome Measures:

## 2020-05-22 NOTE — PROGRESS NOTES
Melina Woods 5U53 after the one liter bolus, bp went up to 975 systolic, then back down again in the 11'U systolic. Notified primary physician and he ordered another 250 bolus. blood pressure now 56'P systolic. Notified primary physician and pt is going to ICU and put on Levophed. Just wanted to update you.  Thanks

## 2020-05-22 NOTE — CONSULTS
[address] [place] [address]                                  CONSULTATION    PATIENT NAME: Rosa Harris                    :        1940  MED REC NO:   [id number]                           ROOM:       0012  ACCOUNT NO:   [de-identified]                           ADMIT DATE: 2020  PROVIDER:     Sunshine Mancilla. Trista Le M.D.    Regino Mishraus:  2020    CHIEF COMPLAINT:  The patient is an 25-year-old male admitted because of  right hip fracture. REASON FOR MEDICAL CONSULT:  Medical management. HISTORY OF PRESENT ILLNESS:  The patient is an 25-year-old male who is  known to me through the office being his primary care physician. The  patient last night while walking into the bedroom, the patient lost his  footing. He felt that he had some steps to go down, however, he lost  balance and fell, landed on his knees. Ever since, it has been hurting  on the right hip. The patient did not have any loss of consciousness. No headache. No neck pain. However, during the day, the patient's pain  is mostly on the right hip and not getting better for which the patient  decided to come to the emergency room, was evaluated, noted to have a  right hip fracture, nondisplaced. The patient was then admitted by  Orthopedic Surgery services and I am asked for consult regarding medical  management. The patient had a known history of COPD, hypertension,  coronary artery disease with stent, had esophagectomy secondary to  esophageal cancer in the past including colon resection secondary to  colon cancer. The patient is on anticoagulation because of history of  pulmonary embolism and DVT and a diet-controlled diabetes. REVIEW OF SYSTEMS:  Denies any headache. No dizziness. No ear pain. No neck pain. No sore throat, cough, cold. No shortness of breath. No  chest pain. No nausea or vomiting. No abdominal pain. Denies any back  pain. No dysuria, frequency, hematuria. has right hip pain. Denies  any weakness or paralysis. The rest of the review of systems is  negative. PAST MEDICAL HISTORY:  1. The patient had history of diabetes type 2, diet controlled. 2.  History of pulmonary embolism and DVT. 3.  Chronic anticoagulation. 4.  Hypertension. 5.  History of esophageal and colon cancer. 6.  GERD, COPD, coronary artery disease, Hunt's esophagus, and  arthritis. PAST SURGICAL HISTORY:  Includes knee arthroplasty in 2009 on the right  by Dr. Kal Islas, tonsillectomy, lung surgery, amputation of the  metacarpal wrist, esophagectomy, colon resection, coronary artery  disease, angioplasty with stent. FAMILY HISTORY:  Significant for diabetes, stroke, and cancer. SOCIAL HISTORY:  The patient is . The patient is a former  smoker; however, he quit in 1969. The patient had a good social  support. MEDICATIONS:  See nursing notes. ALLERGIES:  To [address]. The patient feel nauseous and vomiting from  NARCOTICS. PHYSICAL EXAMINATION:  GENERAL:  When I have seen the patient, the patient is alert, active,  cooperative, comfortable in the bed. VITAL SIGNS:  Blood pressure 100/61, temperature 98.1, respirations 16,  pulse 71, saturations 93% on room air. HEENT:  Head:  Atraumatic. Tympanic membranes normal.  No oral thrush. Oral  mucosa is slightly dry  NECK:  Supple. LUNGS:  Clear to auscultation. Decreased breath sound bilaterally. There are few crackles at the bases. HEART:  Regular rate and rhythm. No murmur. ABDOMEN:  Soft, nontender. Normoactive bowel sounds. No  hepatosplenomegaly. BACK:  Showed no CVA. EXTREMITIES:  Showed tenderness on the right hip. Dorsalis pedis are  full and equal.  There is no tenderness on the knee. NEUROLOGIC:  Motor, sensory, and cranial nerves are intact. ASSESSMENT:  1. Right hip fracture, requiring operative repair. 2.  Essential hypertension, stable.   3. Coronary artery disease with history of stent. 4.  Anticoagulated with Coumadin. 5.  History of pulmonary embolism and DVT. 6.  GERD. 7.  Diabetes type 2.  8.  COPD. 9.  History of colon and esophageal cancer. PLAN:  The patient is high risk for surgery but medically stable for  surgery. The patient is on Coumadin and at this time we are going to  reverse it with vitamin K 10 mg IM and recheck the INR. We will resume  the inhalers and home medications. We are going to check blood sugar  daily. The patient is going to bridge Lovenox post surgery for DVT  prophylaxis and PE prophylaxis. The patient is high risk  for the surgery. The patient will be followed up accordingly and  further orders are based on clinical course and laboratory and x-ray  findings. At this time, I ordered a normal saline at 100 mL per hour. GISSELLE Maldonado M.D.    D: 05/21/2020 23:02:34       T: 05/22/2020 1:03:27     [email or web address]  Job#: [id number]     Doc#: [id number]    CC:

## 2020-05-22 NOTE — PROGRESS NOTES
[address] [address] THERAPY  [facility] ICU 4D  EVALUATION    Time:   Time In: 1040  Time Out: 1118  Timed Code Treatment Minutes: 23 Minutes  Minutes: 38          Date: 2020  Patient Name: Rody Aponte,   Gender: male      MRN: [id number]  : 1940  ([de-identified] y.o.)  Referring Practitioner: Ethan Daliey PA-C  Diagnosis: Hip Fracture Requiring Operative Repair, Right, closed  Additional Pertinent Hx: Pt admitted with above after having fallen at home. Pt underwent R Hemiarthroplasty on 20. Pt developed hypotension following surgery and was transferred to ICU. Restrictions/Precautions:  Restrictions/Precautions: ROM Restrictions, Fall Risk, Weight Bearing, Up as Tolerated, Surgical Protocols  Right Lower Extremity Weight Bearing: Weight Bearing As Tolerated  Position Activity Restriction  Hip Precautions: No hip external rotation, No ADduction, No hip extension  Other position/activity restrictions: right THR anterior-lateral approach- no add past neutral, no ER, no hip hyperextension    Subjective  Chart Reviewed: Yes, Orders, History and Physical  Patient assessed for rehabilitation services?: Yes    Subjective: Pleasant and cooperative  Comments: RN approved session. Pt had agreed to get up to the chair and do some of his self care. He reports moderate hip pain. Ice packs in place over his hip following session. 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 Occasional difficulty with fine motor tasks secondary to injury of his R hand but pt is able to adapt the task or requests to have help. Activity Tolerance: Patient limited by pain, Patient limited by fatigue  Pt was using 1L of O2. He needed a rest break after having completed the transfer. He indicated that the pain was slightly elevated after having been up and moving. He had his legs elevated and ice packs were placed over his hip. Assessment:  Assessment: Patient would benefit from continued skilled OT services to address above deficits. He presents with hip fracture requiring operative repair, Right, closed. Pt underwent R hemiarthroplasty on 5/21/20. He was using a rolling walker at home but a cane when going to the store prior to admission. He helped with housekeeping and does most of his own self care. He demonstrated doing self care grooming and had help with LE dressing. He stated that he was taking spongebaths at the sink prior to admission. Performance deficits / Impairments: Decreased functional mobility , Decreased ADL status, Decreased strength, Decreased endurance, Decreased balance  Prognosis: Good  REQUIRES OT FOLLOW UP: Yes  Decision Making: Low Complexity    Treatment Initiated: Treatment and education initiated within context of evaluation.   Evaluation time included review of current medical information, gathering information related to past medical, social and functional history, completion of standardized testing, formal and informal

## 2020-05-22 NOTE — CARE COORDINATION
5/22/20, 7:07 AM EDT    DISCHARGE BARRIERS        Patient transferred to 4D. Report given to unit Tomas Dickson, regarding discharge plan for this patient.

## 2020-05-22 NOTE — PROGRESS NOTES
facial expression 4/10: right hip  OBJECTIVE:  Bed Mobility:  Supine to Sit: Contact Guard Assistance, Minimal Assistance, X 1, with increased time for completion  Scooting: Contact Guard Assistance    Transfers:  Sit to Stand: [employer], X 1, with increased time for completion, cues for hand placement  Stand to [employer], X 1, with increased time for completion, cues for hand placement    Ambulation:  Contact Guard Assistance tomin A  Distance: 20 feet  Surface: Level Tile  Device:Standard Walker  Gait Deviations: Forward Flexed Posture, Slow Анна, Decreased Step Length Bilaterally, Decreased Weight Shift Right, Decreased Gait Speed and small steps with step too gait pattern    Balance:  Static Sitting Balance:  Stand By Assistance  Static Standing Balance: Stand By Assistance, Contact Guard Assistance  Dynamic Standing Balance: Contact Guard Assistance, Minimal Assistance    Exercise:  Patient was guided in 1 set(s) 10 reps of exercise to both lower extremities. Ankle pumps, Glut sets, Quad sets, Long arc quads and Hip abduction/adduction. Exercises were completed for increased independence with functional mobility. Functional Outcome Measures: Completed  -PAC Inpatient Mobility without Stair Climbing Raw Score : 14  AM-PAC Inpatient without Stair Climbing T-Scale Score : 40.85    ASSESSMENT:  Assessment: Patient progressing toward established goals. Activity Tolerance:  Patient tolerance of  treatment: good.       Equipment Recommendations:Equipment Needed: No  Discharge Recommendations:  Continue to assess pending progress, Patient would benefit from continued therapy after discharge    Plan: Times per week: BID X 6 and daily X 1  Specific instructions for Next Treatment: THR protocol  Current Treatment Recommendations: Strengthening, Transfer Training, Balance Training, Gait Training, Functional Mobility Training, Stair training, Home Exercise Program    Patient

## 2020-05-22 NOTE — PROGRESS NOTES
Pt given 250mL bolus as ordered by Dr. Sofía Hubbard. Pt blood pressure then became 70's SBP. Called rapid and resource nurse and got the verbal order from Dr. Sofía Hubbard to transfer to ICU and start Levo. Called and updated wife of patient transferred.

## 2020-05-22 NOTE — CONSULTS
Intensivist Consult Note        Patient:  Victoria Cabral  YOB: 1940  Date of Service: 5/21/2020  MRN: [id number]   Acct:  [de-identified]   Primary Care Physician: Renetta Campos MD    Chief Complaint:  Right hip/knee pain  Reason for consult  hypotension    Date of Service: Pt seen/examined in consultation on 5/21/2020     History Of Present Illness:      Victoria Cabral [de-identified] y.o. male who we are asked to see/evaluate by Estefania Ware MD for medical management of hypotension. Patient is a former smoker, past medical history type 2 diabetes mellitus diet-controlled, DVT/pulmonary embolism, esophageal cancer with esophagectomy, colon cancer S/P colon resection, COPD, CAD, Hunt's syndrome, right tot al knee arthroplasty who originally presented after a fall at home. Patient had right femoral neck fracture and is S/P right hip Hemiarthroplasty on 5/21/2020 with Dr. Laquita Casillas. EBL noted 150 ml. On 6K patient developed hypotension. Patient administered 1,250 ml bolus without resolution of hypotension. Upon evaluation patient awake and alert. Patient denies blurred vision, dizziness, chest pain, nausea/vomiting. Patient reports slight shortness of breath without cough/fever/chills. Patient states he has never had trouble with hypotension before. Reports surgical pain is minimal.      For further details see assessment/plan. Assessment and Plan:-    1. Acute Hypoxic Post-Surgical Respiratory Insufficiency:  Baseline room air. Wean oxygen to keep saturation greater than 92%. Incentive spirometer. Encourage cough and deep breathing. 2. Hypotension:  Unknown etiology-differentials include hypovolemia postoperatively or cardiogenic origin. S/P lasix IV 20 mg pre-operatively, takes lasix outpateint. Patient appears clinically dry with dry mucous membranes but has posterior rales. S/P 1250 mL's saline IV bolus for hypotension. Continue IV fluids.   Start levophed and titrate for MAP >65.  Strict I & O and daily weight. Repeat BMP. 3. Right Fermoral Neck Fracture:  S/P right hip Hemiarthroplasty on 5/21/2020 with Dr. Gabe Ahn. Pain control, activity per Orthopedic service. PT/OT. 4. HFpEF:  Last echocardiogram 4/25/2018 demonstrated EF 55%. Hold Lasix secondary to hypotension. Repeat ECHO as BNP elevated and CXR demonstrates mild pulmonary edema. 5. Type #2 Diabetes Mellitis:  (Uncontrolled)  Hg A1C 7.5 on 1/29/2020. Hold metformin. Start sliding scale insulin with Accu-Cheks AC & at bedtime. 6. History PE/DVT:  Coumadin held secondary to surgery. 7. CAD:  S/P 2 stents remotely. Plavix held secondary to surgery. 8. Moderate COPD:   (Not in exacerbation)  Continue Respimat, Pulmicort, DuoNebs, & Brovana. 9. Benign Essential Hypertension:  Currently hypotensive. Hold Lasix, Imdur, & atenolol.       Past Medical History:        Diagnosis Date    Arthritis     Hunt syndrome 2013    intestinal mucosa ( HX esophageal resection w/ pull through    CAD (coronary artery disease)     Clotting disorder (HCC)     COPD (chronic obstructive pulmonary disease) (HCC)     COPD (chronic obstructive pulmonary disease) (HCC)     GERD (gastroesophageal reflux disease)     History of colon cancer 1997    Status post ressection    History of esophageal cancer 2000    Status post resection in [facility]. Hx of blood clots     LEG, LUNGS    Hypertension     Kidney stone on left side 7/2010    Nausea & vomiting     Pericarditis     History of    Personal history of DVT (deep vein thrombosis)     Personal history of pulmonary embolism     Pseudogout 12/2005    Type II or unspecified type diabetes mellitus without mention of complication, not stated as uncontrolled        Past Surgical History:        Procedure Laterality Date    COLONOSCOPY  6/6/2011    Dr Maico Villalobos  11/24/14    [facility]    CORONARY ANGIOPLASTY WITH STENT

## 2020-05-22 NOTE — PROGRESS NOTES
patient was weaned off of levophed  Patient will transition to orthopedic floor. With coverage provided by family medicine Dr. Jakob Boswell    Past Medical History: Per HPI  Family History: Mother: Diabetes, stroke Father: Cancer  Social History: Reformed smoker, 25.5 pack year history, denies EtOH use, denies drug use. Review of Systems   Constitutional: Negative for fatigue and fever. HENT: Negative for sore throat and trouble swallowing. Eyes: Negative for photophobia and visual disturbance. Respiratory: Negative for cough and wheezing. Cardiovascular: Negative for chest pain and leg swelling. Gastrointestinal: Negative for constipation, nausea and vomiting. Endocrine: Negative for polydipsia and polyphagia. Genitourinary: Negative for decreased urine volume and flank pain. Musculoskeletal: Negative for back pain and neck pain. Skin: Negative for color change, pallor and rash. Allergic/Immunologic: Negative for food allergies. Neurological: Positive for weakness. Negative for dizziness and numbness. Hematological: Negative for adenopathy. Psychiatric/Behavioral: Negative for agitation, confusion and hallucinations.            Scheduled Meds:   budesonide  0.5 mg Nebulization BID    pantoprazole  40 mg Intravenous Once    [Held by provider] atenolol  100 mg Oral Daily    Arformoterol Tartrate  15 mcg Nebulization BID    [Held by provider] isosorbide mononitrate  30 mg Oral Daily    sodium chloride flush  10 mL Intravenous 2 times per day    acetaminophen  650 mg Oral Q6H    sennosides-docusate sodium  1 tablet Oral BID    sodium chloride  500 mL Intravenous Once    finasteride  5 mg Oral Daily    tamsulosin  0.4 mg Oral Nightly    insulin lispro  0-6 Units Subcutaneous TID WC    insulin lispro  0-3 Units Subcutaneous Nightly    norepinephrine        sodium chloride flush  10 mL Intravenous 2 times per day     Continuous Infusions:   sodium chloride 100 mL/hr at 05/21/20

## 2020-05-22 NOTE — CARE COORDINATION
5/22/20, 7:51 AM EDT    DISCHARGE [facility] [address] [facility] day: 2  Location: -12/012-A Reason for admit: Hip fracture requiring operative repair, right, closed, initial encounter [facility]) [S72.001A]  Closed right hip fracture, initial encounter ([facility] [address] Lige Laming   Procedure: 5/21 right Hip hemiarthroplasty (EBL 150ml). Treatment Plan of Care: Ortho and intensivist following. Transfer to ICU from [facility] post-op for hypotension. On 6k received 1250 bolus, did not resolve. 0.9% @ 100ml/hr. Levo at 6mcg/min. Last documented bp 108/46. Using 1L O2, sats 99%, baseline RA. Hgb 12.1. BNP elevated. Planning echo today. PT/OT. Order for merritt to be removed. Barriers to Discharge: Await medical clearance. PCP: Claude Purdue, MD  Readmission Risk Score: 17%  Patient Goals/Plan/Treatment Preferences: Hopeful to return home with wife and new HH. PT/OT evals pending. Monitor for dme needs.

## 2020-05-22 NOTE — PROGRESS NOTES
PRN  budesonide (PULMICORT) nebulizer suspension 500 mcg, 0.5 mg, Nebulization, BID  pantoprazole (PROTONIX) injection 40 mg, 40 mg, Intravenous, Once  [Held by provider] atenolol (TENORMIN) tablet 100 mg, 100 mg, Oral, Daily  Arformoterol Tartrate (BROVANA) nebulizer solution 15 mcg, 15 mcg, Nebulization, BID  [Held by provider] isosorbide mononitrate (IMDUR) extended release tablet 30 mg, 30 mg, Oral, Daily  sodium chloride flush 0.9 % injection 10 mL, 10 mL, Intravenous, 2 times per day  sodium chloride flush 0.9 % injection 10 mL, 10 mL, Intravenous, PRN  acetaminophen (TYLENOL) tablet 650 mg, 650 mg, Oral, Q6H  sennosides-docusate sodium (SENOKOT-S) 8.6-50 MG tablet 1 tablet, 1 tablet, Oral, BID  magnesium hydroxide (MILK OF MAGNESIA) 400 MG/5ML suspension 30 mL, 30 mL, Oral, Daily PRN  traMADol (ULTRAM) tablet 50 mg, 50 mg, Oral, Q6H PRN  0.9 % sodium chloride bolus, 500 mL, Intravenous, Once  albuterol (PROVENTIL) nebulizer solution 2.5 mg, 2.5 mg, Nebulization, Q6H PRN  finasteride (PROSCAR) tablet 5 mg, 5 mg, Oral, Daily  tamsulosin (FLOMAX) capsule 0.4 mg, 0.4 mg, Oral, Nightly  potassium chloride (KLOR-CON M) extended release tablet 40 mEq, 40 mEq, Oral, PRN **OR** potassium bicarb-citric acid (EFFER-K) effervescent tablet 40 mEq, 40 mEq, Oral, PRN **OR** potassium chloride 10 mEq/100 mL IVPB (Peripheral Line), 10 mEq, Intravenous, PRN  potassium chloride 10 mEq/100 mL IVPB (Peripheral Line), 10 mEq, Intravenous, PRN  insulin lispro (HUMALOG) injection vial 0-6 Units, 0-6 Units, Subcutaneous, TID WC  insulin lispro (HUMALOG) injection vial 0-3 Units, 0-3 Units, Subcutaneous, Nightly  glucose (GLUTOSE) 40 % oral gel 15 g, 15 g, Oral, PRN  dextrose 50 % IV solution, 12.5 g, Intravenous, PRN  glucagon (rDNA) injection 1 mg, 1 mg, Intramuscular, PRN  dextrose 5 % solution, 100 mL/hr, Intravenous, PRN  sodium chloride flush 0.9 % injection 10 mL, 10 mL, Intravenous, 2 times per day  sodium chloride flush 0.9 % injection 10 mL, 10 mL, Intravenous, PRN  polyethylene glycol (GLYCOLAX) packet 17 g, 17 g, Oral, Daily PRN  [DISCONTINUED] promethazine (PHENERGAN) tablet 12.5 mg, 12.5 mg, Oral, Q6H PRN **OR** ondansetron (ZOFRAN) injection 4 mg, 4 mg, Intravenous, Q6H PRN  acetaminophen (TYLENOL) tablet 650 mg, 650 mg, Oral, Q4H PRN  morphine (PF) injection 1 mg, 1 mg, Intravenous, Q3H PRN  oxyCODONE (ROXICODONE) immediate release tablet 5 mg, 5 mg, Oral, Q4H PRN  promethazine (PHENERGAN) injection 12.5 mg, 12.5 mg, Intramuscular, Q6H PRN    ASSESSMENT AND PLAN      Status post right hip hemiarthroplasty    Okay for transfer from orthopedic standpoint. Resuming Coumadin, Plavix and Metformin from home meds. Continue PT/OT. Continue pain control. Hospitalist to continue medical management. Discharge planning pending.

## 2020-05-23 NOTE — PLAN OF CARE
Problem: Impaired respiratory status  Goal: Clear lung sounds  Description: Clear lung sounds     Outcome: Ongoing  Note: Pt has diminished breath sounds. Txs to help improve lung aeration.

## 2020-05-23 NOTE — PROGRESS NOTES
Department of Orthopedic Surgery  Physician Assistant Progress Note      SUBJECTIVE   Patient is POD #2 from right hip hemiarthroplasty. He is doing well today  back to the floor. Nursing states his pain is well controlled. He has walked today. Patient seated in chair. Don discontinued, had a bowel movement. Patient has no complaints. Anticipating discharge to home with home health versus ECF. Patient has some stairs at home, PT working with him. OBJECTIVE    Physical    VITALS:  BP (!) 107/51   Pulse 77   Temp 97.5 °F (36.4 °C) (Oral)   Resp 18   Ht 5' 11\" (1.803 m)   Wt 167 lb 8.8 oz (76 kg)   SpO2 92%   BMI 23.37 kg/m²   CONSTITUTIONAL:  awake, alert, cooperative, no apparent distress, and appears stated age  MUSCULOSKELETAL:  On exam of the right hip, bandages in place, clean and dry. Exam of wound shows well closed incision, no sign of infection. Bandages will be changed when patient goes back to bed. No pain with rotation of hip. No small arc ROM pain of knee. Ligaments stable. Negative calf tenderness bilaterally. DF, PF, EHL 5/5 bilaterally. Distal neurovascularly intact.      Data    CBC:   Lab Results   Component Value Date    WBC 11.6 05/21/2020    RBC 4.34 05/21/2020    RBC 4.56 09/03/2011    HGB 12.1 05/21/2020    HCT 39.4 05/21/2020    .0 05/21/2020    MCH 31.6 05/21/2020    MCHC 31.6 05/21/2020    RDW 14.7 03/19/2018     05/21/2020    MPV 10.9 05/21/2020     CMP:    Lab Results   Component Value Date     05/21/2020    K 4.6 05/21/2020    K 5.3 05/21/2020     05/21/2020    CO2 21 05/21/2020    BUN 22 05/21/2020    CREATININE 1.2 05/21/2020    LABGLOM 58 05/21/2020    GLUCOSE 393 05/21/2020    GLUCOSE 151 04/11/2012    PROT 6.3 05/21/2020    LABALBU 3.1 05/21/2020    LABALBU 4.2 04/11/2012    CALCIUM 7.8 05/21/2020    BILITOT 0.7 05/21/2020    ALKPHOS 66 05/21/2020    AST 18 05/21/2020    ALT 10 05/21/2020     Current Inpatient Medications    Current

## 2020-05-23 NOTE — PLAN OF CARE
Problem: Falls - Risk of:  Goal: Will remain free from falls  Description: Will remain free from falls  Outcome: Ongoing  Note: No falls noted this shift. Fall risk assessment completed. Hourly rounding performed. Bed locked in lowest position, bed alarm on, call light and personal items within reach, and fall sign posted. Patient uses the urinal at bedside. Bladder scan showed 16mL. Problem: Pain:  Goal: Pain level will decrease  Description: Pain level will decrease  Outcome: Ongoing  Note: Pain Assessment: 0-10  Pain Level: 0   Patient's Stated Pain Goal: 5   Is pain goal met at this time? Yes     Non-Pharmaceutical Pain Intervention(s): Cold applied, Rest, Repositioned, Tylenol administered per MAR. Problem: Neurological  Goal: Maximum potential motor/sensory/cognitive function  Outcome: Ongoing  Note: Alert and oriented x4. Denies any numbness and tingling. Problem: Cardiovascular  Goal: No DVT, peripheral vascular complications  Outcome: Ongoing  Note: No signs or symptoms of DVTs. Patient on plavix and coumadin. Problem: Respiratory  Goal: No pulmonary complications  Outcome: Ongoing  Note: Lung sounds are clear and diminished. O2 saturation is 96% on room air. Incentive spirometer. Will continue to assess. Problem: GI  Goal: No bowel complications  Outcome: Ongoing  Note: Bowel sounds hypoactive. Denies passing flatus. Problem:   Goal: Adequate urinary output  Outcome: Ongoing  Note: Bladder scanned ordered d/t patient not urinating since 1030 on 5/22. Bladder scan at 2030 showed 16ml. No need to straight cath. Problem: Skin Integrity/Risk  Goal: No skin breakdown during hospitalization  Outcome: Ongoing  Note: No new skin lesions noted this shift. Patient encouraged to reposition every two hours. Nursing staff assist with patient repositioning. Skin assessments completed and ongoing.         Problem: Discharge Planning:  Goal: Discharged to appropriate level of

## 2020-05-23 NOTE — PROGRESS NOTES
05/22/20 1005 -- -- -- 94 25 98 % --   05/22/20 1004 -- -- -- 92 20 97 % --   05/22/20 1003 (!) 129/56 -- -- 89 19 97 % --   05/22/20 1002 -- -- -- 83 15 99 % --   05/22/20 1001 -- -- -- 99 20 97 % --   05/22/20 1000 -- -- -- 101 15 100 % --   05/22/20 0959 -- -- -- 96 19 100 % --   05/22/20 0958 -- -- -- 103 21 100 % --   05/22/20 0957 -- -- -- 95 20 100 % --   05/22/20 0956 -- -- -- 88 19 100 % --   05/22/20 0955 -- -- -- 69 21 100 % --   05/22/20 0954 -- -- -- 69 18 100 % --   05/22/20 0953 -- -- -- 70 21 100 % --   05/22/20 0952 -- -- -- 72 20 100 % --   05/22/20 0951 -- -- -- 79 21 100 % --   05/22/20 0950 (!) 101/50 -- -- 76 20 97 % --   05/22/20 0949 -- -- -- 76 14 98 % --   05/22/20 0948 -- -- -- 92 17 100 % --   05/22/20 0947 -- -- -- 100 30 97 % --   05/22/20 0946 -- -- -- 99 15 97 % --   05/22/20 0945 -- -- -- 92 17 97 % --   05/22/20 0944 -- -- -- 88 17 96 % --   05/22/20 0943 -- -- -- 93 19 97 % --   05/22/20 0942 -- -- -- 100 17 98 % --   05/22/20 0941 -- -- -- 85 16 96 % --   05/22/20 0940 -- -- -- 88 16 96 % --   05/22/20 0939 -- -- -- 83 16 96 % --   05/22/20 0938 -- -- -- 88 15 96 % --   05/22/20 0937 -- -- -- 89 16 96 % --   05/22/20 0936 -- -- -- 94 19 97 % --   05/22/20 0935 -- -- -- 98 21 96 % --   05/22/20 0934 -- -- -- 91 16 96 % --   05/22/20 0933 -- -- -- 98 15 96 % --   05/22/20 0932 (!) 101/49 -- -- 92 21 96 % --   05/22/20 0931 -- -- -- 98 17 96 % --   05/22/20 0930 -- -- -- 89 15 96 % --   05/22/20 0929 -- -- -- 95 16 96 % --   05/22/20 0928 -- -- -- 97 16 95 % --   05/22/20 0927 -- -- -- 91 16 96 % --   05/22/20 0926 -- -- -- 92 16 95 % --   05/22/20 0925 -- -- -- 95 16 96 % --   05/22/20 0924 -- -- -- 90 16 96 % --   05/22/20 0923 -- -- -- 95 16 96 % --   05/22/20 0922 -- -- -- 94 17 95 % --   05/22/20 0921 -- -- -- 93 17 95 % --   05/22/20 0920 -- -- -- 91 17 95 % --   05/22/20 0919 -- -- -- 91 15 95 % --   05/22/20 0918 -- -- -- 73 15 96 % --   05/22/20 0917 (!) 104/46 -- -- 77 17 05/22/20 0737 -- -- -- 59 16 99 % --   05/22/20 0736 -- -- -- 57 14 98 % --   05/22/20 0735 -- -- -- 57 16 99 % --   05/22/20 0734 -- -- -- 58 16 99 % --   05/22/20 0733 (!) 115/51 -- -- 61 17 100 % --   05/22/20 0732 -- -- -- 61 16 99 % --   05/22/20 0731 -- -- -- 58 15 99 % --   05/22/20 0730 -- -- -- 56 15 100 % --   05/22/20 0729 -- -- -- 58 15 99 % --   05/22/20 0728 -- -- -- 59 16 99 % --   05/22/20 0727 -- -- -- 60 15 99 % --   05/22/20 0726 -- -- -- 57 15 99 % --   05/22/20 0725 -- -- -- 56 14 100 % --   05/22/20 0724 -- -- -- 58 14 98 % --   05/22/20 0723 -- -- -- 58 15 100 % --   05/22/20 0722 -- -- -- 62 16 99 % --   05/22/20 0721 -- -- -- 57 15 99 % --   05/22/20 0720 -- -- -- 58 14 99 % --   05/22/20 0719 -- -- -- 62 15 99 % --   05/22/20 0718 -- -- -- 56 15 99 % --   05/22/20 0717 -- -- -- 59 15 98 % --   05/22/20 0716 -- -- -- 57 15 100 % --   05/22/20 0715 (!) 126/52 -- -- 58 14 100 % --   05/22/20 0714 -- -- -- 58 15 100 % --   05/22/20 0713 -- -- -- 64 14 100 % --   05/22/20 0712 -- -- -- 58 16 99 % --   05/22/20 0711 -- -- -- 58 15 100 % --   05/22/20 0710 -- -- -- 67 18 99 % --   05/22/20 0709 -- -- -- 55 15 98 % --   05/22/20 0708 -- -- -- 58 14 99 % --   05/22/20 0707 -- -- -- 56 14 98 % --   05/22/20 0706 -- -- -- 56 12 99 % --   05/22/20 0705 -- -- -- 61 14 99 % --   05/22/20 0704 -- -- -- 55 14 99 % --   05/22/20 0703 -- -- -- 57 15 99 % --   05/22/20 0702 (!) 108/46 -- -- 56 15 99 % --   05/22/20 0700 -- -- -- 57 14 99 % --   05/22/20 0632 (!) 121/57 -- -- 62 17 -- --   05/22/20 0602 (!) 106/51 -- -- 57 13 99 % --   05/22/20 0532 (!) 132/56 -- -- 60 15 99 % --   05/22/20 0502 (!) 112/44 -- -- 61 15 98 % --   05/22/20 0432 103/76 -- -- 76 16 90 % --   05/22/20 0402 (!) 98/51 97.5 °F (36.4 °C) Oral 75 23 100 % 167 lb 8.8 oz (76 kg)   05/22/20 0332 109/63 -- -- 57 14 99 % --   05/22/20 0302 (!) 97/53 -- -- 59 15 97 % --   05/22/20 0232 (!) 103/54 -- -- 59 17 98 % --   05/22/20 0202 (!) 93/52 -- -- 87 13 96 % --   05/22/20 0132 (!) 80/47 -- -- 64 14 98 % --   05/22/20 0100 (!) 97/54 -- -- 63 17 98 % --   05/22/20 0030 (!) 100/50 -- -- 67 12 99 % --   05/22/20 0000 (!) 93/56 -- -- 94 15 97 % --   05/21/20 2318 -- -- -- -- -- -- 165 lb 2 oz (74.9 kg)   05/21/20 2310 95/71 98.2 °F (36.8 °C) Oral 79 17 -- --   05/21/20 2249 (!) 72/46 -- -- -- -- -- --   05/21/20 2144 84/60 -- -- -- -- -- --   05/21/20 2052 110/60 -- -- 102 -- -- --       General Appearance:  Alert, cooperative, no distress, appears stated age   HEENT:  Neck:      Chest/Lungs:  Heart: CTA  RRR   Abdomen:  Back: Soft non tender   Extremities:  Neurological Exam: No edmea  WNL       Assessment:     Admitting Diagnosis:    Hip fracture requiring operative repair, right, closed, initial encounter ([facility] [S72.001A]  Closed right hip fracture, initial encounter ([facility] [facility]    Diagnosis Date Noted    Postoperative hypotension [I95.89] 05/22/2020     Priority: High    Hip fracture requiring operative repair, right, closed, initial encounter ([facility] General Forward 05/20/2020     Priority: High    Closed right hip fracture, initial encounter ([facility] [S72.001A] 05/20/2020     Priority: High    Anticoagulated on Coumadin [Z79.01] 06/13/2016     Priority: High    Presence of stent in coronary artery in patient with coronary artery disease [I25.10, Z95.5] 05/21/2020     Priority: Medium    Essential hypertension [I10] 08/04/2016     Priority: Medium    History of pulmonary embolism [Z86.711]      Priority: Medium    GERD (gastroesophageal reflux disease) [K21.9]      Priority: Medium    Type 2 diabetes mellitus with complication ([facility] [W59.9]      Priority: Medium    TONG (dyspnea on exertion) [R06.09] 10/11/2012     Priority: Medium    COPD, severe [J44.9] 06/04/2012     Priority: Medium    Restrictive lung disease [J98.4] 06/04/2012     Priority: Medium    History of colon cancer [Z85.038]      Priority: Medium    History of esophageal cancer [Z85.01]      Priority: Medium       Plan:     Discussed plans with the nursing staff  Continue present treatment  PT/OT  Discussed rehab/ECF    Medications, Laboratories and Imaging results:    Scheduled Meds:   metFORMIN  500 mg Oral Daily with breakfast    clopidogrel  75 mg Oral Daily    warfarin (COUMADIN) daily dosing (placeholder)   Other RX Placeholder    budesonide  0.5 mg Nebulization BID    pantoprazole  40 mg Intravenous Once    [Held by provider] atenolol  100 mg Oral Daily    Arformoterol Tartrate  15 mcg Nebulization BID    [Held by provider] isosorbide mononitrate  30 mg Oral Daily    sodium chloride flush  10 mL Intravenous 2 times per day    acetaminophen  650 mg Oral Q6H    sennosides-docusate sodium  1 tablet Oral BID    sodium chloride  500 mL Intravenous Once    finasteride  5 mg Oral Daily    tamsulosin  0.4 mg Oral Nightly    insulin lispro  0-6 Units Subcutaneous TID WC    insulin lispro  0-3 Units Subcutaneous Nightly    sodium chloride flush  10 mL Intravenous 2 times per day     Continuous Infusions:   sodium chloride 100 mL/hr at 05/21/20 0759    dextrose       PRN Meds:ipratropium-albuterol, sodium chloride flush, magnesium hydroxide, traMADol, albuterol, potassium chloride **OR** potassium alternative oral replacement **OR** potassium chloride, potassium chloride, glucose, dextrose, glucagon (rDNA), dextrose, sodium chloride flush, polyethylene glycol, [DISCONTINUED] promethazine **OR** ondansetron, acetaminophen, morphine, oxyCODONE, promethazine    Imaging:    Lab Review :    Lab Results   Component Value Date    WBC 11.6 (H) 05/21/2020    HGB 12.1 (L) 05/21/2020    HCT 39.4 (L) 05/21/2020    .0 (H) 05/21/2020     05/21/2020     Lab Results   Component Value Date    CREATININE 1.2 05/21/2020    BUN 22 05/21/2020     (L) 05/21/2020    K 4.6 05/21/2020     05/21/2020    CO2 21 (L) 05/21/2020     Lab Results Component Value Date    CKTOTAL 101 09/02/2011    CKMB 1.4 09/02/2011    TROPONINI 0.011 09/03/2011     Lab Results   Component Value Date    ALT 10 (L) 05/21/2020    AST 18 05/21/2020    ALKPHOS 66 05/21/2020    BILITOT 0.7 05/21/2020     Lab Results   Component Value Date    LIPASE 9.5 10/16/2014         Electronically signed by Adarsh Valenzuela MD on 5/22/20 at 8:45 PM FREDDY Alas M.D.

## 2020-05-23 NOTE — PROGRESS NOTES
RN placed 2L of oxygen via Nasal cannula for shortness of breath when ambulating to and from the bathroom.

## 2020-05-23 NOTE — PROGRESS NOTES
chloride flush 0.9 % injection 10 mL  10 mL Intravenous PRN Stephanie Ice, PA-C        polyethylene glycol (GLYCOLAX) packet 17 g  17 g Oral Daily PRN Stephanie Ice, PA-C        [facility]ansetron [facility] 4 mg  4 mg Intravenous Q6H PRN Stephanie Ice, PA-C   4 mg at 05/22/20 2965    acetaminophen (TYLENOL) tablet 650 mg  650 mg Oral Q4H PRN Stephanie Ice, PA-C        morphine (PF) injection 1 mg  1 mg Intravenous Q3H PRN Stephanie Ice, PA-C   1 mg at 05/20/20 2215    oxyCODONE (ROXICODONE) immediate release tablet 5 mg  5 mg Oral Q4H PRN Stephanie Ice, PA-C   5 mg at 05/22/20 1150    promethazine (PHENERGAN) injection 12.5 mg  12.5 mg Intramuscular Q6H PRN Socorro R Adaline Alba, PA-C         Allergies   Allergen Reactions    Norco [Hydrocodone-Acetaminophen] Nausea And Vomiting     Principal Problem:    Closed right hip fracture, initial encounter ([facility]  Active Problems:    Postoperative hypotension    History of esophageal cancer    History of colon cancer    COPD, severe    Type 2 diabetes mellitus with complication (HCC)    GERD (gastroesophageal reflux disease)    History of pulmonary embolism    Presence of stent in coronary artery in patient with coronary artery disease    Restrictive lung disease    TONG (dyspnea on exertion)    Anticoagulated on Coumadin    Essential hypertension    Hip fracture requiring operative repair, right, closed, initial encounter ([facility]  Resolved Problems:    * No resolved hospital problems. *    Blood pressure (!) 95/51, pulse 80, temperature 97.4 °F (36.3 °C), temperature source Oral, resp. rate 18, height 5' 11\" (1.803 m), weight 167 lb 8.8 oz (76 kg), SpO2 92 %. Subjective:  Symptoms:  Stable. Diet:  Adequate intake. Activity level: Impaired due to weakness. Pain:  He complains of pain that is mild. (  Right hip pain). Objective:  General Appearance:  Comfortable.     Vital signs: (most recent): Blood pressure (!) 95/51, pulse

## 2020-05-24 NOTE — PROGRESS NOTES
PA-C        polyethylene glycol (GLYCOLAX) packet 17 g  17 g Oral Daily PRN Joslyn Allegra, PA-C        ondansetron [facility]) injection 4 mg  4 mg Intravenous Q6H PRN Joslyn Allegra, PA-C   4 mg at 05/22/20 8722    acetaminophen (TYLENOL) tablet 650 mg  650 mg Oral Q4H PRN Joslyn Allegra, PA-C        morphine (PF) injection 1 mg  1 mg Intravenous Q3H PRN Joslyn Allegra, PA-C   1 mg at 05/20/20 2215    oxyCODONE (ROXICODONE) immediate release tablet 5 mg  5 mg Oral Q4H PRN Joslyn Allegra, PA-C   5 mg at 05/22/20 1150    promethazine (PHENERGAN) injection 12.5 mg  12.5 mg Intramuscular Q6H PRN Socorro R Gael Minor, PA-C         Allergies   Allergen Reactions    Norco [Hydrocodone-Acetaminophen] Nausea And Vomiting     Principal Problem:    Closed right hip fracture, initial encounter ([facility] [address]  Active Problems:    Postoperative hypotension    History of esophageal cancer    History of colon cancer    COPD, severe    Type 2 diabetes mellitus with complication (HCC)    GERD (gastroesophageal reflux disease)    History of pulmonary embolism    Presence of stent in coronary artery in patient with coronary artery disease    Restrictive lung disease    TONG (dyspnea on exertion)    Anticoagulated on Coumadin    Essential hypertension    Hip fracture requiring operative repair, right, closed, initial encounter ([facility] [address]  Resolved Problems:    * No resolved hospital problems. *    Blood pressure (!) 106/55, pulse 73, temperature 97.6 °F (36.4 °C), temperature source Oral, resp. rate 16, height 5' 11\" (1.803 m), weight 167 lb 8.8 oz (76 kg), SpO2 96 %. Subjective:  Symptoms:  Stable. Diet:  Adequate intake. Activity level: Impaired due to weakness. Pain:  He complains of pain that is moderate. Objective:  General Appearance:  Comfortable. Vital signs: (most recent): Blood pressure (!) 106/55, pulse 73, temperature 97.6 °F (36.4 °C), temperature source Oral, resp.  rate 16, height 5' 11\" (1.803

## 2020-05-24 NOTE — PROGRESS NOTES
Clinical Pharmacy Note    Warfarin consult follow-up    Recent Labs     05/24/20  0437   INR 1.22*     Recent Labs     05/21/20 2035 05/24/20  0437   HGB 12.1* 9.9*   HCT 39.4* 32.6*   PLT  --  178       Significant Drug-Drug Interactions:  New warfarin drug-drug interactions: none  Discontinued drug-drug interactions: none  Current warfarin drug-drug interactions: clopidogrel (home), tramadol         Date INR Warfarin Dose   5/22/2020 1.16 5 mg   5/23/2020  1.12  4 mg   5/24/2020 1.22  2.5 mg                                      Notes:                     Daily PT/INR until stable within therapeutic range.      Josie Taylor, PharmD, BCPS   5/24/2020  2:48 PM

## 2020-05-24 NOTE — PROGRESS NOTES
Department of Orthopedic Surgery  Physician Assistant Progress Note      SUBJECTIVE  Patient is POD #3 from right hip hemiarthroplasty. Nursing states his pain is well controlled. He has walked today in the halls with PT. Patient seated in chair in the hallway working on going up steps as well. Patient has no complaints. PLavix and Coumadin resumed. Pharmacy dosing coumadin. OBJECTIVE    Physical    VITALS:  BP (!) 106/55   Pulse 73   Temp 97.6 °F (36.4 °C) (Oral)   Resp 16   Ht 5' 11\" (1.803 m)   Wt 167 lb 8.8 oz (76 kg)   SpO2 96%   BMI 23.37 kg/m²   CONSTITUTIONAL:  awake, alert, cooperative, no apparent distress, and appears stated age  MUSCULOSKELETAL:  On exam of the right hip, bandages in place, clean and dry. No sign of infection. No pain with rotation of hip. No small arc ROM pain of knee. Ligaments stable. Negative calf tenderness bilaterally. DF, PF, EHL 5/5 bilaterally. Distal neurovascularly intact. 1+ pedal pulses.     Data    CBC:   Lab Results   Component Value Date    WBC 8.8 05/24/2020    RBC 3.16 05/24/2020    RBC 4.56 09/03/2011    HGB 9.9 05/24/2020    HCT 32.6 05/24/2020    .2 05/24/2020    MCH 31.3 05/24/2020    MCHC 30.4 05/24/2020    RDW 14.7 03/19/2018     05/24/2020    MPV 11.3 05/24/2020     CMP:    Lab Results   Component Value Date     05/24/2020    K 4.5 05/24/2020    K 5.3 05/21/2020     05/24/2020    CO2 26 05/24/2020    BUN 19 05/24/2020    CREATININE 0.7 05/24/2020    LABGLOM >90 05/24/2020    GLUCOSE 135 05/24/2020    GLUCOSE 151 04/11/2012    PROT 6.3 05/21/2020    LABALBU 3.1 05/21/2020    LABALBU 4.2 04/11/2012    CALCIUM 8.0 05/24/2020    BILITOT 0.7 05/21/2020    ALKPHOS 66 05/21/2020    AST 18 05/21/2020    ALT 10 05/21/2020     Current Inpatient Medications    Current Facility-Administered Medications: metFORMIN (GLUCOPHAGE-XR) extended release tablet 500 mg, 500 mg, Oral, Daily with breakfast  clopidogrel (PLAVIX) tablet 75 mg, 75 mg, Intravenous, PRN  glucagon (rDNA) injection 1 mg, 1 mg, Intramuscular, PRN  dextrose 5 % solution, 100 mL/hr, Intravenous, PRN  sodium chloride flush 0.9 % injection 10 mL, 10 mL, Intravenous, 2 times per day  sodium chloride flush 0.9 % injection 10 mL, 10 mL, Intravenous, PRN  polyethylene glycol (GLYCOLAX) packet 17 g, 17 g, Oral, Daily PRN  [DISCONTINUED] promethazine (PHENERGAN) tablet 12.5 mg, 12.5 mg, Oral, Q6H PRN **OR** ondansetron (ZOFRAN) injection 4 mg, 4 mg, Intravenous, Q6H PRN  acetaminophen (TYLENOL) tablet 650 mg, 650 mg, Oral, Q4H PRN  morphine (PF) injection 1 mg, 1 mg, Intravenous, Q3H PRN  oxyCODONE (ROXICODONE) immediate release tablet 5 mg, 5 mg, Oral, Q4H PRN  promethazine (PHENERGAN) injection 12.5 mg, 12.5 mg, Intramuscular, Q6H PRN    ASSESSMENT AND PLAN      Status post right hip hemiarthroplasty     Continue PT/OT. Continue pain control. Hospitalist to continue medical management. Discharge planning pending.

## 2020-05-24 NOTE — PROGRESS NOTES
[address] [address]  INPATIENT PHYSICAL THERAPY  DAILY NOTE  [facility] MED 5K - 5K-23/023-A    Time In: 4670  Time Out: 1121  Timed Code Treatment Minutes: 27 Minutes  Minutes: 27          Date: 2020  Patient Name: Rody Aponte,  Gender:  male        MRN: [id number]  : 1940  ([de-identified] y.o.)     Referring Practitioner: Annalee Juarez PAC  Diagnosis: Hip fx requiring op repair ,right closed   Additional Pertinent Hx: Pt admitted  after a fall at home. Pt suffered a right femur fx. Pt had sx  for a THR by Dr Mekhi Woodall . Its an augustin-lateral approach . Prior Level of Function:  Lives With: Spouse  Type of Home: House  Home Layout: One level  Home Access: Stairs to enter without rails(1 step in the home between the sun room and the living room)  Entrance Stairs - Number of Steps: 2 steps from the garage and 1+1 step in the front area. Home Equipment: Rolling walker, Reacher   Bathroom Shower/Tub: Tub/Shower unit  Bathroom Toilet: Standard    Receives Help From: Family  ADL Assistance: Needs assistance  Homemaking Assistance: Needs assistance  Homemaking Responsibilities: No  Ambulation Assistance: Independent  Transfer Assistance: Independent  Active : Yes(wife mainly drives )  Additional Comments: Pt was using a rolling walker prior to admission. He did not use any O2. His spouse assisted him with dressing at times. He was taking spongebaths only. Pt assisted with some of the housekeeping. Restrictions/Precautions:  Restrictions/Precautions: ROM Restrictions, Fall Risk, Weight Bearing, Up as Tolerated, Surgical Protocols  Right Lower Extremity Weight Bearing: Weight Bearing As Tolerated  Position Activity Restriction  Hip Precautions: No hip external rotation, No ADduction, No hip extension  Other position/activity restrictions: right THR anterior-lateral approach- no add past neutral, no ER, no hip hyperextension    SUBJECTIVE: RN approved session.  Patient in recliner upon arrival and agreeable to therapy. PAIN: 2-3/10: R hip    OBJECTIVE:  Bed Mobility:  Sit to Supine: Stand By Assistance, with increased time for completion, bed flat, no rails     Transfers:  Sit to Stand: Contact Guard Assistance, Minimal Assistance, X 1  Stand to Sit:Contact [employer] Assistance    Ambulation:  Contact Guard Assistance  Distance: ~40ft x2  Surface: Level Tile  Device:Rolling Walker  Gait Deviations: Forward Flexed Posture, Slow Анна, Decreased Step Length Bilaterally, Decreased Weight Shift Right, Decreased Gait Speed, Decreased Heel Strike Bilaterally and step-to gait    Stairs:  Contact Guard Assistance  Number of Steps: 1  Height: 6\" step with Rolling Walker, cues for sequencing    Exercise:  Patient was guided in 1 set(s) 15 reps of exercise to both lower extremities. Glut sets, Quad sets, Heelslides, Hip abduction/adduction and Straight leg raises. Exercises were completed for increased independence with functional mobility. Functional Outcome Measures: Completed  -PAC Inpatient Mobility without Stair Climbing Raw Score : 15  AM-PAC Inpatient without Stair Climbing T-Scale Score : 43.03    ASSESSMENT:  Assessment: Patient progressing toward established goals. Activity Tolerance:  Patient tolerance of  treatment: good.       Equipment Recommendations:Equipment Needed: No  Discharge Recommendations:  Continue to assess pending progress, Patient would benefit from continued therapy after discharge(pt would like to go home but depending on pts progress and about of asisstence he continues to require ,pt may need rehab prior to home.)    Plan: Times per week: BID X 6 and daily X 1  Specific instructions for Next Treatment: THR protocol  Current Treatment Recommendations: Strengthening, Transfer Training, Balance Training, Gait Training, Functional Mobility Training, Stair training, Home Exercise Program    Patient Education  Patient Education: Plan of Care, Bed Mobility, Transfers, Gait, Stairs, Up

## 2020-05-25 NOTE — PLAN OF CARE
Problem: Falls - Risk of:  Goal: Will remain free from falls  Description: Will remain free from falls  Outcome: Ongoing  Note: Patient free from falls this shift. Patient uses call light appropriately, bed in lowest position, nonskid socks on, bed rails up x2, fall sign posted and call light in reach. Patient at risk due to right hip injury. Problem: Pain:  Goal: Pain level will decrease  Description: Pain level will decrease  Outcome: Ongoing  Note: Patient reporting pain 3/10 with a goal of 5/10. Patient satisfied with current interventions including rest and repositioning. Pain assessment ongoing. Problem: Neurological  Goal: Maximum potential motor/sensory/cognitive function  Outcome: Ongoing  Note: Alert and oriented x4. Problem: Cardiovascular  Goal: No DVT, peripheral vascular complications  Outcome: Ongoing  Note: Patient on coumadin and plavis, no signs of dvt or complications. Problem: GI  Goal: No bowel complications  Outcome: Ongoing  Note: Patient refused medications ordered for stool softeners this shift. Problem:   Goal: Adequate urinary output  Outcome: Ongoing  Note: Adequate urinary output this shift. Problem: Skin Integrity/Risk  Goal: No skin breakdown during hospitalization  Outcome: Ongoing  Note: No skin issues noted this shift. Repositioning every 2 hours and as needed/      Problem: Discharge Planning:  Goal: Discharged to appropriate level of care  Description: Discharged to appropriate level of care  Outcome: Ongoing  Note: Discharge plans to go home with wife and home health or SNF discussed with patient. Problem: Infection - Surgical Site:  Goal: Signs of wound healing will improve  Description: Signs of wound healing will improve  Outcome: Ongoing  Note: No signs of infection on surgical area, dressing clean dry and intact. Care plan reviewed with patient. Patient  verbalize understanding of the plan of care and contribute to goal setting.

## 2020-05-25 NOTE — PLAN OF CARE
Problem: Falls - Risk of:  Goal: Will remain free from falls  Description: Will remain free from falls  5/25/2020 1956 by Esther Lester RN  Outcome: Ongoing  Note: Fall assessment complete. No falls noted this shift. Bed alarm on, bed in low position, call light and personal items in reach. Nonskid socks on. Will continue to monitor. Problem: Pain:  Goal: Pain level will decrease  Description: Pain level will decrease  5/25/2020 1956 by Esther Lester RN  Outcome: Ongoing  Note: Patient denies pain so far this shift will continue to monitor. Problem: Neurological  Goal: Maximum potential motor/sensory/cognitive function  5/25/2020 1956 by Esther Lester RN  Outcome: Ongoing  Note: Alert and oriented x4 this shift. Problem: Cardiovascular  Goal: No DVT, peripheral vascular complications  5/88/5130 1956 by Esther Lester RN  Outcome: Ongoing  Note: Patient no s/s of DVT. Will continue to monitor. Problem: GI  Goal: No bowel complications  0/59/6091 1956 by Esther Lester RN  Outcome: Ongoing  Note: Bowel sounds active x4. Soft, round, nondistended, nontender. Problem:   Goal: Adequate urinary output  5/25/2020 1956 by Esther Lester RN  Outcome: Ongoing  Note: Adequate urinary output. Problem: Skin Integrity/Risk  Goal: No skin breakdown during hospitalization  5/25/2020 1956 by Esther Lester RN  Outcome: Ongoing  Note: No new skin issues noted this shift will continue to monitor. Patient turns self in bed. Problem: Discharge Planning:  Goal: Discharged to appropriate level of care  Description: Discharged to appropriate level of care  5/25/2020 1956 by Esther Lester RN  Outcome: Ongoing  Note: Plan is to go to the [facility] for rehab. Problem: Infection - Surgical Site:  Goal: Signs of wound healing will improve  Description: Signs of wound healing will improve  5/25/2020 1956 by Esther Lester RN  Outcome: Ongoing  Note: No s/s of infection will continue to monitor. Problem: Mobility - Impaired:  Goal: Achieve maximum mobility level  Description: Achieve maximum mobility level  5/25/2020 1956 by Javi Lopez RN  Outcome: Ongoing  Note: Up with x1 assist walker gait belt     Care plan reviewed with patient. Patient verbalize understanding of the plan of care and contribute to goal setting.

## 2020-05-25 NOTE — PROGRESS NOTES
Kali Oliveira is a [de-identified] y.o. male patient.     Current Facility-Administered Medications   Medication Dose Route Frequency Provider Last Rate Last Dose    metFORMIN (GLUCOPHAGE-XR) extended release tablet 500 mg  500 mg Oral Daily with breakfast Kiana Ma PA-C   500 mg at 05/25/20 3699    clopidogrel (PLAVIX) tablet 75 mg  75 mg Oral Daily Kiana Ma PA-C   75 mg at 05/25/20 9962    warfarin (COUMADIN) daily dosing (placeholder)   Other RX Placeholder Kiana aM PA-C        0.9 % sodium chloride infusion   Intravenous Continuous Rosemary Peace MD 50 mL/hr at 05/23/20 1649      ipratropium-albuterol (DUONEB) nebulizer solution 1 ampule  1 ampule Inhalation 4x Daily PRN Dionicio Delgadillo MD        budesonide (PULMICORT) nebulizer suspension 500 mcg  0.5 mg Nebulization BID Dionicio Delgadillo MD   500 mcg at 05/25/20 0758    pantoprazole (PROTONIX) injection 40 mg  40 mg Intravenous Once Dionicio Delgadillo MD        [Held by provider] atenolol (TENORMIN) tablet 100 mg  100 mg Oral Daily Dionicio Delgadillo MD        Arformoterol Tartrate [facility]) nebulizer solution 15 mcg  15 mcg Nebulization BID Dionicio Delgadillo MD   15 mcg at 05/25/20 0756    [Held by provider] isosorbide mononitrate (IMDUR) extended release tablet 30 mg  30 mg Oral Daily Dionicio Delgadillo MD        sodium chloride flush 0.9 % injection 10 mL  10 mL Intravenous 2 times per day Kiana Ma PA-C   10 mL at 05/23/20 2013    sodium chloride flush 0.9 % injection 10 mL  10 mL Intravenous PRN Kiana Ma PA-C        acetaminophen (TYLENOL) tablet 650 mg  650 mg Oral Q6H Maxwell Robertson PA-C   650 mg at 05/25/20 1221    sennosides-docusate sodium (SENOKOT-S) 8.6-50 MG tablet 1 tablet  1 tablet Oral BID Kiana Ma PA-C   1 tablet at 05/23/20 2013    magnesium hydroxide (MILK OF MAGNESIA) 400 MG/5ML suspension 30 mL  30 mL Oral Daily PRN Kiana Ma PA-C        PA-C        polyethylene glycol (GLYCOLAX) packet 17 g  17 g Oral Daily PRN VIDYA Perez-C        [facility]etron [facility]) injection 4 mg  4 mg Intravenous Q6H PRN VIDYA Perez-C   4 mg at 05/22/20 3217    acetaminophen (TYLENOL) tablet 650 mg  650 mg Oral Q4H PRN VIDYA Perez-STEPH        morphine (PF) injection 1 mg  1 mg Intravenous Q3H PRN VIDYA Perez-C   1 mg at 05/20/20 2215    oxyCODONE (ROXICODONE) immediate release tablet 5 mg  5 mg Oral Q4H PRN VIDYA Perez-C   5 mg at 05/22/20 1150    promethazine (PHENERGAN) injection 12.5 mg  12.5 mg Intramuscular Q6H PRN Socorro R Sissy Boast, PA-C         Allergies   Allergen Reactions    Norco [Hydrocodone-Acetaminophen] Nausea And Vomiting     Principal Problem:    [facility]  Active Problems:    Postoperative hypotension    History of esophageal cancer    History of colon cancer    COPD, severe    Type 2 diabetes mellitus with complication (HCC)    GERD (gastroesophageal reflux disease)    History of pulmonary embolism    Presence of stent in coronary artery in patient with coronary artery disease    Restrictive lung disease    TONG (dyspnea on exertion)    Anticoagulated on Coumadin    Essential hypertension    Hip fracture requiring operative repair, right, closed, initial encounter ([facility]  Resolved Problems:    * No resolved hospital problems. *    Blood pressure (!) 121/58, pulse 89, temperature 97.6 °F (36.4 °C), temperature source Oral, resp. rate 16, height 5' 11\" (1.803 m), weight 167 lb 8.8 oz (76 kg), SpO2 94 %. Subjective:  Symptoms:  Stable. Diet:  Adequate intake. Activity level: Impaired due to weakness. Pain:  He complains of pain that is mild. (  Gait   Unsteady ). Objective:  General Appearance:  Comfortable. Vital signs: (most recent): Blood pressure (!) 121/58, pulse 89, temperature 97.6 °F (36.4 °C), temperature source Oral, resp.  rate 16,

## 2020-05-25 NOTE — PLAN OF CARE
Problem: Impaired respiratory status  Goal: Clear lung sounds  Description: Clear lung sounds     5/24/2020 2001 by Prince Batista RCP  Outcome: Ongoing   Breath sounds are clear and diminished at this time. Continue with treatments to help improve breath sounds.

## 2020-05-26 NOTE — CARE COORDINATION
5/26/20, 8:47 AM EDT    DISCHARGE ON GOING EVALUATION    [address] [place] [address] day: 6  Location: 5-23/023-A Reason for admit: Hip fracture requiring operative repair, right, closed, initial encounter [facility]) [S72.001A]  Closed right hip fracture, initial encounter [facility]) Maddy Willett   Procedure: 5/21 right Hip hemiarthroplasty   Treatment Plan of Care: PT/OT. Pain management. N/V checks. I&O. Needs negative Cocvd test for ECF  Barriers to Discharge:  Need ECF placement  PCP: Renea Ayala MD  Readmission Risk Score: 20%  Patient Goals/Plan/Treatment Preferences: I spoke with Bill this morning. He is requesting [facility]. Primary nurse trying to get COVID test ordered and sent this morning.  I discussed case with Gale Zuluaga

## 2020-05-26 NOTE — DISCHARGE INSTR - DIET

## 2020-05-26 NOTE — CARE COORDINATION
5/26/20, 2:09 PM EDT    DISCHARGE PLANNING EVALUATION    Call to [facility] with [facility]-message left with referral and SW requested call back to confirm. MARNI Stacy aware of need for home health order and sticky note left for physician.     3:09 PM Call to [facility] with [facility] with information re: dressing change to surgical site. Anticipate that patient will be discharged this evening.     5/26/20, 3:11 PM EDT    Patient goals/plan/ treatment preferences discussed by  and . Patient goals/plan/ treatment preferences reviewed with patient/ family. Patient/ family verbalize understanding of discharge plan and are in agreement with goal/plan/treatment preferences. Understanding was demonstrated using the teach back method. AVS provided by RN at time of discharge, which includes all necessary medical information pertaining to the patients current course of illness, treatment, post-discharge goals of care, and treatment preferences.     Services After Discharge  Services At/After Discharge: Nursing Services, OT, PT([facility] [place])   IMM Letter  IMM Letter given to Patient/Family/Significant other/Guardian/POA/by[de-identified]   IMM Letter date given[de-identified] 05/26/20  IMM Letter time given[de-identified] 8342

## 2020-05-26 NOTE — PROGRESS NOTES
[facility]  Occupational Therapy  Daily Note  Time:   Time In: 4912  Time Out: 0940  Timed Code Treatment Minutes: 40 Minutes  Minutes: 44          Date: 2020  Patient Name: Randy Tinsley,   Gender: male      Room: [facility]023-A  MRN: [id number]  : 1940  ([de-identified] y.o.)  Referring Practitioner: Nahomy Robles PA-C  Diagnosis: Hip Fracture Requiring Operative Repair, Right, closed  Additional Pertinent Hx: Pt admitted with above after having fallen at home. Pt underwent R Hemiarthroplasty on 20. Pt developed hypotension following surgery and was transferred to ICU. Restrictions/Precautions:  Restrictions/Precautions: ROM Restrictions, Fall Risk, Weight Bearing, Up as Tolerated, Surgical Protocols  Right Lower Extremity Weight Bearing: Weight Bearing As Tolerated  Position Activity Restriction  Hip Precautions: No hip external rotation, No ADduction, No hip extension  Other position/activity restrictions: right THR anterior-lateral approach- no add past neutral, no ER, no hip hyperextension    SUBJECTIVE: Nurse ok'd session. Patient lying in bed upon arrival, agreeable to complete ADL routine before breakfast this AM     PAIN: complains of slight pain in R hip, did not rate    COGNITION: WFL    ADL:   Feeding: Independent. For breakfast tray  Grooming: with set-up. Seated at sink to complete shaving using razor and shaving cream.  Bathing: Minimal Assistance. For BLE below knees including B feet d/t hip precautions. Able to wash scotty area/bottom while standing with CGA. Able to wash remaining areas seated in chair with supervision  Upper Extremity Dressing: with set-up. To doff/don gown seated in chair  Lower Extremity Dressing: Minimal Assistance to don R sock. Able to don L sock after education/demonstration provided on sock aid. BALANCE:  Sitting Balance:  Supervision. Standing Balance: Contact Guard Assistance.       BED MOBILITY:  Supine to Sit:

## 2020-05-26 NOTE — PROGRESS NOTES
Transfers, Reviewed Prior Education, Gait, car transfers     Goals:  Patient goals : Go home with wife   Short term goals  Time Frame for Short term goals: by discharge  Short term goal 1: supine to sit and return with SBA to get in and out of bed   Short term goal 2: sit to stand with SBA to get on and off various surfaces  Short term goal 3: ambulate with RW 50 feet with SBA to walk safely in the home  Short term goal 4: ascend/descend 1 step with AD and SBA to enter home and go between levels in the home  Short term goal 5: pt to perform car transfer at [facility] for safe transport home  Long term goals  Time Frame for Long term goals : NA due to short ELOS    Following session, patient left in safe position with all fall risk precautions in place.

## 2020-05-27 NOTE — DISCHARGE SUMMARY
Department of Orthopedic Surgery  Physician Assistant   Discharge Summary    The Jovani Sauer is a [de-identified] y.o. male underwent right hip hemiarthroplasty procedure without complication. Jovani Sauer was admitted to the floor following His recovery in the PACU Patient was transferred to ICU for post operative blood pressure and was transferred back to the floor POD#1.      Admit date 05/20/2020  Discharge date 05/26/2020    Discharge Diagnosis  right hip hemiarthroplasty    Current Inpatient Medications    Current Facility-Administered Medications: metFORMIN (GLUCOPHAGE-XR) extended release tablet 500 mg, 500 mg, Oral, Daily with breakfast  clopidogrel (PLAVIX) tablet 75 mg, 75 mg, Oral, Daily  warfarin (COUMADIN) daily dosing (placeholder), , Other, RX Placeholder  0.9 % sodium chloride infusion, , Intravenous, Continuous  ipratropium-albuterol (DUONEB) nebulizer solution 1 ampule, 1 ampule, Inhalation, 4x Daily PRN  budesonide (PULMICORT) nebulizer suspension 500 mcg, 0.5 mg, Nebulization, BID  pantoprazole (PROTONIX) injection 40 mg, 40 mg, Intravenous, Once  [Held by provider] atenolol (TENORMIN) tablet 100 mg, 100 mg, Oral, Daily  Arformoterol Tartrate (BROVANA) nebulizer solution 15 mcg, 15 mcg, Nebulization, BID  [Held by provider] isosorbide mononitrate (IMDUR) extended release tablet 30 mg, 30 mg, Oral, Daily  sodium chloride flush 0.9 % injection 10 mL, 10 mL, Intravenous, 2 times per day  sodium chloride flush 0.9 % injection 10 mL, 10 mL, Intravenous, PRN  acetaminophen (TYLENOL) tablet 650 mg, 650 mg, Oral, Q6H  sennosides-docusate sodium (SENOKOT-S) 8.6-50 MG tablet 1 tablet, 1 tablet, Oral, BID  magnesium hydroxide (MILK OF MAGNESIA) 400 MG/5ML suspension 30 mL, 30 mL, Oral, Daily PRN  traMADol (ULTRAM) tablet 50 mg, 50 mg, Oral, Q6H PRN  0.9 % sodium chloride bolus, 500 mL, Intravenous, Once  albuterol (PROVENTIL) nebulizer solution 2.5 mg, 2.5 mg, Nebulization, Q6H PRN  finasteride (PROSCAR) tablet

## 2020-06-02 NOTE — PROGRESS NOTES
(76 kg)   01/28/20 171 lb (77.6 kg)     BP Readings from Last 3 Encounters:   06/02/20 (!) 106/58   05/26/20 136/66   05/21/20 (!) 88/48       Physical Exam  Vitals signs reviewed. Constitutional:       Appearance: He is well-developed. HENT:      Head: Normocephalic and atraumatic. Right Ear: External ear normal.      Left Ear: External ear normal.      Nose: Nose normal.   Eyes:      General: No scleral icterus. Conjunctiva/sclera: Conjunctivae normal.      Pupils: Pupils are equal, round, and reactive to light. Neck:      Musculoskeletal: Normal range of motion and neck supple. Thyroid: No thyromegaly. Vascular: No JVD. Cardiovascular:      Rate and Rhythm: Normal rate and regular rhythm. Heart sounds: No murmur. No friction rub. Pulmonary:      Effort: Pulmonary effort is normal.      Breath sounds: Examination of the right-lower field reveals rales. Examination of the left-lower field reveals rales. Decreased breath sounds and rales present. No wheezing. Chest:      Chest wall: No tenderness. Abdominal:      General: Bowel sounds are normal.      Palpations: Abdomen is soft. There is no mass. Tenderness: There is no abdominal tenderness. Lymphadenopathy:      Cervical: No cervical adenopathy. Skin:     Findings: No rash. Neurological:      Mental Status: He is alert and oriented to person, place, and time. Psychiatric:         Behavior: Behavior is cooperative. Assessment/Plan:  1. Type 2 diabetes mellitus with complication (HCC)    - POCT Glucose  - POCT glycosylated hemoglobin (Hb A1C)  - PA DISCHARGE MEDS RECONCILED W/ CURRENT OUTPATIENT MED LIST    2. Infected surgical wound      3. Hip fracture requiring operative repair, right, closed, initial encounter ([facility]    - [facility] MED LIST    4.  COPD, severe          Medical Decision Making: moderate complexity      Lab Results   Component Value Date    LABA1C nebulization 2 times daily 60 ampule 11    Arformoterol Tartrate (BROVANA) 15 MCG/2ML NEBU Take 2 mLs by nebulization 2 times daily 120 mL 11    Blood Glucose Monitoring Suppl (ONE TOUCH ULTRA SYSTEM KIT) w/Device KIT Test blood sugar daily Dx E11.9 1 kit 0    acetaminophen (TYLENOL) 325 MG tablet Take 650 mg by mouth every 6 hours as needed for Pain Don't take more than 3,000 mg per day.  nitroGLYCERIN (NITROSTAT) 0.4 MG SL tablet Place 0.4 mg under the tongue every 5 minutes as needed for Chest pain. If third one does not relieve pain, call 9-1-1.  Respiratory Therapy Supplies (NEBULIZER COMPRESSOR) KIT by Does not apply route. 1 kit 0     No current facility-administered medications for this visit. Return in about 3 months (around 9/3/2020). Patient is seeing his orthopedic surgeon tomorrow    Patient to return immediately for follow-up if having more problems and concerns.       Marshall Salazar MD

## 2020-06-02 NOTE — PROGRESS NOTES
BP Readings from Last 2 Encounters:   06/02/20 (!) 106/58   05/26/20 136/66     Hemoglobin A1C (%)   Date Value   03/02/2020 7.7     LDL Calculated (mg/dL)   Date Value   03/02/2020 91              Tobacco use:  Patient  reports that he quit smoking about 50 years ago. His smoking use included cigarettes. He has a 25.50 pack-year smoking history. He has never used smokeless tobacco.    If Smoker - Cessation materials given? Yes    Is Daily aspirin on medication list?:  No    Diabetic retinal exam done? No   If yes, document in Health Maintenance. Monofilament placed on counter? No    Shoes and socks removed? No    BP taken with correct size cuff? Yes   Repeated if > 140/90 NA     Is patient taking any medications for diabetes    Yes   If yes, see medication list    Is the patient reporting any side effects of diabetic medications? No    Microalbumin performed if applicable? No      Is patient taking any over the counter medications    Yes   If yes, see medication list      Patient Self-Management Goal for Chronic Condition  Goal: I will take all medications as prescribed by my doctor, and I will call the office if I am having any medication problems. Barriers to success: none  Plan for overcoming my barriers: N/A     Confidence: 10/10  Date goal set: 6/2/20  Date goal attained:     Have you seen any other physician or provider since your last visit no    Have you had any other diagnostic tests since your last visit? no    Have you changed or stopped any medications since your last visit including any over-the-counter medicines, vitamins, or herbal medicines? no     Are you taking all your prescribed medications?  Yes    If NO, why?

## 2020-06-08 NOTE — PROGRESS NOTES
responsible for any copays, coinsurance amounts or other amounts not covered by your insurance company. If you do not accept this, unfortunately we will not be able to schedule a virtual visit with the provider. Do you accept?   Yes    CLINICAL PHARMACY CONSULT: MED RECONCILIATION/REVIEW ADDENDUM    For Pharmacy Admin Tracking Only    PHSO: No  Total # of Interventions Recommended: 1  - Decreased Dose #: 1  - Maintenance Safety Lab Monitoring #: 1  Total Interventions Accepted: 1  Time Spent (min): 4411 Sterling Buchanan, PharmD

## 2020-06-25 NOTE — PROGRESS NOTES
Medication Management [address]   [phone] (phone)  [phone] (fax)      Anticoagulation encounter completed via telephone. Patient had lab result completed at outside lab. Mr. Fatoumata Nuñez is a [de-identified] y.o.  male with history of DVT, PE, Pulmonary embolus. Patient verifies current dosing regimen and tablet strength. No missed or extra doses. Patient denies s/s bleeding/bruising/swelling/SOB/chest pain. No blood in urine or stool. No dietary changes. No changes in medication/OTC agents/Herbals. No change in alcohol use or tobacco use. No change in activity level. Patient denies headaches/dizziness/lightheadedness/falls. No vomiting/diarrhea or acute illness. No Procedures scheduled in the future at this time. Assessment:   Lab Results   Component Value Date    INR 1.83 (H) 06/25/2020    INR 2.78 (H) 06/18/2020    INR 5.34 (HH) 06/15/2020    PROTIME 1.89 (H) 09/03/2011    PROTIME 2.20 (H) 09/02/2011     INR subtherapeutic   Recent Labs     06/25/20  1015   INR 1.83*     Interview completed by Anat Crane, PharmD Candidate. Subtherapeutic INR following a dose decrease of 23%. Plan:  Increase Coumadin 1.25 mg MWF, 2.5 mg TThSS (10% increase). Recheck INR in 1 weeks by [facility]. Patient reminded to call the Anticoagulation Clinic with signs or symptoms of bleeding or with any medication changes. Patient given instructions utilizing the teach back method. The following statement was review with patient regarding this virtual visit:  We want to confirm that, for purposes of billing, this is a virtual visit with your provider for which we will submit a claim for reimbursement with your insurance company. You may be responsible for any copays, coinsurance amounts or other amounts not covered by your insurance company. If you do not accept this, unfortunately we will not be able to schedule a virtual visit with the provider. Do you accept?

## 2020-07-02 NOTE — PROGRESS NOTES
Medication Management [address]  [phone] (phone)  [phone] (fax)    INR drawn by [facility]. Nursing assessment reviewed and appreciated. Nursing assessment within the normal limits with the exception of the following:  None. Pt is being discharged from [facility] today. Called and spoke with pt. Assessment:  Lab Results   Component Value Date    INR 2.08 (H) 07/02/2020    INR 1.83 (H) 06/25/2020    INR 2.78 (H) 06/18/2020    PROTIME 1.89 (H) 09/03/2011    PROTIME 2.20 (H) 09/02/2011     INR therapeutic   Recent Labs     07/02/20  1205   INR 2.08*         Plan:  Continue Coumadin 1.25mg MWF and 2.5mg TThSaS. Recheck INR in 12 days. Pt prefers an in clinic appt. .  Patient reminded to call the Anticoagulation Clinic with any signs or symptoms of bleeding or with any medication changes. Patient given instructions utilizing the teach back method.

## 2020-07-14 NOTE — PROGRESS NOTES
Medication Management [address]  [phone] (phone)  [phone] (fax)      Mr. Gallito Avila is a [de-identified] y.o.  male with history of DVT, PE who presents today for anticoagulation monitoring and adjustment. Patient verifies current dosing regimen and tablet strength. No missed or extra doses. Patient denies s/s bleeding/bruising/swelling/SOB/chest pain. No blood in urine or stool. No dietary changes. No changes in medication/OTC agents/Herbals. No change in alcohol use or tobacco use. No change in activity level. Patient denies headaches/dizziness/lightheadedness/falls. [place] Haste and broke hip at the end of May. Did not hit head. No vomiting/diarrhea or acute illness. No Procedures scheduled in the future at this time. Assessment:   Lab Results   Component Value Date    INR 3.30 (H) 07/14/2020    INR 2.08 (H) 07/02/2020    INR 1.83 (H) 06/25/2020    PROTIME 1.89 (H) 09/03/2011    PROTIME 2.20 (H) 09/02/2011     INR supratherapeutic   Recent Labs     07/14/20  1456   INR 3.30*   Patient interview completed and discussed with pharmacist by Cheyenne Brizuela, PharmD Candidate. Plan:  1.25mg x1 then continue Coumadin 1.25mg MWF, 2.5mg TThSS. Recheck INR in 2 weeks. Patient reminded to call the Anticoagulation Clinic with any signs or symptoms of bleeding or with any medication changes. Patient given instructions utilizing the teach back method. Discharged ambulatory in no apparent distress. After visit summary printed and reviewed with patient.       Medications reviewed and updated on home medication list Yes      CLINICAL PHARMACY CONSULT: MED RECONCILIATION/REVIEW [address] Only    PHSO: No  Total # of Interventions Recommended: 1  - Decreased Dose #: 1  - Maintenance Safety Lab Monitoring #: 1  Total Interventions Accepted: 1  Time Spent (min): [address], [address] [facility]

## 2020-07-23 PROBLEM — C15.9 SQUAMOUS CELL CARCINOMA, ESOPHAGUS (HCC): Status: ACTIVE | Noted: 2020-01-01
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
## 2020-08-03 NOTE — PROGRESS NOTES
Mr. Gallito Avila is a [de-identified] y.o.  male with history of DVT, PE who presents today for anticoagulation monitoring and adjustment. Patient verifies current dosing regimen and tablet strength. No missed or extra doses. Patient denies s/s bleeding/bruising/swelling/SOB/chest pain. Multiple small bruises on bilat forearms (at baseline)  No blood in urine or stool. No dietary changes. No changes in medication/OTC agents/Herbals. No change in alcohol use or tobacco use. No change in activity level. Patient denies headaches/dizziness/lightheadedness/falls. No vomiting/diarrhea or acute illness. No Procedures scheduled in the future at this time. Assessment:   Lab Results   Component Value Date    INR 2.30 (H) 08/03/2020    INR 5.00 (H) 07/28/2020    INR 3.30 (H) 07/14/2020    PROTIME 1.89 (H) 09/03/2011    PROTIME 2.20 (H) 09/02/2011     INR therapeutic   Recent Labs     08/03/20  1116   INR 2.30*         Plan:  Continue Coumadin 2.5mg MWF, 1.25mg TThSS. Recheck INR in 2 weeks. Patient reminded to call the Anticoagulation Clinic with any signs or symptoms of bleeding or with any medication changes. Patient given instructions utilizing the teach back method. Discharged ambulatory in no apparent distress. After visit summary printed and reviewed with patient. Medications reviewed and updated on home medication list Yes        CLINICAL PHARMACY CONSULT: MED RECONCILIATION/REVIEW ADDENDUM    For Pharmacy Admin Tracking Only    PHSO: No  Total # of Interventions Recommended: 0  - Updated Order #: 0 Updated Order Reason(s):  Other  All home medications reviewed  - Maintenance Safety Lab Monitoring #: 1  Total Interventions Accepted: 0  Time Spent (min): Πάνου 90, PharmD  [address]

## 2020-08-05 NOTE — TELEPHONE ENCOUNTER
Date of last visit:  6/2/2020  Date of next visit:  Visit date not found    Requested Prescriptions     Pending Prescriptions Disp Refills    metFORMIN (GLUCOPHAGE-XR) 500 MG extended release tablet 30 tablet      Sig: Take 1 tablet by mouth daily (with breakfast)

## 2020-08-12 NOTE — TELEPHONE ENCOUNTER
Rosalino Huertas was previously evaluated and a DME order was entered for a nebulizer compressor in order to administer Budesonide and arformoterol due to the diagnosis of COPD. The need for this equipment and treatment was discussed with the patient and he understands and is in agreement. Notify patient order placed for new machine appears he has not obtained new machine for several years. If has any further issues notify office.

## 2020-08-17 NOTE — PROGRESS NOTES
Medication Management [address]   [phone] (phone)  [phone] (fax)      Mr. Rosalino Huertas is a [de-identified] y.o.  male with history of DVT, PE who presents today for anticoagulation monitoring and adjustment. Patient verifies current dosing regimen and tablet strength. No missed or extra doses. Patient denies s/s bleeding/bruising/swelling/SOB/chest pain-SOB no more than usual  No blood in urine or stool. No dietary changes. No changes in medication/OTC agents/Herbals.-will be receiving Iron infusion on 8/19 - educated on stools being dark  No change in alcohol use or tobacco use. No change in activity level. Patient denies headaches/dizziness/lightheadedness/falls. No vomiting/diarrhea or acute illness. No Procedures scheduled in the future at this time. Assessment:   Lab Results   Component Value Date    INR 2.00 (H) 08/17/2020    INR 2.30 (H) 08/03/2020    INR 5.00 (H) 07/28/2020    PROTIME 1.89 (H) 09/03/2011    PROTIME 2.20 (H) 09/02/2011     INR therapeutic   Recent Labs     08/17/20  1035   INR 2.00*         Plan:  Continue Coumadin 2.5 mg MWF, 1.25 mg TThSS. Recheck INR in 3 week(s). Patient reminded to call the Anticoagulation Clinic with any signs or symptoms of bleeding or with any medication changes. Patient given instructions utilizing the teach back method. Discharged ambulatory in no apparent distress. After visit summary printed and reviewed with patient.       Medications reviewed and updated on home medication list Yes    Influenza vaccine:     [] given    [x] declined   [] received previously   [] plans to receive at a later time   [] refused    [] documented in [facility]: MED RECONCILIATION/REVIEW [address]: No  Total # of Interventions Recommended: 1  - Maintenance Safety Lab Monitoring #: 1  Total Interventions Accepted: 1  Time Spent (min): 20    Ernestina Marlee Flynn, PharmD, BCPS  8/17/2020  10:46 AM

## 2020-08-26 NOTE — TELEPHONE ENCOUNTER
Called Dr. Jc Bynum office - Dr. Elana Moreno is concerned with patient's drop in hemoglobin and instructed patient to stop coumadin. He believes he may have a GI bleed. Will follow up with office next week regarding a plan for patient.

## 2020-08-26 NOTE — TELEPHONE ENCOUNTER
[address] [facility] Pharmacist Clinical Staff    Phone Number: [phone]               Christian Dickinson called to let us know that Dr. Jon Parr took him off of his warfarin.  I told him I'd have a [facility] call him.   He gets iron infusions  and some of the lab work  was concerning the Dr. Prudence Gomez

## 2020-09-03 NOTE — TELEPHONE ENCOUNTER
Called Dr. Jasbir Christina office. Per office, patient will continue to hold warfarin due to concern for GI Bleed. Appointment with Dr. Viola Liriano scheduled for 9/9 @ 1100 to reassess. Will follow-up with office on 9/9. As patient is holding warfarin, called patient to cancel INR check scheduled for next week.

## 2020-09-09 NOTE — TELEPHONE ENCOUNTER
----- Message from [employer] sent at 9/9/2020  1:32 PM EDT -----  Contact: Sal Leo didn't make it to his appt today at 1100 due  to him having a blood transfusion. He is scheduled for tomorrow afternoon but he doesn't know what dose of coumadin to take. He hasn't started taking it since his colonoscopy. He's waiting on a call from us.

## 2020-09-09 NOTE — TELEPHONE ENCOUNTER
Spoke with Dr. Elsy Ramirez office who states patient is NOT to resume Coumadin at this time and they will re-assess 9/16/20. Informed patient and cancelled Coumadin Clinic appointment. Patient voiced understanding. Added to follow-up calendar.     Saverio Holstein, PharmD, BCPS  9/9/2020  2:03 PM

## 2020-09-17 NOTE — TELEPHONE ENCOUNTER
Per department calendar  \"9/16: Pt was advised to continue to hold warfarin and Plavix. Has three more weekly iron infusions. Will reassess in 1 month. \" Petra Garcia 9/16/2020    Patient on department calendar to follow up in 1 month.

## 2020-10-01 PROBLEM — I26.99 PULMONARY EMBOLUS (HCC): Status: RESOLVED | Noted: 2018-12-27 | Resolved: 2020-01-01

## 2020-10-01 PROBLEM — C15.9 SQUAMOUS CELL CARCINOMA, ESOPHAGUS (HCC): Status: RESOLVED | Noted: 2020-01-01 | Resolved: 2020-01-01

## 2020-10-01 NOTE — PROGRESS NOTES
Immunizations Administered     Name Date Dose Route    Influenza, Quadv, IM, (6 mo and older Fluzone, Flulaval, Fluarix and 3 yrs and older Afluria) 10/1/2020 0.5 mL Intramuscular    Site: Deltoid- Left    Lot: [id number]    NDC: 67427-555-60

## 2020-10-01 NOTE — PROGRESS NOTES
Date: 10/1/2020    : Maggie Patient is a [de-identified] y.o.male who presents today for:  Chief Complaint   Patient presents with    Check-Up    Diabetes         HPI:       Had colonoscopy by Dr Arun Ochoa, last month and getting Iron transfusion from Dr Katelin Delgadillo weekly    He is otherwise doing well, No SOB, No chest pain , No fatigue. No nausea nor abdominal pain      Diabetes   Pertinent negatives for hypoglycemia include no dizziness or headaches. Pertinent negatives for diabetes include no chest pain, no fatigue and no weakness.        CurrentHome Medications:  Current Outpatient Medications   Medication Sig Dispense Refill    traMADol (ULTRAM) 50 MG tablet take 1 tablet by mouth every 4 hours if needed for pain      isosorbide mononitrate (IMDUR) 30 MG extended release tablet TAKE 1 TABLET BY MOUTH ONCE DAILY      atenolol (TENORMIN) 100 MG tablet take 1 tablet by mouth once daily 90 tablet 1    Nebulizer MISC Please provide new nebulizer machine to use with Albuterol 2.5mg via nebs Q6h prn, Arformoterol 15mcg via nebs BID and pulmicort 500 mcg via nebs BID 1 each 0    metFORMIN (GLUCOPHAGE-XR) 500 MG extended release tablet Take 1 tablet by mouth daily (with breakfast) 90 tablet 1    Arformoterol Tartrate (BROVANA) 15 MCG/2ML NEBU Take 2 mLs by nebulization 2 times daily 120 mL 11    budesonide (PULMICORT) 0.5 MG/2ML nebulizer suspension Take 2 mLs by nebulization 2 times daily 60 ampule 11    albuterol-ipratropium (COMBIVENT RESPIMAT)  MCG/ACT AERS inhaler Inhale 1 puff into the lungs every 6 hours as needed for Wheezing 1 Inhaler 5    furosemide (LASIX) 20 MG tablet Take 1 tablet by mouth daily 90 tablet 1    warfarin (COUMADIN) 2.5 MG tablet Take by mouth every evening Take as directed by coumadin clinic, 105 tablets = 90 days       clopidogrel (PLAVIX) 75 MG tablet Take 75 mg by mouth daily      finasteride (PROSCAR) 5 MG tablet Take 1 tablet by mouth daily 90 tablet 1    tamsulosin (FLOMAX) 0.4 164 lb 8 oz (74.6 kg)   BMI 22.94 kg/m²   BP Readings from Last 3 Encounters:   10/01/20 112/64   07/23/20 118/68   06/02/20 (!) 106/58     Wt Readings from Last 3 Encounters:   10/01/20 164 lb 8 oz (74.6 kg)   07/23/20 157 lb 3.2 oz (71.3 kg)   06/02/20 167 lb (75.8 kg)       Physical Exam  Vitals signs reviewed. Constitutional:       Appearance: He is well-developed. HENT:      Head: Normocephalic and atraumatic. Right Ear: External ear normal.      Left Ear: External ear normal.      Nose: Nose normal.   Eyes:      General: No scleral icterus. Conjunctiva/sclera: Conjunctivae normal.      Pupils: Pupils are equal, round, and reactive to light. Neck:      Musculoskeletal: Normal range of motion and neck supple. Thyroid: No thyromegaly. Vascular: No JVD. Cardiovascular:      Rate and Rhythm: Normal rate and regular rhythm. Heart sounds: No murmur. No friction rub. Pulmonary:      Effort: Pulmonary effort is normal.      Breath sounds: Normal breath sounds. No wheezing or rales. Chest:      Chest wall: No tenderness. Abdominal:      General: Bowel sounds are normal.      Palpations: Abdomen is soft. There is no mass. Tenderness: There is no abdominal tenderness. Lymphadenopathy:      Cervical: No cervical adenopathy. Skin:     Findings: No rash. Neurological:      Mental Status: He is alert and oriented to person, place, and time. Psychiatric:         Behavior: Behavior is cooperative. Assessment:         Diagnosis Orders   1. Type 2 diabetes mellitus with complication (HCC)  POCT Glucose    POCT glycosylated hemoglobin (Hb A1C)    Comprehensive Metabolic Panel    Magnesium   2. Need for influenza vaccination  INFLUENZA, QUADV, 0.5ML, 6 MO AND OLDER, IM, MDV, (Jose Francisco Gregg)   3. COPD, severe     4. History of colon cancer     5.  History of esophageal cancer         :      Medications Prescribed:  Orders Placed This Encounter   Medications    atenolol (TENORMIN) 100 MG tablet     Sig: take 1 tablet by mouth once daily     Dispense:  90 tablet     Refill:  1     Orders Placed:  Orders Placed This Encounter   Procedures    INFLUENZA, QUADV, 0.5ML, 6 MO AND OLDER, IM, MDV, (FLUZONE QUADV)    Comprehensive Metabolic Panel     Standing Status:   Future     Standing Expiration Date:   10/1/2021    Magnesium     Standing Status:   Future     Standing Expiration Date:   10/1/2021    POCT Glucose    POCT glycosylated hemoglobin (Hb A1C)       Lab Results   Component Value Date    LABA1C 6.3 10/01/2020    LABA1C 7.2 (A) 06/02/2020    LABA1C 7.7 03/02/2020     Lab Results   Component Value Date    LDLCALC 91 03/02/2020    CREATININE 0.7 05/24/2020       Return in about 3 months (around 1/5/2021) for awv. Discussed use, benefit, and side effects of prescribedmedications. All patient questions answered. Pt voiced understanding. Instructedto continue current medications, diet and exercise. Patient agreed with treatmentplan.

## 2020-10-11 PROBLEM — J96.91 RESPIRATORY FAILURE WITH HYPOXIA (HCC): Status: ACTIVE | Noted: 2020-01-01

## 2020-10-11 NOTE — ED NOTES
Patient resting in bed. Respirations easy and unlabored. No distress noted. Call light within reach.       Laurel Cramer RN  10/11/20 2554

## 2020-10-11 NOTE — ED TRIAGE NOTES
Pt presents to ED c/c sob x couple of days and a fever. Family states his temperature was 99 then 100.1 so they brought him in because of how sob he was. Pt is A&O x4. EKG complete.

## 2020-10-11 NOTE — ED NOTES
ED nurse-to-nurse bedside report    Chief Complaint   Patient presents with    Shortness of Breath    Fever      LOC: alert and orientated to name, place, date  Vital signs   Vitals:    10/11/20 1149 10/11/20 1150 10/11/20 1151 10/11/20 1303   BP:   (!) 148/72 (!) 107/49   Pulse:   100 74   Resp:   28 25   Temp:   100.4 °F (38 °C)    TempSrc:   Oral    SpO2: (!) 84% (!) 85% 100% 98%   Weight:   150 lb (68 kg)       Pain:    Pain Interventions: tylenol zofran  Pain Goal: 1/10  Oxygen: No    Current needs required 3L/NC   Telemetry: Yes  LDAs:   Peripheral IV 10/11/20 Left Forearm (Active)   Site Assessment Clean; Intact 10/11/20 1206   Line Status Brisk blood return;Flushed; Infusing;Specimen collected 10/11/20 1206   Dressing Status Clean;Dry 10/11/20 1206   Dressing Intervention New 10/11/20 1206     Continuous Infusions:   Mobility: Requires assistance * 1  Boyd Fall Risk Score: Fall Risk 10/22/2019 7/18/2019 10/11/2018 9/26/2017 2/14/2017 2/4/2016 11/3/2015   2 or more falls in past year? no no no no no no no   Fall with injury in past year? no no no no no no no     Fall Interventions: fall band  Report given to:  MARNI Morillo RN  10/11/20 2689

## 2020-10-11 NOTE — ED NOTES
Pt medicated per MAR. Pt and family updated on POC. Patient resting in bed. Respirations easy and unlabored. No distress noted. Call light within reach.       Aubrie Durbin RN  10/11/20 6697
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
## 2020-10-11 NOTE — ED PROVIDER NOTES
ATTENDING ATTESTATION  Evaluation of Darrion Egan. Patient seen and examined by me. Case discussed and care plan developed with resident. I agree with the note and plan as documented by her, except if my documentation differs. I reviewed the medical, surgical, family and social history, medications and allergies. I have reviewed the nursing documentation. I have reviewed the patient's vital signs. Patient c/o fever, difficulty breathing. He has had a temperature of 99 at home but more recently was 100.1. He has been appearing more more short of breath. He is also had nausea and vomiting today. Upon arrival sats were noted to be 82% on room air patient was tachycardic. Physical exam is notable for elderly patient, in mild distress, actively vomiting upon my entrance to the room. Patient is mildly short of breath, breath sounds are remarkable for audible crackles. MDM: Chest x-ray, Tylenol, labs, gentle IV fluid hydration, suspect COVID-19 and swab will be sent. Anticipate admission to the hospital due to hypoxia. We will also begin Decadron. Covid swab returned and was negative, chest x-ray shows right lower lobe infiltrate. IV antibiotics were started and admission was sought. There was much discussion amongst PCP, hospitalist, and intensivist regarding appropriate placement for patient in the hospital.  Patient was given IV fluid bolus once chest x-ray was read as right lower lobe pneumonia and covid swab was negative. Additional PCR testing was ordered for COVID. Patient's blood pressure started to trend downward, therefore patient was ultimately admitted to the ICU & intensivist placed central line. Repeat blood gas showed respiratory acidosis, patient was maintaining sats of 98% on nasal cannula oxygen but due to worsening of his blood gas, BiPAP was initiated.   Plan: As above    Please see the residents' completed note for final disposition except as documented on this attestation. Diagnosis, treatment and disposition plans were discussed and agreed upon by patient. This transcription was electronically signed. It was dictated by use of voice recognition software and electronically transcribed. The transcription may contain errors not detected in proofreading.      I was present for the critical portion following procedures: None       Electronically signed by Richie Salcido MD on 10/11/20 at 12:25 PM EDT      Davey Haile MD  10/11/20 6183

## 2020-10-11 NOTE — ED NOTES
ED to inpatient nurses report    Chief Complaint   Patient presents with    Shortness of Breath    Fever      Present to ED from home  LOC: alert and orientated to name, place, date  Vital signs   Vitals:    10/11/20 1303 10/11/20 1314 10/11/20 1403 10/11/20 1447   BP: (!) 107/49  (!) 93/52 (!) 91/51   Pulse: 74  71 61   Resp: 25 24 26 23   Temp:   99.8 °F (37.7 °C)    TempSrc:   Oral    SpO2: 98% 96% 99% 99%   Weight:          Oxygen Baseline RA    Current needs required 40% Bipap/Cpap Yes  LDAs:   Peripheral IV 10/11/20 Left Forearm (Active)   Site Assessment Clean; Intact 10/11/20 1206   Line Status Brisk blood return;Flushed; Infusing;Specimen collected 10/11/20 1206   Dressing Status Clean;Dry 10/11/20 1206   Dressing Intervention New 10/11/20 1206       Peripheral IV 10/11/20 Right Forearm (Active)   Site Assessment Clean;Dry; Intact 10/11/20 1446   Line Status Brisk blood return;Flushed; Infusing 10/11/20 1446   Dressing Status Clean;Dry; Intact 10/11/20 1446   Dressing Intervention New 10/11/20 1446     Mobility: Requires assistance * 1  Pending ED orders: None  Present condition: Pt alert and oriented. Pt resting comfortably on cot. Pt respirations slightly labored. Pt often has congestive cough.     Electronically signed by Fiona Valadez RN on 10/11/2020 at 3:15 PM     Fiona Valadez [facility]  10/11/20 5309

## 2020-10-12 NOTE — PROGRESS NOTES
Patient arrived to unit from ER via cart. Patient transferred to ICU bed and placed on continuous ICU bedside monitor. Patient admitted for Respiratory failure with hypoxia ([facility] [address] [J96.91]. Vitals obtained. See flowsheets. Patient's IV access includes 20g in L FA, 18g R FA and r IJ CVC. Current infusions and rates of infusion include levophed @ 21 mcg/min. Assessment completed by Emilee Cisse RN. Two nurse skin assessment completed by Emerson Alvarez and this RN. See flowsheets for assessment details. Policies and procedures of ICU able to be explained to patient at this time. Family member(s)/representative(s) present at time of admission include N/A. Patient rights explained to family member(s)/representatives and patient, as able. Patient/patient's family member(s)/representative(s) N/A to have physician notified of their admission. All questions posed by patient's family member(s)/representative(s) and patient answered at this time.      Stage 2 noted to gluteal fold

## 2020-10-12 NOTE — CARE COORDINATION
10/12/20, 7:49 AM EDT  DISCHARGE PLANNING EVALUATION:    Sruthi Silva       Admitted from: ED 10/11/2020/ [address] [place] [address],Third Floor day: 1   Location: [facility]/008-A Reason for admit: Respiratory failure with hypoxia ([facility] [address] [J96.91] Status: IP  Admit order signed?: yes  PMH:  has a past medical history of Arthritis, Hunt syndrome, CAD (coronary artery disease), Clotting disorder ([facility] [address] COPD (chronic obstructive pulmonary disease) ([facility] [address] COPD (chronic obstructive pulmonary disease) ([facility] [address] GERD (gastroesophageal reflux disease), History of colon cancer, History of esophageal cancer, Hx of blood clots, Hypertension, Kidney stone on left side, Nausea & vomiting, Pericarditis, Personal history of DVT (deep vein thrombosis), Personal history of pulmonary embolism, Pseudogout, and Type II or unspecified type diabetes mellitus without mention of complication, not stated as uncontrolled. Procedure:   10/11 Central line  10/12 Intubated  Pertinent abnormal Imaging:  10/12 CXR:   Tubes and lines as noted. Bilateral consolidations and pleural effusions, moderately improved. Significant improvement in central pulmonary edema/CHF. Moderate sized hiatal hernia is suspected. 10/12 CT chest:   Right lower lobe dense consolidation with involvement of the posterior    aspect of the upper and middle lobe.  Findings suspicious for pneumonia. Left base atelectasis or pneumonia. Small hiatal hernia.  Deep mesenteric calcified mass may reflect chronic    sclerosing mesenteritis versus carcinoid. Left kidney cyst.  Possible cholelithiasis.         Medications:  Scheduled Meds:   sodium chloride flush  10 mL Intravenous 2 times per day    dexamethasone  6 mg Intravenous Daily    Arformoterol Tartrate  15 mcg Nebulization BID    budesonide  500 mcg Nebulization BID    [Held by provider] isosorbide mononitrate  30 mg Oral Daily    [Held by provider] tamsulosin  0.4 mg Oral Nightly    midazolam        piperacillin-tazobactam  3.375 g Intravenous Q8H    chlorhexidine  15 mL Mouth/Throat BID    pantoprazole  40 mg Intravenous BID    atenolol  100 mg Per NG tube Daily    clopidogrel  75 mg Per NG tube Daily    finasteride  5 mg Per NG tube Daily    warfarin (COUMADIN) daily dosing (placeholder)   Other RX Placeholder    levofloxacin  750 mg Intravenous Q24H     Continuous Infusions:   sodium chloride 100 mL/hr at 10/12/20 0551    propofol 20 mcg/kg/min (10/12/20 0745)    norepinephrine 15 mcg/min (10/12/20 0733)    vasopressin (Septic Shock) infusion 0.04 Units/min (10/12/20 0103)      Pertinent Info/Orders/Treatment Plan:  Presented to ED with sob, fever, nausea, vomiting, fatigue. Covid (-). Second send out test in process. Requiring vent: PCV +, rate 12, peep 6, 30% fio2. Intensivist following. PT/OT. Dietitian. WBC 15.4. IVF. Levo @ 15mcg. Propofol @ 20mcg. Vasopressin @ 0.04units. Nebs. Dexamethasone. IV protonix. IV levaquin. IV zosyn. Don. Daily wts. I/O. Telemetry. Diet: Diet NPO Effective Now   Smoking status:  reports that he quit smoking about 50 years ago. His smoking use included cigarettes. He has a 25.50 pack-year smoking history. He has never used smokeless tobacco.   PCP: Rosy Spicer MD  Readmission 30 days or less: no  Readmission Risk Score: 30%    Discharge Planning Evaluation  Current Residence:     Living Arrangements:      Support Systems:     Current Services PTA:     Potential Assistance Needed:     Potential Assistance Purchasing Medications:     Does patient want to participate in local refill/ meds to beds program?     Type of Home Care Services:     Patient expects to be discharged to:     Expected Discharge date: Follow Up Appointment: Best Day/ Time:      Patient Goals/Plan/Treatment Preferences: Patient is from home with his wife. Spoke with wife and explained role of . He uses a walker to ambulate, does not use home O2.  He had HH in past but not current. Plans pending clinical course. Transportation/Food Security/Housekeeping Addressed:  No issues identified.     Evaluation: no

## 2020-10-12 NOTE — PLAN OF CARE
Problem: Falls - Risk of:  Goal: Will remain free from falls  Description: Will remain free from falls  10/12/2020 0830 by Tato Francis RN  Outcome: Ongoing  Note: No falls this shift. Bed in lowest position and locked. Bed alarm activated. Problem: Falls - Risk of:  Goal: Absence of physical injury  Description: Absence of physical injury  Outcome: Ongoing  Note: No physical injury     Problem: Restraint Use - Nonviolent/Non-Self-Destructive Behavior:  Goal: Absence of restraint indications  Description: Absence of restraint indications  10/12/2020 0830 by Tato Francis RN  Outcome: Ongoing  Note: Restraints remain in place. Potential for pulling out ETT, no evidence of learning when educated. Problem: Restraint Use - Nonviolent/Non-Self-Destructive Behavior:  Goal: Absence of restraint-related injury  Description: Absence of restraint-related injury  10/12/2020 0830 by Tato Francis RN  Outcome: Ongoing  Note: No restraint-related injury occurred. Problem: Airway Clearance - Ineffective:  Goal: Ability to maintain a clear airway will improve  Description: Ability to maintain a clear airway will improve  10/12/2020 0830 by Tato Francis RN  Outcome: Ongoing  Note: ETT in place     Problem: Fluid Volume - Imbalance:  Goal: Absence of imbalanced fluid volume signs and symptoms  Description: Absence of imbalanced fluid volume signs and symptoms  10/12/2020 0830 by Tato Francis RN  Outcome: Ongoing  Note: Monitoring I/O. Don in place. Problem: Skin Integrity - Impaired:  Goal: Absence of new skin breakdown  Description: Absence of new skin breakdown  10/12/2020 0830 by Tato Francis RN  Outcome: Ongoing  Note: No new skin breakdown. Stage 2 to buttocks and surrounding skin stage 1. Problem: Respiratory  Goal: No pulmonary complications  Outcome: Ongoing  Note: Remains on vent at this time. Legionella growing in respiratory cultures. COVID - x1. Send out COVID.       Problem: Respiratory  Goal: O2 Sat > 90%  Outcome: Ongoing  Note: O2 remains greater than 90%     Problem: Skin Integrity/Risk  Goal: No skin breakdown during hospitalization  Outcome: Ongoing  Note: No new skin breakdown noted. Turn q 2 hours and PRN. Mouth care q 2 hours and PRN. Problem: Skin Integrity/Risk  Goal: Wound healing  Outcome: Ongoing  Note: Stage 1/2 to buttocks. Problem: Discharge Planning:  Goal: Discharged to appropriate level of care  Description: Discharged to appropriate level of care  Outcome: Ongoing  Note: Care plan reviewed with patient and wife. Patient unable to verbalize understanding of the plan of care and contribute to goal setting. Problem: Infection - Central Venous Catheter-Associated Bloodstream Infection:  Goal: Will show no infection signs and symptoms  Description: Will show no infection signs and symptoms  Outcome: Ongoing  Note: Sterile technique and ETOH caps applied to CVC when not in use. Problem: Infection - Ventilator-Associated Pneumonia:  Goal: Prevent a ventilator associated event (VAE)  Description: Prevent a ventilator associated event (VAE)  Outcome: Ongoing  Note: HOB elevated 30 degrees. Suctioning PRN. Problem: Infection - Ventilator-Associated Pneumonia:  Goal: Absence of pulmonary infection  Description: Absence of pulmonary infection  Outcome: Ongoing  Note: Legionella PNM, on ATBx. Problem: Urinary Elimination:  Goal: Signs and symptoms of infection will decrease  Description: Signs and symptoms of infection will decrease  Outcome: Ongoing  Note: Don care with soap and water. Urine culture sent when placed. Problem: Urinary Elimination:  Goal: Complications related to the disease process, condition or treatment will be avoided or minimized  Description: Complications related to the disease process, condition or treatment will be avoided or minimized  Outcome: Ongoing  Note: No new complications noted.

## 2020-10-12 NOTE — PLAN OF CARE
Problem: Falls - Risk of:  Goal: Will remain free from falls  Description: Will remain free from falls  Outcome: Met This Shift  Note: Bed alarm active for pt safety     Problem: Restraint Use - Nonviolent/Non-Self-Destructive Behavior:  Goal: Absence of restraint-related injury  Description: Absence of restraint-related injury  Outcome: Met This Shift     Problem: Skin Integrity - Impaired:  Goal: Absence of new skin breakdown  Description: Absence of new skin breakdown  Outcome: Met This Shift  Note: Skin assessed on admission by two rns. Wound documented     Problem: Restraint Use - Nonviolent/Non-Self-Destructive Behavior:  Goal: Absence of restraint indications  Description: Absence of restraint indications  Outcome: Not Met This Shift  Note: Pt reaches for life threatening tubes and lines when unrestrained. Continue to monitor     Problem: Airway Clearance - Ineffective:  Goal: Ability to maintain a clear airway will improve  Description: Ability to maintain a clear airway will improve  Outcome: Not Met This Shift  Note: Pt intubated for pneumonia     Problem: Fluid Volume - Imbalance:  Goal: Absence of imbalanced fluid volume signs and symptoms  Description: Absence of imbalanced fluid volume signs and symptoms  Outcome: Not Met This Shift  Note: Pt requiring vasopressors for bp support    Care plan reviewed with patient and wife. Patient and wife verbalize understanding of the plan of care and contribute to goal setting.

## 2020-10-12 NOTE — PROCEDURES
ICU PROCEDURE - ENDOTRACHEAL INTUBATION    Gerber Tejada     MRN#: [id number]  10/12/20      [email or web address]     : 1940      INDICATION: Acute hypoxic respiratory failure, hypoxia and hypercapnia, failed BIPAP    TIME OUT: taken    Permission obtained, risks/benefits reviewed:    ANESTHESIA:   []Ketamine  []Ativan  [] Morphine  []Propofol  [x]Other medications:Etomidate    ESTIMATED BLOOD LOSS:  None. COMPLICATIONS:  []N/A  [] Other:    LARYNGOSCOPIC AIRWAY GRADE (CORMACK-LEHANE):[]1  [x]2a  []2b []3  []4        INTUBATION EQUIPMENT USED:  [x] Direct laryngoscope only    OUTCOME: Successful placement of #  8  Taperguard Evac endotracheal via   [x]Oral route    INSERTION DEPTH:  24    cm from   [x]lip           CONFIRMATION OF TUBE POSITION:   [x]Capnography - Strong & repeatable exhaled CO2 detection   [x]Multiple point auscultation   [x]SpO2 response   [x]STAT X-ray   [x]Bronchoscopic assessment    UNUSUAL FINDINGS:    PROCEDURE:     Using direct video-laryngoscopy, the vocal cords were visualized and the endotracheal tube was placed through the cords under direct vision. Good breath sounds were auscultated bilaterally without sounds over abdomen. Appropriate strong & repeatable exhaled CO2 detection was confirmed.        Electronically signed by Franki Meigs, APRN - CNP on 10/12/2020 at 6:45 AM

## 2020-10-12 NOTE — FLOWSHEET NOTE
While pt who is [de-identified]years of age was in bed on ICE, 4D, his wife was sitting by his side and we could have a conversation. Pt's wife stated that they belong to [facility] in [place] and she hoped that her Jehovah's witness knew that he was here. She also shared that the doctors think he has pnomonia. We had prayer for his healing and for her strength. 10/12/20 1030   Encounter Summary   Services provided to: Patient and family together   Referral/Consult From: [address] [facility] [place] [address] first [address] [place] Yes   Continue Visiting Yes  (10/12 sleeping/NR)   Complexity of Encounter Moderate   Length of Encounter 15 minutes   Spiritual Assessment Completed Yes   Spiritual/Pentecostalism   Type Spiritual support   Care Plan:  Continue spiritual and emotional care for patient and family. Including prayers.

## 2020-10-12 NOTE — PROGRESS NOTES
[address]  PHYSICAL THERAPY MISSED TREATMENT NOTE  [facility] ICU 4D    Date: 10/12/2020  Patient Name: Cleveland Zamora        MRN: [id number]   : 1940  ([de-identified] y.o.)  Gender: male                REASON FOR MISSED TREATMENT:  Hold treatment per nursing request.  Pt just intubated early this am. Will hold & check back 10/13/2020.

## 2020-10-12 NOTE — PROGRESS NOTES
[address] [facility] THERAPY MISSED TREATMENT NOTE  [facility] ICU 4D  [id number]/[id number]      Date: 10/12/2020  Patient Name: Robbi Villalobos        CSN: [id number]   : 1940  ([de-identified] y.o.)  Gender: male                REASON FOR MISSED TREATMENT: Pt just intubated early this am. Will hold & check back 10/13/2020.

## 2020-10-12 NOTE — PROGRESS NOTES
Raul Caceres NP at bedside to perform intubation. Pt agreeable to procedure Suction at bedside. RN and RT at bedside. 20 etomidate given by Abida Mckeon at [place] per Raul Caceres NP verbal order.  Positive color change 0204. 24 at the lip #8 ett

## 2020-10-12 NOTE — PROGRESS NOTES
Therapies:    Current Tube Feeding (TF) Orders:  · Feeding Route: Orogastric  · Formula: (Vital 1.2 ( also lowest CHO TF))  · Schedule: Continuous(goal is 65 ml/hr)  · Additives/Modulars: (.)  · Water Flushes: per Dr  · Current TF & Flush Orders Provides: to start at 20 ml/hr  · Goal TF & Flush Orders Provides: 1872 kcals TF ( 2118 kcals with diprivan), 117 grams protein, 173 grams CHO in 1560 ml TF/24 hours      Anthropometric Measures:  · Height: 5' 11\" (180.3 cm)  · Current Body Weight: 170 lb 3.1 oz (77.2 kg)(10/12 +1 edema)   · Admission Body Weight: 170 lb 3.1 oz (77.2 kg)(10/12 +1 edema)    · Usual Body Weight: 167 lb 8.8 oz (76 kg)(per EMR 5/20/20)     · Ideal Body Weight: 172 lbs;     · BMI: 23.7  · BMI Categories: Normal Weight (BMI 22.0 to 24.9) age over 72       Nutrition Diagnosis:   · Inadequate oral intake related to inadequate protein-energy intake as evidenced by intubation      Nutrition Interventions:   Food and/or Nutrient Delivery:  Start Tube Feeding  Nutrition Education/Counseling:  No recommendation at this time   Coordination of Nutrition Care:  Continued Inpatient Monitoring, Interdisciplinary Rounds    Goals:  TF to provide % of nutrient needs while pt is intubated       Nutrition Monitoring and Evaluation:     Food/Nutrient Intake Outcomes:  Enteral Nutrition Intake/Tolerance  Physical Signs/Symptoms Outcomes:  Biochemical Data, Chewing or Swallowing, GI Status, Fluid Status or Edema, Hemodynamic Status, Skin, Weight, Nutrition Focused Physical Findings     Discharge Planning:     Too soon to determine     Electronically signed by Rome Nuñez RD, LD on 10/12/20 at 11:08 AM EDT    Contact: [phone]

## 2020-10-12 NOTE — SIGNIFICANT EVENT
Patient Legionella positive on PCR and urine antigen. Levaquin started. Electronically signed by Jen Phelps.  LARRY Najera CNP on 10/12/2020 at 9:15 AM

## 2020-10-12 NOTE — PROCEDURES
Maddison Monday is a [de-identified] y.o. male patient. 1. Sepsis, due to unspecified organism, unspecified whether acute organ dysfunction present ([facility] [address]    2. 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 Hx of blood clots     LEG, LUNGS    Hypertension     Kidney stone on left side 7/2010    Nausea & vomiting     Pericarditis     History of    Personal history of DVT (deep vein thrombosis)     Personal history of pulmonary embolism     Pseudogout 12/2005    Type II or unspecified type diabetes mellitus without mention of complication, not stated as uncontrolled      Blood pressure (!) 97/57, pulse 51, temperature 97.3 °F (36.3 °C), temperature source Bladder, resp. rate 16, height 5' 11\" (1.803 m), weight 170 lb 3.1 oz (77.2 kg), SpO2 97 %. Central Line    Date/Time: 10/11/2020 4:30 PM  Performed by: Lavon Roy MD  Authorized by: Lavon Roy MD   Consent: Verbal consent obtained. Written consent obtained. Risks and benefits: risks, benefits and alternatives were discussed  Consent given by: patient  Patient identity confirmed: hospital-assigned identification number  Time out: Immediately prior to procedure a \"time out\" was called to verify the correct patient, procedure, equipment, support staff and site/side marked as required.   Indications: vascular access  Anesthesia: local infiltration    Anesthesia:  Local Anesthetic: lidocaine 1% without epinephrine  Anesthetic total: 3 mL    Sedation:  Patient sedated: no    Preparation: skin prepped with ChloraPrep  Skin prep agent dried: skin prep agent completely dried prior to procedure  Sterile barriers: all five maximum sterile barriers used - cap, mask, sterile gown, sterile gloves, and large sterile sheet  Hand hygiene: hand hygiene performed prior to central venous catheter insertion  Location details: right internal jugular  Patient position: flat  Catheter type: triple lumen  Catheter size: 7 Fr  Pre-procedure: landmarks identified  Ultrasound guidance: yes  Sterile ultrasound techniques: sterile gel and sterile probe covers were used  Number of attempts: 1  Successful placement: yes  Post-procedure: line sutured and dressing applied  Assessment: blood return through all ports,  free fluid flow,  placement verified by x-ray and no pneumothorax on x-ray  Patient tolerance: Patient tolerated the procedure well with no immediate complications  Comments: Estimated blood loss 10 to 15 cc          Suzy Dougherty MD  10/12/2020

## 2020-10-12 NOTE — PLAN OF CARE
Problem: Nutrition  Goal: Optimal nutrition therapy  Outcome: Ongoing   Nutrition Problem #1: Inadequate oral intake  Intervention: Food and/or Nutrient Delivery: Start Tube Feeding  Nutritional Goals: TF to provide % of nutrient needs while pt is intubated

## 2020-10-12 NOTE — H&P
CRITICAL CARE PROGRESS NOTE      Patient:  Chapis Roque    Unit/Bed:12/012A  YOB: 1940  MRN: [id number]   PCP: Karoline Hernandez MD  Date of Admission: 10/11/2020  Chief Complaint:- Acute respiratory failure    Assessment and Plan:    1. Acute respiratory failure hypoxia and hypercapnia:  Failed Bipap. 10/12/2020 orally intubated and placed on PCV mode of ventilation. Continue with lung protection strategies targeting a peak pressure 35 or less and plateau 30 and less. 2. Distributive versus hypovolemic shock: 10/11/2020 Lactic Acid 2.1, PCT 1.57. Patient did require Levophed, vasopressin to maintain mean arterial pressure greater than 65. Consolidated RLL involvement of the posterior upper and middle lobe, question of cholelithiasis. An acute drop in H/H 13.2-9.1 was noted. 3. Severe pneumonia: Patient presented with fever, cough, fatigue and poor appetite. Noted history of esophageal cancer with esophagectomy ? Asp PNA. 10/12/2020 CT chest-extensive consolidation of the right lower lobe with air bronchograms and areas of necrosis with some involvement of the adjacent posterior aspect of the right upper lobe and right middle lobe. Concerns of OER-Zldxvz-csbfvbmky leukocidin, MRSA PCR 5/2020 negative, repeat is pending. Blood cultures are pending. Urine titers for Strept and Legionella is pending. Sputum-Biofire is pending. Patient did receive Rocephin and Zithromax while in the ER will stop and change to Zosyn pending results. 4. Hyponatremia:  Likely hypovolemic, IV fluids will be started, repeat BMP. 5. Anion gap acidosis:  Likely secondary to above, repeat BMP after IV fluids and monitor, may need HCO3 gtt,  6. Acute kidney injury, oliguric:  Urine is pending, likely hypovolemic-pre-renal, Home Lasix was not restarted at the time of admission. Urine Elytes are pending, repeat BMP, dose medications to GFR, no NSAIDS to be given.   7. DMT2, uncontrolled:  Oral  of Metformin was not started at time of admission, will monitor with ACCU and SSI. 8. Mildly elevated troponin:  In the setting of sepsis, MAYRA. No EKG changes noted, patient is on Coumadin. This is likely demand ischemia. Limited ECHO-will be ordered, and monitor. 9. Acute on chronic anemia, macrocytic:  Noted H/H 13.2-9.1 since admission. No obvious signs of bleeding noted. Protonix BID dosing, will monitor with serial H/H's with target H/H 8.0. Will continue Coumadin at this time. 10. COPD, stable:  History. Home Brovana and Pulmicort were continued. 11. VTE, history:  DVT and PE,  Coumadin will be continued, Pharmacy to dose. 12. Esophageal Cancer: history, s/p esophagectomy, monitor  13. Colon Cancer:  History s/p colon resection, monitor    INITIAL H AND P AND ICU COURSE:  Dinorah Jennings is a 79-year-old  male admitted to [facility] ICU 10/12/2020 with acute respiratory failure. 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 Sima Lacey presented to [facility] ER 10/11/2020 for evaluation of shortness of breath, fatigue, fever, nausea and vomiting. Patient accompanied by wife and daughter. Daughter states that patient began to have the symptoms on Friday, had increased difficulty of breathing, fever up to 102.0, difficulty getting out of bed, altered mental status with decreased interaction, and multiple episodes of nonbloody nonbilious emesis yesterday. Influenza and COVID-19 testing was negative. While in the ER patient did require escalating oxygen requirements and was placed on Bipap.  Hypotension was not improved with 0.9NS fluid bolusing, which he did require Levophed gtt to be started. Patient was admitted to [facility] ICU for further management and care. Past Medical History:  See HPI. Family History: Mother  CVA, DMT2, Father  Cancer. Social History: Former smoker, denies any ETOH or illicit drug use.  lives with wife. ROS   GENERAL: Positive for fever, chills, fatigue and poor appetite. SKN: No lesions or rashes. HEAD: No headaches or recent injury  EYES: No acute changes in vision, no diplopia or blurred vision, no drainage  EARS: No hearing loss, no tinnitus, no drainage  NOSE/THROAT: No rhinorrhea or pharyngitis, no nasal drainage  NECK: No lumps or unusual neck stiffness  PULMONARY: Positive for dyspnea, cough, Denies any  orthopnea or paroxysmal nocturnal dyspnea, stridor or wheezing  CARDIAC: No chest pain, pressure, heaviness or tightnes  GI: Denies any abdominal pain, No melena or hematochezia, no diarrhea or constipation  PERIPHERAL VASCULAR: No intermittent claudication or unusual leg cramps  MUSCULOSKELETAL: Occasional arthralgias, myalgias  NEUROLOGICAL: No Seizures or Syncope  HEMATOLOGIC:  Positive for anemia, no unusual bruising or bleeding  PSYCH: No homicidal or suicidal ideations. Scheduled Meds:  Continuous Infusions:   norepinephrine 21 mcg/min (10/11/20 2009)       PHYSICAL EXAMINATION:  T:  97.3  P:  52. RR:  16. B/P:  114/58. FiO2:  40. O2 Sat:  94.  I/O:  pending  Body mass index is 20.92 kg/m². GCS:   13  General:   Pale, acutely ill in appearance  HEENT:  normocephalic and atraumatic. No scleral icterus. PERR  Neck: supple. No Thyromegaly. Lungs: clear to auscultation. , diminished right base  No retractions. Bipap  Cardiac: RRR, S1S2  No JVD. Abdomen: soft. Nontender, bowel sounds present no guarding or rebound tenderness noted  Extremities:  No clubbing, cyanosis, or edema x 4. Vasculature: capillary refill < 3 seconds. Palpable dorsalis pedis pulses.   Skin:  warm and dry.  Psych:  Alert and oriented x3. Affect appropriate  Lymph:  No supraclavicular adenopathy. Neurologic:  No focal deficit. No seizures. Data: (All radiographs, tracings, PFTs, and imaging are personally viewed and interpreted unless otherwise noted).    Recent Results (from the past 24 hour(s))   EKG 12 Lead    Collection Time: 10/11/20 11:44 AM   Result Value Ref Range    Ventricular Rate 81 BPM    Atrial Rate 81 BPM    P-R Interval 146 ms    QRS Duration 78 ms    Q-T Interval 370 ms    QTc Calculation (Bazett) 429 ms    P Axis 45 degrees    R Axis 48 degrees    T Axis 14 degrees   Comprehensive Metabolic Panel w/ Reflex to MG    Collection Time: 10/11/20 12:07 PM   Result Value Ref Range    Glucose 154 (H) 70 - 108 mg/dL    CREATININE 1.4 (H) 0.4 - 1.2 mg/dL    BUN 27 (H) 7 - 22 mg/dL    Sodium 140 135 - 145 meq/L    Potassium reflex Magnesium 4.0 3.5 - 5.2 meq/L    Chloride 100 98 - 111 meq/L    CO2 28 23 - 33 meq/L    Calcium 9.1 8.5 - 10.5 mg/dL    AST 73 (H) 5 - 40 U/L    Alkaline Phosphatase 70 38 - 126 U/L    Total Protein 6.7 6.1 - 8.0 g/dL    Alb 2.9 (L) 3.5 - 5.1 g/dL    Total Bilirubin 0.8 0.3 - 1.2 mg/dL    ALT 15 11 - 66 U/L   CBC Auto Differential    Collection Time: 10/11/20 12:07 PM   Result Value Ref Range    WBC 13.4 (H) 4.8 - 10.8 thou/mm3    RBC 4.37 (L) 4.70 - 6.10 mill/mm3    Hemoglobin 13.2 (L) 14.0 - 18.0 gm/dl    Hematocrit 42.7 42.0 - 52.0 %    MCV 97.7 (H) 80.0 - 94.0 fL    MCH 30.2 26.0 - 33.0 pg    MCHC 30.9 (L) 32.2 - 35.5 gm/dl    RDW-CV 17.9 (H) 11.5 - 14.5 %    RDW-SD 64.2 (H) 35.0 - 45.0 fL    Platelets 539 132 - 858 thou/mm3    MPV 11.6 9.4 - 12.4 fL    Seg Neutrophils 89.5 %    Lymphocytes 2.7 %    Monocytes 6.9 %    Eosinophils 0.0 %    Basophils 0.2 %    Immature Granulocytes 0.7 %    Segs Absolute 12.0 (H) 1.8 - 7.7 thou/mm3    Lymphocytes Absolute 0.4 (L) 1.0 - 4.8 thou/mm3    Monocytes Absolute 0.9 0.4 - 1.3 thou/mm3    Eosinophils Absolute 0.0 0.0 - 0.4 thou/mm3 Basophils Absolute 0.0 0.0 - 0.1 thou/mm3    Immature Grans (Abs) 0.09 (H) 0.00 - 0.07 thou/mm3    nRBC 0 /100 wbc   Troponin    Collection Time: 10/11/20 12:07 PM   Result Value Ref Range    Troponin T 0.037 (A) ng/ml   Brain Natriuretic Peptide    Collection Time: 10/11/20 12:07 PM   Result Value Ref Range    Pro-BNP 9715.0 (H) 0.0 - 1800.0 pg/mL   Rapid influenza A/B antigens    Collection Time: 10/11/20 12:07 PM    Specimen: Nasopharyngeal   Result Value Ref Range    Flu A Antigen Negative NEGATIVE    Flu B Antigen Negative NEGATIVE   COVID-19    Collection Time: 10/11/20 12:07 PM   Result Value Ref Range    SARS-CoV-2, NAAT NOT DETECTED NOT DETECTED   Lactate Dehydrogenase    Collection Time: 10/11/20 12:07 PM   Result Value Ref Range     (H) 100 - 190 U/L   Procalcitonin    Collection Time: 10/11/20 12:07 PM   Result Value Ref Range    Procalcitonin 1.57 (H) 0.01 - 0.09 ng/mL   Anion Gap    Collection Time: 10/11/20 12:07 PM   Result Value Ref Range    Anion Gap 12.0 8.0 - 16.0 meq/L   Glomerular Filtration Rate, Estimated    Collection Time: 10/11/20 12:07 PM   Result Value Ref Range    Est, Glom Filt Rate 49 (A) ml/min/1.73m2   Osmolality    Collection Time: 10/11/20 12:07 PM   Result Value Ref Range    Osmolality Calc 287.6 275.0 - 300.0 mOsmol/kg   Cortisol Total    Collection Time: 10/11/20 12:07 PM   Result Value Ref Range    Cortisol 27.03 ug/dL   TSH with Reflex    Collection Time: 10/11/20 12:07 PM   Result Value Ref Range    TSH 2.240 0.400 - 4.200 uIU/mL   Lactic Acid, Plasma    Collection Time: 10/11/20 12:27 PM   Result Value Ref Range    Lactic Acid 2.1 0.5 - 2.2 mmol/L   Blood Gas, Venous    Collection Time: 10/11/20  1:01 PM   Result Value Ref Range    PH MIXED 7.40 7.31 - 7.41    PCO2, MIXED VENOUS 44 41 - 51 mmhg    PO2, Mixed 27 25 - 40 mmhg    HCO3, Mixed 27 23 - 28 mmol/l    Base Exc, Mixed 1.7 -2.0 - 3.0 mmol/l    O2 Sat, Mixed 49 %    COLLECTED BY: [id number]    Culture, Blood 1 /hpf    WBC, UA 0-2 0-4/hpf /hpf    Epithelial Cells, UA 0-2 3-5/hpf /hpf    Bacteria, UA FEW FEW/NONE SEEN    Casts NONE SEEN NONE SEEN /lpf   ICTOTEST, URINE    Collection Time: 10/12/20 12:45 AM   Result Value Ref Range    Ictotest NEGATIVE NEGATIVE   Pneumonia Panel, Molecular    Collection Time: 10/12/20  2:15 AM    Specimen: BAL- Bronch.  Lavage   Result Value Ref Range    Source ET aspirates     Specimen Acceptability NA     Acinetobacter calcoaceticus-baumannii by PCR Not Detected Not Detected    Enterobacter cloacae complex by PCR Not Detected Not Detected    Escherichia coli by PCR Not Detected Not Detected    Haemophilus Influenzae by PCR Not Detected Not Detected    Klebsiella aerogenes by PCR Not Detected Not Detected    Klebsiella oxytoca by PCR Not Detected Not Detected    Klebsiella pneumoniae by PCR Not Detected Not Detected    Moraxella catarrhalis by PCR Not Detected Not Detected    Proteus species by PCR Not Detected Not Detected    Pseudomonas aeruginosa by PCR Not Detected Not Detected    Serratia marcescens by PCR Not Detected Not Detected    Staph aureus by PCR Not Detected Not Detected    Strep agalactiae by PCR Not Detected Not Detected    Strep pneumoniae by PCR Not Detected Not Detected    Strep pyogenes by PCR Not Detected Not Detected    Resistant gene ctx-m by PCR N/A Not Detected    Resistant gene imp by PCR N/A Not Detected    Resistant gene kpc by PCR N/A Not Detected    Resistant gene meca/c & mrej by PCR N/A Not Detected    Resistant gene ndm by PCR N/A Not Detected    Resistant gene oxa-48-like by pcr N/A Not Detected    Resistant gene vim by PCR N/A Not Detected    Chlamydia pneumoniae By PCR Not Detected Not Detected    Legionella Pneumophilia By PCR Detected (AA) Not Detected    Mycoplasma pneumoniae by PCR Not Detected Not Detected    Adenovirus by PCR Not Detected Not Detected    CORONAVIRUS PCR Not Detected Not Detected    Metapneumovirus by PCR Not Detected Not Detected Rhinovirus Enterovirus PCR Not Detected Not Detected    Influenza A by PCR Not Detected Not Detected    Influenza B by PCR Not Detected Not Detected    Parainfluenza virus by PCR Not Detected Not Detected    Respiratory Syncytial Virus by PCR Not Detected Not Detected   Protime-INR    Collection Time: 10/12/20  4:15 AM   Result Value Ref Range    INR 1.35 (H) 0.85 - 1.13   Calcium, Ionized    Collection Time: 10/12/20  4:15 AM   Result Value Ref Range    Calcium, Ion 1.08 (L) 1.12 - 1.32 mmol/L   Comprehensive Metabolic Panel w/ Reflex to MG    Collection Time: 10/12/20  4:15 AM   Result Value Ref Range    Glucose 233 (H) 70 - 108 mg/dL    CREATININE 1.3 (H) 0.4 - 1.2 mg/dL    BUN 38 (H) 7 - 22 mg/dL    Sodium 138 135 - 145 meq/L    Potassium reflex Magnesium 4.3 3.5 - 5.2 meq/L    Chloride 103 98 - 111 meq/L    CO2 23 23 - 33 meq/L    Calcium 8.1 (L) 8.5 - 10.5 mg/dL    AST 64 (H) 5 - 40 U/L    Alkaline Phosphatase 63 38 - 126 U/L    Total Protein 6.1 6.1 - 8.0 g/dL    Alb 2.5 (L) 3.5 - 5.1 g/dL    Total Bilirubin 0.5 0.3 - 1.2 mg/dL    ALT 20 11 - 66 U/L   Magnesium    Collection Time: 10/12/20  4:15 AM   Result Value Ref Range    Magnesium 2.0 1.6 - 2.4 mg/dL   Phosphorus    Collection Time: 10/12/20  4:15 AM   Result Value Ref Range    Phosphorus 5.3 (H) 2.4 - 4.7 mg/dL   CBC auto differential    Collection Time: 10/12/20  4:15 AM   Result Value Ref Range    WBC 15.4 (H) 4.8 - 10.8 thou/mm3    RBC 3.89 (L) 4.70 - 6.10 mill/mm3    Hemoglobin 11.8 (L) 14.0 - 18.0 gm/dl    Hematocrit 39.4 (L) 42.0 - 52.0 %    .3 (H) 80.0 - 94.0 fL    MCH 30.3 26.0 - 33.0 pg    MCHC 29.9 (L) 32.2 - 35.5 gm/dl    RDW-CV 17.7 (H) 11.5 - 14.5 %    RDW-SD 65.9 (H) 35.0 - 45.0 fL    Platelets 634 104 - 012 thou/mm3    MPV 11.2 9.4 - 12.4 fL    Seg Neutrophils 90.7 %    Lymphocytes 2.5 %    Monocytes 6.1 %    Eosinophils 0.0 %    Basophils 0.1 %    Immature Granulocytes 0.6 %    Segs Absolute 14.0 (H) 1.8 - 7.7 thou/mm3 Lymphocytes Absolute 0.4 (L) 1.0 - 4.8 thou/mm3    Monocytes Absolute 0.9 0.4 - 1.3 thou/mm3    Eosinophils Absolute 0.0 0.0 - 0.4 thou/mm3    Basophils Absolute 0.0 0.0 - 0.1 thou/mm3    Immature Grans (Abs) 0.09 (H) 0.00 - 0.07 thou/mm3    nRBC 0 /100 wbc    Anisocytosis PRESENT Absent   Anion Gap    Collection Time: 10/12/20  4:15 AM   Result Value Ref Range    Anion Gap 12.0 8.0 - 16.0 meq/L   Glomerular Filtration Rate, Estimated    Collection Time: 10/12/20  4:15 AM   Result Value Ref Range    Est, Glom Filt Rate 53 (A) ml/min/1.73m2     Cardiac Telemetry NSR        Seen with multidisciplinary ICU team yes. Meets Continued ICU Level Care Criteria:    [x] Yes   [] No - Transfer Planned to listed location:  [] HOSPITALIST CONTACTED-      Case and plan discussed with Dr. Ang Townsend. Electronically signed by LARRY Hood - CNP  CRITICAL CARE SPECIALIST  Patient seen by me. Case discussed with nurse practitioner. Patient critically ill on mechanical ventilator. Continue with volume resuscitation and pressors as necessary. Monitor urine output. CC time 35 minutes. Time was discontiguous. Time does not include NP assessment or procedures. Time does include my direct assessment and coordination of care. Electronically signed by Lizz Townsend MD.

## 2020-10-13 NOTE — PLAN OF CARE
Problem: Falls - Risk of:  Goal: Will remain free from falls  Description: Will remain free from falls  10/13/2020 0826 by Paradise Adam RN  Outcome: Ongoing  Note: No falls this shift. Bed in lowest position and locked. Bed alarm activated. Problem: Falls - Risk of:  Goal: Absence of physical injury  Description: Absence of physical injury  10/13/2020 0826 by Paradise Adam RN  Outcome: Ongoing  Note: No physical injury occurred. Problem: Restraint Use - Nonviolent/Non-Self-Destructive Behavior:  Goal: Absence of restraint indications  Description: Absence of restraint indications  10/13/2020 0826 by Paradise Adam RN  Outcome: Ongoing  Note: Restraints remain in place. Patient attempts to pull at ETT at times. Problem: Restraint Use - Nonviolent/Non-Self-Destructive Behavior:  Goal: Absence of restraint-related injury  Description: Absence of restraint-related injury  10/13/2020 0826 by Paradise Adam RN  Outcome: Ongoing  Note: No injury occurred. Problem: Airway Clearance - Ineffective:  Goal: Ability to maintain a clear airway will improve  Description: Ability to maintain a clear airway will improve  10/13/2020 0826 by Paradise Adam RN  Outcome: Ongoing  Note: ETT in place     Problem: Fluid Volume - Imbalance:  Goal: Absence of imbalanced fluid volume signs and symptoms  Description: Absence of imbalanced fluid volume signs and symptoms  10/13/2020 0826 by Paradise Adam RN  Outcome: Ongoing  Note: Monitoring I/O     Problem: Skin Integrity - Impaired:  Goal: Absence of new skin breakdown  Description: Absence of new skin breakdown  10/13/2020 0826 by Paradise Adam RN  Outcome: Ongoing  Note: No new skin breakdown noted. Problem: Respiratory  Goal: No pulmonary complications  32/59/2210 0826 by Paradise Adam RN  Outcome: Ongoing  Note: Remains on vent.       Problem: Respiratory  Goal: O2 Sat > 90%  10/13/2020 0826 by Paradise Adam RN  Outcome: Ongoing  Note: O2 > 90% Problem: Skin Integrity/Risk  Goal: No skin breakdown during hospitalization  10/13/2020 0826 by Jami Kehr, RN  Outcome: Ongoing  Note: No new skin breakdown noted. Turn q 2 hours and PRN. Problem: Skin Integrity/Risk  Goal: Wound healing  Outcome: Ongoing  Note: Monitoring wound healing. Problem: Discharge Planning:  Goal: Discharged to appropriate level of care  Description: Discharged to appropriate level of care  10/13/2020 0826 by Jami Kehr, RN  Outcome: Ongoing  Note: Care plan reviewed with patient and Wife. Wife verbalize understanding of the plan of care and contribute to goal setting. Problem: Infection - Central Venous Catheter-Associated Bloodstream Infection:  Goal: Will show no infection signs and symptoms  Description: Will show no infection signs and symptoms  10/13/2020 0826 by Jami Kehr, RN  Outcome: Ongoing  Note: No new signs of infection noted. Sterile technique      Problem: Infection - Ventilator-Associated Pneumonia:  Goal: Prevent a ventilator associated event (VAE)  Description: Prevent a ventilator associated event (VAE)  10/13/2020 0826 by Jami Kehr, RN  Outcome: Ongoing  Note: HOB elevated. Suctioning prn     Problem: Infection - Ventilator-Associated Pneumonia:  Goal: Absence of pulmonary infection  Description: Absence of pulmonary infection  10/13/2020 0826 by Jami Kehr, RN  Outcome: Ongoing  Note: Treating for legionella PNM. On levaquin     Problem: Urinary Elimination:  Goal: Signs and symptoms of infection will decrease  Description: Signs and symptoms of infection will decrease  10/13/2020 0826 by Jami Kehr, RN  Outcome: Ongoing  Note: Don care with soap and water. Urine culture sent when placed.          Problem: Urinary Elimination:  Goal: Complications related to the disease process, condition or treatment will be avoided or minimized  Description: Complications related to the disease process, condition or treatment will be avoided

## 2020-10-13 NOTE — CARE COORDINATION
10/13/20, 11:07 AM EDT    DISCHARGE ON [address] [facility] day: 2  Location: -08/008-A Reason for admit: Respiratory failure with hypoxia [facility]) [J96.91]   Procedure:   10/11 Central line  10/12 Intubated  Treatment Plan of Care: Intensivist following. + legionella PNM. Remains on vent: PCV+, rate 12, peep 6, fiow 30%. IVF. Levo @ 1mcg. Propofol @ 20mcg. Vasopressin @ 0.04u. IV levaquin. Nebs. Inhaler. IV protonix. WBC 14.4. PT/OT on, unable to see. Dietitian following for tube feeds. Telemetry. Daily wts. I/O. Barriers to Discharge: Await medical clearance  PCP: Mary Steven MD  Readmission Risk Score: 28%  Patient Goals/Plan/Treatment Preferences: Plans pending clinical course. PTA from home with wife. Uses a walker but did not use HH or home O2. Update: discussed in ICU rounds, plan to extubate tomorrow if patient tolerates. Following commands.

## 2020-10-13 NOTE — PLAN OF CARE
Problem: Falls - Risk of:  Goal: Will remain free from falls  Description: Will remain free from falls  10/12/2020 2134 by Julio Miles RN  Outcome: Ongoing  Note: Side rails x4 in place, bed alarm is activated, hourly rounding in effect, fall risk armband in place, pt. educated on importance of seeking assistance before ambulating. Problem: Falls - Risk of:  Goal: Absence of physical injury  Description: Absence of physical injury  10/12/2020 2134 by Julio Miles RN  Outcome: Ongoing  Note: Side rails x4 in place, bed alarm is activated, hourly rounding in effect, fall risk armband in place, pt. educated on importance of seeking assistance before ambulating. Problem: Restraint Use - Nonviolent/Non-Self-Destructive Behavior:  Goal: Absence of restraint indications  Description: Absence of restraint indications  10/12/2020 2134 by Julio Miles RN  Outcome: Ongoing  Note: Pt has restraints in place to prevent pt from pulling lines and tubes      Problem: Restraint Use - Nonviolent/Non-Self-Destructive Behavior:  Goal: Absence of restraint-related injury  Description: Absence of restraint-related injury  10/12/2020 2134 by Julio Miles RN  Outcome: Ongoing  Note: Q2 hr circulation checks are in place     Problem: Airway Clearance - Ineffective:  Goal: Ability to maintain a clear airway will improve  Description: Ability to maintain a clear airway will improve  10/12/2020 2134 by Julio Miles RN  Outcome: Ongoing  Note: Ventilator in use, suctioning be done as needed.  HOB at 30      Problem: Fluid Volume - Imbalance:  Goal: Absence of imbalanced fluid volume signs and symptoms  Description: Absence of imbalanced fluid volume signs and symptoms  10/12/2020 2134 by Julio Miles RN  Outcome: Ongoing  Note: Pt has merritt in with strict I/Os in place     Problem: Skin Integrity - Impaired:  Goal: Absence of new skin breakdown  Description: Absence of new skin breakdown  10/12/2020 2134 by Martha Park RN  Outcome: Ongoing  Note: Pt already has wounds that were present on admission. See flowsheets. Continue with skin care bundle to prevent any further breakdown on this patient      Problem: Respiratory  Goal: No pulmonary complications  14/18/5001 2134 by Martha Park RN  Outcome: Ongoing  Note: Pt requiring assistance of ventilator at this time      Problem: Respiratory  Goal: O2 Sat > 90%  10/12/2020 2134 by Martha Park RN  Outcome: Ongoing  Note: Pt O2 sats have been high 90s so far this shift. Will continue to monitor and will titrate FiO2 is O2 sat begins to drop. Problem: Skin Integrity/Risk  Goal: No skin breakdown during hospitalization  10/12/2020 2134 by Martha Park RN  Outcome: Ongoing  Note: Pt being turned every two hours, skin assessments being done ever four hours and protective sacral patch is in place      Problem: Nutrition  Goal: Optimal nutrition therapy  10/12/2020 2134 by Martha Park RN  Outcome: Ongoing  Note: Pt receiving TF continuously. Vital is running at 48 currently and will continue to increase until goal of 65 ml/hr is met     Problem: Discharge Planning:  Goal: Discharged to appropriate level of care  Description: Discharged to appropriate level of care  10/12/2020 2134 by Martha Park RN  Outcome: Ongoing  Note: Discharge plan reviewed with patient. Patient continues to actively participate in current discharge plan. Case management/ following patient.         Problem: Infection - Central Venous Catheter-Associated Bloodstream Infection:  Goal: Will show no infection signs and symptoms  Description: Will show no infection signs and symptoms  10/12/2020 2134 by Martha Park RN  Outcome: Ongoing  Note: Pt being turned every two hours, skin assessment in place q 4 hr      Problem: Infection - Ventilator-Associated Pneumonia:  Goal: Prevent a ventilator associated event (VAE)  Description: Prevent a ventilator associated event (VAE)  10/12/2020 2134 by Candy Hsieh RN  Outcome: Ongoing  Note: Oral care being done every 2 hours, HOB elevated to 30     Problem: Infection - Ventilator-Associated Pneumonia:  Goal: Absence of pulmonary infection  Description: Absence of pulmonary infection  10/12/2020 2134 by Candy Hsieh RN  Outcome: Ongoing  Note: Will continue to monitor temp. Positive PNM infection already recorded      Problem: Urinary Elimination:  Goal: Signs and symptoms of infection will decrease  Description: Signs and symptoms of infection will decrease  10/12/2020 2134 by Candy Hsieh RN  Outcome: Ongoing  Note: Pt has merritt in place with continuous temp monitoring       Problem: Skin Integrity:  Goal: Will show no infection signs and symptoms  Description: Will show no infection signs and symptoms  10/12/2020 2134 by Candy Hsieh RN  Outcome: Ongoing  Note: Pt being turned every two hours, skin assessment in place q 4 hr      Problem: Skin Integrity:  Goal: Absence of new skin breakdown  Description: Absence of new skin breakdown  Outcome: Ongoing  Note: Skin breakdown was present on admission.

## 2020-10-13 NOTE — PROGRESS NOTES
CRITICAL CARE PROGRESS NOTE      Patient:  Malia Villegas    Unit/Bed:4D-08/008-A  YOB: 1940  MRN: [id number]   PCP: Valentina Tapia MD  Date of Admission: 10/11/2020  Chief Complaint:-Acute Hypoxic respiratory failure    Assessment and Plan:      1. Acute hypoxic respiratory failure: Patient failed BiPAP 10/12/2020 and was intubated. Maintain peak pressures less than 35. Continue to wean as tolerated. 2. Severe pneumonia: Positive for Legionella pneumonia, continue Levaquin day #2. 3. Septic shock: Patient requiring levophed drip. Continue to wean. Procalcitonin trending down  4. Leukocytosis: Blood cell count noted at 14.4, trending down. Monitor. 5. Macrocytic anemia: Monitor. Patient did get recent blood transfusions and iron infusions outpatient with Dr. Blanca Scott heme-onc. 6. Diabetes mellitus: Added Lantus and sliding scale insulin. A1c 6.3  7. Hx of DVT and PE: Patient was noted to be on Plavix and Coumadin. This was stopped by heme-onc secondary to acute GI bleed. Per wife's statement patient is to continue to hold Coumadin and Plavix and follow-up with heme-onc in 2 weeks. 8. COPD: Stable, continue inhalers. 9. Esophageal cancer history: Status post esophagectomy  10. Colon cancer history: Status post colon resection        INITIAL H AND P AND ICU COURSE:  Priyank Lynn 70-year-old white male who presented to [facility] on 10/12/2020 with complaints of shortness of breath, fever, nausea vomiting. He has a past medical history of reformed smoker daily, CAD placement in 2015,hypertension, esophageal cancer, colon cancer, wade's syndrome, nephrolithiasis. Per report patient presented to the emergency department on 10/11/2020 for evaluation of shortness of breath, fatigue, fever, nausea and vomiting. He was accompanied by his wife and daughter.   Daughter states that he began having symptoms on Friday and had increased difficulty breathing with fevers T-max 102.0. He was also noted to have difficulty ambulating and altered mental status. Influenza a and COVID were negative. While in the emergency department patient did have escalating oxygen requirements and was placed on BiPAP. Hypotension was not improved with fluid bolusing. He did require levo fed drip to be started and intubation. He was admitted to the ICU. PCR did reveal patient was positive for Legionella pneumonia. Patient was started on Levaquin. Patient does have positive history for DVT and PE but per wife's report anticoagulation had been on hold secondary to GI bleed. Patient does follow with Dr. Bob Castillo heme-onc. Past Medical History: Per HPI  Family History: Mother: diabetes, stroke Father: cancer  Social History: Reformed smoker, 25-pack-year history, denies alcohol use denies drug use    ROS   Intubated and sedated on mechanical ventilation    Scheduled Meds:   insulin glargine  10 Units Subcutaneous Once    insulin lispro  0-12 Units Subcutaneous Q6H    insulin glargine  10 Units Subcutaneous Nightly    sodium chloride flush  10 mL Intravenous 2 times per day    Arformoterol Tartrate  15 mcg Nebulization BID    budesonide  500 mcg Nebulization BID    [Held by provider] isosorbide mononitrate  30 mg Oral Daily    [Held by provider] tamsulosin  0.4 mg Oral Nightly    chlorhexidine  15 mL Mouth/Throat BID    pantoprazole  40 mg Intravenous BID    atenolol  100 mg Per NG tube Daily    clopidogrel  75 mg Per NG tube Daily    finasteride  5 mg Per NG tube Daily    levofloxacin  750 mg Intravenous Q24H     Continuous Infusions:   dextrose      sodium chloride 100 mL/hr at 10/12/20 1646    propofol 20 mcg/kg/min (10/13/20 4233)    norepinephrine 2 mcg/min (10/13/20 0553)    vasopressin (Septic Shock) infusion 0.04 Units/min (10/13/20 0251)       PHYSICAL EXAMINATION:  T:  98.6. P:  54. RR:  15. B/P:  86/48.   FiO2:  30. O2 Sat:  94.  I/O:  3737/1100  Body mass index is 23.58 kg/m². PC: 20/60: TV: 456: RRTotal: 16: Ti:1 sec:  General: Acute on chronically ill-appearing white male  HEENT:  normocephalic and atraumatic. No scleral icterus. PERR  Neck: supple. No Thyromegaly. Lungs: Diminished to auscultation. No retractions  Cardiac: RRR. No JVD. Abdomen: soft. Nontender. Extremities:  No clubbing, cyanosis, or edema x 4. Vasculature: capillary refill < 3 seconds. Palpable dorsalis pedis pulses. Skin:  warm and dry. Psych: Awakes and follows commands on mechanical ventilation  Lymph:  No supraclavicular adenopathy. Neurologic:  No focal deficit. No seizures. Data: (All radiographs, tracings, PFTs, and imaging are personally viewed and interpreted unless otherwise noted).  Sodium 136, potassium 4.3, chloride 102, CO2 21, BUN 41, creatinine 1.1, anion gap 13.0, glucose 397.  WBC 14.4, hemoglobin 11.1, hematocrit 34.8, platelet count 155.  Telemetry shows normal sinus rhythm    CT chest 10/12/2020 reports right lobe dense consolidation. Left base pneumonia.  EKG NSR   Procalcitonin 1.57, 1.54.   Lactic acid 2.1      Meets Continued ICU Level Care Criteria:    [x] Yes   [] No - Transfer Planned to listed location:  [] HOSPITALIST CONTACTED-      Case and plan discussed with Dr. Paradise Dunaway. Electronically signed by Marybeth Gloria. LARRY Ho - CNP  CRITICAL CARE SPECIALIST  Patient seen by me. Case discussed with nurse practitioner. Patient critically ill with Legionella pneumonia. Patient on mechanical ventilator. Right lower lobe consolidation noted. Left lower lobe pneumonia noted as well. CC time 30 minutes. Time was discontiguous. Time includes my direct assessment of the patient and coordination of care. Electronically signed by Fabi Dunaway MD.

## 2020-10-14 NOTE — PLAN OF CARE
Vent setting optimized to achieve target tidal volume, respiratory rate and ideal oxygen saturations. SBT will be performed when appropriate.     Problem: Impaired respiratory status  Goal: Will be able to breathe spontaneously, without ventilator support  Description: Will be able to breathe spontaneously, without ventilator support  Outcome: Ongoing

## 2020-10-14 NOTE — PLAN OF CARE
Problem: Nutrition  Goal: Optimal nutrition therapy  10/14/2020 1206 by Karl Wadsworth RD, LD  Outcome: Ongoing  Nutrition Problem #1: Inadequate oral intake  Intervention: Food and/or Nutrient Delivery: Continue Current Tube Feeding  Nutritional Goals: TF to provide % of nutrient needs while pt is intubated

## 2020-10-14 NOTE — PLAN OF CARE
Problem: Falls - Risk of:  Goal: Will remain free from falls  Description: Will remain free from falls  Outcome: Ongoing  Note: Call light in reach, bed in lowest position, and bed alarm activated. Education given on use of call light before ambulation and when in need of assistance. Hourly visual checks performed and charted. Toileting offered to patient. No falls this shift, at any time. Arm band and falling star in place. Will continue to monitor. Problem: Falls - Risk of:  Goal: Absence of physical injury  Description: Absence of physical injury  Outcome: Ongoing  Note: Patient remains free from physical injury this shift. Problem: Restraint Use - Nonviolent/Non-Self-Destructive Behavior:  Goal: Absence of restraint indications  Description: Absence of restraint indications  Outcome: Ongoing  Note: Patient remains in restraints this shift due to pulling at lines and tubes this shift. Problem: Restraint Use - Nonviolent/Non-Self-Destructive Behavior:  Goal: Absence of restraint-related injury  Description: Absence of restraint-related injury  Outcome: Ongoing  Note: Patient remains free from restraint related injury this shift. Problem: Airway Clearance - Ineffective:  Goal: Ability to maintain a clear airway will improve  Description: Ability to maintain a clear airway will improve  Outcome: Ongoing  Note: Patient remains orally intubated this shift. Problem: Skin Integrity - Impaired:  Goal: Absence of new skin breakdown  Description: Absence of new skin breakdown  Outcome: Ongoing  Note: No signs of skin breakdown. Skin warm, dry, and intact. Mucous membranes pink and moist.  Assistance with turns/ambulation provided PRN. Will continue to monitor. Problem: Respiratory  Goal: No pulmonary complications  Outcome: Ongoing  Note: Patient remains orally intubated this shift. Patient's lungs remain clear at this time.      Problem: Respiratory  Goal: O2 Sat > 90%  Outcome: Ongoing  Note: Patient's oxygenation saturation remains greater than 90% on current ventilator settings. Problem: Skin Integrity/Risk  Goal: No skin breakdown during hospitalization  Outcome: Ongoing  Note: No signs of skin breakdown. Skin warm, dry, and intact. Mucous membranes pink and moist.  Assistance with turns/ambulation provided PRN. Will continue to monitor. Problem: Discharge Planning:  Goal: Discharged to appropriate level of care  Description: Discharged to appropriate level of care  Outcome: Ongoing  Note: Patient's discharge disposition remains unknown at this time. Problem: Infection - Central Venous Catheter-Associated Bloodstream Infection:  Goal: Will show no infection signs and symptoms  Description: Will show no infection signs and symptoms  Outcome: Ongoing  Note: Patient's CVC site remains free from redness and warmth this shift. Care plan reviewed with patient. Patient unable to verbalize understanding of the plan of care and contributed to goal setting.

## 2020-10-14 NOTE — PROGRESS NOTES
Comprehensive Nutrition Assessment    Type and Reason for Visit:  Reassess(TF check)    Nutrition Recommendations/Plan:   Continue Vital 1.2 TF ( also lowest CHO  ) goal  65 ml/hr with current diprivan rate. Free H20 flush per Dr. Randee Tracy probiotic. Per Rupa Steinberg RN pt with diarrhea. When pt is extubated & prior to  po recommend swallow evaluation if appropriate. Pt with hx of s/p esophagectomy. DB med adjustment per Dr Neil Genao is on lowest CHO     Nutrition Assessment:    Pt. nutritionally compromised AEB NPO, but with some improvement as tolerating TF at goal .  At risk for further nutrition compromise r/t intubated , severe pneumonia,  MAYRA. 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 Nutrition recommendations/interventions as per above. Malnutrition Assessment:  Malnutrition Status: At risk for malnutrition (Comment)    Context:  Acute Illness     Findings of the 6 clinical characteristics of malnutrition:  Energy Intake:  Unable to assess  Weight Loss:  No significant weight loss     Body Fat Loss:  No significant body fat loss     Muscle Mass Loss:  1 - Mild muscle mass loss Temples (temporalis)  Fluid Accumulation:  1 - Mild     Strength:   not performed    Estimated Daily Nutrient Needs:  Energy (kcal):  ~2084 kcals ( 27 kcals/kg); Weight Used for Energy Requirements:  (10/12 76 kg)     Protein (g):  ~93- 108 grams (1.2-  1.4 grams/kg)  monitor renal status; Weight Used for Protein Requirements:  Admission(77.2 kg)        Fluid (ml/day):  per Dr;         Nutrition Related Findings:    Pt continues intubated, sedated with diprivan & tolerating TF at goal. Per Rupa Steinberg RN pt is now having diarrhea. MAP 68. meds include vassopresssin, humalog , lantus & ATB. WBC 11.3, BUN 42, Cr 0.8, glucose 288.        Wounds:  Stage I(on bottom per RN)       Current Nutrition Therapies:    Current Tube Feeding (TF) Orders:  · Feeding Route: Orogastric  · Formula: (Vital 1.2 ( also lowest CHO TF))  · Schedule: Continuous(goal is 65 ml/hr)  · Additives/Modulars: (.)  · Water Flushes: per Dr  · Current TF & Flush Orders Provides: at goal  · Goal TF & Flush Orders Provides: 1872 kcals TF ( 2118 kcals with diprivan), 117 grams protein, 173 grams CHO in 1560 ml TF/24 hours      Anthropometric Measures:  · Height: 5' 11\" (180.3 cm)  · Current Body Weight: 170 lb 3.1 oz (77.2 kg)(10/12 +1 edema)   · Admission Body Weight: 170 lb 3.1 oz (77.2 kg)(10/12 +1 edema)    · Usual Body Weight: 164 lb 10.9 oz (74.7 kg)(10/14 +2 edema)     · Ideal Body Weight: 172 lbs;      · BMI: 23.7    · BMI Categories: Normal Weight (BMI 22.0 to 24.9) age over 72       Nutrition Diagnosis:   · Inadequate oral intake related to inadequate protein-energy intake as evidenced by intubation      Nutrition Interventions:   Food and/or Nutrient Delivery:  Continue Current Tube Feeding  Nutrition Education/Counseling:  No recommendation at this time   Coordination of Nutrition Care:  Continued Inpatient Monitoring, Interdisciplinary Rounds    Goals:  TF to provide % of nutrient needs while pt is intubated       Nutrition Monitoring and Evaluation:     Food/Nutrient Intake Outcomes:  Enteral Nutrition Intake/Tolerance  Physical Signs/Symptoms Outcomes:  Biochemical Data, Chewing or Swallowing, GI Status, Fluid Status or Edema, Hemodynamic Status, Skin, Weight, Nutrition Focused Physical Findings     Discharge Planning:     Too soon to determine     Electronically signed by Ghada Linares RD, LD on 10/14/20 at 12:08 PM EDT    Contact: [phone]

## 2020-10-14 NOTE — CARE COORDINATION
10/14/20, 3:23 PM EDT    DISCHARGE ON GOING [address] [facility] day: 3  Location: -08/008-A Reason for admit: Respiratory failure with hypoxia [facility]) [J96.91]   Procedure:   10/11 Central line  10/12 Intubated  Treatment Plan of Care:Intensivist following. + legionella PNM. Did not tolerate SBT, remains on  PCV+, rate 12, peep 6, 25% fio2. IVF. Precedex gtt. Propofol. Vasopressin. Barriers to Discharge: await medical clearance. PCP: Beryl Warren MD  Readmission Risk Score: 28%  Patient Goals/Plan/Treatment Preferences: Plans pending progress, pta from home with wife.

## 2020-10-14 NOTE — PROGRESS NOTES
CRITICAL CARE PROGRESS NOTE      Patient:  Sruthi Silva    Unit/Bed:4D-08/008-A  YOB: 1940  MRN: [id number]   PCP: Beryl aWrren MD  Date of Admission: 10/11/2020  Chief Complaint:- Acute Hypoxic respiratory failure    Assessment and Plan:      1. Acute hypoxic respiratory failure: Patient failed BiPAP 10/12/2020 and was intubated. Maintain peak pressures less than 35. Continue to wean as tolerated. 2. Severe pneumonia: Positive for Legionella pneumonia, continue Levaquin day #3, added Decadron due to chest x-ray worsening. No fever leukocytosis is improved. 3. Septic shock: Patient requiring levophed drip. Continue to wean. Procalcitonin trending down  4. Leukocytosis: Blood cell count noted at 11.3, trending down. Monitor. 5. Macrocytic anemia: Monitor. Patient did get recent blood transfusions and iron infusions outpatient with Dr. Bob Castillo heme-onc. 6. Diabetes mellitus: Added Lantus and sliding scale insulin. A1c 6.3. Increase Lantus for continued hyperglycemia 10/14  7. Hx of DVT and PE: Patient was noted to be on Plavix and Coumadin. This was stopped by heme-onc secondary to acute GI bleed. Per wife's statement patient is to continue to hold Coumadin and Plavix and follow-up with heme-onc in 2 weeks. 8. COPD: Stable, continue inhalers. 9. Esophageal cancer history: Status post esophagectomy  10. Colon cancer history: Status post colon resection    INITIAL H AND P AND ICU COURSE:  Samantha Delacruz 76-year-old white male who presented to [facility] on 10/12/2020 with complaints of shortness of breath, fever, nausea vomiting. He has a past medical history of reformed smoker daily, CAD placement in 2015,hypertension, esophageal cancer, colon cancer, wade's syndrome, nephrolithiasis. Per report patient presented to the emergency department on 10/11/2020 for evaluation of shortness of breath, fatigue, fever, nausea and vomiting.   He was accompanied by his 90/57. FiO2:  25. O2 Sat:  100. I/O:  1427/525  Body mass index is 22.97 kg/m². General: Acute on chronically ill-appearing white male  HEENT:  normocephalic and atraumatic. No scleral icterus. PERR  Neck: supple. No Thyromegaly. Lungs: Diminished to auscultation. No retractions  Cardiac: RRR. No JVD. Abdomen: soft. Nontender. Extremities:  No clubbing, cyanosis, or edema x 4. Vasculature: capillary refill < 3 seconds. Palpable dorsalis pedis pulses. Skin:  warm and dry. Psych: Awakes and follows commands on mechanical ventilation  Lymph:  No supraclavicular adenopathy. Neurologic:  No focal deficit. No seizures. Data: (All radiographs, tracings, PFTs, and imaging are personally viewed and interpreted unless otherwise noted).  Sodium 137, potassium 3.7, chloride 106, CO2 24, BUN 42, creatinine 0.8, anion gap 7.0, glucose 270.  WBC 11.3, hemoglobin 10.8, hematocrit 35.3, platelet count 965   Telemetry shows normal sinus rhythm    CT chest 10/12/2020 reports right lobe dense consolidation. Left base pneumonia.  EKG NSR   Procalcitonin 1.57, 1.54.   Lactic acid 2.1   Chest x-ray 10/14/2020 reports worsening right and stable left basilar consolidation and pleural fluid. Meets Continued ICU Level Care Criteria:    [x] Yes   [] No - Transfer Planned to listed location:  [] HOSPITALIST CONTACTED-      Case and plan discussed with Dr. Nahum Keane. Electronically signed by LARRY Keating - CNP  CRITICAL CARE SPECIALIST  Patient seen by me. Case discussed with nurse practitioner. Patient on Levaquin for legionnaires disease. Chest x-ray shows progression requiring Decadron. Procalcitonin trending downward. CC time 30 minutes. Time was discontiguous. Time includes my direct assessment the patient and coordination of care. Electronically signed by Rafael Keane MD.

## 2020-10-15 NOTE — CARE COORDINATION
10/15/20, 12:44 PM EDT    DISCHARGE ON GOING [address] day: 4  Location: -08/008-A Reason for admit: Respiratory failure with hypoxia [facility]) [J96.91]   Procedure:   10/11 Central line  10/12 Intubated  Treatment Plan of Care: :Intensivist following. + legionella PNM. Procal trending down. Remains on vent: PCV+ rate 12, peep 6, 25%. Respiratory weaning as tolerated. IVF. Precedex @ 0.04u. Nebs. Inhaler. IV steroids added. IV levaquin. IV protonix. SS insulin. Barriers to Discharge: Await medical clearance. PCP: Abby Gonzalez MD  Readmission Risk Score: 28%  Patient Goals/Plan/Treatment Preferences: Plans  Pending progress. PTA from home with wife, uses a walker. Monitor for further needs.

## 2020-10-15 NOTE — PLAN OF CARE
Problem: Falls - Risk of:  Goal: Will remain free from falls  Description: Will remain free from falls  Outcome: Ongoing  Note: Patient absent of falls this shift. Goal: Absence of physical injury  Description: Absence of physical injury  Outcome: Ongoing  Note: Patient is absent of physical injury     Problem: Restraint Use - Nonviolent/Non-Self-Destructive Behavior:  Goal: Absence of restraint indications  Description: Absence of restraint indications  Outcome: Ongoing  Note: Need for restraints continues per order. Goal: Absence of restraint-related injury  Description: Absence of restraint-related injury  Outcome: Ongoing  Note: Absent of injury. Problem: Airway Clearance - Ineffective:  Goal: Ability to maintain a clear airway will improve  Description: Ability to maintain a clear airway will improve  Outcome: Ongoing  Note: Patient able to maintain a clear airway this shift. Problem: Fluid Volume - Imbalance:  Goal: Absence of imbalanced fluid volume signs and symptoms  Description: Absence of imbalanced fluid volume signs and symptoms  Outcome: Ongoing  Note: No fluid imbalance noted. Problem: Skin Integrity - Impaired:  Goal: Absence of new skin breakdown  Description: Absence of new skin breakdown  Outcome: Ongoing  Note: Absent of new skin breakdown. Problem: Respiratory  Goal: No pulmonary complications  Outcome: Ongoing  Note: No pulmonary complications this shift. Goal: O2 Sat > 90%  Outcome: Ongoing  Note: Patient has a O2 Sat >90%. Problem: Skin Integrity/Risk  Goal: No skin breakdown during hospitalization  Outcome: Ongoing  Note: No new skin breakdown this shift. Goal: Wound healing  Outcome: Ongoing  Note: Wound continuing to heal.     Problem: Discharge Planning:  Goal: Discharged to appropriate level of care  Description: Discharged to appropriate level of care  Outcome: Ongoing  Note: No discharge planning this shift.      Problem: Infection - Central Venous Catheter-Associated Bloodstream Infection:  Goal: Will show no infection signs and symptoms  Description: Will show no infection signs and symptoms  Outcome: Ongoing  Note: No infection noted this shift. No signs of skin infection this shift. Problem: Infection - Ventilator-Associated Pneumonia:  Goal: Prevent a ventilator associated event (VAE)  Description: Prevent a ventilator associated event (VAE)  Outcome: Ongoing  Note: Patient has pneumonia and on antibiotics. Goal: Absence of pulmonary infection  Description: Absence of pulmonary infection  Outcome: Ongoing  Note: Patient has pneumonia and on antibiotics. Problem: Urinary Elimination:  Goal: Signs and symptoms of infection will decrease  Description: Signs and symptoms of infection will decrease  Outcome: Ongoing  Note: No signs for UTI this shift. Goal: Complications related to the disease process, condition or treatment will be avoided or minimized  Description: Complications related to the disease process, condition or treatment will be avoided or minimized  Outcome: Ongoing  Note: No complications this shift. Problem: Skin Integrity:  Goal: Will show no infection signs and symptoms  Description: Will show no infection signs and symptoms  Outcome: Ongoing  Note: No infection noted this shift. No signs of skin infection this shift. Goal: Absence of new skin breakdown  Description: Absence of new skin breakdown  Outcome: Ongoing  Note: Absent of new skin breakdown this shift. Problem: Impaired respiratory status  Goal: Will be able to breathe spontaneously, without ventilator support  Description: Will be able to breathe spontaneously, without ventilator support  10/14/2020 1355 by [facility], RCP  Outcome: Ongoing  Note: The patient was not able to tolerate SBT. RSBI  was > 100  and SpO2 of 90 on 35% FiO2. Spontanteous VT was 250 and RR 35-38 breaths/min on PS 18 CPAP 6.   The patient was on SBT for 5 minutes and placed back on

## 2020-10-15 NOTE — PLAN OF CARE
receiving TPN    Care plan reviewed with patient . Patient  verbalize understanding of the plan of care and contribute to goal setting.

## 2020-10-15 NOTE — PLAN OF CARE
Problem: Impaired respiratory status  Goal: Will be able to breathe spontaneously, without ventilator support  Description: Will be able to breathe spontaneously, without ventilator support  Outcome: Ongoing  Note: Vent setting optimized to achieve target tidal volume, respiratory rate and ideal oxygen saturations. Patient is currently on 32/6 Rate of 12 and 25%. Will continue to wean as patient tolerates. SBT will be performed when appropriate.

## 2020-10-15 NOTE — PROGRESS NOTES
CRITICAL CARE PROGRESS NOTE      Patient:  Darrion Egan    Unit/Bed:4D-08/008-A  YOB: 1940  MRN: [id number]   PCP: Stu Miller MD  Date of Admission: 10/11/2020  Chief Complaint:- Acute Hypoxic respiratory failure    Assessment and Plan:      1. Acute hypoxic respiratory failure: Patient failed BiPAP 10/12/2020 and was intubated.  Maintain peak pressures less than 35.  Continue to wean as tolerated. 2. Severe pneumonia: Positive for Legionella pneumonia, continue Levaquin day #4, added Decadron due to chest x-ray worsening. No fever leukocytosis is improved. 3. Pulmonary edema: IV Lasix x1. Repeat chest x-ray in the a.m.  4. Leukocytosis: Blood cell count noted at 11.3, trending down.  Monitor. 5. Macrocytic anemia: Monitor.  Patient did get recent blood transfusions and iron infusions outpatient with Dr. Zay Talavera heme-onc. 6. Diabetes mellitus: Added Lantus and sliding scale insulin.  A1c 6.3. Increase Lantus for continued hyperglycemia 10/14  7. Hx of DVT and PE: Patient was noted to be on Plavix and Coumadin.  This was stopped by heme-onc secondary to acute GI bleed.  Per wife's statement patient is to continue to hold Coumadin and Plavix and follow-up with heme-onc in 2 weeks. 8. COPD: Stable, continue inhalers. 9. Esophageal cancer history: Status post esophagectomy  10. Colon cancer history: Status post colon resection  11. Septic shock: resolved;  Patient requiring levophed drip.  Continue to wean.  Procalcitonin trending down    INITIAL H AND P AND ICU COURSE:  Patrice Sergeant white male who presented to [facility] on 10/12/2020 with complaints of shortness of breath, fever, nausea vomiting. Bandar Vargas has a past medical history of reformed smoker daily, CAD placement in 2015,hypertension, esophageal cancer, colon cancer, wade's syndrome, nephrolithiasis.  Per report patient presented to the emergency department on 10/11/2020 for evaluation of shortness of breath, fatigue, fever, nausea and vomiting.  He was accompanied by his wife and daughter. Myron Valencia states that he began having symptoms on Friday and had increased difficulty breathing with fevers T-max 102.0.  He was also noted to have difficulty ambulating and altered mental status. Theoplis Alken a and COVID were negative.  While in the emergency department patient did have escalating oxygen requirements and was placed on BiPAP.  Hypotension was not improved with fluid bolusing.  He did require levo fed drip to be started and intubation. Zeke Barnhart was admitted to the ICU.  PCR did reveal patient was positive for Legionella pneumonia.  Patient was started on Levaquin.  Patient does have positive history for DVT and PE but per wife's report anticoagulation had been on hold secondary to GI bleed.  Patient does follow with Dr. Hadley Rivera heme-onc. Past Medical History: Per HPI  Family History:  Mother: diabetes, stroke Father: cancer  Social History: Reformed smoker, 25-pack-year history, denies alcohol use denies drug use    ROS   Intubated and sedated on mechanical ventilation    Scheduled Meds:   furosemide  40 mg Intravenous Once    dexamethasone  6 mg Intravenous Daily    insulin glargine  15 Units Subcutaneous Nightly    insulin lispro  0-12 Units Subcutaneous Q6H    sodium chloride flush  10 mL Intravenous 2 times per day    Arformoterol Tartrate  15 mcg Nebulization BID    budesonide  500 mcg Nebulization BID    [Held by provider] isosorbide mononitrate  30 mg Oral Daily    [Held by provider] tamsulosin  0.4 mg Oral Nightly    chlorhexidine  15 mL Mouth/Throat BID    pantoprazole  40 mg Intravenous BID    [Held by provider] atenolol  100 mg Per NG tube Daily    [Held by provider] clopidogrel  75 mg Per NG tube Daily    finasteride  5 mg Per NG tube Daily    levofloxacin  750 mg Intravenous Q24H     Continuous Infusions:   dexmedetomidine 0.4 mcg/kg/hr (10/15/20 0800)    dextrose      sodium chloride 100 mL/hr at 10/15/20 0835       PHYSICAL EXAMINATION:  T:  97.5.  P:  61. RR:  18. B/P:  111/59. FiO2:  25. O2 Sat:  93.  I/O:  1456/1200  Body mass index is 23.43 kg/m². HEENT:  normocephalic and atraumatic. No scleral icterus. PERR  Neck: supple. No Thyromegaly. Lungs: clear to auscultation. No retractions  Cardiac: RRR. No JVD. Abdomen: soft. Nontender. Extremities:  No clubbing, cyanosis. + 1 pitting edema bilateral lower extremities   Vasculature: capillary refill < 3 seconds. Palpable dorsalis pedis pulses. Skin:  warm and dry. Psych: Awakes and follows commands on mechanical ventilation  Lymph:  No supraclavicular adenopathy. Neurologic:  No focal deficit. No seizures. Data: (All radiographs, tracings, PFTs, and imaging are personally viewed and interpreted unless otherwise noted).  Sodium 36, potassium 4.5, chloride 108, CO2 24, BUN 35, creatinine 0.7, anion gap 4.0, glucose 343   WBC 8.7, hemoglobin 11.3, hematocrit 36.3, platelet count 938   Telemetry shows normal sinus rhythm   CT chest 10/12/2020 reports right lobe dense consolidation.  Left base pneumonia.  EKG NSR   Procalcitonin 1.57, 1.54.   Lactic acid 2.1, 1.5   Chest x-ray 10/14/2020 reports worsening right and stable left basilar consolidation and pleural fluid.  Chest x-ray 10/15/2020 reports stable bibasilar consolidation and pleural fluid. Meets Continued ICU Level Care Criteria:    [x] Yes   [] No - Transfer Planned to listed location:  [] HOSPITALIST CONTACTED-      Case and plan discussed with Dr. Reanna Yee. Electronically signed by Britta Greenfield. LARRY Miller - CNP  CRITICAL CARE SPECIALIST  Patient critically ill on mechanical ventilator secondary to Legionella pneumonia. Patient discussed with nurse practitioner. Continue with Adriana. Consolidated lower lobes should respond to steroids. May require thoracentesis. CC time 30 minutes. Time was discontiguous.   Time includes my direct assessment of the patient coordination of care. Electronically signed by Quin Hickman MD.

## 2020-10-16 NOTE — PLAN OF CARE
Problem: Impaired respiratory status  Goal: Will be able to breathe spontaneously, without ventilator support  Description: Will be able to breathe spontaneously, without ventilator support  Outcome: Ongoing  Note: Driving pressures too high at this time, not able to wean or do SBT.    Will continue to monitor

## 2020-10-16 NOTE — PROGRESS NOTES
Comprehensive Nutrition Assessment    Type and Reason for Visit:  Reassess(TF check)    Nutrition Recommendations/Plan:   Continue Vital 1.2 TF ( also lowest CHO  ) goal  65 ml/hr   Free H20 flush per Dr. Danuta Bird probiotic. When pt is extubated & prior to  po recommend swallow evaluation if appropriate. Pt with hx of s/p esophagectomy. Nutrition Assessment:    Pt. nutritionally compromised AEB NPO, but with  improvement as tolerating TF at goal  & pt received 91% of prescribed TF over  last 24 hours.  At risk for further nutrition compromise r/t intubated , severe pneumonia,  MAYRA. 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  Nutrition recommendations/interventions as per above    Malnutrition Assessment:  Malnutrition Status: At risk for malnutrition (Comment)    Context:  Acute Illness     Findings of the 6 clinical characteristics of malnutrition:  Energy Intake:  Unable to assess  Weight Loss:  No significant weight loss     Body Fat Loss:  No significant body fat loss     Muscle Mass Loss:  1 - Mild muscle mass loss Temples (temporalis)  Fluid Accumulation:  1 - Mild     Strength:   not performed     Estimated Daily Nutrient Needs:  Energy (kcal):  ~2084 kcals ( 27 kcals/kg); Weight Used for Energy Requirements:  (10/12 76 kg)     Protein (g):  ~93- 108 grams (1.2-  1.4 grams/kg)  monitor renal status; Weight Used for Protein Requirements:  Admission(77.2 kg)        Fluid (ml/day):  per Dr;          Nutrition Related Findings:  Pt continues intubated sedated with precedex, tolerating  TF at goal. per Maykel RN diarrhea has improved, a little loose stool in tube, but may take out rectal tube soon.  MAP 76. glucose 114, WBC 11.1, BUN 41, Cr 0.7. meds include steroid, humalog & lantus      Wounds:  Stage I(on bottom per RN)       Current Nutrition Therapies:    Current Tube Feeding (TF) Orders:  · Feeding

## 2020-10-16 NOTE — CARE COORDINATION
10/16/20, 1:58 PM EDT    DISCHARGE ON GOING [address] [facility] day: 5  Location: -08/008-A Reason for admit: Respiratory failure with hypoxia [facility]) [J96.91]   Procedure:   10/11 Central line  10/12 Intubated  10/16 CXR: Stable bibasilar consolidation and pleural fluid. Treatment Plan of Care: Intensivist following. + legionella PNM. Remains on vent: PCV+, rate 12, peep 5, 25% fio2. IVF. Precedex. Propofol off. IVF. Nebs. Inhaler. IV steroids. IV levaquin. IV protonix. IV lasix given x 1 dose on 10/15. Per documentation on 10/15 ? Thoracentesis. Barriers to Discharge: [place] medical clearance. PCP: Sugar Lomeli MD  Readmission Risk Score: 25%  Patient Goals/Plan/Treatment Preferences: Plans pending progress. PTA from home with wife. Had [facility] in past. Uses a walker. Does not have home O2.

## 2020-10-16 NOTE — PLAN OF CARE
Problem: Falls - Risk of:  Goal: Will remain free from falls  Description: Will remain free from falls  Note: Pt intubated and sedated bed alarm on and hourly rounding continued      Problem: Restraint Use - Nonviolent/Non-Self-Destructive Behavior:  Goal: Absence of restraint indications  Description: Absence of restraint indications  Note: Pt restraints discontinued as pt able to follow commands and aware of ETT     Problem: Fluid Volume - Imbalance:  Goal: Absence of imbalanced fluid volume signs and symptoms  Description: Absence of imbalanced fluid volume signs and symptoms  Note: Labs monitored daily, pt continues to have edema in extremities      Problem: Respiratory  Goal: O2 Sat > 90%  Note: Pt  remains intubated, O2 sats greater than 92% requiring 25% FIO2     Problem: Skin Integrity/Risk  Goal: No skin breakdown during hospitalization  Note: Pt turned and repositioned every 2 hrs with pillow support      Problem: Discharge Planning:  Goal: Discharged to appropriate level of care  Description: Discharged to appropriate level of care  Note: No discharge planning at this time  will continue to monitor     Problem: Infection - Ventilator-Associated Pneumonia:  Goal: Prevent a ventilator associated event (VAE)  Description: Prevent a ventilator associated event (VAE)  Note: Pt on IV antibiotics      Problem: Urinary Elimination:  Goal: Signs and symptoms of infection will decrease  Description: Signs and symptoms of infection will decrease  Note: No sx of UTI    Care plan reviewed with patient . Patient  verbalize understanding of the plan of care and contribute to goal setting.

## 2020-10-16 NOTE — PROGRESS NOTES
CRITICAL CARE PROGRESS NOTE      Patient:  Cleveland Zamora    Unit/Bed:4D-08/008-A  YOB: 1940  MRN: [id number]   PCP: Deepak Thomas MD  Date of Admission: 10/11/2020  Chief Complaint:- Acute Hypoxic respiratory failure     Assessment and Plan:      1. Acute hypoxic respiratory failure: Patient failed BiPAP 10/12/2020 and was intubated.  Maintain peak pressures less than 35.  Continue to wean as tolerated. 2. Severe pneumonia: Positive for Legionella pneumonia, continue Levaquin day #5, added Decadron due to chest x-ray worsening.  No fever leukocytosis is improved. 3. Pulmonary edema: IV Lasix x1. Repeat chest x-ray in the a.m. twice daily Lasix  4. Leukocytosis: Blood cell count noted at 11.1, trending down.  Monitor. 5. Macrocytic anemia: Monitor.  Patient did get recent blood transfusions and iron infusions outpatient with Dr. Victorina Mcfadden heme-onc. Hemoglobin 11.3/34.8  6. Diabetes mellitus: Added Lantus and sliding scale insulin.  A1c 6.3.  Increase Lantus for continued hyperglycemia 10/14  7. Hx of DVT and PE: Patient was noted to be on Plavix and Coumadin.  This was stopped by heme-onc secondary to acute GI bleed.  Per wife's statement patient is to continue to hold Coumadin and Plavix and follow-up with heme-onc in 2 weeks. 8. COPD: Stable, added Bevespi  9. Esophageal cancer history: Status post esophagectomy  10. Colon cancer history: Status post colon resection  11. Septic shock: resolved;  Patient requiring levophed drip.  Continue to wean.  Procalcitonin trending down    INITIAL H AND P AND ICU COURSE:  Jose G Escalera white male who presented to [facility] on 10/12/2020 with complaints of shortness of breath, fever, nausea vomiting. Josefa Pitst has a past medical history of reformed smoker daily, CAD placement in 2015,hypertension, esophageal cancer, colon cancer, wade's syndrome, nephrolithiasis.  Per report patient presented to the emergency department on 10/11/2020 for evaluation of shortness of breath, fatigue, fever, nausea and vomiting.  He was accompanied by his wife and daughter. Magi Aldana states that he began having symptoms on Friday and had increased difficulty breathing with fevers T-max 102.0.  He was also noted to have difficulty ambulating and altered mental status. Dileep Brandon a and COVID were negative.  While in the emergency department patient did have escalating oxygen requirements and was placed on BiPAP.  Hypotension was not improved with fluid bolusing.  He did require levo fed drip to be started and intubation. Corinna Atkins was admitted to the ICU.  PCR did reveal patient was positive for Legionella pneumonia.  Patient was started on Levaquin.  Patient does have positive history for DVT and PE but per wife's report anticoagulation had been on hold secondary to GI bleed.  Patient does follow with Dr. Suzette Harley heme-onc. Past Medical History: per HPI  Family History:  Mother: diabetes, stroke Father: cancer  Social History:  Reformed smoker, 25-pack-year history, denies alcohol use denies drug use    ROS   Intubated and sedated on mechanical ventilation       Scheduled Meds:   furosemide  20 mg Intravenous BID    insulin glargine  30 Units Subcutaneous Nightly    dexamethasone  6 mg Intravenous Daily    insulin lispro  0-12 Units Subcutaneous Q6H    sodium chloride flush  10 mL Intravenous 2 times per day    Arformoterol Tartrate  15 mcg Nebulization BID    budesonide  500 mcg Nebulization BID    [Held by provider] isosorbide mononitrate  30 mg Oral Daily    [Held by provider] tamsulosin  0.4 mg Oral Nightly    chlorhexidine  15 mL Mouth/Throat BID    pantoprazole  40 mg Intravenous BID    [Held by provider] atenolol  100 mg Per NG tube Daily    [Held by provider] clopidogrel  75 mg Per NG tube Daily    finasteride  5 mg Per NG tube Daily    levofloxacin  750 mg Intravenous Q24H     Continuous Infusions:   dexmedetomidine 0.4 mcg/kg/hr (10/16/20 9761)    dextrose      sodium chloride 20 mL/hr at 10/15/20 1401       PHYSICAL EXAMINATION:  T:  98.4.  P:  66. RR:  17. B/P:  113/62. FiO2:  25. O2 Sat:  93.  I/O: 2731/4225  Body mass index is 23.43 kg/m². General: Acute on chronically ill-appearing white male  HEENT:  normocephalic and atraumatic. No scleral icterus. PERR  Neck: supple. No Thyromegaly. Lungs: clear to auscultation. No retractions  Cardiac: RRR. No JVD. Abdomen: soft. Nontender. Extremities:  No clubbing, cyanosis. +1 pitting edema bilateral lower ext. Vasculature: capillary refill < 3 seconds. Palpable dorsalis pedis pulses. Skin:  warm and dry. Psych: Awake and follows commands on mechanical ventilation  Lymph:  No supraclavicular adenopathy. Neurologic:  No focal deficit. No seizures. Data: (All radiographs, tracings, PFTs, and imaging are personally viewed and interpreted unless otherwise noted).  Sodium 144, potassium 3.9, chloride 110, CO2 27, BUN 41, creatinine 0.7, anion gap 7.0, glucose 168   WBC 11.1, hemoglobin 11.3, hematocrit 34.8, platelet count 109   Telemetry shows normal sinus rhythm   INR 1.25   PCR pneumonia panel positive for Legionella   Chest x-ray 10/15/2020 reports stable bibasilar consolidation with pleural fluid.  CT chest 10/12/2020 reports right lobe dense consolidation.  Left base pneumonia.  EKG NSR   Procalcitonin 1.57, 1.54.   Lactic acid 2.1, 1.5   Chest x-ray 10/14/2020 reports worsening right and stable left basilar consolidation and pleural fluid.  Chest x-ray 10/15/2020 reports stable bibasilar consolidation and pleural fluid. Meets Continued ICU Level Care Criteria:    [x] Yes   [] No - Transfer Planned to listed location:  [] HOSPITALIST CONTACTED-      Case and plan discussed with Dr. Marc Ferrell. Electronically signed by Cornelio Scherer. LARRY Lomax - CNP  CRITICAL CARE SPECIALIST  Patient seen by me. Case discussed with nurse practitioner.   Patient

## 2020-10-16 NOTE — PLAN OF CARE
Problem: Impaired respiratory status  Goal: Will be able to breathe spontaneously, without ventilator support  Description: Will be able to breathe spontaneously, without ventilator support  10/16/2020 1851 by Jacqueline Rowell RCP  Outcome: Not Met This Shift  Note: Pt is not ready for SBT at this time.

## 2020-10-16 NOTE — PROGRESS NOTES
[address] [place] [id number]  PHYSICAL THERAPY MISSED TREATMENT NOTE  ACUTE CARE  [facility] ICU 4D              Missed Treatment    On vent 10-16 , see after extubated or monday for E.M if tolerated. Not early mobility candidate today.

## 2020-10-16 NOTE — PLAN OF CARE
Problem: Nutrition  Goal: Optimal nutrition therapy  Outcome: Ongoing   Nutrition Problem #1: Inadequate oral intake  Intervention: Food and/or Nutrient Delivery: Continue Current Tube Feeding  Nutritional Goals: TF to provide % of nutrient needs while pt is intubated

## 2020-10-16 NOTE — FLOWSHEET NOTE
[address] THERAPY MISSED TREATMENT NOTE  [facility] ICU 4D  4D-08/008-A      Date: 10/16/2020  Patient Name: Scott Viramontes        CSN: [id number]   : 1940  ([de-identified] y.o.)  Gender: male                REASON FOR MISSED TREATMENT: Hold Treatment per Nursing. Pt currently intubated at this time. Pt not appropriate for early mobility. Will discontinue orders at this time, please reorder when appropriate.

## 2020-10-17 NOTE — PLAN OF CARE
Problem: Restraint Use - Nonviolent/Non-Self-Destructive Behavior:  Goal: Absence of restraint indications  Description: Absence of restraint indications  10/16/2020 2303 by Shelley Hager RN  Outcome: Met This Shift     Problem: Falls - Risk of:  Goal: Will remain free from falls  Description: Will remain free from falls  10/16/2020 2303 by Shelley Hager RN  Outcome: Ongoing  Note: Fall precautions in place. Bed locked and in lowest position. Bed alarm on. Hourly rounding. Problem: Airway Clearance - Ineffective:  Goal: Ability to maintain a clear airway will improve  Description: Ability to maintain a clear airway will improve  Outcome: Ongoing  Note: Patient remains on ventilator at this time. Problem: Fluid Volume - Imbalance:  Goal: Absence of imbalanced fluid volume signs and symptoms  Description: Absence of imbalanced fluid volume signs and symptoms  10/16/2020 2303 by Shelley Hager RN  Outcome: Ongoing  Note: Continuing to monitor labs daily. Problem: Skin Integrity - Impaired:  Goal: Absence of new skin breakdown  Description: Absence of new skin breakdown  Outcome: Ongoing  Note: No signs of new skin break down at this time. Problem: Respiratory  Goal: O2 Sat > 90%  10/16/2020 2303 by Shelley Hager RN  Outcome: Ongoing  Note: SpO2 remains >90% on ventilator. Problem: Skin Integrity/Risk  Goal: No skin breakdown during hospitalization  10/16/2020 2303 by Shelley Hager RN  Outcome: Ongoing  Note: No signs of new skin breakdown at this time.       Problem: Discharge Planning:  Goal: Discharged to appropriate level of care  Description: Discharged to appropriate level of care  10/16/2020 2303 by Shelley Hager RN  Outcome: Ongoing     Problem: Infection - Ventilator-Associated Pneumonia:  Goal: Prevent a ventilator associated event (VAE)  Description: Prevent a ventilator associated event (VAE)  10/16/2020 2303 by Shelley Hager RN  Outcome: Ongoing Problem: Urinary Elimination:  Goal: Signs and symptoms of infection will decrease  Description: Signs and symptoms of infection will decrease  10/16/2020 2303 by Herber Carrasquillo RN  Outcome: Ongoing  Note: Don catheter remains in place and draining adequate amounts of urine at this time. Problem: Skin Integrity:  Goal: Absence of new skin breakdown  Description: Absence of new skin breakdown  Outcome: Ongoing  Note: No signs of news skin break down at this time. Problem: Nutrition  Goal: Optimal nutrition therapy  10/16/2020 2303 by Herber Carrasquillo RN  Outcome: Ongoing  Note: Patient remains on continuous tube feed at a rate of 65ml/hr, tolerating well. Care plan reviewed with patient. Patient nods head in understanding of the plan of care . Patient unable to contribute to goal setting due to intubation. Reinforcement needed.

## 2020-10-17 NOTE — PROGRESS NOTES
CRITICAL CARE PROGRESS NOTE      Patient:  Cleveland Zamora    Unit/Bed:4D-08/008-A  YOB: 1940  MRN: [id number]   PCP: Deepak Thomas MD  Date of Admission: 10/11/2020  Chief Complaint:-  Acute Hypoxic respiratory failure    Assessment and Plan:      1. Acute hypoxic respiratory failure: Patient failed BiPAP 10/12/2020 and was intubated.  Maintain peak pressures less than 35.  Continue to wean as tolerated. Weaning pressure control  2. Severe pneumonia: Positive for Legionella pneumonia, continue Levaquin day #6, added Decadron due to chest x-ray worsening.  No fever leukocytosis is improved. 3. Pulmonary edema: IV Lasix x1.  Repeat chest x-ray in the a.m. twice daily Lasix  4. Leukocytosis: Blood cell count noted at 8.2, trending down.  Monitor. 5. Macrocytic anemia: Monitor.  Patient did get recent blood transfusions and iron infusions outpatient with Dr. Victorina Mcfadden heme-onc. Hemoglobin 11.7/35.6  6. Diabetes mellitus: Added Lantus and sliding scale insulin.  A1c 6.3.  Increase Lantus for continued hyperglycemia 10/14  7. Hx of DVT and PE: Patient was noted to be on Plavix and Coumadin.  This was stopped by heme-onc secondary to acute GI bleed.  Per wife's statement patient is to continue to hold Coumadin and Plavix and follow-up with heme-onc in 2 weeks. 8. COPD: Stable, added Bevespi  9. Esophageal cancer history: Status post esophagectomy  10. Colon cancer history: Status post colon resection  11.  Septic shock: resolved; Patient requiring levophed drip.  Continue to wean.  Procalcitonin trending down    INITIAL H AND P AND ICU COURSE:  Jose G Escalera white male who presented to [facility] on 10/12/2020 with complaints of shortness of breath, fever, nausea vomiting. [facility] has a past medical history of reformed smoker daily, CAD placement in 2015,hypertension, esophageal cancer, colon cancer, wade's syndrome, nephrolithiasis.  Per report patient presented to the 10/17/20 0615       PHYSICAL EXAMINATION:  T:  99.  P:  65. RR:  17. B/P:  96/55. FiO2:  21. O2 Sat:  97.  I/O: 2184/3300  Body mass index is 24.29 kg/m². PC: 18/6: TV: 487: RRTotal: 16: Ti:1 sec: ETCO2: 34.  General: Acute on chronically ill-appearing white male  HEENT:  normocephalic and atraumatic. No scleral icterus. PERR  Neck: supple. No Thyromegaly. Lungs: diminished  to auscultation. No retractions  Cardiac: RRR. No JVD. Abdomen: soft. Nontender. Extremities:  No clubbing, cyanosis. Trace lower ext edema  Vasculature: capillary refill < 3 seconds. Palpable dorsalis pedis pulses. Skin:  warm and dry. Psych: Awakes and follows commands on mechanical ventilation  Lymph:  No supraclavicular adenopathy. Neurologic:  No focal deficit. No seizures. Data: (All radiographs, tracings, PFTs, and imaging are personally viewed and interpreted unless otherwise noted).  Sodium 143, potassium 4.2, chloride 108, CO2 30, BUN 43, creatinine 0.6, anion gap 5.0, glucose 127.  WBC 8.2, hemoglobin 11.7, hematocrit 35.6, platelet count 486   Telemetry shows normal sinus rhythm   INR 1.25   PCR pneumonia panel positive for Legionella   Chest x-ray 10/15/2020 reports stable bibasilar consolidation with pleural fluid.  CT chest 10/12/2020 reports right lobe dense consolidation.  Left base pneumonia.  EKG NSR   Procalcitonin 1.57, 1.54.   Lactic acid 2.1, 1.5   Chest x-ray 10/14/2020 reports worsening right and stable left basilar consolidation and pleural fluid.  Chest x-ray 10/15/2020 reports stable bibasilar consolidation and pleural fluid. Meets Continued ICU Level Care Criteria:    [x] Yes   [] No - Transfer Planned to listed location:  [] HOSPITALIST CONTACTED- DR     Case and plan discussed with Dr. Luca Varner. Electronically signed by Dewey Amaro.  LARRY Alatorre - CNP  CRITICAL CARE SPECIALIST

## 2020-10-17 NOTE — PLAN OF CARE
Problem: Impaired respiratory status  Goal: Will be able to breathe spontaneously, without ventilator support  Description: Will be able to breathe spontaneously, without ventilator support  10/17/2020 0854 by Lilian Shelton RCP  Outcome: Ongoing   Vent setting optimized to achieve target tidal volume, respiratory rate and ideal oxygen saturations. SBT will be performed when appropriate.        Weaning pressures, hopefully we will be able to wean further today

## 2020-10-17 NOTE — PLAN OF CARE
Problem: Impaired respiratory status  Goal: Will be able to breathe spontaneously, without ventilator support  Description: Will be able to breathe spontaneously, without ventilator support  10/17/2020 0448 by Rosa Hyatt RCP  Outcome: Ongoing  Note: Pt breathes above set ventilator rate, continues to be ventilated.

## 2020-10-17 NOTE — PLAN OF CARE
Problem: Falls - Risk of:  Goal: Will remain free from falls  Description: Will remain free from falls  10/17/2020 1256 by Margarito Meza RN  Outcome: Ongoing  Note: No falls this shift. Fall precautions remain in place     Problem: Airway Clearance - Ineffective:  Goal: Ability to maintain a clear airway will improve  Description: Ability to maintain a clear airway will improve  10/17/2020 1256 by Margarito Meza RN  Outcome: Ongoing  Note: Continuing to wean vent. Discussions about possible trach vs extubation     Problem: Fluid Volume - Imbalance:  Goal: Absence of imbalanced fluid volume signs and symptoms  Description: Absence of imbalanced fluid volume signs and symptoms  10/17/2020 1256 by Margarito Meza RN  Outcome: Ongoing  Note: Continues to have edema. Lasix per order. Albumin given today for hypotension. Problem: Skin Integrity - Impaired:  Goal: Absence of new skin breakdown  Description: Absence of new skin breakdown  10/17/2020 1256 by Margarito Meza RN  Outcome: Ongoing  Note: No new breakdown     Problem: Respiratory  Goal: No pulmonary complications  35/17/1961 1256 by Margarito Meza RN  Outcome: Ongoing  Note: Suctioned bile colored secretions from ETT. Weaning vent. O2 at 21% FiO2     Problem: Respiratory  Goal: O2 Sat > 90%  10/17/2020 1256 by Margarito Meza RN  Outcome: Ongoing  Note: Sats stable mid to upper 90s. Weaning vent as able.  Pt on 21% FiO2     Problem: Skin Integrity/Risk  Goal: No skin breakdown during hospitalization  10/17/2020 1256 by Margarito Meza RN  Outcome: Ongoing  Note: No new breakdown     Problem: Skin Integrity/Risk  Goal: Wound healing  10/17/2020 1256 by Margarito Meza RN  Outcome: Ongoing  Note: Protective cream to buttucks and rectal area      Problem: Discharge Planning:  Goal: Discharged to appropriate level of care  Description: Discharged to appropriate level of care  10/17/2020 1256 by Margarito Meza RN  Outcome: Ongoing  Note: Rodger Greco RN  Outcome: Ongoing  Note: PT continues with TF.  rate d/t poorly placed OGT    Problem: Restraint Use - Nonviolent/Non-Self-Destructive Behavior:  Goal: Absence of restraint indications  Description: Absence of restraint indications  10/17/2020 1256 by Rodger Greco RN  Outcome: Completed  10/16/2020 2303 by Nayan Chacon RN  Outcome: Met This Shift     Problem: Restraint Use - Nonviolent/Non-Self-Destructive Behavior:  Goal: Absence of restraint-related injury  Description: Absence of restraint-related injury  Outcome: Completed      Care plan reviewed. Patient unable to verbalize understanding of the plan of care and contribute to goal setting.   Wife updated on POC

## 2020-10-18 NOTE — FLOWSHEET NOTE
0846-Spoke with Dr Fowler Fombell about pt being on spontaneous breathing since 5:30am. New order received to extubate. 0847-Pt extubated to room air. Sats 100% on RA. Pt able to cough up secretions and say name. 0850-Assessment complete at this time. Pt attempted a sip of water and choked. Will make NPO for now and get order for swallow eval. Pt does have strong cough to clear secretions. Pt has generalized weakness to move extremities. No other concerns. Will monitor     1240-Assessment complete. Pt denies any pain or needs at this time. Call light in reach. No other changes from previous assessment    1500-Pt continues to go into SVT v rates 130-150s. Discussed with Nile Hayden CNP. PRN lopressor ordered d/t pt not being able to swallow scheduled atenolol    1520-Assessment complete. Pt intermittently going into SVT. PRN lopressor given. Denies any chest pain or discomfort or palpitations. No pain noted. Will monitor. 1600-Rhythm improved.  Pt back to NSR.     1630-Report to Niki GRIDER on 4K 1655-Spoke to wife Shamar Arslan and updated that pt would be transferred to [address] [place] [address] room 21

## 2020-10-18 NOTE — PROGRESS NOTES
Patient has been successfully weaned from Mechanical Ventilation. SpO2 of 100 on 21% FiO2. Patient extubated and placed on room air. Post extubation SpO2 is 100% with HR  65 bpm and RR 18 breaths/min. Patient had strong cough that was productive of white sputum. Extubation Well tolerated by patient. Navya Meier

## 2020-10-18 NOTE — PROGRESS NOTES
Pt admitted to  4K21 via cart/stretcher. Complaints: Shortness of breath. IV normal saline infusing into the forearm right, condition patent and no redness at a rate of 20 mls/ hour with about 300 mls in the bag still. IV site free of s/s of infection or infiltration. Vital signs obtained. Assessment and data collection initiated. Oriented to room. Policies and procedures for 4K explained. All questions answered with no further questions at this time. Fall prevention and safety brochure discussed with patient. Bed alarm on. Call light in reach. Would you like your Primary Care Physician notified?  no  The best day to schedule a follow up Dr appointment is:  Monday a.m.

## 2020-10-18 NOTE — PROGRESS NOTES
CRITICAL CARE PROGRESS NOTE      Patient:  Melissa Carrera    Unit/Bed:4D-08/008-A  YOB: 1940  MRN: [id number]   PCP: Blas Taylor MD  Date of Admission: 10/11/2020  Chief Complaint:- Acute Hypoxic respiratory failure    Assessment and Plan:      1. Severe pneumonia: Positive for Legionella pneumonia, continue Levaquin day #7, added Decadron due to chest x-ray worsening.  No fever leukocytosis is improved. 2. Pulmonary edema: IV Lasix x1.  Repeat chest x-ray in the a.m. twice daily Lasix  3. Leukocytosis: Blood cell count noted at 7.5, trending down.  Monitor. 4. Macrocytic anemia: Monitor.  Patient did get recent blood transfusions and iron infusions outpatient with Dr. Jose Eddy heme-onc.  Hemoglobin 11.3/35.5  5. Diabetes mellitus: Added Lantus and sliding scale insulin.  A1c 6.3.  Increase Lantus for continued hyperglycemia 10/14  6. Hx of DVT and PE: Patient was noted to be on Plavix and Coumadin.  This was stopped by heme-onc secondary to acute GI bleed.  Per wife's statement patient is to continue to hold Coumadin and Plavix and follow-up with heme-onc in 2 weeks. 7. COPD: Stable, added Bevespi  8. Esophageal cancer history: Status post esophagectomy  9. Colon cancer history: Status post colon resection  10. Septic shock: resolved; Patient requiring levophed drip.  Continue to wean.  Procalcitonin trending down  11. Acute hypoxic respiratory failure: resolved; extubated to room air 10/18    INITIAL H AND P AND ICU COURSE:  Williams Morris white male who presented to [facility] on 10/12/2020 with complaints of shortness of breath, fever, nausea vomiting. [facility] has a past medical history of reformed smoker daily, CAD placement in 2015,hypertension, esophageal cancer, colon cancer, wade's syndrome, nephrolithiasis.  Per report patient presented to the emergency department on 10/11/2020 for evaluation of shortness of breath, fatigue, fever, nausea and vomiting. Rose Rowland was accompanied by his wife and daughter. Donnamkomal Juarez states that he began having symptoms on Friday and had increased difficulty breathing with fevers T-max 102.0.  He was also noted to have difficulty ambulating and altered mental status. Taffy El a and COVID were negative.  While in the emergency department patient did have escalating oxygen requirements and was placed on BiPAP.  Hypotension was not improved with fluid bolusing.  He did require levo fed drip to be started and intubation. Rose Rowland was admitted to the ICU.  PCR did reveal patient was positive for Legionella pneumonia.  Patient was started on Levaquin.  Patient does have positive history for DVT and PE but per wife's report anticoagulation had been on hold secondary to GI bleed.  Patient does follow with Dr. Taj Hadley heme-onc    Past Medical History:  per HPI  Family History: Mother: diabetes, stroke Father: cancer  Social History:  Reformed smoker, 25-pack-year history, denies alcohol use denies drug use    Review of Systems   Constitutional: Positive for fatigue. Negative for fever. HENT: Positive for trouble swallowing. Negative for sore throat. Eyes: Negative for photophobia. Respiratory: Positive for shortness of breath (mild). Negative for chest tightness and wheezing. Cardiovascular: Positive for leg swelling. Negative for chest pain. Gastrointestinal: Negative for abdominal pain, nausea and vomiting. Endocrine: Negative for polydipsia and polyphagia. Genitourinary: Negative for decreased urine volume and flank pain. Musculoskeletal: Negative for back pain and neck pain. Skin: Negative for color change, pallor and rash. Allergic/Immunologic: Negative for food allergies and immunocompromised state. Neurological: Positive for weakness. Negative for dizziness and numbness. Hematological: Negative for adenopathy. Psychiatric/Behavioral: Negative for agitation and confusion. The patient is not nervous/anxious. Scheduled Meds:   furosemide  20 mg Intravenous BID    glycopyrrolate-formoterol  2 puff Inhalation BID    insulin glargine  30 Units Subcutaneous Nightly    insulin lispro  0-12 Units Subcutaneous Q6H    sodium chloride flush  10 mL Intravenous 2 times per day    isosorbide mononitrate  30 mg Oral Daily    tamsulosin  0.4 mg Oral Nightly    chlorhexidine  15 mL Mouth/Throat BID    pantoprazole  40 mg Intravenous BID    atenolol  100 mg Per NG tube Daily    [Held by provider] clopidogrel  75 mg Per NG tube Daily    finasteride  5 mg Per NG tube Daily    levofloxacin  750 mg Intravenous Q24H     Continuous Infusions:   dextrose         PHYSICAL EXAMINATION:  T:  97.5. P:  95. RR:  24. B/P:  117/74. FiO2:  0. O2 Sat:  98.  I/O: 2061/3150  Body mass index is 22.63 kg/m². GCS:  15  General:  Acute on chronically ill-appearing white male  HEENT:  normocephalic and atraumatic. No scleral icterus. PERR  Neck: supple. No Thyromegaly. Lungs: diminished to auscultation. No retractions  Cardiac: RRR. No JVD. Abdomen: soft. Nontender. Extremities:  No clubbing, cyanosis, trace edema bilateral lower extremity  Vasculature: capillary refill < 3 seconds. Palpable dorsalis pedis pulses. Skin:  warm and dry. Psych:  Alert and oriented x3. Affect appropriate  Lymph:  No supraclavicular adenopathy. Neurologic:  No focal deficit. No seizures. Data: (All radiographs, tracings, PFTs, and imaging are personally viewed and interpreted unless otherwise noted).  Sodium 141, potassium 4.2, chloride 104, CO2 31, BUN 43, creatinine 0.6, anion gap 6.0, glucose 105   WBC 7.5, hemoglobin 11.3, hematocrit 35.5, platelet count 904   Telemetry shows normal sinus rhythm  · INR 1.25  · PCR pneumonia panel positive for Legionella  · Chest x-ray 10/15/2020 reports stable bibasilar consolidation with pleural fluid. · CT chest 10/12/2020 reports right lobe dense consolidation.  Left base pneumonia.   · EKG NSR  · Procalcitonin 1.57, 1.54.  · Lactic acid 2.1, 1.5  · Chest x-ray 10/14/2020 reports worsening right and stable left basilar consolidation and pleural fluid. · Chest x-ray 10/15/2020 reports stable bibasilar consolidation and pleural fluid. Meets Continued ICU Level Care Criteria:    [] Yes   [x] No - Transfer Planned to listed location: 4K   [x] HOSPITALIST CONTACTED- DR Ashley Villegas    Case and plan discussed with Dr. Autumn Das. Electronically signed by Angelika Murry. LARRY Woodard - CNP  CRITICAL CARE SPECIALIST  Electronically signed by Cori Mckinney MD.

## 2020-10-18 NOTE — PLAN OF CARE
Problem: Impaired respiratory status  Goal: Will be able to breathe spontaneously, without ventilator support  Description: Will be able to breathe spontaneously, without ventilator support  Outcome: Ongoing  Note: Pt on SBT at this time, PS of 14, Peep 6, 21%  RR 16, Vt 350-375, %

## 2020-10-18 NOTE — PLAN OF CARE
Problem: Falls - Risk of:  Goal: Will remain free from falls  Description: Will remain free from falls  10/17/2020 2100 by Hellen Jain  Outcome: Met This Shift  Note: Pt. Free of falls this shift. Bed alarm on and hourly rounding completed. Problem: Falls - Risk of:  Goal: Absence of physical injury  Description: Absence of physical injury  Outcome: Met This Shift  Note: No physical injury noted this shift. Problem: Skin Integrity - Impaired:  Goal: Absence of new skin breakdown  Description: Absence of new skin breakdown  10/17/2020 2100 by Hellen Jain  Outcome: Met This Shift  Note: No new skin breakdown noted this shift. Problem: Respiratory  Goal: O2 Sat > 90%  10/17/2020 2100 by Hellen Jain  Outcome: Met This Shift  Note: Pt. Maintaining O2 Sat > 90% on ventilator. Problem: Skin Integrity/Risk  Goal: No skin breakdown during hospitalization  10/17/2020 2100 by Hellen Jain  Outcome: Met This Shift  Note: No new skin breakdown noted this shift. Problem: Infection - Central Venous Catheter-Associated Bloodstream Infection:  Goal: Will show no infection signs and symptoms  Description: Will show no infection signs and symptoms  10/17/2020 2100 by Hellen Jain  Outcome: Met This Shift  Note: Pt. Afebrile this shift. Lines aspirated and flushed with no resistance this shift. No signs of CVC associated infection. Problem: Infection - Ventilator-Associated Pneumonia:  Goal: Prevent a ventilator associated event (VAE)  Description: Prevent a ventilator associated event (VAE)  10/17/2020 2100 by Hellen Jain  Outcome: Met This Shift  Note: Pt.repositioned and provided with oral care per protocol to prevent a VAE. Problem: Urinary Elimination:  Goal: Signs and symptoms of infection will decrease  Description: Signs and symptoms of infection will decrease  10/17/2020 2100 by Hellen Jain  Outcome: Met This Shift  Note: Pt. Afebrile this shift.  Pt. Producing adequate urine output this shift. Don cleansed with soap and water. No signs of urinary catheter associated infection. Problem: Urinary Elimination:  Goal: Complications related to the disease process, condition or treatment will be avoided or minimized  Description: Complications related to the disease process, condition or treatment will be avoided or minimized  10/17/2020 2100 by Hellen Jain  Outcome: Met This Shift  Note: Complications avoided/minimized this shift. Problem: Skin Integrity:  Goal: Will show no infection signs and symptoms  Description: Will show no infection signs and symptoms  10/17/2020 2100 by Hellen Jain  Outcome: Met This Shift  Note: Pt. Afebrile this shift. Lines aspirated and flushed with no resistance this shift. No signs of CVC associated infection. Problem: Skin Integrity:  Goal: Absence of new skin breakdown  Description: Absence of new skin breakdown  10/17/2020 2100 by Hellen Jain  Outcome: Met This Shift  Note: No new skin breakdown noted this shift. Problem: Nutrition  Goal: Optimal nutrition therapy  10/17/2020 2100 by Sarah Jain  Outcome: Met This Shift  Note: Pt. Receiving tube feed at 20ml/hr-goal met. Problem: Airway Clearance - Ineffective:  Goal: Ability to maintain a clear airway will improve  Description: Ability to maintain a clear airway will improve  10/17/2020 2100 by Hellen Jain  Outcome: Ongoing  Note: Endotracheal and mouth suctioning needed frequently to maintain clear airway. Problem: Fluid Volume - Imbalance:  Goal: Absence of imbalanced fluid volume signs and symptoms  Description: Absence of imbalanced fluid volume signs and symptoms  10/17/2020 2100 by Hellen Jain  Outcome: Ongoing  Note: Pt. Has +1 pitting edema on BUE and BLE. Patient producing adequate urine and maintain blood pressure without vasopressors this shift.       Problem: Respiratory  Goal: No pulmonary complications  50/94/2829 2100 by Hellen Jain  Outcome: Ongoing  Note: Nestor throughout all lung fields. Problem: Discharge Planning:  Goal: Discharged to appropriate level of care  Description: Discharged to appropriate level of care  10/17/2020 2100 by [address]  Outcome: Ongoing  Note: Pt. Remains in ICU at this time. Problem: Infection - Ventilator-Associated Pneumonia:  Goal: Absence of pulmonary infection  Description: Absence of pulmonary infection  10/17/2020 2100 by Hellen Jain  Outcome: Ongoing  Note: Pt. Afebrile this shift. Nestor in all lung fields. Care plan reviewed with patient. Patient verbalize understanding of the plan of care and contribute to goal setting.

## 2020-10-18 NOTE — PLAN OF CARE
Problem: Falls - Risk of:  Goal: Will remain free from falls  Description: Will remain free from falls  10/17/2020 1256 by Rene Ma RN  Outcome: Ongoing  Note: No falls this shift. Fall precautions remain in place     Problem: Airway Clearance - Ineffective:  Goal: Ability to maintain a clear airway will improve  Description: Ability to maintain a clear airway will improve  10/17/2020 1256 by Rene Ma RN  Outcome: Ongoing  Note: Extubated to RA. Able to clear secretions     Problem: Fluid Volume - Imbalance:  Goal: Absence of imbalanced fluid volume signs and symptoms  Description: Absence of imbalanced fluid volume signs and symptoms  10/17/2020 1256 by Rene Ma RN  Outcome: Ongoing  Note: Continues to have edema. Lasix per order. Problem: Skin Integrity - Impaired:  Goal: Absence of new skin breakdown  Description: Absence of new skin breakdown  10/17/2020 1256 by Rene Ma RN  Outcome: Ongoing  Note: No new breakdown     Problem: Respiratory  Goal: No pulmonary complications  89/39/5892 1256 by Rene Ma RN  Outcome: Ongoing  Note: extubated      Problem: Respiratory  Goal: O2 Sat > 90%  10/17/2020 1256 by Rene Ma RN  Outcome: Ongoing  Note: Sats stable mid to upper 90s on RA     Problem: Skin Integrity/Risk  Goal: No skin breakdown during hospitalization  10/17/2020 1256 by Rene Ma RN  Outcome: Ongoing  Note: No new breakdown     Problem: Skin Integrity/Risk  Goal: Wound healing  10/17/2020 1256 by Rene Ma RN  Outcome: Ongoing  Note: Protective cream to buttucks and rectal area      Problem: Discharge Planning:  Goal: Discharged to appropriate level of care  Description: Discharged to appropriate level of care  10/17/2020 1256 by Rene Ma RN  Outcome: Ongoing  Note: Discharge planning ongoing.       Problem: Infection - Central Venous Catheter-Associated Bloodstream Infection:  Goal: Will show no infection signs and symptoms  Description: Will show no infection signs and symptoms  10/17/2020 1256 by Babita Ricks RN  Outcome: Ongoing  Note: Aseptic technique when accessing central line. Dsg D&I with biopatch. Weekly dsg changes and change posiflow caps weekly and PRN with blood draws. Problem: Urinary Elimination:  Goal: Signs and symptoms of infection will decrease  Description: Signs and symptoms of infection will decrease  10/17/2020 1256 by Babita Ricks RN  Outcome: Ongoing  Note: Lasix given merritt remains in place     Problem: Urinary Elimination:  Goal: Complications related to the disease process, condition or treatment will be avoided or minimized  Description: Complications related to the disease process, condition or treatment will be avoided or minimized  10/17/2020 1256 by Babita Ricks RN  Outcome: Ongoing  Note: Adequate urine output. Problem: Skin Integrity:  Goal: Will show no infection signs and symptoms  Description: Will show no infection signs and symptoms  10/17/2020 1256 by Babita Ricks RN  Outcome: Ongoing  Note: Aseptic technique when accessing central line. Dsg D&I with biopatch. Weekly dsg changes and change posiflow caps weekly and PRN with blood draws. Problem: Skin Integrity:  Goal: Absence of new skin breakdown  Description: Absence of new skin breakdown  10/17/2020 1256 by Babita Ricks RN  Outcome: Ongoing  Note: No new breakdown     Problem: Nutrition  Goal: Optimal nutrition therapy  10/17/2020 1256 by Babita Ricks RN  Outcome: Ongoing  Note: NPO with pending swallow eval      Care plan reviewed. Patient unable to verbalize understanding of the plan of care and contribute to goal setting.   Wife updated on POC

## 2020-10-18 NOTE — PLAN OF CARE
Problem: Impaired respiratory status  Goal: Will be able to breathe spontaneously, without ventilator support  Description: Will be able to breathe spontaneously, without ventilator support  10/18/2020 0955 by Angelina Green RCP  Outcome: Met This Shift   Patient weaned off the ventilator and placed on room air. Tolerated well.

## 2020-10-19 NOTE — PROGRESS NOTES
[address] ICU STEPDOWN TELEMETRY 4K  Modified Barium Swallow  & Treatment     SLP Individual Minutes  Time In: 2490  Time Out: 4184  Minutes: 27  Timed Code Treatment Minutes: 0 Minutes       Date: 10/19/2020  Patient Name: Jatin Nunez      CSN: [id number]   : 1940  ([de-identified] y.o.)  Gender: male   Referring Physician:  LARRY Smith CNP  Diagnosis: Respiratory failure with hypoxia   Secondary Diagnosis: Dysphagia   Precautions: Aspiration risk   History of Present Illness/Injury: Per chart review; Navya Door white male who presented to [facility] on 10/12/2020 with complaints of shortness of breath, fever, nausea vomiting. Frederick Payne has a past medical history of reformed smoker daily, CAD placement in ,hypertension, esophageal cancer, colon cancer, wade's syndrome, nephrolithiasis.  Per report patient presented to the emergency department on 10/11/2020 for evaluation of shortness of breath, fatigue, fever, nausea and vomiting. Pt was referred for a BSE post extubation, followed by ST recommendations to complete MBS prior to recommendations for PO intake.    has a past medical history of Arthritis, Wade syndrome, CAD (coronary artery disease), Clotting disorder ([address] COPD (chronic obstructive pulmonary disease) ([address] COPD (chronic obstructive pulmonary disease) ([address] GERD (gastroesophageal reflux disease), History of colon cancer, History of esophageal cancer, Hx of blood clots, Hypertension, Kidney stone on left side, Nausea & vomiting, Pericarditis, Personal history of DVT (deep vein thrombosis), Personal history of pulmonary embolism, Pseudogout, and Type II or unspecified type diabetes mellitus without mention of complication, not stated as uncontrolled. Current Diet: NPO     Pain: No pain reported. SUBJECTIVE:  Patient is alert, upright in bed, pleasant and agreeable for MBS on this date.  Wife not present at the time of MBS, while ST has plans to complete additional education at bedside as needed. OBJECTIVE:    Respiratory Status:  Independent    Behavioral Observation:  Alert and Oriented    PATIENT WAS EVALUATED USING:  BARIUM: thin via ice, cup, nectar/mild via tsp cup, honey via tsp, puree     ORAL PREPARATION PHASE:  Impaired:  Decreased Bolus Control    ORAL PHASE: Impaired:  Decreased Lingual Elevation, Decreased Tongue Based Retraction and Uncontrolled Bolus/Diffuse Fall Over Tongue Base     ORAL PHASE JOSUE SCORE: (Dysphagia outcome and severity scale)  5 = Mild Dysphagia - May have one diet consistency restricted - Mild oral residue but clears    PHARYNGEAL PHASE:  Impaired: Decreased Airway Protection, Decreased Epiglottic Inversion, Decreased Hyolaryngeal Elevation, Residue in the Valleculae and Cricopharyngeal Dysfunction     PHARYNGEAL PHASE JOSUE SCORE: (Dysphagia outcome and severity scale)  2 = Moderately Severe Dysphagia - Tolerates at least one consistency safely with total use of strategies - Non-oral nutrition - Severe residue unable to clear, Aspiration with two or more consistencies with no cough, Aspiration with one or more consistency, no cough and airway penetration to the vocal folds with one or more consistency, no cough    EVIDENCE FOR LARYNGEAL PENETRATION AND/OR ASPIRATION:  Audible aspiration evident with thin liquids - (weak unproductive cough)  Silent aspiration evident with Nectar/Mild, adn honey/moderate    PENETRATION-ASPIRATION SCALE (PAS):   Thin Liquids: 7 = Material enters the airway, passes below the vocal folds, and is not ejected from the trachea despite effort  Mildly Thick Liquids:  8 = Material enters the airway, passes below the vocal folds, and no effort is made to eject  Moderately Thick Liquids: 8 = Material enters the airway, passes below the vocal folds, and no effort is made to eject  Puree:  1 = Material does not enter the airway    ESOPHAGEAL PHASE:   See Radiology Report for details   -Noted a bony like protrusion evident in the upper esophogeal passage, consistent with a osteophyte. ATTEMPTED TECHNIQUES:  Small Bolus Size Ineffective    Straw Not Attempted    Cup Ineffective    Chin Tuck Ineffective Chin tuck effective in 1 trial, but pt unable to consistently demonstrate good positioning. Head Turn Not Attempted    Spoon Presentations Ineffective    Volitional Cough Ineffective    Spontaneous Cough Ineffective           DIAGNOSTIC IMPRESSIONS:  Patient seen with MD permission for MBS on this date. The patient presented with Mild Oral Dysphagia characterized by mild decreased posterior tongue base control and reduced tongue base retraction; resulting in mild uncontrolled premature loss over BOT to the valleculae and pyriforms. Pt exhibited mild residue in the valleculae after the swallow. The pt presented with Moderate to Severe Pharyngeal Dysphagia characterized by decreased epiglottic inversion, diminished laryngeal elevation and anterior tilt, compounded with cricopharyngeal dysfunction and severely diminished pharyngeal sensation. Pharyngeal dysfunction resulted in the following:    Aspiration/Penetration: Thin liquids-- Pt presented with audible, trace, tracheal aspiration before and during the swallow. Pt was unable to clear aspiration despite unsuccessful attempts to cough. Cough was VERY weak and ineffective. Nectar/Mild-- Pt presented with SILENT trace, tracheal aspiration during and after the swallow with subsequent swallow attempts to clear vallecular residue. Attempted a chin tuck with was successful 50% of cup drink trials-- d/t cognition and poor comprehension of head positioning, the pt was unable to maintain a tucked position for safe consumption consistent. Honey/Moderate-- Pt presented with SILENT trace, tracheal aspiration during and after the swallow with subsequent swallow attempts to clear vallecular residue.   Puree-- NO laryngeal penetration and/or tracheal aspiration. Diet Recommendations:  NPO + recommendations for NG tube  Strategies:  Recommend NPO   Rehabilitation Potential: good    EDUCATION:  Learner: Patient and Significant Other  Education:  Reviewed results and recommendations of this evaluation, Reviewed diet and strategies, Reviewed signs, symptoms and risks of aspiration, Reviewed ST goals and Plan of Care and Reviewed recommendations for follow-up  Evaluation of Education: Demonstrates with assistance and Needs further instruction    PLAN:  Skilled SLP intervention on acute care 3-5 x per week or until goals met and/or pt plateaus in function. Specific interventions for next session may include: pharyngeal strengthening exercises, OME, oral care, and consideration for repeat instrumental as vocal quality improves. Simone Schwartz PATIENT GOAL:    Return to prior level of function. SHORT TERM GOALS:  Short-term Goals  Timeframe for Short-term Goals: 2 weeks  Goal 1: Patient will tolerate limited PO trials w/ ST only with no overt s/s of aspiraiton/penetration to demosntrate readiness for repeat instrumental (MBS vs FEES)  Goal 2: Pt will complete OME and pharyngeal strengthening exercises 10x each to improve laryngeal strength and airway protection. Goal 3: Family/staff will demonstrate understanding of oral care measures and complete 2-3x daily to decrease amount of oral bacteria given NPO status. Goal 4: Will monitor for appropriateness to complete repeat instrumental assessment, per ST recommendations only (MBS vs FEEs)     INTERVENTION:  Immediately following MBS, the pt was given skilled education and review of swallow study via visual & verbal model-- watching MBS (pause, slow motion), and detailed explanation of anatomy & physiology, why he is at a high risk of aspiration, and what he can do to improve airway protection.  In addition, ST reviewed recommendations for NPO with consideration for pleasure feeds of puree solids and ice chips following aggressive oral care. *Recommended alternative means of nutrition/hydration given severity of swallow function, while the pt stated at this time he DOES NOT want a PEG. He requested to try the NG tube with dysphagia therapy and re-assessment prior to deciding to place a PEG tube. LONG TERM GOALS:  No LTM Goals recommended, due to anticipated short ELOS. Carli Pedroza MA., [facility]

## 2020-10-19 NOTE — PROGRESS NOTES
[address] ICU STEPDOWN TELEMETRY 4K  Bedside Swallowing Evaluation   & Treatment       SLP Individual Minutes  Time In: 5767  Time Out: 4203  Minutes: 19  Timed Code Treatment Minutes: 0 Minutes       Date: 10/19/2020  Patient Name: Darrion Egan      CSN: [id number]   : 1940  ([de-identified] y.o.)  Gender: male   Referring Physician:  LARRY Witt CNP  Diagnosis: Respiratory failure with hypoxia   Secondary Diagnosis: Dysphagia     History of Present Illness/Injury: Per chart review; Patrice Sergeant white male who presented to [facility] on 10/12/2020 with complaints of shortness of breath, fever, nausea vomiting. Bandar Vargas has a past medical history of reformed smoker daily, CAD placement in ,hypertension, esophageal cancer, colon cancer, wade's syndrome, nephrolithiasis.  Per report patient presented to the emergency department on 10/11/2020 for evaluation of shortness of breath, fatigue, fever, nausea and vomiting. Pt was referred for a BSE post extubation.    Past Medical History:   Diagnosis Date    Arthritis     Wade syndrome     intestinal mucosa ( HX esophageal resection w/ pull through    CAD (coronary artery disease)     Clotting disorder (HCC)     COPD (chronic obstructive pulmonary disease) (HCC)     COPD (chronic obstructive pulmonary disease) (HCC)     GERD (gastroesophageal reflux disease)     History of colon cancer     Status post ressection    History of esophageal cancer     Status post resection in [facility]. Hx of blood clots     LEG, LUNGS    Hypertension     Kidney stone on left side 2010    Nausea & vomiting     Pericarditis     History of    Personal history of DVT (deep vein thrombosis)     Personal history of pulmonary embolism     Pseudogout 2005    Type II or unspecified type diabetes mellitus without mention of complication, not stated as uncontrolled goals and Plan of Care and Reviewed recommendations for follow-up  Evaluation of Education: Demonstrates with assistance and Needs further instruction    PLAN:  Recommendations pending MBS    PATIENT GOAL:    Return to least restrictive diet. SHORT TERM GOALS:  Short-term Goals  Timeframe for Short-term Goals: 1-3 days  Goal 1: Pt will complete MBS to further assess the pharyngeal phase of the swallow to determine LRD for PO initiation. INTERVENTION:  Completed education and review regarding the risk of prolonged intubation, poor secretion management, severe dysphonia post extubation, and overall medically compromised medical status. Per discussion with the pt and his wife, the pt is demonstrating overt s/s of aspiration/penetration with thin liquid trials, with a DIMINISHED and in effective cough. Education completed with recommendations to complete MBS, details for how the procedure is completed, as well as, the importance of ensuring good airway protection prior to  PO initiation to prevent/lower the risk of reintubation. *Pt and wife are in agreement. LONG TERM GOALS:  No LTM Goals recommended, due to anticipated short ELOS. Carli Rosenthal MA., [facility]

## 2020-10-19 NOTE — PROGRESS NOTES
rehabilitation services?: Yes  Family / Caregiver Present: Yes  Other (Comment): ERROL  Subjective: RN approved PT evaluation. Pt up in bed with wife and respiratory therapist in room upon PT arrival. RT noted that on room air, pt is saturating in the low 90%s. Pt agreeable to thearpy session and says he would like to sit in the chair. General:  Follows Commands: Within Functional Limits    Vision: Impaired  Vision Exceptions: Wears glasses at all times    Hearing: Exceptions to [facility]  Hearing Exceptions: Bilateral hearing aid, Hard of hearing/hearing concerns    Pain: none stated    Social/Functional History:    Lives With: Spouse  Type of Home: House  Home Layout: One level  Home Access: (wife states that after the pt's prior hip procedure he was able to navigate the steps while using a walker)  Entrance Stairs - Number of Steps: 2  Home Equipment: (2 [employer])      Ambulation Assistance: Independent  Transfer Assistance: Independent      OBJECTIVE:    Vitals:   O2 saturation at 98% at session start; 93% sitting EOB, in mid 90s at session completion. Denied lightheadedness.      Range of Motion:  Bilateral Lower Extremity: WFL for bed mobility and transfers    Strength:  Bilateral Lower Extremity: Impaired - functionally weak during transfers    Balance:  Pt sat EOB for ~8 minutes: some time to catch breath, manage oral secretions, work on sitting balance--posterior lean and slightly to L   Static Sitting Balance:  Max initially, then wavered Min<-->Mod  Dynamic Sitting Balance: Max initially, then wavered Min<-->Mod  Static Standing Balance: Maximum Assistance, X 2  Dynamic Standing Balance: Maximum Assistance, X 2     Bed Mobility:  Supine to Sit: Moderate Assistance, X 1, with head of bed raised, with verbal cues , with increased time for completion  Scooting: Maximum Assistance, X 1, with head of bed raised, with verbal cues , for seated scooting to scoot forward    Transfers:  Sit to Stand: Maximum Assistance, X 2, cues for hand placement  Stand to Sit:Moderate Assistance, X 2, with verbal cues    Ambulation:  Maximum Assistance, X 2, with verbal cues , with increased time for completion, encouraged pt to keep head up--unable to today  Distance: ~1' to chair from bed  Surface: Level Tile  Device:no device, hand placement on therapist's arm  Gait Deviations: Forward Flexed Posture, Slow Анна, Decreased Step Length Bilaterally, Decreased Weight Shift Bilaterally and Unsteady Gait    Functional Outcome Measures: Completed  AM-PAC Inpatient Mobility Raw Score : 7  AM-PAC Inpatient T-Scale Score : 26.42    ASSESSMENT:  Activity Tolerance:  Patient tolerance of  treatment: fair. Pt with increased respirations following bed to chair transfer and supine to sit transfer. However, he was smiling and giving a \"thumbs-up\" to his wife after transferring to chair. Treatment Initiated: Treatment and education initiated within context of evaluation. Evaluation time included review of current medical information, gathering information related to past medical, social and functional history, completion of standardized testing, formal and informal observation of tasks, assessment of data and development of plan of care and goals. Treatment time included skilled education and facilitation of tasks to increase safety and independence with functional mobility for improved independence and quality of life. Assessment: Body structures, Functions, Activity limitations: Decreased functional mobility , Decreased ADL status, Decreased strength, Decreased posture, Decreased endurance, Decreased balance  Assessment: Pt is currently below PLOF by way of bed mobility, transfers and ambulation because of impairments associated with his diagnosis of respiratory failure with hypoxia. Pt will benefit from continued physical therapy while in the hospital and after discharge to return to PLOF.   Prognosis: Good    REQUIRES PT FOLLOW UP: Yes    Discharge Recommendations:  Discharge Recommendations: [address], IP Rehab, Continue to assess pending progress. Pt is not safe to return home at this time and requires continued physical therapy. Patient Education:  PT Education: Goals, PT Role, Energy Conservation, Plan of Care, General Safety, Functional Mobility Training, Transfer Training    Equipment Recommendations:  Equipment Needed: No    Plan:  Times per week: 5x GM  Times per day: Daily  Current Treatment Recommendations: Strengthening, Transfer Training, Endurance Training, Patient/Caregiver Education & Training, Home Exercise Program, Functional Mobility Training, Safety Education & Training, Gait Training    Goals:  Patient goals : go home with wife  Short term goals  Time Frame for Short term goals: by hospital discharge  Short term goal 1: bed mobility with min assist for ease of bed mobility in discharge environment  Short term goal 2: Supine to/from sit with min assist for ease of bed mobility in discharge environment  Short term goal 3: Sit to/from stand with mod assist x 1 for ease of transfers in discharge environment  Short term goal 4: Ambulate 30' with 2 WW and CGA for ambulation in discharge environment  Short term goal 5: Ascend/descend 2 steps with min assist for entry into home. Following session, patient left in safe position with all fall risk precautions in place, call light in reach, chair alarm on, and wife in room.     Janet Lacey, PT, DPT

## 2020-10-19 NOTE — PROGRESS NOTES
Hospitalist Progress Note    Patient:  Maggie Patient      Unit/Bed:4K-21/021-A    YOB: 1940    MRN: [id number]       Acct: [de-identified]     PCP: Lewayne Siemens, MD    Date of Admission: 10/11/2020    Assessment/Plan:    #Severe pneumonia present on admission secondary to [address] in the ICU ,status post extubation 10/18   10/19: Continuing with Levaquin that was started on 10/12 (day 9 today)    -Patient remains afebrile. White blood cell count is 9.7 this morning    -Will discontinue Levaquin tomorrow, after 10 total days of therapy and if the patient remains afebrile    #Acute diastolic congestive heart failure exacerbation-likely precipitated with pneumonia and sepsis did receive a lot of fluid secondary to sepsis. 10/19: Patient on IV Lasix 20 mg twice daily started on 10/18. Is also receiving D5W at a rate of 100 cc/h due to hypoglycemia    -Speech therapy will evaluate the patient in order to advance diet. Will need to move D5W as patient is also receiving Lasix to remove fluid in his lungs    -Diffuse crackles heard throughout the lungs on exam today  -Chest x-ray tomorrow stop D5-continue to check intake and output and daily weight as well as electrolytes    #Dysphagia-  10/19- patient failed swallow eval and modified barium swallow-speech seen and recommended NG tube with feedings/PEG tube-family and patient reluctant about this-we will consult palliative care keep n.p.o. for now. # History of Chronic Macrocytic Anemia   10/19: Follows Dr. Katelin Delgadillo. As per chart review patient apparently had recent blood transfusion and iron infusions    -H&H 13.6/41.8 today, stable      # Non-Insulin Dependent Diabetes Mellitus Type 2   10/19: While in the ICU, Lantus and sliding scale insulin was added. Latest A1c is 6.3. Lantus was increased on 10/14 for hyperglycemia     -Patient receiving D5W currently due to some hypoglycemia experiences last night.   He is currently on Lantus 30 units at night as well as a medium dose corrective sliding scale insulin    -Patient is currently n.p.o. we will hold Lantus completely discontinue medium scale sliding scale monitor blood sugars every 6 hours and at bedtime. Use of glucagon IM for hyperglycemia and    # History of DVT and PE   10/19: Patient was apparently on Plavix and Coumadin. As per heme-onc this was stopped secondary to an acute GI bleed.    -We will continue to hold Plavix and Coumadin as the patient has a follow-up appointment with heme-onc in 2 weeks    # History of COPD    10/19: Stable, on RA     # History of Esophageal Cancer   -S/p esophagectomy    # History of Colon Cancer    -S/p colon resection    # Septic Shock - Resolved    -Required Levophed drip while in the ICU. Has been weaned off.   -Blood pressures remain stable    # Acute Hypoxic Respiratory Failure likely 2/2 Legionella Pneumonia s/p extubation 10/18 - Resolved   -As stated patient was extubated to room air on 10/18   -Saturating well so far on RA     # Disposition Planning   -Requires further antibiotics, s/p extubation/ICU day 1 today. Hold Lantus, consult palliative care    Chief Complaint:     Hospital Course: Joseph Crooks [de-identified]year-old white male who presented to [facility] on 10/12/2020 with complaints of shortness of breath, fever, nausea vomiting. He has a past medical history of reformed smoker daily, CAD placement in 2015,hypertension, esophageal cancer, colon cancer, wade's syndrome, nephrolithiasis. Per report patient presented to the emergency department on 10/11/2020 for evaluation of shortness of breath, fatigue, fever, nausea and vomiting. He was accompanied by his wife and daughter. Daughter states that he began having symptoms on Friday and had increased difficulty breathing with fevers T-max 102.0. He was also noted to have difficulty ambulating and altered mental status. Influenza a and COVID were negative.   While in the emergency department patient did have escalating oxygen requirements and was placed on BiPAP. Hypotension was not improved with fluid bolusing. He did require levo fed drip to be started and intubation. He was admitted to the ICU. PCR did reveal patient was positive for Legionella pneumonia. Patient was started on Levaquin. Patient does have positive history for DVT and PE but per wife's report anticoagulation had been on hold secondary to GI bleed. Patient does follow with Dr. Sherie Roque heme-onc    10/18: Patient extubated to room air, transferred to [facility] ICU stepdown unit    Subjective (past 24 hours):     No acute events overnight. Patient is feeling is feeling well so far this morning. Is continuing to suction phlegm and mucous via nasogastric suctioning. He states to feeling tired but states this is probably due to just being extubated last night. He denies any new onset headaches, fever/chills, chest pain, palpitations, abdominal pain, n/v/d/c, extremity swelling. He states his breathing has gotten a little better.     Medications:  Reviewed    Infusion Medications    dextrose 100 mL/hr (10/19/20 0307)     Scheduled Medications    atenolol  100 mg Per NG tube Once    furosemide  20 mg Intravenous BID    glycopyrrolate-formoterol  2 puff Inhalation BID    insulin glargine  30 Units Subcutaneous Nightly    insulin lispro  0-12 Units Subcutaneous Q6H    sodium chloride flush  10 mL Intravenous 2 times per day    isosorbide mononitrate  30 mg Oral Daily    tamsulosin  0.4 mg Oral Nightly    chlorhexidine  15 mL Mouth/Throat BID    pantoprazole  40 mg Intravenous BID    atenolol  100 mg Per NG tube Daily    [Held by provider] clopidogrel  75 mg Per NG tube Daily    finasteride  5 mg Per NG tube Daily    levofloxacin  750 mg Intravenous Q24H     PRN Meds: metoprolol, glucose, dextrose, glucagon (rDNA), dextrose, sodium chloride flush, acetaminophen **OR** acetaminophen, polyethylene glycol, promethazine **OR** ondansetron, fentanNYL, albuterol sulfate HFA **AND** ipratropium **AND** MDI Treatment      Intake/Output Summary (Last 24 hours) at 10/19/2020 0948  Last data filed at 10/19/2020 0414  Gross per 24 hour   Intake 1492.59 ml   Output 4000 ml   Net -2507.41 ml       Diet:  No diet orders on file    Exam:  /61   Pulse 105   Temp 98.2 °F (36.8 °C) (Oral)   Resp 18   Ht 5' 11\" (1.803 m)   Wt 163 lb 8 oz (74.2 kg)   SpO2 96%   BMI 22.80 kg/m²     General appearance: Chronically ill appearing  male. Suctioning yellow mucous and phlegm via nasogastric suctioning  HEENT: Pupils equal, round, and reactive to light. Conjunctivae/corneas clear. Neck: Supple, with full range of motion. No jugular venous distention. Trachea midline. S/p extubation   Respiratory:  Diffuse crackles heard throughout all lung fields anteriorly and posteriorly  Cardiovascular: Regular rate and rhythm with normal S1/S2 without murmurs, rubs or gallops. Abdomen: Soft, non-tender, non-distended with normal bowel sounds. Musculoskeletal: passive and active ROM x 4 extremities. Digit amputations of the right hand noted  Skin: Pale complexion  Neurologic:  Neurovascularly intact without any focal sensory/motor deficits. Cranial nerves: II-XII intact, grossly non-focal.  Psychiatric: Alert and oriented, thought content appropriate, normal insight  Capillary Refill: Brisk,< 3 seconds   Peripheral Pulses: +2 palpable, equal bilaterally     Labs:   Recent Labs     10/17/20  0455 10/18/20  0418 10/19/20  0524   WBC 8.2 7.5 9.7   HGB 11.7* 11.3* 13.6*   HCT 35.6* 35.5* 41.8*    266 338     Recent Labs     10/17/20  0613 10/18/20  0418 10/19/20  0525    141 139   K 4.2 4.2 4.0    104 99   CO2 30 31 32   BUN 43* 43* 38*   CREATININE 0.6 0.6 0.8   CALCIUM 8.2* 8.2* 8.4*     No results for input(s): AST, ALT, BILIDIR, BILITOT, ALKPHOS in the last 72 hours.   Recent Labs     10/17/20  5332 10/18/20  3133 10/19/20  0525   INR 1.25* 1.28* 1.34*     No results for input(s): CKTOTAL, TROPONINI in the last 72 hours. Microbiology:      Urinalysis:      Lab Results   Component Value Date    NITRU NEGATIVE 10/12/2020    WBCUA 0-2 10/12/2020    BACTERIA FEW 10/12/2020    RBCUA 3-5 10/12/2020    BLOODU MODERATE 10/12/2020    SPECGRAV 1.022 10/12/2020    GLUCOSEU 100 03/28/2019       Radiology:  XR CHEST PORTABLE   Final Result   Impression:   1. Mild progression in the bilateral lower lobe airspace disease. Increasing left and possible new right pleural fluid collection      This document has been electronically signed by: Maria Del Rosario Olsen MD on    10/17/2020 04:41 AM         XR CHEST PORTABLE   Final Result   Impression:      Stable bibasilar consolidation and pleural fluid. This document has been electronically signed by: Kt Berger MD on    10/15/2020 02:50 AM         XR CHEST PORTABLE   Final Result   Impression:      Worsening right and stable left basilar consolidation and pleural fluid. This document has been electronically signed by: Kt Berger MD on    10/14/2020 02:12 AM         XR CHEST PORTABLE   Final Result   Impression:   Tubes and lines as noted. Bilateral consolidations and pleural effusions, moderately improved. Significant improvement in central pulmonary edema/CHF. Moderate sized hiatal hernia is suspected. This document has been electronically signed by: Maurilio Melchor MD on    10/12/2020 02:55 AM         CT CHEST WO CONTRAST   Final Result   Right lower lobe dense consolidation with involvement of the posterior    aspect of the upper and middle lobe. Findings suspicious for pneumonia. Left base atelectasis or pneumonia. Small hiatal hernia. Deep mesenteric calcified mass may reflect chronic    sclerosing mesenteritis versus carcinoid. Left kidney cyst.  Possible cholelithiasis.       This document has been electronically signed by: Maurilio Melchor MD on 10/12/2020 01:01 AM      All CT scans at this facility use dose modulation, iterative    reconstruction, and/or weight-based   dosing when appropriate to reduce radiation dose to as low as reasonably    achievable. XR CHEST PORTABLE   Final Result   Central venous catheter tip is at the cavoatrial junction            **This report has been created using voice recognition software. It may contain minor errors which are inherent in voice recognition technology. **      Final report electronically signed by Dr. Brionna Jacobsen on 10/11/2020 6:40 PM      XR CHEST PORTABLE   Final Result   Right lower lobe airspace consolidation. In addition interstitial/pulmonary edema bilaterally. **This report has been created using voice recognition software. It may contain minor errors which are inherent in voice recognition technology. **      Final report electronically signed by Dr. Brionna Jacobsen on 10/11/2020 1:03 PM        DVT prophylaxis: [] Lovenox                                 [x] SCDs                                  [] SQ Heparin                                 [] Encourage ambulation           [] Already on Anticoagulation     Code Status: Full Code    PT/OT Eval Status: Consults placed    Tele:   [x] yes              [] no    Electronically signed by Letty Kmaara MD on 10/19/2020 at 9:48 AM
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
## 2020-10-20 NOTE — PROGRESS NOTES
Comprehensive Nutrition Assessment    Type and Reason for Visit:  Reassess    Nutrition Recommendations/Plan:   Initiate Glucerna 1.2calml TF 20ml/hr & increase by 20mlhr every  4 hrs as tolerated to goal 70ml/hr. Free water flush 110ml every 6 hrs if no IVF. Monitor SLP notes. Nutrition Assessment:      Pt. with no improvement from a nutritional standpoint AEB NPO (failed MBS 10/19) mild oral dysphagia & mod-severe pharyngeal dysphagia  Remains at risk for further nutritional compromise r/t intubated while on ICU then extubated (10/18), severe pneumonia, MAYRA and underlying medical condition (COPD, DM, CAD, GERD, HTN, Hx: Esophagectomy/esophageal cancer 2000, colon cancer  s/p resection 1997, partial hand amputation as a child, Hx chronic macrocytic anemia). Nutrition recommendations/interventions as per above. Malnutrition Assessment:  Malnutrition Status: At risk for malnutrition (Comment)    Context:  Acute Illness     Findings of the 6 clinical characteristics of malnutrition:  Energy Intake:  (< 50% (10/19), was extubated 10/18)  Weight Loss:  Unable to assess(edema fluctuations)     Body Fat Loss:  No significant body fat loss     Muscle Mass Loss:  1 - Mild muscle mass loss Temples (temporalis)  Fluid Accumulation:  1 - Mild     Strength:   unable to assess    Estimated Daily Nutrient Needs:  Energy (kcal):  ~2084 kcals ( 27 kcals/kg); Weight Used for Energy Requirements:  (10/12 76 kg)     Protein (g):  ~93- 108 grams (1.2-  1.4 grams/kg)  monitor renal status; Weight Used for Protein Requirements:  Admission(77.2 kg)        Fluid (ml/day):  ~1800ml (25ml/kgm wt. 72kgm 10/20); Nutrition Related Findings:  Pt extubated (10/18) & failed MBS (10/19) mild oral dysphagia mod-severe pharyngeal dysphagia. Labs: (10/20) Ca 8. meds: Lasix, Humalog, Glycolax. Pt edentulous, dentures at home,reports sore throat & mentions he had a BM (10/19) - not documented.       Wounds:  (stage II wound(location not documented), stage 1 perineum nonblanchable redness surrounding perirectum)       Current Nutrition Therapies:    Current Tube Feeding (TF) Orders:  · Feeding Route: Nasogastric(NGT to be placed (10/20) per RN)  · Formula: Diabetic  · Schedule: Continuous  · Water Flushes: 110ml free water flush every 6 hrs if no IVF  · Current TF & Flush Orders Provides: just ordered- Glucerna 1.2cal/ml 20ml/hr not infusing yet  · Goal TF & Flush Orders Provides: 1680ml, 101 grams protein, 192 grams CHO, 27 grams fiber, 1352ml water from TF, 2016kcals, (1792ml water/24 hrs includes free water flushes ~25ml/kgm wt of 72 kgm (10/20)      Anthropometric Measures:  · Height: 5' 11\" (180.3 cm)  · Current Body Weight: 159 lb 8 oz (72.3 kg)(trace RUE, LUE, RLE & LLE edema)   · Admission Body Weight: 170 lb 3.1 oz (77.2 kg)(10/12 +1 edema)    · Usual Body Weight: 164 lb 10.9 oz (74.7 kg)(10/14 +2 edema)     · Ideal Body Weight: 172 lbs; BMI: 22.3  ·     · BMI Categories: Normal Weight (BMI 22.0 to 24.9) age over 72       Nutrition Diagnosis:   · Swallowing difficulty related to swallowing difficulty as evidenced by NPO or clear liquid status due to medical condition, nutrition support - enteral nutrition      Nutrition Interventions:   Food and/or Nutrient Delivery:  Start Tube Feeding  Nutrition Education/Counseling:  No recommendation at this time   Coordination of Nutrition Care:  Continued Inpatient Monitoring    Goals:  TF to meet atleast % estimated nutritional needs until appropriate to transition to po feeds. Nutrition Monitoring and Evaluation:     Food/Nutrient Intake Outcomes:  Enteral Nutrition Intake/Tolerance  Physical Signs/Symptoms Outcomes:  Biochemical Data, Chewing or Swallowing, GI Status, Fluid Status or Edema, Hemodynamic Status, Skin, Weight, Nutrition Focused Physical Findings     Discharge Planning:     Too soon to determine     Electronically signed by Gallo Baker RD, LD on 10/20/20 at 1:15 PM EDT    Contact: [phone]

## 2020-10-20 NOTE — PROGRESS NOTES
Hospitalist Progress Note    Patient:  Patty Nathan      Unit/Bed:4K-21/021-A    YOB: 1940    MRN: [id number]       Acct: [de-identified]     PCP: Radha Rice MD    Date of Admission: 10/11/2020    Assessment/Plan:    #Severe pneumonia present on admission secondary to [address] in the ICU ,status post extubation 10/18     10/19: Continuing with Levaquin that was started on 10/12 (day 9 today)    -Patient remains afebrile. White blood cell count is 9.7 this morning    -Will discontinue Levaquin tomorrow, after 10 total days of therapy and if the patient remains afebrile   10/20: We will discontinue Levaquin today, has had a for 10 days total.  He has been afebrile for the last 48 hours. White blood cell count has elevated however this is likely due to a hemoconcentration effect    -Discontinue Levaquin after his dose today 10/20        #Acute diastolic congestive heart failure exacerbation-likely precipitated with pneumonia and sepsis did receive a lot of fluid secondary to sepsis. .     10/19: Patient on IV Lasix 20 mg twice daily started on 10/18. Is also receiving D5W at a rate of 100 cc/h due to hypoglycemia    -Speech therapy will evaluate the patient in order to advance diet. Will need to move D5W as patient is also receiving Lasix to remove fluid in his lungs    -Diffuse crackles heard throughout the lungs on exam today  -Chest x-ray tomorrow stop D5-continue to check intake and output and daily weight as well as electrolytes   10/20: Patient has lost 4 pounds since yesterday, I/Os negative for 426.8 cc        #Dysphagia     10/19- patient failed swallow eval and modified barium swallow-speech seen and recommended NG tube with feedings/PEG tube-family and patient reluctant about this-we will consult palliative care keep n.p.o. for now. 10/20: Palliative care consult is in. We will keep patient n.p.o. and will talk to the family today regarding NG tube feeding/PEG tube. Patient himself states he is okay with nasogastric tube feedings. # History of Chronic Macrocytic Anemia     10/19: Follows Dr. Latha Rene. As per chart review patient apparently had recent blood transfusion and iron infusions    -H&H 13.6/41.8 today, stable   10/20: -H&H today is 12.8/39. 6. Stable      # Non-Insulin Dependent Diabetes Mellitus Type 2     10/19: While in the ICU, Lantus and sliding scale insulin was added. Latest A1c is 6.3. Lantus was increased on 10/14 for hyperglycemia     -Patient receiving D5W currently due to some hypoglycemia experiences last night. He is currently on Lantus 30 units at night as well as a medium dose corrective sliding scale insulin    -Patient is currently n.p.o. we will hold Lantus completely discontinue medium scale sliding scale monitor blood sugars every 6 hours and at bedtime. Use of glucagon IM for hyperglycemia and    # History of DVT and PE     10/19: Patient was apparently on Plavix and Coumadin. As per heme-onc this was stopped secondary to an acute GI bleed.    -We will continue to hold Plavix and Coumadin as the patient has a follow-up appointment with heme-onc in 2 weeks    # History of COPD    10/19: Stable, on RA     # History of Esophageal Cancer   -S/p esophagectomy    # History of Colon Cancer    -S/p colon resection    # Septic Shock - Resolved    -Required Levophed drip while in the ICU. Has been weaned off.   -Blood pressures remain stable    # Acute Hypoxic Respiratory Failure likely 2/2 Legionella Pneumonia s/p extubation 10/18 - Resolved   -As stated patient was extubated to room air on 10/18   -Saturating well so far on RA     # Disposition Planning   -s/p extubation/ICU day 1 today. Hold Lantus, consult palliative care    Chief Complaint:     Hospital Course: Gracelyn Oppenheim [de-identified]year-old white male who presented to [facility] on 10/12/2020 with complaints of shortness of breath, fever, nausea vomiting.   He has a past medical history of reformed smoker daily, CAD placement in 2015,hypertension, esophageal cancer, colon cancer, wade's syndrome, nephrolithiasis. Per report patient presented to the emergency department on 10/11/2020 for evaluation of shortness of breath, fatigue, fever, nausea and vomiting. He was accompanied by his wife and daughter. Daughter states that he began having symptoms on Friday and had increased difficulty breathing with fevers T-max 102.0. He was also noted to have difficulty ambulating and altered mental status. Influenza a and COVID were negative. While in the emergency department patient did have escalating oxygen requirements and was placed on BiPAP. Hypotension was not improved with fluid bolusing. He did require levo fed drip to be started and intubation. He was admitted to the ICU. PCR did reveal patient was positive for Legionella pneumonia. Patient was started on Levaquin. Patient does have positive history for DVT and PE but per wife's report anticoagulation had been on hold secondary to GI bleed. Patient does follow with Dr. Hadley Rivera heme-onc    10/18: Patient extubated to room air, transferred to [facility] ICU stepdown unit    10/19: Patient is being kept n.p.o., speech therapy performed a modified barium swallow which revealed the patient has some aspiration concerns. After discussing with the family they are not willing to do a NG tube with feedings or a PEG tube. Palliative care has been consulted. Otherwise clinically he is doing much better, on ninth day of Levaquin. Subjective (past 24 hours):     Patient desaturated to about 86-87% O2 overnight, 1 L O2 NC was placed. Patient reports feeling well this morning. He is still NPO, and states he is fine with nasogastric tube feedings. He states when he had his esophagectomy, a peg tube was placed for about one month.   He denies any new onset headaches, fevers/chills, chest pain, palpitations, abdominal pain, n/v/d/c, extremity swelling. Medications:  Reviewed    Infusion Medications    dextrose Stopped (10/19/20 1522)     Scheduled Medications    atenolol  100 mg Per NG tube Once    furosemide  20 mg Intravenous BID    glycopyrrolate-formoterol  2 puff Inhalation BID    [Held by provider] insulin glargine  30 Units Subcutaneous Nightly    insulin lispro  0-12 Units Subcutaneous Q6H    sodium chloride flush  10 mL Intravenous 2 times per day    isosorbide mononitrate  30 mg Oral Daily    tamsulosin  0.4 mg Oral Nightly    chlorhexidine  15 mL Mouth/Throat BID    pantoprazole  40 mg Intravenous BID    atenolol  100 mg Per NG tube Daily    [Held by provider] clopidogrel  75 mg Per NG tube Daily    finasteride  5 mg Per NG tube Daily    levofloxacin  750 mg Intravenous Q24H     PRN Meds: metoprolol, glucose, dextrose, glucagon (rDNA), dextrose, sodium chloride flush, acetaminophen **OR** acetaminophen, polyethylene glycol, promethazine **OR** ondansetron, fentanNYL, albuterol sulfate HFA **AND** ipratropium **AND** MDI Treatment      Intake/Output Summary (Last 24 hours) at 10/20/2020 0820  Last data filed at 10/20/2020 0323  Gross per 24 hour   Intake 0 ml   Output 2125 ml   Net -2125 ml       Diet:  Diet NPO Effective Now    Exam:  BP (!) 117/54   Pulse 68   Temp 97.5 °F (36.4 °C) (Oral)   Resp 22   Ht 5' 11\" (1.803 m)   Wt 159 lb 8 oz (72.3 kg)   SpO2 93%   BMI 22.25 kg/m²     General appearance: Chronically ill appearing  male. Suctioning yellow mucous and phlegm via nasogastric suctioning  HEENT: Pupils equal, round, and reactive to light. Conjunctivae/corneas clear. Neck: Supple, with full range of motion. No jugular venous distention. Trachea midline. S/p extubation 10/18  Respiratory:  Crackles still apparent on auscultation, improved since yesterday 10/19 exam however  Cardiovascular: Regular rate and rhythm with normal S1/S2 without murmurs, rubs or gallops.   Abdomen: Soft, non-tender, non-distended with normal bowel sounds. Musculoskeletal: passive and active ROM x 4 extremities. Digit amputations of the right hand noted  Skin: Pale complexion  Neurologic:  Neurovascularly intact without any focal sensory/motor deficits. Cranial nerves: II-XII intact, grossly non-focal.  Psychiatric: Alert and oriented, thought content appropriate, normal insight  Capillary Refill: Brisk,< 3 seconds   Peripheral Pulses: +2 palpable, equal bilaterally     Labs:   Recent Labs     10/18/20  0418 10/19/20  0524 10/20/20  0520   WBC 7.5 9.7 11.8*   HGB 11.3* 13.6* 12.8*   HCT 35.5* 41.8* 39.6*    338 337     Recent Labs     10/18/20  0418 10/19/20  0525 10/20/20  0521    139 137   K 4.2 4.0 3.7    99 96*   CO2 31 32 32   BUN 43* 38* 31*   CREATININE 0.6 0.8 0.8   CALCIUM 8.2* 8.4* 8.0*     No results for input(s): AST, ALT, BILIDIR, BILITOT, ALKPHOS in the last 72 hours. Recent Labs     10/18/20  0418 10/19/20  0525 10/20/20  0521   INR 1.28* 1.34* 1.47*     No results for input(s): CKTOTAL, TROPONINI in the last 72 hours. Microbiology:      Urinalysis:      Lab Results   Component Value Date    NITRU NEGATIVE 10/12/2020    WBCUA 0-2 10/12/2020    BACTERIA FEW 10/12/2020    RBCUA 3-5 10/12/2020    BLOODU MODERATE 10/12/2020    SPECGRAV 1.022 10/12/2020    GLUCOSEU 100 03/28/2019       Radiology:  FL MODIFIED BARIUM SWALLOW W VIDEO   Final Result   1. Laryngeal penetration and aspiration of honey thick, nectar thick and thin barium. 2. Additional recommendations from the speech therapist will follow. **This report has been created using voice recognition software. It may contain minor errors which are inherent in voice recognition technology. **      Final report electronically signed by Dr. Radha Zafar on 10/19/2020 12:54 PM      XR CHEST PORTABLE   Final Result   Impression:   1. Mild progression in the bilateral lower lobe airspace disease.      Increasing left and possible new right pleural fluid collection      This document has been electronically signed by: Reggie Jackson MD on    10/17/2020 04:41 AM         XR CHEST PORTABLE   Final Result   Impression:      Stable bibasilar consolidation and pleural fluid. This document has been electronically signed by: Tony Serrano MD on    10/15/2020 02:50 AM         XR CHEST PORTABLE   Final Result   Impression:      Worsening right and stable left basilar consolidation and pleural fluid. This document has been electronically signed by: Tony Serrano MD on    10/14/2020 02:12 AM         XR CHEST PORTABLE   Final Result   Impression:   Tubes and lines as noted. Bilateral consolidations and pleural effusions, moderately improved. Significant improvement in central pulmonary edema/CHF. Moderate sized hiatal hernia is suspected. This document has been electronically signed by: Karime Fernandes MD on    10/12/2020 02:55 AM         CT CHEST WO CONTRAST   Final Result   Right lower lobe dense consolidation with involvement of the posterior    aspect of the upper and middle lobe. Findings suspicious for pneumonia. Left base atelectasis or pneumonia. Small hiatal hernia. Deep mesenteric calcified mass may reflect chronic    sclerosing mesenteritis versus carcinoid. Left kidney cyst.  Possible cholelithiasis. This document has been electronically signed by: Karime Fernandes MD on    10/12/2020 01:01 AM      All CT scans at this facility use dose modulation, iterative    reconstruction, and/or weight-based   dosing when appropriate to reduce radiation dose to as low as reasonably    achievable. XR CHEST PORTABLE   Final Result   Central venous catheter tip is at the cavoatrial junction            **This report has been created using voice recognition software. It may contain minor errors which are inherent in voice recognition technology. **      Final report electronically signed by Dr. Migel Botello on 10/11/2020 6:40 PM      XR CHEST PORTABLE   Final Result   Right lower lobe airspace consolidation. In addition interstitial/pulmonary edema bilaterally. **This report has been created using voice recognition software. It may contain minor errors which are inherent in voice recognition technology. **      Final report electronically signed by Dr. Yousuf Cassidy on 10/11/2020 1:03 PM        DVT prophylaxis: [] Lovenox                                 [x] SCDs                                  [] SQ Heparin                                 [] Encourage ambulation           [] Already on Anticoagulation     Code Status: Full Code    PT/OT Eval Status: Consults placed    Tele:   [x] yes              [] no    Electronically signed by Minda Brownlee MD on 10/20/2020 at 8:20 AM

## 2020-10-20 NOTE — PLAN OF CARE
Problem: Nutrition  Goal: Optimal nutrition therapy  10/20/2020 1314 by Bola Menendez RD, LD  Outcome: Ongoing   Nutrition Problem #1: Swallowing difficulty  Intervention: Food and/or Nutrient Delivery: Start Tube Feeding  Nutritional Goals: TF to meet atleast % estimated nutritional needs until appropriate to transition to po feeds.

## 2020-10-20 NOTE — PROGRESS NOTES
Attempted to insert NG tube x3. Patient unable to swallow. Tried to put NG in with drinking water, patient coughing severely. Updated Dr Janeth Mustafa.

## 2020-10-20 NOTE — CARE COORDINATION
DISCHARGE/PLANNING EVALUATION  10/20/20, 10:02 AM EDT    Reason for Referral:  \"Attending plans new HH (nsg, aide, therapy)\" and \"ECF placement at discharge\"  Mental Status:   Alert and oriented  Decision Making:  Makes decisions with his wife  Family/Social/Home Environment:  SW spoke to patient and his wife. They live in a one story home without a basement. She is able to do all housekeeping and is the main . He has not been able to take a shower but completes a sponge bath. He was not on home o2. He has a cane but mainly uses the wheeled walker. They have 2 children who live in the area and help when able. He had [facility] in the past  Current Services including food security, transportation and housekeeping:   See above  Current Equipment:  See above  Payment Source:  Medicare and [employer]  Concerns or Barriers to Discharge: We discussed his need for therapy and eating. SW advised that he could go to an ECF but likely would not take due to not eating and likely won't take with tube down his nose. We discussed Rehab here and the Medicare benefit. They were both open to his option   Post acute provider list with quality measures, geographic area and applicable managed care information provided. Questions regarding selection process answered:  Not at this time    Teach Back Method used with patient and wife regarding care plan and needs  Patient and wife verbalize understanding of the plan of care and contribute to goal setting. Patient goals, treatment preferences and discharge plan:   He would like to return home, not too certain about ECF. He was agreeable to rehab here.  Will await order for this and see again if unable to go    Electronically signed by ERNESTO Govea on 10/20/2020 at 10:02 AM

## 2020-10-20 NOTE — PLAN OF CARE
Problem: Impaired respiratory status  Goal: Clear lung sounds  10/20/2020 0757 by Db Palacios RCP  Outcome: Ongoing    Improve breath sounds, increase aeration and decrease WOB. Cont with home meds.

## 2020-10-20 NOTE — CARE COORDINATION
10/20/20, 2:21 PM EDT    DISCHARGE PLANNING EVALUATION      ERIN spoke to patient to inform that he would not be able to go to the rehab unit. We discussed ECF briefly but he wanted SW to speak to his wife. ERIN called her to explain the above. She requested SW speak to her daughter who she thought was in his room. ERIN spoke to the daughter, Renee Argutea, to explain the above. The son-in-law states he needs to have the peg tube now as he has not eaten in 3 days. ERIN states she has no control of this but would speak to the nurse. They really did not want to discuss any plan at this time. While in the room the other daughter, Thiago Muhammad, called this Amish Wagner tried to explain again that he can't go to rehab, that plans need to be made to best service him and that if he agrees to [facility] that their choice could be made early so that they get the ECF of choice. She states he is \"no where near\" to getting out of the hospital and would not commit to any plan. ERIN advised we would help them in this plan to get what he wants.

## 2020-10-20 NOTE — PROGRESS NOTES
[address] PHYSICAL THERAPY  DAILY NOTE  [facility] ICU STEPDOWN TELEMETRY 4K - 4M-79/754-Q     Time In: 4450  Time Out: 0742  Timed Code Treatment Minutes: 24 Minutes  Minutes: 24          Date: 10/20/2020  Patient Name: Shawn Gonzales,  Gender:  male        MRN: [id number]  : 1940  ([de-identified] y.o.)     Referring Practitioner: DELGADO Gupta  Diagnosis: respiratory failure with hypoxia  Additional Pertinent Hx: From hospitalist progress note:  Josephine Munoz [de-identified]year-old white male who presented to [facility] on 10/12/2020 with complaints of shortness of breath, fever, nausea vomiting. He has a past medical history of reformed smoker daily, CAD placement in ,hypertension, esophageal cancer, colon cancer, wade's syndrome, nephrolithiasis. Per report patient presented to the emergency department on 10/11/2020 for evaluation of shortness of breath, fatigue, fever, nausea and vomiting. He was accompanied by his wife and daughter. Daughter states that he began having symptoms on Friday and had increased difficulty breathing with fevers T-max 102.0. He was also noted to have difficulty ambulating and altered mental status. Influenza a and COVID were negative. While in the emergency department patient did have escalating oxygen requirements and was placed on BiPAP. Hypotension was not improved with fluid bolusing. He did require levo fed drip to be started and intubation. He was admitted to the ICU. PCR did reveal patient was positive for Legionella pneumonia. Patient was started on Levaquin. Patient does have positive history for DVT and PE but per wife's report anticoagulation had been on hold secondary to GI bleed.   Patient does follow with Dr. Mason Patel heme-onc     Prior Level of Function:  Lives With: Spouse  Type of Home: House  Home Layout: One level  Home Access: (wife states that after the pt's prior hip procedure he was able to navigate the steps while using a walker)  Entrance Stairs - Number of Steps: 2  Home Equipment: (2 [employer])        Ambulation Assistance: Independent  Transfer Assistance: Independent    Restrictions/Precautions:  Restrictions/Precautions: Fall Risk, General Precautions    SUBJECTIVE: Pt resting in recliner and is ready to return to bed. PAIN: 4/10: generalized    OBJECTIVE:  Bed Mobility:  Sit to Supine: Moderate Assistance     Transfers:  Sit to Stand: Maximum Assistance, X 2  Stand to Sit:Moderate Assistance, X 2  Stand Pivot:Dependent, with EDUARDO Delgado after attempting with Mod-Max A x 2 with walker and pt barely able to move feet despite tactile cues for wt shifting. Ambulation:  Maximum Assistance, X 2  Distance: <1 foot  Surface: Level Tile  Device:Rolling Walker  Gait Deviations: Forward Flexed Posture, Slow Анна, Decreased Step Length Bilaterally, Decreased Weight Shift Bilaterally and required much cueing verbally and tactilely to initiate movement of feet. Balance:  Static Sitting Balance:  Stand By Assistance  Dynamic Sitting Balance: Stand By Assistance, Contact Guard Assistance  Static Standing Balance: Moderate Assistance, Maximum Assistance, X 2  Dynamic Standing Balance: Maximum Assistance, X 2    Exercise:  Patient was guided in 1 set(s) 10 reps of exercise to both lower extremities. Ankle pumps, Glut sets, Quad sets, Heelslides, Hip abduction/adduction, Seated marches and Long arc quads. Exercises were completed for increased independence with functional mobility. Functional Outcome Measures: Completed  AM-PAC Inpatient Mobility Raw Score : 8  AM-PAC Inpatient T-Scale Score : 28.52    ASSESSMENT:  Assessment: Patient progressing toward established goals. and has limited endurance, strength, and requires +2 assist for transfers and to attempt ambulation. Pt is not safe for return to home and would greatly benefit from continued skilled PT to address deficits noted above.   Activity Tolerance:  Patient tolerance

## 2020-10-20 NOTE — PLAN OF CARE
Continue the use of MDIs to help improve lung sounds.   Problem: Impaired respiratory status  Goal: Clear lung sounds  Outcome: Ongoing

## 2020-10-20 NOTE — CARE COORDINATION
Update: ECF with NGTF without PEG likely difficult discharge disposition; IP Rehab not candidate and not accepting new patients; collaborated with Attending, [place] IPR Coordinator, Karo Laughlin [place], [address]  Electronically signed by Niki Pino RN on 10/20/2020 at 12:00 PM    Update: patient agreeable to PEG; await GI consult; collaborated with Attending, Karo Laughlin who will discuss SNF options (not safe for home per therapy); collaborated with Karo Laughlin  Electronically signed by Niki Pino RN on 10/20/2020 at 12:22 PM

## 2020-10-20 NOTE — PROGRESS NOTES
require skilled care of licensed speech pathologist to progress toward achievement of established goals and plan of care. .     Plan for Next Session: Oral care;  [address]

## 2020-10-20 NOTE — PLAN OF CARE
Problem: Falls - Risk of:  Goal: Will remain free from falls  Description: Will remain free from falls  Outcome: Ongoing  Note: Patient in bed, call light and items in reach, bed alarm on. Goal: Absence of physical injury  Description: Absence of physical injury  Outcome: Ongoing  Note: No falls or injury this shift. Problem: Airway Clearance - Ineffective:  Goal: Ability to maintain a clear airway will improve  Description: Ability to maintain a clear airway will improve  Outcome: Ongoing     Problem: Fluid Volume - Imbalance:  Goal: Absence of imbalanced fluid volume signs and symptoms  Description: Absence of imbalanced fluid volume signs and symptoms  Outcome: Ongoing  Note: No crackles, adequate urine output. Problem: Skin Integrity - Impaired:  Goal: Absence of new skin breakdown  Description: Absence of new skin breakdown  Outcome: Ongoing  Note: Q2 turn      Problem: Respiratory  Goal: No pulmonary complications  Outcome: Ongoing  Goal: O2 Sat > 90%  Outcome: Ongoing     Problem: Skin Integrity/Risk  Goal: No skin breakdown during hospitalization  Outcome: Ongoing  Goal: Wound healing  Outcome: Ongoing     Problem: Discharge Planning:  Goal: Discharged to appropriate level of care  Description: Discharged to appropriate level of care  Outcome: Ongoing  Note: Patient from home with wife and HH, will need rehab. Problem: Urinary Elimination:  Goal: Signs and symptoms of infection will decrease  Description: Signs and symptoms of infection will decrease  Outcome: Ongoing  Note: Don care Q shift  Goal: Complications related to the disease process, condition or treatment will be avoided or minimized  Description: Complications related to the disease process, condition or treatment will be avoided or minimized  Outcome: Ongoing  Note: No bladders spasms this shift.       Problem: Skin Integrity:  Goal: Absence of new skin breakdown  Description: Absence of new skin breakdown  Outcome:

## 2020-10-21 PROBLEM — E44.0 MODERATE MALNUTRITION (HCC): Status: ACTIVE | Noted: 2020-01-01

## 2020-10-21 NOTE — PROGRESS NOTES
Labs     10/21/20  0937   CALCIUM 8.0*     Ionized Calcium:No results for input(s): IONCA in the last 72 hours. Magnesium:No results for input(s): MG in the last 72 hours. Phosphorus:No results for input(s): PHOS in the last 72 hours. BNP:No results for input(s): BNP in the last 72 hours. Glucose:  Recent Labs     10/21/20  0008 10/21/20  0556 10/21/20  1113   POCGLU 97 106 80     HgbA1C: No results for input(s): LABA1C in the last 72 hours. INR:   Recent Labs     10/21/20  0539   INR 1.52*     Hepatic: No results for input(s): ALKPHOS, ALT, AST, PROT, BILITOT, BILIDIR, LABALBU in the last 72 hours. Amylase and Lipase:No results for input(s): LACTA, AMYLASE in the last 72 hours. Lactic Acid: No results for input(s): LACTA in the last 72 hours. Troponin: No results for input(s): CKTOTAL, CKMB, TROPONINT in the last 72 hours. BNP: No results for input(s): BNP in the last 72 hours. Lipids: No results for input(s): CHOL, TRIG, HDL, LDLCALC in the last 72 hours. Invalid input(s): LDL  ABGs:   Lab Results   Component Value Date    PH 7.19 10/11/2020    PCO2 75 10/11/2020    PO2 98 10/11/2020    HCO3 29 10/11/2020    O2SAT 95 10/11/2020       Radiology reports as per the Radiologist  Radiology: Ct Chest Wo Contrast    Result Date: 10/12/2020  CT chest without contrast: Comparison:  CT,SR  - CT CHEST PULMONARY EMBOLISM W CONTRAST  - 10/19/2018 03:48 PM EDT FINDINGS: Lungs/pleura: Extensive consolidation of the right lower lobe with air bronchograms and areas of necrosis, with some involvement of the adjacent posterior aspect of the right upper lobe and right middle lobe. Left lower lobe consolidation or atelectasis. No pleural effusion or pneumothorax. Upper lobes are otherwise clear. Mediastinum/heart/aorta: Mild cardiac enlargement. No pericardial effusion. Coronary artery mural calcifications. Tortuous thoracic aorta. Dilated trachea and upper thoracic esophagus.   Fluid-filled distended mid and distal thoracic esophagus. Small hiatal hernia. Uppermost abdomen: Surgical clips anterior to the aorta in the upper abdomen. Calcific mass in the deep mesentery measuring 11 x 37 mm. Probable cholelithiasis. Left kidney cortical cyst. MSK: Multilevel spondylosis of the thoracic spine. Mild kyphoscoliosis. No acute fracture. No destructive osseous lesion. Right lower lobe dense consolidation with involvement of the posterior aspect of the upper and middle lobe. Findings suspicious for pneumonia. Left base atelectasis or pneumonia. Small hiatal hernia. 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 Left kidney cyst.  Possible cholelithiasis. This document has been electronically signed by: Michelle Leon MD on 10/12/2020 01:01 AM All CT scans at this facility use dose modulation, iterative reconstruction, and/or weight-based dosing when appropriate to reduce radiation dose to as low as reasonably achievable. Xr Chest Portable    Result Date: 10/12/2020  1 view chest x-ray. Comparison:  SUSIE,SR  - XR CHEST PORTABLE  - 10/11/2020 06:12 PM EDT Findings: Stable right IJ CVP catheter. ET tube terminates 4 cm above the david. Enterogastric tube loops in the stomach, tip in the gastric body, noting a retrocardiac hiatal hernia. Bilateral lower lobe consolidations on the left obscuring the hemidiaphragm, and bilateral pleural effusions. Heart is mildly enlarged. No pulmonary edema. Impression: Tubes and lines as noted. Bilateral consolidations and pleural effusions, moderately improved. Significant improvement in central pulmonary edema/CHF. Moderate sized hiatal hernia is suspected. This document has been electronically signed by: Michelle Leon MD on 10/12/2020 02:55 AM     Xr Chest Portable    Result Date: 10/11/2020  PROCEDURE: XR CHEST PORTABLE CLINICAL INFORMATION: Central line .  COMPARISON: 11 October 2020 at 12:34 PM TECHNIQUE: Portable semiupright FINDINGS: Right internal jugular vein catheter is present courses unremarkable tip is at the cavoatrial junction. There are no other changes from earlier     Central venous catheter tip is at the cavoatrial junction **This report has been created using voice recognition software. It may contain minor errors which are inherent in voice recognition technology. ** Final report electronically signed by Dr. Lena Martinez on 10/11/2020 6:40 PM    Xr Chest Portable    Result Date: 10/11/2020  PROCEDURE: XR CHEST PORTABLE CLINICAL INFORMATION: sob . COMPARISON: 5/21/2020 TECHNIQUE: Portable semiupright FINDINGS: Patient is severely rotated. Heart size is within normal limits. Mediastinum is not widened. Dense airspace consolidation right lower lobe. Interstitial edema bilaterally. Retrocardiac atelectasis or infiltrate. Pulmonary vessels are not congested. Right lower lobe airspace consolidation. In addition interstitial/pulmonary edema bilaterally. **This report has been created using voice recognition software. It may contain minor errors which are inherent in voice recognition technology. ** Final report electronically signed by Dr. Lena Martinez on 10/11/2020 1:03 PM       Physical Exam:  Vitals: BP (!) 104/53   Pulse 93   Temp 98 °F (36.7 °C) (Oral)   Resp 20   Ht 5' 11\" (1.803 m)   Wt 145 lb 12.8 oz (66.1 kg)   SpO2 93%   BMI 20.33 kg/m²   24 hour intake/output:    Intake/Output Summary (Last 24 hours) at 10/21/2020 1359  Last data filed at 10/21/2020 1352  Gross per 24 hour   Intake 10 ml   Output 900 ml   Net -890 ml     Last 3 weights:   Wt Readings from Last 3 Encounters:   10/20/20 145 lb 12.8 oz (66.1 kg)   10/01/20 164 lb 8 oz (74.6 kg)   07/23/20 157 lb 3.2 oz (71.3 kg)       General appearance - alert,  and in no distress and ill-appearing  HEENT: soft voice  Chest - clear to auscultation, no wheezes, rales or rhonchi, symmetric air entry  Cardiovascular - normal rate and regular rhythm  Abdomen - soft, nontender, nondistended, no masses or organomegaly   Neurological - Alert and oriented  Integumentary - Skin color, texture, turgor normal. No Rashes or lesions  Musculoskeletal -weakness      DVT prophylaxis: [] Lovenox                                 [] SCDs                                 [] SQ Heparin                                 [] Encourage ambulation           [x] Already on Anticoagulation plavix                 Assessment:  1. Dysphagia   2. Pneumonia improved  3. Anemia   4. HX esophagectomy, esophageal cancer     Active Problems:    Respiratory failure with hypoxia (HCC)    Sepsis ([facility] [address]    Pneumonia due to organism    Moderate malnutrition ([facility] [address]  Resolved Problems:    * No resolved hospital problems. *      Plan:    1. Analgesia and antiemetics as needed  2. Labs in am   3. Plan for J tube placement tomorrow, Pre op - NPO at midnight, consent, covid screen, Labs in am, Iv antibiotic on call to OR       Electronically signed by LARRY Keene - CNP on 10/21/2020 at 1:59 PM Patient seen and examined independently by me. Above discussed and I agree with CNP. Labs, cultures, and radiographs where available were reviewed. See orders for the updated patient care plan.     Stefania Cisse MD, discussed with patient again J-tube placement patient again noted to be on Plavix but need to proceed with same due to his malnutrition patient voices understanding of procedure for tomorrow  10/21/2020   9:50 PM

## 2020-10-21 NOTE — PLAN OF CARE
Problem: Falls - Risk of:  Goal: Will remain free from falls  Description: Will remain free from falls  Outcome: Ongoing  Note: Patient in bed, call light and items in reach. Bed alarm on. Goal: Absence of physical injury  Description: Absence of physical injury  Outcome: Ongoing  Note: No falls or injury this shift. Patient up to commode 2 assist with gait belt.       Problem: Airway Clearance - Ineffective:  Goal: Ability to maintain a clear airway will improve  Description: Ability to maintain a clear airway will improve  Outcome: Ongoing  Note: Patient coughing up sputum frequently      Problem: Fluid Volume - Imbalance:  Goal: Absence of imbalanced fluid volume signs and symptoms  Description: Absence of imbalanced fluid volume signs and symptoms  Outcome: Ongoing  Note: NPO, no IV fluids, held Lasix d/t low BP, adequate urine output     Problem: Skin Integrity - Impaired:  Goal: Absence of new skin breakdown  Description: Absence of new skin breakdown  Outcome: Ongoing  Note: Q2 turn     Problem: Respiratory  Goal: No pulmonary complications  Outcome: Ongoing  Goal: O2 Sat > 90%  Outcome: Ongoing     Problem: Skin Integrity/Risk  Goal: No skin breakdown during hospitalization  Outcome: Ongoing  Note: Q2 turn, incontinence care as needed  Goal: Wound healing  Outcome: Ongoing     Problem: Discharge Planning:  Goal: Discharged to appropriate level of care  Description: Discharged to appropriate level of care  Outcome: Ongoing  Note: Discharge planning still in process, family does not want ECF, deemed not appropriate for rehab     Problem: Urinary Elimination:  Goal: Signs and symptoms of infection will decrease  Description: Signs and symptoms of infection will decrease  Outcome: Ongoing  Note: Don care Q shift  Goal: Complications related to the disease process, condition or treatment will be avoided or minimized  Description: Complications related to the disease process, condition or treatment will be avoided or minimized  Outcome: Ongoing     Problem: Skin Integrity:  Goal: Absence of new skin breakdown  Description: Absence of new skin breakdown  Outcome: Ongoing     Problem: Nutrition  Goal: Optimal nutrition therapy  10/21/2020 0241 by Renee Luna RN  Outcome: Ongoing  10/20/2020 1314 by Adalberto Malhotra RD, LD  Outcome: Ongoing

## 2020-10-21 NOTE — CARE COORDINATION
10/21/20, 2:45 PM EDT    DISCHARGE ON GOING [address] day: 10  Location: 4K-21/021-A Reason for admit: Respiratory failure with hypoxia [facility]) [J96.91]   Procedure:   10/11 CVC  10/12 Intubated  10/17 Extubated  10/22 JT planned  Treatment Plan of Care: client admitted with Respiratory Failure/Legionella Pneumonia to ICU (history colon cancer, esophagectomy for esophageal cancer) prior to 4K; Palliative Care/SGY/GI following  Barriers to Discharge: SGY plans procedure above today, WBC 16.4; monitor  PCP: Beryl Warren MD  Readmission Risk Score: 28%  Patient Goals/Plan/Treatment Preferences: lives with spouse, has walker; therapy following, has nebulizer, current with [facility], no IPR beds, family resistant to ECF despite therapy recommends it (not safe for home); will need  minimum for JT teaching (nsg, aide, therapy, order in EPIC), will also need likely home infusion

## 2020-10-21 NOTE — PROGRESS NOTES
Hospitalist Progress Note    Patient:  Irena Jefferson      Unit/Bed:4K-21/021-A    YOB: 1940    MRN: [id number]       Acct: [de-identified]     PCP: Mary Steven MD    Date of Admission: 10/11/2020    Assessment/Plan:    #Severe pneumonia present on admission secondary to [address] in the ICU ,status post extubation 10/18 - Resolved     10/19: Continuing with Levaquin that was started on 10/12 (day 9 today)    -Patient remains afebrile. White blood cell count is 9.7 this morning    -Will discontinue Levaquin tomorrow, after 10 total days of therapy and if the patient remains afebrile   10/20: We will discontinue Levaquin today, has had a for 10 days total.  He has been afebrile for the last 48 hours. White blood cell count has elevated however this is likely due to a hemoconcentration    -Discontinue Levaquin after his dose today 10/20   10/21:  -Levaquin discontinued, has a 10 days total    -Has been afebrile for over 48 hours, white blood cell count likely elevated due to hemoconcentration    #Acute diastolic congestive heart failure exacerbation-likely precipitated with pneumonia and sepsis did receive a lot of fluid secondary to sepsis. .     10/19: Patient on IV Lasix 20 mg twice daily started on 10/18. Is also receiving D5W at a rate of 100 cc/h due to hypoglycemia    -Speech therapy will evaluate the patient in order to advance diet. Will need to move D5W as patient is also receiving Lasix to remove fluid in his lungs    -Diffuse crackles heard throughout the lungs on exam today  -Chest x-ray tomorrow stop D5-continue to check intake and output and daily weight as well as electrolytes     10/20: Patient has lost 4 pounds since yesterday, I/Os negative for 426.8 cc      10/21: Patient has further lost 6 pounds since yesterday is down to 145 pounds.   Has lost 1051 cc net fluid as per I/Os     #Dysphagia     10/19- patient failed swallow eval and modified barium swallow-speech seen and recommended NG tube with feedings/PEG tube-family and patient reluctant about this-we will consult palliative care keep n.p.o. for now. 10/20: Palliative care consult is in. We will keep patient n.p.o. and will talk to the family today regarding NG tube feeding/PEG tube. Patient himself states he is okay with nasogastric tube feedings. 10/21: After consulting GI, they have recommended a J-tube insertion due to the patient's complex esophageal anatomy status post esophagectomy. Surgery has been consulted and is planning on doing this on Thursday. Plavix is being held for now. # History of Chronic Macrocytic Anemia     10/19: Follows Dr. Vandana Murguia. As per chart review patient apparently had recent blood transfusion and iron infusions    -H&H 13.6/41.8 today, stable     10/20: -H&H today is 12.8/39. 6. Stable     10/21: -H&H today not drawn this morning, orders have been placed    # Non-Insulin Dependent Diabetes Mellitus Type 2     10/19: While in the ICU, Lantus and sliding scale insulin was added. Latest A1c is 6.3. Lantus was increased on 10/14 for hyperglycemia     -Patient receiving D5W currently due to some hypoglycemia experiences last night. He is currently on Lantus 30 units at night as well as a medium dose corrective sliding scale insulin    -Patient is currently n.p.o. we will hold Lantus completely discontinue medium scale sliding scale monitor blood sugars every 6 hours and at bedtime. Use of glucagon IM for hyperglycemia    # History of DVT and PE     10/19: Patient was apparently on Plavix and Coumadin.   As per heme-onc this was stopped secondary to an acute GI bleed.    -We will continue to hold Plavix and Coumadin as the patient has a follow-up appointment with heme-onc in 2 weeks    # History of COPD    10/19: Stable, on RA     # History of Esophageal Cancer   -S/p esophagectomy    # History of Colon Cancer    -S/p colon resection    # Septic Shock - Resolved    -Required Levophed drip while in the ICU. Has been weaned off.   -Blood pressures remain stable    # Acute Hypoxic Respiratory Failure likely 2/2 Legionella Pneumonia s/p extubation 10/18 - Resolved   -As stated patient was extubated to room air on 10/18   -Saturating well so far on RA     # Disposition Planning   -Patient unable to have a nasogastric tube. GI and general surgery's been consulted with recommendations of a J-tube placement. This will be on Thursday 10/22    Chief Complaint: ICU [facility] Course: Ayad Pressley [de-identified]year-old white male who presented to [facility] on 10/12/2020 with complaints of shortness of breath, fever, nausea vomiting. He has a past medical history of reformed smoker daily, CAD placement in 2015,hypertension, esophageal cancer, colon cancer, wade's syndrome, nephrolithiasis. Per report patient presented to the emergency department on 10/11/2020 for evaluation of shortness of breath, fatigue, fever, nausea and vomiting. He was accompanied by his wife and daughter. Daughter states that he began having symptoms on Friday and had increased difficulty breathing with fevers T-max 102.0. He was also noted to have difficulty ambulating and altered mental status. Influenza a and COVID were negative. While in the emergency department patient did have escalating oxygen requirements and was placed on BiPAP. Hypotension was not improved with fluid bolusing. He did require levo fed drip to be started and intubation. He was admitted to the ICU. PCR did reveal patient was positive for Legionella pneumonia. Patient was started on Levaquin. Patient does have positive history for DVT and PE but per wife's report anticoagulation had been on hold secondary to GI bleed.   Patient does follow with Dr. Taj Hadley heme-onc    10/18: Patient extubated to room air, transferred to [facility] ICU stepdown unit    10/19: Patient is being kept n.p.o., speech therapy performed a modified barium swallow which revealed the patient has some aspiration concerns. After discussing with the family they are not willing to do a NG tube with feedings or a PEG tube. Palliative care has been consulted. Otherwise clinically he is doing much better, on ninth day of Levaquin. 10/20: Nasogastric tube insertion was attempted x3, with no success. Patient is agreeable to having a PEG tube placement as he has had this in the past.  GI and general surgery were consulted and are recommending a J-tube placement to be done on 10/22    Subjective (past 24 hours):     No acute events overnight. Patient without complaints this morning, states to feeling better. He states his breathing is gotten better and is coughing up less mucus/phlegm. He denies any new onset headaches, fever/chills, chest pain, palpitations, shortness of breath, abdominal pain, nausea/vomiting/constipation/diarrhea, extremity pain.     Medications:  Reviewed    Infusion Medications    dextrose Stopped (10/19/20 1522)     Scheduled Medications    atenolol  100 mg Per NG tube Once    furosemide  20 mg Intravenous BID    glycopyrrolate-formoterol  2 puff Inhalation BID    [Held by provider] insulin glargine  30 Units Subcutaneous Nightly    insulin lispro  0-12 Units Subcutaneous Q6H    sodium chloride flush  10 mL Intravenous 2 times per day    isosorbide mononitrate  30 mg Oral Daily    tamsulosin  0.4 mg Oral Nightly    chlorhexidine  15 mL Mouth/Throat BID    pantoprazole  40 mg Intravenous BID    atenolol  100 mg Per NG tube Daily    [Held by provider] clopidogrel  75 mg Per NG tube Daily    finasteride  5 mg Per NG tube Daily     PRN Meds: metoprolol, glucose, dextrose, glucagon (rDNA), dextrose, sodium chloride flush, acetaminophen **OR** acetaminophen, polyethylene glycol, promethazine **OR** ondansetron, fentanNYL, albuterol sulfate HFA **AND** ipratropium **AND** MDI Treatment      Intake/Output Summary (Last 24 Results   Component Value Date    NITRU NEGATIVE 10/12/2020    WBCUA 0-2 10/12/2020    BACTERIA FEW 10/12/2020    RBCUA 3-5 10/12/2020    BLOODU MODERATE 10/12/2020    SPECGRAV 1.022 10/12/2020    GLUCOSEU 100 03/28/2019       Radiology:  FL MODIFIED BARIUM SWALLOW W VIDEO   Final Result   1. Laryngeal penetration and aspiration of honey thick, nectar thick and thin barium. 2. Additional recommendations from the speech therapist will follow. **This report has been created using voice recognition software. It may contain minor errors which are inherent in voice recognition technology. **      Final report electronically signed by Dr. Carlton Licona on 10/19/2020 12:54 PM      XR CHEST PORTABLE   Final Result   Impression:   1. Mild progression in the bilateral lower lobe airspace disease. Increasing left and possible new right pleural fluid collection      This document has been electronically signed by: Ame Fonseca MD on    10/17/2020 04:41 AM         XR CHEST PORTABLE   Final Result   Impression:      Stable bibasilar consolidation and pleural fluid. This document has been electronically signed by: Ishan Gallardo MD on    10/15/2020 02:50 AM         XR CHEST PORTABLE   Final Result   Impression:      Worsening right and stable left basilar consolidation and pleural fluid. This document has been electronically signed by: Ishan Gallardo MD on    10/14/2020 02:12 AM         XR CHEST PORTABLE   Final Result   Impression:   Tubes and lines as noted. Bilateral consolidations and pleural effusions, moderately improved. Significant improvement in central pulmonary edema/CHF. Moderate sized hiatal hernia is suspected. This document has been electronically signed by: Cesar Isidro MD on    10/12/2020 02:55 AM         CT CHEST WO CONTRAST   Final Result   Right lower lobe dense consolidation with involvement of the posterior    aspect of the upper and middle lobe.   Findings

## 2020-10-21 NOTE — PROGRESS NOTES
Comprehensive Nutrition Assessment    Type and Reason for Visit:  Reassess    Nutrition Recommendations/Plan:   When J-tube able to be placed, suggest initiate Glucerna 1.2cal/ml 20ml/hr & increase by 20ml every 4 hrs as tolerated to goal 70ml/hr is met as tolerated. Suggest free water flush 110ml  every  6 hrs if no IVF. Monitor SLP notes. Nutrition Assessment:     Pt. with no improvement from a nutritional standpoint AEB unable to place NGT, NPO, waiting on J-tube placement. 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 Nutrition recommendations/interventions as per above. Malnutrition Assessment:  Malnutrition Status: Moderate malnutrition    Context:  Acute Illness     Findings of the 6 clinical characteristics of malnutrition:  Energy Intake:  (, 50% x 3 days, was extubated (10/18))  Weight Loss:  Unable to assess(edema/fluid shifts, suspect wt loss nutrition & fluid related)     Body Fat Loss:  1 - Mild body fat loss Orbital   Muscle Mass Loss:  1 - Mild muscle mass loss Temples (temporalis)  Fluid Accumulation:  Unable to assess     Strength:  Not Performed    Estimated Daily Nutrient Needs:  Energy (kcal):  ~2084 kcals ( 27 kcals/kg); Weight Used for Energy Requirements:  (10/12 76 kg)     Protein (g):  ~93- 108 grams (1.2-  1.4 grams/kg)  monitor renal status; Weight Used for Protein Requirements:  Admission(77.2 kg)        Fluid (ml/day):  ~1800ml (25ml/kgm wt. 72kgm 10/20); Nutrition Related Findings:  Pt extubated (10/18) & failed MBS (10/19) mild oral dysphagia, mod-severe pharyngeal dysphagia. Unable to successfully place NGT noted. Plan is for J-tube placement (10/22) per RN.  ( pt with hx esophageal cancer 2002 gastric pull through OR   & PEG is not feasible) BM x 1 (10/21) meds: Lasix, Humalog,Glycolax. Wounds:  (stage II wound(location  not documented), stage 1 perineum nonblanchable redness surrounding perirectum)       Current Nutrition Therapies:    Current Tube Feeding (TF) Orders:  · Feeding Route: (Pt is to receive a J-tube, not placed yet)  · Formula: Diabetic ( goal is 70ml/hr as tolerated)  · Schedule: Continuous  · Water Flushes: 110ml free water flush every 6 hrs if no IVF  · Current TF & Flush Orders Provides: waiting on J-tube placement for TF to start  · Goal TF & Flush Orders Provides: 1680ml, 101 grams protein, 192 grams CHO, 27 grams fiber, 1352ml water from TF, 2016kcals, (1792ml water/24 hrs includes free water flushes ~25ml/kgm wt of 72 kgm (10/20)      Anthropometric Measures:  · Height: 5' 11\" (180.3 cm)  · Current Body Weight: 145 lb 12.8 oz (66.1 kg)((10/20) + 1 RUE, LUE, RLE & LLE edema)   · Admission Body Weight: 170 lb 3.1 oz (77.2 kg)(10/12 +1 edema)    · Usual Body Weight: 164 lb 10.9 oz (74.7 kg)(10/14 +2 edema)     · Ideal Body Weight: 172 lbs; BMI: 20.3  ·       · BMI Categories: Underweight (BMI less than 22) age over 72       Nutrition Diagnosis:   · Moderate malnutrition, In context of acute illness or injury related to swallowing difficulty, impaired respiratory function as evidenced by NPO or clear liquid status due to medical condition, mild loss of subcutaneous fat, mild muscle loss      Nutrition Interventions:   Food and/or Nutrient Delivery:  (Start TF when J-tube able to be placed.)  Nutrition Education/Counseling:  No recommendation at this time   Coordination of Nutrition Care:  Continued Inpatient Monitoring    Goals:  TF to meet atleast % estimated nutritional needs until appropriate to transition to po feeds.        Nutrition Monitoring and Evaluation:     Food/Nutrient Intake Outcomes:  Enteral Nutrition Intake/Tolerance  Physical Signs/Symptoms Outcomes:  Biochemical Data, Chewing or Swallowing, GI Status, Fluid Status or Edema, Hemodynamic Status, Skin, Weight, Nutrition Focused Physical Findings     Discharge Planning:     Too soon to determine     Electronically signed by Bola Menendez RD, LD on 10/21/20 at 1:13 PM EDT    Contact: [phone]

## 2020-10-21 NOTE — FLOWSHEET NOTE
[facility] PROGRESS NOTE      Patient: Jon Peraza  Room #: 5D-29/824-B            YOB: 1940  Age: [de-identified] y.o. Gender: male            Admit Date & Time: 10/11/2020 11:39 AM    Assessment:  Bill as he likes to be called is in bed on 4k. He is supported by his daughter and her . Odilia Payne is in good spirits. He is awake and alert, calm and approachable and friendly. Interventions:  Odilia Payne stated his  was here to see him this morning. He belongs to [employer] in [facility]. He welcomed prayer and wanted continued prayer for his recovery since he has been here for 10 days. Outcomes:  Pt was encouraged. His family is very supportive    Plan: 1. Care Plan:  Continue spiritual and emotional care for patient and family. Including prayers.      Electronically signed by Bassam Mojica, on 10/21/2020 at 2:54 PM.  [address] [place] [address]  [phone]

## 2020-10-21 NOTE — CONSULTS
[address] [place] [address]                                  CONSULTATION    PATIENT NAME: Odalis Vallejo                    :        1940  MED REC NO:   [id number]                           ROOM:       0021  ACCOUNT NO:   [de-identified]                           ADMIT DATE: 10/11/2020  PROVIDER:     Angelica Baez. Analy Staton MD    CONSULT DATE:  10/20/2020    CHIEF COMPLAINT:  Failed swallowing eval, need for jejunostomy tube. HISTORY OF PRESENT ILLNESS:  The patient is a fairly amazing 27-year-old  white male who in  underwent an esophagectomy at the [facility] for esophageal cancer with a presumed gastric pull-through up  into the stomach. The patient also has had a history of colon cancer  resected, uncertain as to what part of the bowel, but apparently this  was done in [place] in the early s also. The patient prior to this  admission was able to eat, no problems with swallowing, has been  followed by the GI service, having had colonoscopy and EGD within the  last year per Dr. Leanne Hinojosa. Nonetheless, he was admitted on  10/11/2020, nine days ago, shortness of breath, fever. Influenza and  COVID tests were negative. He was intubated, actually was in the  intensive care unit on Levophed and cultures came back showing  Legionella pneumonia. He was extubated 2 days ago, transferred to  step-down. Yesterday, he failed as mentioned a swallowing eval.  GI was  initially consulted for placement of a \"PEG tube\" but obviously with  gastric pull-through, this was not feasible and General Surgery is being  consulted for placement of the J-tube. Apparently, an NG tube was  attempted to be placed without success.     PAST MEDICAL HISTORY:  Significant for COPD, coronary artery disease,  history of Hunt's esophagus with reflux disease, colon cancer,  history of hypertension, history of pericarditis, a history of DVT and  pulmonary emboli, diabetes, and pseudogout. PAST SURGICAL HISTORY:  Surgeries include coronary angioplasty with  stent placement. He has had the esophagectomy as mentioned above. He  has had a hand amputation to metacarpals on the fourth and fifth digits  of the right hand in his mid 45s. He had a recent hip replacement in  05/2020 on the right. He has had some sort of thoracotomy of the lung  due to scar tissue from chemo and radiation. He has had a remote  tonsillectomy. He has had a right knee arthroscopy and the upper GIs as  mentioned above. He has had the esophagectomy and a colon resection. FAMILY HISTORY:  Positive for diabetes, coronary artery disease, and  cancer. ALLERGIES:  To [address]. SOCIAL HISTORY:  The patient has a history of smoking but quit well over  50 years ago. He denies alcohol use. REVIEW OF SYSTEMS:  10-point review of systems otherwise negative. PHYSICAL EXAMINATION:  GENERAL:  The patient is an 79-year-old white male who is being examined  in his bed. HEAD, EARS, EYES, NOSE, AND THROAT:  Pupils are equal.  He is edentulous  at this time. It should be noted that his voice is quite hoarse and  somewhat difficult to understand. He has an incision in the neck from  the esophagectomy. He has a left thoracotomy incision from the  esophagectomy. NECK:  Soft. No palpable masses. CARDIAC:  S1, S2.  LUNGS:  Respirations are clear. He has two radiation tattoos right at  the epigastric border of the sternum. ABDOMEN:  He appears to have an incision transversely in the abdomen,  presumably the right colon removed, otherwise, the abdomen is soft. EXTREMITIES:  Upper and lower extremities show reasonable range of  motion. He does have digits removed on the right hand, otherwise, good  range of motion of the arms. The lower extremities show no edema and  reasonable range of motion.     CURRENT MEDICATIONS:  Include Tenormin, Plavix, Proscar, Lasix, Bevespi  inhaler, Lantus, Humalog, Imdur, Levaquin, Protonix, Flomax, albuterol  inhalers, Zofran, Phenergan. No other anticoagulants could be viewed in  the chart. ASSESSMENT/PLAN:  Recent intubation for a week and now with dysphagia. We discussed J-tube placement with the patient and the fact that it  would require a general anesthetic and surgery. The patient appears to  be a very kind man and seems to understand the process. I did discuss  on the phone with his wife. At this point in time, we will plan on  doing this Thursday. The patient is on Plavix which does pose some  bleeding risk, but the patient needs nutrition. We will proceed with  J-tube on Thursday if Medicine is okay. IZABELLA CARRERO [facility], MD    D: 10/20/2020 21:20:52       T: 10/20/2020 21:26:48     [email or web address]  Job#: [id number]     Doc#: [id number]    CC:

## 2020-10-21 NOTE — PROGRESS NOTES
[address] [place] THERAPY  STRZ ICU STEPDOWN TELEMETRY 4K  DAILY NOTE    TIME   SLP Individual Minutes  Time In: 6593  Time Out: 2142  Minutes: 18  Timed Code Treatment Minutes: 0 Minutes       Date: 10/21/2020  Patient Name: Collette Maizes      CSN: [id number]   : 1940  ([de-identified] y.o.)  Gender: male   Referring Physician:  LARRY Sanchez CNP  Diagnosis: Respiratory failure with hypoxia  Secondary Diagnosis: Dysphagia   Precautions: Aspiration   Current Diet: NPO - no means of nutrition at this time; planning J-tube 10/22   Swallowing Strategies: N/a -NPO  Date of Last MBS: 10/19    Pain:  No pain reported. Subjective:  Patient seen sitting upright in chair; alert and pleasant. Patient's wife present. Both verbalize continued plan for PEG tube Thursday 10/22    Short-Term Goals:  SHORT TERM GOAL #1:  Goal 1: Patient will tolerate limited PO trials w/ ST only with no overt s/s of aspiraiton/penetration to demosntrate readiness for repeat instrumental (MBS vs FEES)  INTERVENTIONS: DNT secondary to focus on dysphagia education, oral care and OME. Patient and wife verbalized continued plan for PEG tube Thursday 10/22    SHORT TERM GOAL #2:  Goal 2: Pt will complete OME and pharyngeal strengthening exercises 10x each to improve laryngeal strength and airway protection. INTERVENTIONS: ST explained at length rational of OME in relation to patient with high level of medical complexity at this time. Patient with independent use of oral suctioning (reduced frequency compared to yesterday) throughout session d/t poor secretion management. Patient with ongoing very hoarse/breathy vocal quality. OME: ST provided education at length with handout provided encouraging OME to be completed at least x3 per day; ST also provided education that patient could potentially benefit from vital stimulation therapy. Patient and wife deny hx of seizures or pace maker.  ST to obtain order as clinically appropriate following placement of PEG tube. -Lingual lateralization: x10 fair success, reduced ROM   -Lingual protrusion and hold: x10 fair success, reduced lingual strength noted  -Lingual elevation and depression: x10 fair success, reduced ROM and coordination noted  -Carrie: x1 attempt; POOR success  -Effortful swallows: x2 attempts; POOR success     ST provided rational of all therapeutic exercise; patient and wife highly receptive     SHORT TERM GOAL #3:  Goal 3: Family/staff will demonstrate understanding of oral care measures and complete 2-3x daily to decrease amount of oral bacteria given NPO status. INTERVENTIONS: ST provided education re: oral care routine, supplied provided. ST and patient completed oral care utilizing hydrogen peroxide and water solution + toothet as well as nonalcoholic mouth wash + water + toothet ; patient with fair tolerance overall. Patient continued to require oral suctioning to remove excessive secretions. ST encouraged continued oral care at least every 2 hours. Patient's wife verbalized understanding. ST also encouraged RN to continued oral care measures. SHORT TERM GOAL #4:  Goal 4:  Will monitor for appropriateness to complete repeat instrumental assessment, per ST recommendations only (MBS vs FEEs)  INTERVENTIONS: DNT secondary to focus on other goals; not appropriate at this time       Long-Term Goals: No LTGs due to short ELOS           EDUCATION:  Learner: Patient and Significant Other  Education:  Reviewed results and recommendations of this evaluation, Reviewed diet and strategies, Reviewed signs, symptoms and risks of aspiration, Reviewed ST goals and Plan of Care, Reviewed recommendations for follow-up and Education Related to Potential Risks and Complications Due to Impairment/Illness/Injury  Evaluation of Education: Verbalizes understanding, Demonstrates with assistance and Needs further instruction    ASSESSMENT/PLAN:  Activity Tolerance:  Patient tolerance of  treatment: fair. Assessment/Plan: Patient progressing toward established goals. Continues to require skilled care of licensed speech pathologist to progress toward achievement of established goals and plan of care. .     Plan for Next Session: Oral care;  [address]

## 2020-10-21 NOTE — CARE COORDINATION
10/21/20, 1:32 PM EDT    DISCHARGE PLANNING EVALUATION      SW was asked to speak to the daughter by the nurse as she had called in. ERIN spoke to Victor Manuel, one of his daughters, by phone. SW answered all her questions regarding the rehab unit, why he can't go, ECF and Medicare benefit. She is still not ready to commit to any ECF, (even after reading therapy notes to which she replied that is sounds like he needs rehab) even placing him on a list to secure a facility of their choice.  ERIN offered support and encouragement

## 2020-10-21 NOTE — PROGRESS NOTES
[address] [address]  INPATIENT PHYSICAL THERAPY  DAILY NOTE  STR ICU STEPDOWN TELEMETRY 4K - 4K-021-A    Time In: 0930  Time Out: 1019  Timed Code Treatment Minutes: 45 Minutes  Minutes: 52   RN present to pull merritt catheter and apply new external catheter prior to mobility. Date: 10/21/2020  Patient Name: Gerber Tejada,  Gender:  male        MRN: [id number]  : 1940  ([de-identified] y.o.)     Referring Practitioner: DELGADO Evans  Diagnosis: respiratory failure with hypoxia  Additional Pertinent Hx: From hospitalist progress note:  Lucy Mcneill [de-identified]year-old white male who presented to [facility] on 10/12/2020 with complaints of shortness of breath, fever, nausea vomiting. He has a past medical history of reformed smoker daily, CAD placement in ,hypertension, esophageal cancer, colon cancer, wade's syndrome, nephrolithiasis. Per report patient presented to the emergency department on 10/11/2020 for evaluation of shortness of breath, fatigue, fever, nausea and vomiting. He was accompanied by his wife and daughter. Daughter states that he began having symptoms on Friday and had increased difficulty breathing with fevers T-max 102.0. He was also noted to have difficulty ambulating and altered mental status. Influenza a and COVID were negative. While in the emergency department patient did have escalating oxygen requirements and was placed on BiPAP. Hypotension was not improved with fluid bolusing. He did require levo fed drip to be started and intubation. He was admitted to the ICU. PCR did reveal patient was positive for Legionella pneumonia. Patient was started on Levaquin. Patient does have positive history for DVT and PE but per wife's report anticoagulation had been on hold secondary to GI bleed.   Patient does follow with Dr. Suzette Harley heme-onc     Prior Level of Function:  Lives With: Spouse  Type of Home: House  Home Layout: One level  Home Access: (wife 26.42    ASSESSMENT:  Assessment: Patient progressing toward established goals. Activity Tolerance:  Patient tolerance of  treatment: good. Pt tolerated session well. Pt demos decreased endurance, strength, endurance, balance, and will benefit from cont PT at this time     Equipment Recommendations:Equipment Needed: No  Discharge Recommendations:  [address], IP Rehab, Continue to assess pending progress    Plan: Times per week: 5x GM  Times per day: Daily  Current Treatment Recommendations: Strengthening, Transfer Training, Endurance Training, Patient/Caregiver Education & Training, Home Exercise Program, Functional Mobility Training, Safety Education & Training, Gait Training    Patient Education  Patient Education: Plan of Care, [employer] Mobility, Transfers, Verbal Exercise Instruction    Goals:  Patient goals : go home with wife  Short term goals  Time Frame for Short term goals: by hospital discharge  Short term goal 1: bed mobility with min assist for ease of bed mobility in discharge environment  Short term goal 2: Supine to/from sit with min assist for ease of bed mobility in discharge environment  Short term goal 3: Sit to/from stand with mod assist x 1 for ease of transfers in discharge environment  Short term goal 4: Ambulate 30' with 2 WW and CGA for ambulation in discharge environment  Short term goal 5: Ascend/descend 2 steps with min assist for entry into home. Following session, patient left in safe position with all fall risk precautions in place.

## 2020-10-21 NOTE — PROGRESS NOTES
Surg patient seen in consult dictated patient admitted with pneumonia was intubated for approximately a week now has dysphagia and failed swallowing eval unfortunately patient has had an esophagectomy with gastric pull-through patient also has had a colon resection in the past amazingly esophagectomy was in 2002 at the [facility] clinic surgery was consulted for placement of a J-tube discussed procedure with patient that it would require a general anesthetic and surgical procedure he voices understanding and would like to proceed discussed surgery with wife via the phone

## 2020-10-21 NOTE — PLAN OF CARE
Problem: Nutrition  Goal: Optimal nutrition therapy  10/21/2020 1312 by Lexy Palomares RD, LD  Outcome: Ongoing   Nutrition Problem #1: Moderate malnutrition, In context of acute illness or injury  Intervention: Food and/or Nutrient Delivery: (Start TF when J-tube able to be placed.)  Nutritional Goals: TF to meet atleast % estimated nutritional needs until appropriate to transition to po feeds.

## 2020-10-22 NOTE — PLAN OF CARE
Problem: Impaired respiratory status  Goal: Clear lung sounds  Outcome: Ongoing  Note:  Patient lung sounds are considered normal for their current lung condition. No signs of distress noted.  Current treatment regimen appropriate
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
## 2020-10-22 NOTE — PROGRESS NOTES
[address] [place] THER[facility]  [facility] ICU STEPDOWN TELEMETRY 4K  DAILY NOTE    TIME   SLP Individual Minutes  Time In: 7170  Time Out: 1003  Minutes: 16  Timed Code Treatment Minutes: 0 Minutes       Date: 10/22/2020  Patient Name: Kasie Stiles      CSN: [id number]   : 1940  ([de-identified] y.o.)  Gender: male   Referring Physician:  LARRY Villa - CNP  Diagnosis: Respiratory failure with hypoxia  Secondary Diagnosis: Dysphagia   Precautions: Aspiration   Current Diet: NPO - no means of nutrition at this time; planning J-tube 10/22   Swallowing Strategies: N/a -NPO  Date of Last MBS: 10/19    Pain:  No pain reported. Subjective:  Patient seen at bedside; alert and very pleasant. Patient with intermittent confusion this date re: recent events/medical POC and time of day. MARNI Hermosillo notified. MARNI Hermosillo reports; \"plan for PEG tube today @ 330. \"   No family present. Short-Term Goals:  SHORT TERM GOAL #1:  Goal 1: Patient will tolerate limited PO trials w/ ST only with no overt s/s of aspiraiton/penetration to demosntrate readiness for repeat instrumental (MBS vs FEES)  INTERVENTIONS: DNT secondary to focus on dysphagia education, oral care and OME. Patient and wife verbalized continued plan for PEG tube Thursday 10/22    SHORT TERM GOAL #2:  Goal 2: Pt will complete OME and pharyngeal strengthening exercises 10x each to improve laryngeal strength and airway protection. INTERVENTIONS: ST reviewed rational of OME in relation to patient with high level of medical complexity at this time. Patient with independent use of oral suctioning (reduced frequency compared to previous dates) throughout session d/t poor secretion management. Patient with ongoing very severe hoarse/breathy vocal quality, intermittent periods of aphonia.      OME: ST provided reviewed OME handout; encouraging OME to be completed at least x3 per day; ST also provided education that patient could potentially benefit from vital stimulation therapy. ST to obtain vital stimulation order as clinically appropriate following PEG tube placement. Patient indep stated \"I just got done doing those exercises while I was up in the chair this morning. I think I did them at least x3 times yesterday. \"      -Lingual lateralization: x10 fair success, improved ROM   -Lingual protrusion and hold: x10 fair success, reduced lingual strength noted  -Lingual elevation and depression: x10 fair success, reduced ROM and coordination noted  -Lingual circles: x10 fair-good success, each direction-reduced coordination upon rotation to left     -Effortful swallows: x4 attempts; POOR success, patient attempting to \"swallow with mouth open\" initially; provided verbal cues patient able to initiate successful swallows x1 out of 4 attempts     SHORT TERM GOAL #3:  Goal 3: Family/staff will demonstrate understanding of oral care measures and complete 2-3x daily to decrease amount of oral bacteria given NPO status. INTERVENTIONS: ST provided education re: oral care routine, supplied provided. ST and patient completed oral care utilizing hydrogen peroxide and water solution + toothet as well as nonalcoholic mouth wash + water + toothet ; patient with fair-good tolerance overall. Patient continued to require oral suctioning to remove excessive secretions. ST encouraged continued oral care at least every 2 hours. Patient verbalized understanding. ST also encouraged RN to continued oral care measures. SHORT TERM GOAL #4:  Goal 4:  Will monitor for appropriateness to complete repeat instrumental assessment, per ST recommendations only (MBS vs FEEs)  INTERVENTIONS: DNT secondary to focus on other goals; not appropriate at this time; plan for PEG tube this afternoon at 330      Long-Term Goals: No LTGs due to short ELOS           EDUCATION:  Learner: Patient and Significant Other  Education:  Reviewed results and recommendations of this evaluation, Reviewed diet and strategies, Reviewed signs, symptoms and risks of aspiration, Reviewed ST goals and Plan of Care, Reviewed recommendations for follow-up and Education Related to Potential Risks and Complications Due to Impairment/Illness/Injury  Evaluation of Education: Verbalizes understanding, Demonstrates with assistance and Needs further instruction    ASSESSMENT/PLAN:  Activity Tolerance:  Patient tolerance of  treatment: fair. Assessment/Plan: Patient progressing toward established goals. Continues to require skilled care of licensed speech pathologist to progress toward achievement of established goals and plan of care. .     Plan for Next Session: Oral care;  2000 KSENIA Michael

## 2020-10-22 NOTE — CARE COORDINATION
10/22/20, 2:00 PM EDT    DISCHARGE PLANNING EVALUATION      SW spoke to patient and his wife who was in the room. We discussed the final plan. He wants to return home with his wife and HH. SW advised that he could be discharged as soon as his tube feeding is at [facility] rate\" which could be as early as tomorrow. She was surprised and states he is not walking well. SW advised that this is why we were suggesting a short rehab stay. They still do not want this.  They prefer [facility], referral was made, message was left for [facility]

## 2020-10-22 NOTE — PLAN OF CARE
Problem: Nutrition  Goal: Optimal nutrition therapy  Outcome: Ongoing  Nutrition Problem #1: Moderate malnutrition, In context of acute illness or injury  Intervention: Food and/or Nutrient Delivery: Start Tube Feeding  Nutritional Goals: TF to meet atleast % estimated nutritional needs until appropriate to transition to po feeds.

## 2020-10-22 NOTE — PROGRESS NOTES
failed MBS 10/19, u/a place NGT     Wounds:  (stage II wound(location  not documented), stage 1 perineum nonblanchable redness surrounding perirectum)       Current Nutrition Therapies:    Current Tube Feeding (TF) Orders:  · Feeding Route: (J tube - plan placement 10/22)  · Formula: (Glucerna 1.2 goal of 70 ml/hr)  · Schedule: Continuous  · Water Flushes: 110ml free water flush every 6 hrs if no IVF  · Current TF & Flush Orders Provides: waiting on J-tube placement for TF to start  · Goal TF & Flush Orders Provides: 2016 kcals, 101 gm protein, 192 gm CHO, 27 gm fiber, 1352 ml free water (1792 ml with flushes)      Anthropometric Measures:  · Height: 5' 11\" (180.3 cm)  · Current Body Weight: 156 lb 8 oz (71 kg)(10/22, +1 edema)   · Admission Body Weight: 170 lb 3.1 oz (77.2 kg)(10/12 +1 edema)    · Usual Body Weight: 164 lb 10.9 oz (74.7 kg)(10/14 +2 edema)     · Ideal Body Weight: 172 lbs;      · BMI: 21.8  · BMI Categories: Underweight (BMI less than 22) age over 72       Nutrition Diagnosis:   · Moderate malnutrition, In context of acute illness or injury related to swallowing difficulty, impaired respiratory function as evidenced by NPO or clear liquid status due to medical condition, mild loss of subcutaneous fat, mild muscle loss      Nutrition Interventions:   Food and/or Nutrient Delivery:  Start Tube Feeding  Nutrition Education/Counseling:  No recommendation at this time   Coordination of Nutrition Care:  Continued Inpatient Monitoring    Goals:  TF to meet atleast % estimated nutritional needs until appropriate to transition to po feeds.        Nutrition Monitoring and Evaluation:   Behavioral-Environmental Outcomes:  Knowledge or Skill   Food/Nutrient Intake Outcomes:  Enteral Nutrition Intake/Tolerance  Physical Signs/Symptoms Outcomes:  Biochemical Data, Chewing or Swallowing, GI Status, Fluid Status or Edema, Hemodynamic Status, Skin, Weight, Nutrition Focused Physical Findings     Discharge Planning:     Too soon to determine     Electronically signed by Rody Greenberg RD, LD on 10/22/20 at 1:13 PM EDT    Contact: [phone]

## 2020-10-22 NOTE — PLAN OF CARE
Problem: Impaired respiratory status  Goal: Clear lung sounds  10/21/2020 2106 by Larry Olmos RCP  Outcome: Ongoing     Will continue treatments as ordered to improve lung aeration.

## 2020-10-22 NOTE — PROGRESS NOTES
[facility] PHYSICAL THERAPY  DAILY NOTE  [facility] ICU STEPDOWN TELEMETRY 4K - 4K-21/021-A    Time In: 7577  Time Out: 1055  Timed Code Treatment Minutes: 32 Minutes  Minutes: 32          Date: 10/22/2020  Patient Name: Sigifredo Russell,  Gender:  male        MRN: [id number]  : 1940  ([de-identified] y.o.)     Referring Practitioner: DELGADO Del Toro  Diagnosis: respiratory failure with hypoxia  Additional Pertinent Hx: From hospitalist progress note:  Nj Painting [de-identified]year-old white male who presented to [facility] on 10/12/2020 with complaints of shortness of breath, fever, nausea vomiting. He has a past medical history of reformed smoker daily, CAD placement in ,hypertension, esophageal cancer, colon cancer, wade's syndrome, nephrolithiasis. Per report patient presented to the emergency department on 10/11/2020 for evaluation of shortness of breath, fatigue, fever, nausea and vomiting. He was accompanied by his wife and daughter. Daughter states that he began having symptoms on Friday and had increased difficulty breathing with fevers T-max 102.0. He was also noted to have difficulty ambulating and altered mental status. Influenza a and COVID were negative. While in the emergency department patient did have escalating oxygen requirements and was placed on BiPAP. Hypotension was not improved with fluid bolusing. He did require levo fed drip to be started and intubation. He was admitted to the ICU. PCR did reveal patient was positive for Legionella pneumonia. Patient was started on Levaquin. Patient does have positive history for DVT and PE but per wife's report anticoagulation had been on hold secondary to GI bleed.   Patient does follow with Dr. Jenni Chao heme-onc     Prior Level of Function:  Lives With: Spouse  Type of Home: House  Home Layout: One level  Home Access: (wife states that after the pt's prior hip procedure he was able to navigate the steps while using a walker)  Entrance Stairs - Number of Steps: 2  Home Equipment: Rolling walker, Cane   Bathroom Shower/Tub: Tub/Shower unit(has been sponge bathing only)  Bathroom Toilet: Standard    ADL Assistance: Independent  Homemaking Assistance: Needs assistance(wife completes all)  Ambulation Assistance: Independent  Transfer Assistance: Independent    Restrictions/Precautions:  Restrictions/Precautions: Fall Risk, General Precautions    SUBJECTIVE: RN approved PT session, pt in supine upon entry and was agreeable to PT interventions. Pt notes that he is tried from OT session earlier this AM but was agreeable to trial gait. Post session, pt in supine with all needs in reach. Bed alarm on. RN aware. PAIN: denies pain    OBJECTIVE:  Bed Mobility:  Rolling to Right: Minimal Assistance, with head of bed raised, with verbal cues , with increased time for completion   Supine to Sit: Minimal Assistance, with head of bed raised, with verbal cues , with increased time for completion  Sit to Supine: Moderate Assistance, with head of bed raised, with verbal cues , with increased time for completion, assist for LE mgmt     Transfers:  Sit to Stand: Moderate Assistance, with increased time for completion, cues for hand placement, to/from chair with arms  Stand to [address] [facility], with increased time for completion, cues for hand placement, to/from chair with arms, poor eccentric control    Ambulation:  [address], with cues for safety, with verbal cues , with increased time for completion  Distance: 8ft  Surface: Level Tile  Device:Rolling Walker  Gait Deviations:   Forward Flexed Posture, Slow Анна, Decreased Step Length Bilaterally, Decreased Weight Shift Bilaterally and flexed trunk, decreased gait speed, unequal step lengths  Cueing for improved postural awareness, and 2WW mgmt    Balance:  Static Standing Balance: Stand By Assistance, Contact Guard Assistance  Dynamic Standing Balance: Stand By Assistance, Contact Guard Assistance    Exercise:  Patient was guided in 1 set(s) 10 reps of exercise to both lower extremities. Ankle pumps, Hip abduction/adduction, Seated marches and Long arc quads. Exercises were completed for increased independence with functional mobility. Discussed completing HEP 3x/day while in the chair. Pt education provided regarding the importance of mobility during admission. Pt encouraged to ambulate 3x/day with staff and sit in bedside chair for all meals. Functional Outcome Measures: Completed  AM-PAC Inpatient Mobility without Stair Climbing Raw Score : 15  AM-PAC Inpatient without Stair Climbing T-Scale Score : 43.03    ASSESSMENT:  Assessment: Patient progressing toward established goals. Activity Tolerance:  Patient tolerance of  treatment: good. Equipment Recommendations:Equipment Needed: No  Discharge Recommendations:  Subacute/Skilled Nursing Facility, IP Rehab, Continue to assess pending progress    Plan: Times per week: 5x GM  Times per day: Daily  Current Treatment Recommendations: Strengthening, Transfer Training, Endurance Training, Patient/Caregiver Education & Training, Home Exercise Program, Functional Mobility Training, Safety Education & Training, Gait Training    Patient Education  Patient Education: Home Exercise Program, [employer] Mobility, Transfers, Gait    Goals:  Patient goals : go home with wife  Short term goals  Time Frame for Short term goals: by hospital discharge  Short term goal 1: bed mobility with min assist for ease of bed mobility in discharge environment  Short term goal 2: Supine to/from sit with min assist for ease of bed mobility in discharge environment  Short term goal 3: Sit to/from stand with mod assist x 1 for ease of transfers in discharge environment  Short term goal 4: Ambulate 30' with 2 WW and CGA for ambulation in discharge environment  Short term goal 5: Ascend/descend 2 steps with min assist for entry into home. Following session, patient left in safe position with all fall risk precautions in place.

## 2020-10-22 NOTE — PROGRESS NOTES
of fluid secondary to sepsis. .     10/19: Patient on IV Lasix 20 mg twice daily started on 10/18. Is also receiving D5W at a rate of 100 cc/h due to hypoglycemia    -Speech therapy will evaluate the patient in order to advance diet. Will need to move D5W as patient is also receiving Lasix to remove fluid in his lungs    -Diffuse crackles heard throughout the lungs on exam today  -Chest x-ray tomorrow stop D5-continue to check intake and output and daily weight as well as electrolytes     10/20: Patient has lost 4 pounds since yesterday, I/Os negative for 426.8 cc      10/21: Patient has further lost 6 pounds since yesterday is down to 145 pounds. Has lost 1051 cc net fluid as per I/Os     10/22: Patient has gained 11 pounds since yesterday. We will remeasure weight as this is not likely accurate. He is net -1321.8 cc as per I/Os     # History of Chronic Macrocytic Anemia     10/19: Follows Dr. Anjelica Machado. As per chart review patient apparently had recent blood transfusion and iron infusions    -H&H 13.6/41.8 today, stable     10/20: -H&H today is 12.8/39. 6. Stable     10/21: -H&H today not drawn this morning, orders have been placed     10/22: -Last H&H 12.2/38. 1. Stable. # Non-Insulin Dependent Diabetes Mellitus Type 2     10/19: While in the ICU, Lantus and sliding scale insulin was added. Latest A1c is 6.3. Lantus was increased on 10/14 for hyperglycemia     -Patient receiving D5W currently due to some hypoglycemia experiences last night. He is currently on Lantus 30 units at night as well as a medium dose corrective sliding scale insulin    -Patient is currently n.p.o. we will hold Lantus completely discontinue medium scale sliding scale monitor blood sugars every 6 hours and at bedtime. Use of glucagon IM for hyperglycemia    # History of DVT and PE     10/19: Patient was apparently on Plavix and Coumadin.   As per heme-onc this was stopped secondary to an acute GI bleed.    -We will continue to hold Plavix and Coumadin as the patient has a follow-up appointment with heme-onc in 2 weeks    # History of COPD    10/19: Stable, on RA     # History of Esophageal Cancer   -S/p esophagectomy    # History of Colon Cancer    -S/p colon resection    # Septic Shock - Resolved    -Required Levophed drip while in the ICU. Has been weaned off.   -Blood pressures remain stable    # Acute Hypoxic Respiratory Failure likely 2/2 Legionella Pneumonia s/p extubation 10/18 - Resolved   -As stated patient was extubated to room air on 10/18   -Saturating well so far on RA     # Disposition Planning   -J Tube placement today. Likely discharge tomorrow if no adverse events overnight. Home Health care with PT/OT, Aide and Tube Feedings    Chief Complaint: ICU [facility] Course: Liya Alas [de-identified]year-old white male who presented to [facility] on 10/12/2020 with complaints of shortness of breath, fever, nausea vomiting. He has a past medical history of reformed smoker daily, CAD placement in 2015,hypertension, esophageal cancer, colon cancer, wade's syndrome, nephrolithiasis. Per report patient presented to the emergency department on 10/11/2020 for evaluation of shortness of breath, fatigue, fever, nausea and vomiting. He was accompanied by his wife and daughter. Daughter states that he began having symptoms on Friday and had increased difficulty breathing with fevers T-max 102.0. He was also noted to have difficulty ambulating and altered mental status. Influenza a and COVID were negative. While in the emergency department patient did have escalating oxygen requirements and was placed on BiPAP. Hypotension was not improved with fluid bolusing. He did require levo fed drip to be started and intubation. He was admitted to the ICU. PCR did reveal patient was positive for Legionella pneumonia. Patient was started on Levaquin.   Patient does have positive history for DVT and PE but per wife's report anticoagulation had been on hold secondary to GI bleed. Patient does follow with Dr. Vandana Murguia heme-onc    10/18: Patient extubated to room air, transferred to [facility] ICU stepdown unit    10/19: Patient is being kept n.p.o., speech therapy performed a modified barium swallow which revealed the patient has some aspiration concerns. After discussing with the family they are not willing to do a NG tube with feedings or a PEG tube. Palliative care has been consulted. Otherwise clinically he is doing much better, on ninth day of Levaquin. 10/20: Nasogastric tube insertion was attempted x3, with no success. Patient is agreeable to having a PEG tube placement as he has had this in the past.  GI and general surgery were consulted and are recommending a J-tube placement to be done on 10/22    10/21: No change in clinical status. J-tube still scheduled for 10/22    Subjective (past 24 hours):     No acute events overnight. Patient is doing well. Will undergo J-tube placement today.     Medications:  Reviewed    Infusion Medications    sodium chloride 100 mL/hr at 10/22/20 0507    dextrose Stopped (10/19/20 1522)     Scheduled Medications    ceFAZolin  2 g Intravenous On Call to OR    atenolol  100 mg Per NG tube Once    furosemide  20 mg Intravenous BID    glycopyrrolate-formoterol  2 puff Inhalation BID    [Held by provider] insulin glargine  30 Units Subcutaneous Nightly    insulin lispro  0-12 Units Subcutaneous Q6H    sodium chloride flush  10 mL Intravenous 2 times per day    isosorbide mononitrate  30 mg Oral Daily    tamsulosin  0.4 mg Oral Nightly    chlorhexidine  15 mL Mouth/Throat BID    pantoprazole  40 mg Intravenous BID    atenolol  100 mg Per NG tube Daily    [Held by provider] clopidogrel  75 mg Per NG tube Daily    finasteride  5 mg Per NG tube Daily     PRN Meds: metoprolol, glucose, dextrose, glucagon (rDNA), dextrose, sodium chloride flush, acetaminophen **OR** acetaminophen, polyethylene glycol, promethazine **OR** ondansetron, fentanNYL, albuterol sulfate HFA **AND** ipratropium **AND** [CANCELED] MDI Treatment      Intake/Output Summary (Last 24 hours) at 10/22/2020 1206  Last data filed at 10/22/2020 0300  Gross per 24 hour   Intake 695 ml   Output 700 ml   Net -5 ml       Diet:  Diet NPO, After Midnight    Exam:  BP (!) 121/54   Pulse 94   Temp 97.7 °F (36.5 °C) (Oral)   Resp 18   Ht 5' 11\" (1.803 m)   Wt 156 lb 8 oz (71 kg)   SpO2 92%   BMI 21.83 kg/m²     General appearance: Chronically ill appearing  male. Suctioning yellow mucous and phlegm via nasogastric suctioning  HEENT: Pupils equal, round, and reactive to light. Conjunctivae/corneas clear. Neck: Supple, with full range of motion. No jugular venous distention. Trachea midline. S/p extubation 10/18  Respiratory:  Crackles still apparent on auscultation, improved since yesterday 10/19 exam however  Cardiovascular: Regular rate and rhythm with normal S1/S2 without murmurs, rubs or gallops. Abdomen: Soft, non-tender, non-distended with normal bowel sounds. Musculoskeletal: passive and active ROM x 4 extremities. Digit amputations of the right hand noted  Skin: Pale complexion  Neurologic:  Neurovascularly intact without any focal sensory/motor deficits.  Cranial nerves: II-XII intact, grossly non-focal.  Psychiatric: Alert and oriented, thought content appropriate, normal insight  Capillary Refill: Brisk,< 3 seconds   Peripheral Pulses: +2 palpable, equal bilaterally     Labs:   Recent Labs     10/20/20  0520 10/21/20  0937 10/22/20  0617   WBC 11.8* 16.4* 13.2*   HGB 12.8* 12.2* 13.0*   HCT 39.6* 38.1* 41.3*    357 373     Recent Labs     10/20/20  0521 10/21/20  0937 10/22/20  0617    142 140   K 3.7 4.3 4.0   CL 96* 100 102   CO2 32 31 27   BUN 31* 26* 20   CREATININE 0.8 0.6 0.6   CALCIUM 8.0* 8.0* 8.3*     No results for input(s): AST, ALT, BILIDIR, BILITOT, ALKPHOS in the last 72 hours. Recent Labs     10/20/20  0521 10/21/20  0539 10/22/20  0617   INR 1.47* 1.52* 1.46*     No results for input(s): CKTOTAL, TROPONINI in the last 72 hours. Microbiology:      Urinalysis:      Lab Results   Component Value Date    NITRU NEGATIVE 10/12/2020    WBCUA 0-2 10/12/2020    BACTERIA FEW 10/12/2020    RBCUA 3-5 10/12/2020    BLOODU MODERATE 10/12/2020    SPECGRAV 1.022 10/12/2020    GLUCOSEU 100 03/28/2019       Radiology:  FL MODIFIED BARIUM SWALLOW W VIDEO   Final Result   1. Laryngeal penetration and aspiration of honey thick, nectar thick and thin barium. 2. Additional recommendations from the speech therapist will follow. **This report has been created using voice recognition software. It may contain minor errors which are inherent in voice recognition technology. **      Final report electronically signed by Dr. Tabitha Skinner on 10/19/2020 12:54 PM      XR CHEST PORTABLE   Final Result   Impression:   1. Mild progression in the bilateral lower lobe airspace disease. Increasing left and possible new right pleural fluid collection      This document has been electronically signed by: Pam Sheikh MD on    10/17/2020 04:41 AM         XR CHEST PORTABLE   Final Result   Impression:      Stable bibasilar consolidation and pleural fluid. This document has been electronically signed by: Roshni Ahn MD on    10/15/2020 02:50 AM         XR CHEST PORTABLE   Final Result   Impression:      Worsening right and stable left basilar consolidation and pleural fluid. This document has been electronically signed by: Roshni Ahn MD on    10/14/2020 02:12 AM         XR CHEST PORTABLE   Final Result   Impression:   Tubes and lines as noted. Bilateral consolidations and pleural effusions, moderately improved. Significant improvement in central pulmonary edema/CHF. Moderate sized hiatal hernia is suspected.       This document has been electronically signed by: Delbra Bloch Eleanor Muhammad MD on    10/12/2020 02:55 AM         CT CHEST WO CONTRAST   Final Result   Right lower lobe dense consolidation with involvement of the posterior    aspect of the upper and middle lobe. Findings suspicious for pneumonia. Left base atelectasis or pneumonia. Small hiatal hernia. Deep mesenteric calcified mass may reflect chronic    sclerosing mesenteritis versus carcinoid. Left kidney cyst.  Possible cholelithiasis. This document has been electronically signed by: Ralph Hollingsworth MD on    10/12/2020 01:01 AM      All CT scans at this facility use dose modulation, iterative    reconstruction, and/or weight-based   dosing when appropriate to reduce radiation dose to as low as reasonably    achievable. XR CHEST PORTABLE   Final Result   Central venous catheter tip is at the cavoatrial junction            **This report has been created using voice recognition software. It may contain minor errors which are inherent in voice recognition technology. **      Final report electronically signed by Dr. Corona Crandall on 10/11/2020 6:40 PM      XR CHEST PORTABLE   Final Result   Right lower lobe airspace consolidation. In addition interstitial/pulmonary edema bilaterally. **This report has been created using voice recognition software. It may contain minor errors which are inherent in voice recognition technology. **      Final report electronically signed by Dr. Corona Crandall on 10/11/2020 1:03 PM        DVT prophylaxis: [] Lovenox                                 [x] SCDs                                  [] SQ Heparin                                 [] Encourage ambulation           [] Already on Anticoagulation     Code Status: Full Code    PT/OT Eval Status: Consults placed    Tele:   [x] yes              [] no    Electronically signed by Hollis Kaiser MD on 10/22/2020 at 12:06 PM

## 2020-10-22 NOTE — PROGRESS NOTES
[facility] ICU STEPDOWN TELEMETRY 4K  EVALUATION    Time:   Time In: 2162  Time Out: 9298  Timed Code Treatment Minutes: 8 Minutes  Minutes: 21          Date: 10/22/2020  Patient Name: Carlota Argueta,   Gender: male      MRN: [id number]  : 1940  ([de-identified] y.o.)  Referring Practitioner: LARRY Rojas CNP  Diagnosis: respiratory failure with hypoxia  Additional Pertinent Hx: Per H&P:Deniz presented to [facility] ER 10/11/2020 for evaluation of shortness of breath, fatigue, fever, nausea and vomiting. Daughter states that patient began to have the symptoms on Friday, had increased difficulty of breathing, fever up to 102.0, difficulty getting out of bed, altered mental status with decreased interaction, and multiple episodes of nonbloody nonbilious emesis yesterday. Pt intubated on 10/11, extubated on 10/18. Restrictions/Precautions:  Restrictions/Precautions: Fall Risk, General Precautions    Subjective  Chart Reviewed: Yes, Orders, Progress Notes, History and Physical  Patient assessed for rehabilitation services?: Yes  Family / Caregiver Present: No    Subjective: Pt supine in bed upon arrival, agreeable to OT session. Pt scheduled for Gtube placement this afternoon.     Pain:  Pain Assessment  Patient Currently in Pain: Denies    Social/Functional History:  Lives With: Spouse  Type of Home: House  Home Layout: One level  Home Access: (wife states that after the pt's prior hip procedure he was able to navigate the steps while using a walker)  Entrance Stairs - Number of Steps: 2  Home Equipment: Rolling walker, Cane   Bathroom Shower/Tub: Tub/Shower unit(has been sponge bathing only)  Bathroom Toilet: Standard       ADL Assistance: Independent  Homemaking Assistance: Needs assistance(wife completes all)  Ambulation Assistance: Independent  Transfer Assistance: Independent               VISION:WFL    HEARING:  [place]    COGNITION: Decreased Safety ADLs, transfers, and functional mobility compared to PLOF. Pt will continue to benefit from OT services to improve independence with these tasks, in addition to overall strength/endurance to facilitate return to PLOF. Performance deficits / Impairments: Decreased functional mobility , Decreased ADL status, Decreased ROM, Decreased strength, Decreased endurance, Decreased balance  Prognosis: Fair  REQUIRES OT FOLLOW UP: Yes  Decision Making: High Complexity    Treatment Initiated: Treatment and education initiated within context of evaluation. Evaluation time included review of current medical information, gathering information related to past medical, social and functional history, completion of standardized testing, formal and informal observation of tasks, assessment of data and development of plan of care and goals. Treatment time included skilled education and facilitation of tasks to increase safety and independence with ADL's for improved functional independence and quality of life. Discharge Recommendations:  Subacute/Skilled Nursing Facility(not safe to return home at this time)    Patient Education:  OT Education: OT Role, Plan of Care, Home Exercise Program, Transfer Training(safety with mobility)    Equipment Recommendations:  Equipment Needed: No  Other: defer to next level of care    Plan:  Times per week: 5x  Current Treatment Recommendations: Strengthening, ROM, Balance Training, Functional Mobility Training, Endurance Training, Patient/Caregiver Education & Training, Self-Care / ADL. See long-term goal time frame for expected duration of plan of care. If no long-term goals established, a short length of stay is anticipated. Goals:     Short term goals  Time Frame for Short term goals: by discharge  Short term goal 1: Pt will increase activity tolerance for functional mobility to/from [facility] or chair with CGA in prep for toileting tasks.   Short term goal 2: Pt will complete functional transfers with CGA and <2 cues for safety in prep for [facility] transfers. Short term goal 3: Pt will complete BADL tasks with moderate assistance to increase independence with self care tasks. Short term goal 4: Pt will tolerate dynamic standing X2 minutes with unilateral release and CGA in prep for otileting tasks. Following session, patient left in safe position with all fall risk precautions in place.

## 2020-10-22 NOTE — BRIEF OP NOTE
Brief Postoperative Note      Patient: Matthew Arellano  YOB: 1940  MRN: [id number]    Date of Procedure: 10/22/2020    Pre-Op Diagnosis: DYSPHAGIA, ESOPHAGEAL CANCER History     Post-Op Diagnosis: same       Procedure(s):  PLACEMENT OF JEJUNOSTOMY TUBE    Surgeon(s):  Cristhian Filed MD    Assistant:  Resident: Kamille Hendricks DPM    Anesthesia: General    Estimated Blood Loss (mL): 10 ml    Complications: none    Specimens:       Implants:        Drains:   Gastrostomy/Enterostomy/Jejunostomy Jejunostomy LLQ 1 18 fr (Active)       [REMOVED] NG/OG/NJ/NE Tube Right mouth (Removed)   Surrounding Skin Dry; Intact 10/18/20 0000   Securement device Yes 10/18/20 0000   Status Other (Comment) 10/17/20 1145   Placement Verified by External Catheter Length 10/18/20 0000   NG/OG/NJ/NE External Measurement (cm) 65 cm 10/18/20 0000   Drainage Appearance Bile 10/17/20 1400   Tube Feeding Semi-elemental 10/18/20 0000   Tube Feeding Status Continuous 10/18/20 0000   Rate/Schedule 20 mL/hr 10/18/20 0000   Tube Feeding Intake (mL) 148 ml 10/18/20 0609   Free Water Flush (mL) 50 mL 10/17/20 1146   Residual Volume (ml) 20 ml 10/17/20 1400   Output (mL) 0 ml 10/17/20 0352       [REMOVED] Urethral Catheter Temperature probe (Removed)   $ Urethral catheter insertion $ Not inserted for procedure 10/12/20 0100   Catheter Indications Need for fluid management in critically ill patients in a critical care setting not able to be managed by other means such as BSC with hat, bedpan, urinal, condom catheter, or short term intermittent urethral catherization 10/21/20 1000   Site Assessment No urethral drainage 10/21/20 1000   Urine Color Yellow 10/21/20 1000   Urine Appearance Clear 10/21/20 1000   Output (mL) 275 mL 10/21/20 1000       [REMOVED] External Urinary Catheter (Removed)   Catheter changed  No 10/22/20 1428   Urine Color Yellow 10/22/20 1428   Urine Appearance Clear 10/22/20 1428   Urine Odor Malodorous 10/22/20 1428   Output (mL) 850 mL 10/22/20 1428   Suction- Female Only 40 mmgHg continuous 10/22/20 0400   Placement Initiated 10/21/20 2000   Skin Assessment No Injury 10/22/20 0400       Findings: see op note    Electronically signed by Mercedes Escamilla MD on 10/22/2020 at 4:53 PM

## 2020-10-22 NOTE — CARE COORDINATION
Barriers to Discharge: [place] today; if TF Glucerna 1.2 at goal 70 ml/hr, likely home in am  PCP: Robbie Persaud MD  Readmission Risk Score: 28%  Patient Goals/Plan/Treatment Preferences: lives with spouse, has walker; therapy following, has nebulizer, current with [facility], no IPR beds, family resistant to ECF despite therapy recommends it (not safe for home); will need [facility] for JT teaching (nsg, aide, therapy, order in Epic and client prefers [facility] for continuous TF); collaborated with Laura Cruz, [facility] Griselda Orozco, [place], [address] [place] [address]  Electronically signed by Edinson Marin RN on 10/22/2020 at 2:58 PM

## 2020-10-23 NOTE — CARE COORDINATION
Update: spoke with wife; she is okay for SW to check for male beds at [facility], but has not given consent until she discusses with her daughter who is LPN; she will let staff know; collaborated with Codi Delgado RN  Electronically signed by Eugenia Man RN on 10/23/2020 at 11:53 AM    Update: if family decides HH/HIS, need Attending to sign enteral TF orders (PCP to follow HH/HIS order); collaborated with Nawaf Marsh RN, Blanca Ang but hopeful for ECF for Rehab and TF education; collaborated with [facility], Karo Campbell  Electronically signed by Eugenia Man RN on 10/23/2020 at 1:19 PM    Update: received call from [facility] that daughter Kenyetta Reid who is LPN wants Physiatry consult; spoke with Attending who will place Physiatry consult for possible IP Rehab; may not qualify; monitor  Electronically signed by Eugenia Man RN on 10/23/2020 at 3:30 PM

## 2020-10-23 NOTE — CARE COORDINATION
10/23/20, 3:37 PM EDT    DISCHARGE PLANNING EVALUATION      SW spoke to the patient's wife by phone. ERIN informed her that [facility] rehab unit called and wanted therapy notes for a possible referral there. ERIN advised that her daughter had called and asked them to review. She gave consent for these notes to be faxed. This was done.

## 2020-10-23 NOTE — FLOWSHEET NOTE
[facility]  PHYSICAL THERAPY MISSED TREATMENT NOTE  ACUTE CARE  [facility] ICU STEPDOWN TELEMETRY 4K              Missed Treatment  Second attempt, pt asleep in bed, wife present- This PTA spoke with wife, wife requesting to let pt rest as he is not feeling well.will check back later if able or per POC.

## 2020-10-23 NOTE — PLAN OF CARE
Problem: Falls - Risk of:  Goal: Will remain free from falls  Description: Will remain free from falls  Outcome: Ongoing  Note: Patient in bed, call light and items in reach, bed alarm on. Goal: Absence of physical injury  Description: Absence of physical injury  Outcome: Ongoing  Note: No falls or injury this shift. Problem: Airway Clearance - Ineffective:  Goal: Ability to maintain a clear airway will improve  Description: Ability to maintain a clear airway will improve  Outcome: Ongoing  Note: Patient coughing productively     Problem: Fluid Volume - Imbalance:  Goal: Absence of imbalanced fluid volume signs and symptoms  Description: Absence of imbalanced fluid volume signs and symptoms  Outcome: Ongoing  Note: On IV fluids, adequate urine output     Problem: Skin Integrity - Impaired:  Goal: Absence of new skin breakdown  Description: Absence of new skin breakdown  Outcome: Ongoing  Note: Multiple injuries present on admission, Q2 turn.       Problem: Respiratory  Goal: No pulmonary complications  Outcome: Ongoing  Goal: O2 Sat > 90%  Outcome: Ongoing     Problem: Skin Integrity/Risk  Goal: No skin breakdown during hospitalization  Outcome: Ongoing  Note: Q2 turn, incontinence care  Goal: Wound healing  Outcome: Ongoing     Problem: Discharge Planning:  Goal: Discharged to appropriate level of care  Description: Discharged to appropriate level of care  Outcome: Ongoing  Note: Plans to return home with home help when stable      Problem: Urinary Elimination:  Goal: Signs and symptoms of infection will decrease  Description: Signs and symptoms of infection will decrease  Outcome: Ongoing  Note: External catheter, no infection s/s  Goal: Complications related to the disease process, condition or treatment will be avoided or minimized  Description: Complications related to the disease process, condition or treatment will be avoided or minimized  Outcome: Ongoing     Problem: Skin Integrity:  Goal: Absence of new skin breakdown  Description: Absence of new skin breakdown  Outcome: Ongoing  Note: Q2 turn     Problem: Nutrition  Goal: Optimal nutrition therapy  10/22/2020 2256 by Kane Good RN  Outcome: Ongoing  10/22/2020 1313 by Amy Kohler RD, LD  Outcome: Ongoing     Problem: Impaired respiratory status  Goal: Clear lung sounds  10/22/2020 1743 by Ashlee Kaiser RCP  Outcome: Ongoing  Note:  Patient lung sounds are considered normal for their current lung condition. No signs of distress noted. Current treatment regimen appropriate        Problem: Pain:  Goal: Pain level will decrease  Description: Pain level will decrease  Outcome: Ongoing  Note: Pain Assessment: 0-10  Pain Level: 3   Patient's Stated Pain Goal: No pain   Is pain goal met at this time? Yes          Goal: Control of acute pain  Description: Control of acute pain  Outcome: Ongoing  Note: Pain Assessment: 0-10  Pain Level: 3   Patient's Stated Pain Goal: No pain   Is pain goal met at this time? Yes          Goal: Control of chronic pain  Description: Control of chronic pain  Outcome: Ongoing  Note: Pain Assessment: 0-10  Pain Level: 3   Patient's Stated Pain Goal: No pain   Is pain goal met at this time?   Yes

## 2020-10-23 NOTE — PROGRESS NOTES
[facility] [address] ICU STEPDOWN TELEMETRY 4K  Occupational Therapy  Daily Note  Time:   Time In: 0845  Time Out: 3126  Timed Code Treatment Minutes: 25 Minutes  Minutes: 25          Date: 10/23/2020  Patient Name: Matthew Arellano,   Gender: male      Room: [facility]/021-A  MRN: [id number]  : 1940  ([de-identified] y.o.)  Referring Practitioner: LARRY Bloom CNP  Diagnosis: respiratory failure with hypoxia  Additional Pertinent Hx: Per H&P:Deniz presented to [facility] ER 10/11/2020 for evaluation of shortness of breath, fatigue, fever, nausea and vomiting. Daughter states that patient began to have the symptoms on Friday, had increased difficulty of breathing, fever up to 102.0, difficulty getting out of bed, altered mental status with decreased interaction, and multiple episodes of nonbloody nonbilious emesis yesterday. Pt intubated on 10/11, extubated on 10/18. Restrictions/Precautions:  Restrictions/Precautions: Fall Risk, General Precautions    SUBJECTIVE: pt sitting in recliner sleeping. RN stated he wasn't feeling well this am but to try and get pt to work with therapy. Pt did agree to try doing something. PAIN: pt stated having pain in stomach area no number given     COGNITION: Slow Processing, Decreased Insight, Inattention, Decreased Problem Solving, Decreased Safety Awareness and Decreased Arousal    ADL:   Grooming: Contact Guard Assistance, with set-up, with verbal cues  and with increased time for completion. to wash face and lips due to dryness around lips. Pt took long time to complete task due to fatigue and sleepiness . BALANCE:  Standing Balance: Moderate Assistance, with cues for safety, with increased time for completion. from recliner atRW for about 1 min with lots of encouragement to stand longer but unable to complete     BED MOBILITY:  Not Tested    TRANSFERS:  Sit to Stand: Moderate Assistance, with increased time for completion, cues for hand placement.  from recliner   Stand to Sit: Moderate Assistance, with increased time for completion, cues for hand placement. to recliner     FUNCTIONAL MOBILITY:  Pt not able to complete mobility this am with OT to fatigued and not feeling well     ADDITIONAL ACTIVITIES:  .Patient identified a personal goal to increase UB strength and improve overall endurance so they can complete their toilet & shower transfers; skilled edu on UE strengthening and patient completed BUE strengthening exercises x10 reps x1 set this date with no resistance  in all joints and all planes. Patient tolerated in sitting , requiring long  rest breaks. Pt required moderate cues for technique. ASSESSMENT:     Activity Tolerance:  Patient tolerance of  treatment: fair. Very tired today       Discharge Recommendations: Subacute/Skilled Nursing Facility(not safe to return home at this time)   Equipment Recommendations: Equipment Needed: No  Other: defer to next level of care  Plan: Times per week: 5x  Current Treatment Recommendations: Strengthening, ROM, Balance Training, Functional Mobility Training, Endurance Training, Patient/Caregiver Education & Training, Self-Care / ADL    Patient Education  Patient Education: Home Exercise Program, Orientation and Importance of Increasing Activity    Goals  Short term goals  Time Frame for Short term goals: by discharge  Short term goal 1: Pt will increase activity tolerance for functional mobility to/from [facility] or chair with CGA in prep for toileting tasks. Short term goal 2: Pt will complete functional transfers with CGA and <2 cues for safety in prep for [facility] transfers. Short term goal 3: Pt will complete BADL tasks with moderate assistance to increase independence with self care tasks. Short term goal 4: Pt will tolerate dynamic standing X2 minutes with unilateral release and CGA in prep for otileting tasks. Following session, patient left in safe position with all fall risk precautions in place.

## 2020-10-23 NOTE — PROGRESS NOTES
General Surgery  Dr Juni Brar  Daily Progress Note      Patient:  Rice Barth      Unit/Bed:4K-21/021-A    YOB: 1940    MRN: [id number]     Acct: [de-identified]     Admit date: 10/11/2020    Subjective: sitting in chair,  appears ill today, complains of abdominal pain, s/p J tube placement. Complains of pain with flushes, tolerating tube feeds at 40ml/h, no residual. The markings on J tube concur where marked during surgery, no signs of displacement on assessment. All other ROS negative except noted in HPI      Patient Seen, Chart, Consults notes, Labs, Radiology studies reviewed. Past, Family, Social History unchanged from admission. Diet:  DIET TUBE FEED CONTINUOUS/CYCLIC NPO; Diabetic (Glucerna 1.2);  Nasogastric; Continuous; 20; 70    Medications:  Scheduled Meds:   [START ON 10/24/2020] atenolol  100 mg Per J Tube Daily    [START ON 10/24/2020] finasteride  5 mg Per J Tube Daily    furosemide  20 mg Per J Tube BID    atenolol  100 mg Per NG tube Once    glycopyrrolate-formoterol  2 puff Inhalation BID    [Held by provider] insulin glargine  30 Units Subcutaneous Nightly    insulin lispro  0-12 Units Subcutaneous Q6H    sodium chloride flush  10 mL Intravenous 2 times per day    isosorbide mononitrate  30 mg Oral Daily    tamsulosin  0.4 mg Oral Nightly    chlorhexidine  15 mL Mouth/Throat BID    pantoprazole  40 mg Intravenous BID    [Held by provider] clopidogrel  75 mg Per NG tube Daily     Continuous Infusions:   sodium chloride 40 mL/hr at 10/23/20 0035    dextrose Stopped (10/19/20 1522)     PRN Meds:HYDROmorphone, ondansetron, metoprolol, glucose, dextrose, glucagon (rDNA), dextrose, sodium chloride flush, [DISCONTINUED] acetaminophen **OR** acetaminophen, fentanNYL, albuterol sulfate HFA **AND** ipratropium **AND** [CANCELED] MDI Treatment    Objective:    CBC:   Recent Labs     10/21/20  0937 10/22/20  0617 10/23/20  0856   WBC 16.4* 13.2* 20.9*   HGB 12.2* 13.0* 13.1*    373 442*     BMP:    Recent Labs     10/21/20  0937 10/22/20  0617 10/23/20  0856    140 144   K 4.3 4.0 4.2    102 102   CO2 31 27 26   BUN 26* 20 33*   CREATININE 0.6 0.6 0.9   GLUCOSE 70 81 262*     Calcium:  Recent Labs     10/23/20  0856   CALCIUM 8.3*     Ionized Calcium:No results for input(s): IONCA in the last 72 hours. Magnesium:No results for input(s): MG in the last 72 hours. Phosphorus:No results for input(s): PHOS in the last 72 hours. BNP:No results for input(s): BNP in the last 72 hours. Glucose:  Recent Labs     10/22/20  2321 10/23/20  0626 10/23/20  1202   POCGLU 125* 247* 198*     HgbA1C: No results for input(s): LABA1C in the last 72 hours. INR:   Recent Labs     10/23/20  0542   INR 1.27*     Hepatic: No results for input(s): ALKPHOS, ALT, AST, PROT, BILITOT, BILIDIR, LABALBU in the last 72 hours. Amylase and Lipase:No results for input(s): LACTA, AMYLASE in the last 72 hours. Lactic Acid: No results for input(s): LACTA in the last 72 hours. Troponin: No results for input(s): CKTOTAL, CKMB, TROPONINT in the last 72 hours. BNP: No results for input(s): BNP in the last 72 hours. Lipids: No results for input(s): CHOL, TRIG, HDL, LDLCALC in the last 72 hours. Invalid input(s): LDL  ABGs:   Lab Results   Component Value Date    PH 7.19 10/11/2020    PCO2 75 10/11/2020    PO2 98 10/11/2020    HCO3 29 10/11/2020    O2SAT 95 10/11/2020       Radiology reports as per the Radiologist  Radiology: Ct Chest Wo Contrast    Result Date: 10/12/2020  CT chest without contrast: Comparison:  CT,SR  - CT CHEST PULMONARY EMBOLISM W CONTRAST  - 10/19/2018 03:48 PM EDT FINDINGS: Lungs/pleura: Extensive consolidation of the right lower lobe with air bronchograms and areas of necrosis, with some involvement of the adjacent posterior aspect of the right upper lobe and right middle lobe. Left lower lobe consolidation or atelectasis. No pleural effusion or pneumothorax.  Upper signed by: Dana Stevenson MD on 10/12/2020 02:55 AM     Xr Chest Portable    Result Date: 10/11/2020  PROCEDURE: XR CHEST PORTABLE CLINICAL INFORMATION: Central line . COMPARISON: 11 October 2020 at 12:34 PM TECHNIQUE: Portable semiupright FINDINGS: Right internal jugular vein catheter is present courses unremarkable tip is at the cavoatrial junction. There are no other changes from earlier     Central venous catheter tip is at the cavoatrial junction **This report has been created using voice recognition software. It may contain minor errors which are inherent in voice recognition technology. ** Final report electronically signed by Dr. Toby Jacobo on 10/11/2020 6:40 PM    Xr Chest Portable    Result Date: 10/11/2020  PROCEDURE: XR CHEST PORTABLE CLINICAL INFORMATION: sob . COMPARISON: 5/21/2020 TECHNIQUE: Portable semiupright FINDINGS: Patient is severely rotated. Heart size is within normal limits. Mediastinum is not widened. Dense airspace consolidation right lower lobe. Interstitial edema bilaterally. Retrocardiac atelectasis or infiltrate. Pulmonary vessels are not congested. Right lower lobe airspace consolidation. In addition interstitial/pulmonary edema bilaterally. **This report has been created using voice recognition software. It may contain minor errors which are inherent in voice recognition technology. ** Final report electronically signed by Dr. Toby Jacobo on 10/11/2020 1:03 PM       Physical Exam:  Vitals: /60   Pulse 62   Temp 97 °F (36.1 °C) (Axillary)   Resp 18   Ht 5' 11\" (1.803 m)   Wt 154 lb 14.4 oz (70.3 kg)   SpO2 92%   BMI 21.60 kg/m²   24 hour intake/output:    Intake/Output Summary (Last 24 hours) at 10/23/2020 1325  Last data filed at 10/23/2020 0000  Gross per 24 hour   Intake 3108.29 ml   Output 1400 ml   Net 1708.29 ml     Last 3 weights:   Wt Readings from Last 3 Encounters:   10/23/20 154 lb 14.4 oz (70.3 kg)   10/01/20 164 lb 8 oz (74.6 kg)   07/23/20 157 lb 3.2 oz (71.3 kg)       General appearance - alert, ill-appearing  HEENT: soft voice  Chest - clear to auscultation, no wheezes, rales or rhonchi, symmetric air entry  Cardiovascular - normal rate and regular rhythm  Abdomen - soft, tender, nondistended, no masses or organomegaly   Surgical Incision - well approximated, no drainage noted, J tube in correct position as surgery yesterday,   Neurological - Alert and oriented  Integumentary - Skin color, texture, turgor normal. No Rashes or lesions  Musculoskeletal -weakness      DVT prophylaxis: [] Lovenox                                 [] SCDs                                 [] SQ Heparin                                 [] Encourage ambulation           [x] Already on Anticoagulation plavix                 Assessment:  S/p J tube POD#1  1. Dysphagia   2. Pneumonia improved  3. Anemia   4. HX esophagectomy, esophageal cancer     Active Problems:    Respiratory failure with hypoxia (HCC)    Sepsis ([address]    Pneumonia due to organism    Moderate malnutrition ([address]  Resolved Problems:    * No resolved hospital problems. *      Plan:    1. Analgesia and antiemetics as needed  2. Okay for tube feedings to continue   3. plavix and coumadin are on hold, hematology to evaluate   4. palliative care   5. If pain is persistent with flushes may need to consider dye study through J tube to check placemeent       Electronically signed by LARRY Garcia CNP on 10/23/2020 at 1:25 PM Patient seen and examined independently by me. Above discussed and I agree with CNP. Labs, cultures, and radiographs where available were reviewed. See orders for the updated patient care plan.     Claudine Mcguire MD, patient's tube feedings seem to be going okay however is having a lot of pain bowel sounds decreased J-tube has not moved from markings placed we will continue to monitor if any question about placement can place dye via J-tube and check abdominal film but tube feedings appear to be going well without abdominal distention  10/23/2020   6:10 PM

## 2020-10-23 NOTE — FLOWSHEET NOTE
10/23/20 1820   Provider Notification   Reason for Communication Evaluate; Review case   Provider Name Ramilajuan Galvez   Method of Communication Secure Message   Response No new orders   Notification Time [phone]   consult sent for rehab- staff not here at this time, to have case management do this on monday

## 2020-10-23 NOTE — OP NOTE
Fredi Greening  procedure to suture the tube along the serosa of the small bowel and  brought it up to the peritoneum and placed a few sutures to tack the  small bowel to the peritoneum. We had irrigated then the red rubber  tube, it irrigated very easily and there was no evidence of any leakage. We sutured the red rubber tube with two 2-0 silk sutures to the skin. We also marked the red rubber tube, so we would be able to determine if  it began to pull out. Closure was begun. Fascia was closed with #1  Vicryl sutures. Skin was closed with staples. The patient tolerated  the procedure well. IZABELLA CARRERO [facility], MD    D: 10/23/2020 7:33:52       T: 10/23/2020 7:44:18     [email or web address]  Job#: [id number]     Doc#: [id number]    CC:

## 2020-10-23 NOTE — ANESTHESIA POSTPROCEDURE EVALUATION
Department of Anesthesiology  Postprocedure Note    Patient: Farhad Ayers  MRN: [id number]  YOB: 1940  Date of evaluation: 10/22/2020  Time:  8:04 PM     Procedure Summary     Date:  10/22/20 Room / Location:  [address]    Anesthesia Start:  6908 Anesthesia Stop:  2865    Procedure:  PLACEMENT OF JEJUNOSTOMY TUBE (N/A ) Diagnosis:  (DYSPHAGIA, ESOPHAGEAL CANCER)    Surgeon: Wilmer Qiu MD Responsible Provider:  Lazaro Ramirez DO    Anesthesia Type:  general ASA Status:  4          Anesthesia Type: general    Lemuel Phase I: Lemuel Score: 8    Lemuel Phase II:      Last vitals: Reviewed and per EMR flowsheets.        Anesthesia Post Evaluation    Patient location during evaluation: PACU  Patient participation: complete - patient participated  Level of consciousness: awake and alert  Pain score: 2  Airway patency: patent  Nausea & Vomiting: no nausea and no vomiting  Complications: no  Cardiovascular status: hemodynamically stable and blood pressure returned to baseline  Respiratory status: spontaneous ventilation, room air and acceptable  Hydration status: stable

## 2020-10-23 NOTE — PROGRESS NOTES
Comprehensive Nutrition Assessment    Type and Reason for Visit:  Reassess    Nutrition Recommendations/Plan: Recommend increase TF rate, as tolerated, to goal of 70 ml/hr. Free water flush 110 ml every 4 hours (or per MD). Nutrition Assessment:    Pt improving from a nutritional standpoint AEB initiation of TF however having abdominal pain with TF at 40 ml/hr (? Site related). Remains at risk for further nutritional compromise r/t moderate malnutrition, increased nutrient needs for wound healing and underlying medical condition (hx COPD, DM, hx esophageal cancer s/p gastric pull through 2002, colon cancer s/p resection 1997). Nutrition recommendations/interventions as per above. Malnutrition Assessment:  Malnutrition Status: Moderate malnutrition    Context:  Acute Illness     Findings of the 6 clinical characteristics of malnutrition:  Energy Intake:  (, 50% x 3 days, was extubated (10/18))  Weight Loss:  Unable to assess(edema/fluid shifts, suspect wt loss nutrition & fluid related)     Body Fat Loss:  1 - Mild body fat loss Orbital   Muscle Mass Loss:  1 - Mild muscle mass loss Temples (temporalis)  Fluid Accumulation:  Unable to assess     Strength:  Not Performed    Estimated Daily Nutrient Needs:  Energy (kcal):  ~2084 kcals ( 27 kcals/kg); Weight Used for Energy Requirements:  (10/12 76 kg)     Protein (g):  ~93- 108 grams (1.2-  1.4 grams/kg)  monitor renal status; Weight Used for Protein Requirements:  Admission(77.2 kg)        Fluid (ml/day):  ~1800ml (25ml/kgm wt. 72kgm 10/20); Nutrition Related Findings:  pt. seen with wife this am; reports abdominal pain around J tube site; pt. unable to state if pain in only around site or throughout abdomen; RN aware and will increase TF rate when clinically able; 10/23: Sodium 144, Glucose 262, BUN 33, Cr 0.9;  Rx includes Lasix, Insulin; per staff - now possible ECF at discharge vs home with HIS      Wounds:  (stage II wound(location  not documented), stage 1 perineum nonblanchable redness surrounding perirectum)       Current Nutrition Therapies:    Current Tube Feeding (TF) Orders:  · Feeding Route: Jejunostomy(placed 10/22)  · Formula: (Glucerna 1.2 goal of 70 ml/hr)  · Schedule: Continuous  · Additives/Modulars: (none)  · Water Flushes: 110ml free water flush every 6 hrs if no IVF  · Current TF & Flush Orders Provides: at 40 ml/hr  · Goal TF & Flush Orders Provides: 2016 kcals, 101 gm protein, 192 gm CHO, 27 gm fiber, 1352 ml free water (1792 ml with flushes)      Anthropometric Measures:  · Height: 5' 11\" (180.3 cm)  · Current Body Weight: 156 lb 8 oz (71 kg)(10/22, +1 edema)   · Admission Body Weight: 170 lb 3.1 oz (77.2 kg)(10/12 +1 edema)    · Usual Body Weight: 164 lb 10.9 oz (74.7 kg)(10/14 +2 edema)     · Ideal Body Weight: 172 lbs;   · BMI: 21.8  · BMI Categories: Underweight (BMI less than 22) age over 72       Nutrition Diagnosis:   · Moderate malnutrition, In context of acute illness or injury related to swallowing difficulty, impaired respiratory function as evidenced by NPO or clear liquid status due to medical condition, mild loss of subcutaneous fat, mild muscle loss      Nutrition Interventions:   Food and/or Nutrient Delivery:  Start Tube Feeding  Nutrition Education/Counseling:  Education completed(10/23 Reviewed home TF instructions with pt's wife (pt. sleeping). Provided written materials with TF prescription, RD name and number.) [facility] - need signed MD order when discharged  Coordination of Nutrition Care:  Continued Inpatient Monitoring    Goals:  TF to meet atleast % estimated nutritional needs until appropriate to transition to po feeds.        Nutrition Monitoring and Evaluation:   Behavioral-Environmental Outcomes:  Knowledge or Skill   Food/Nutrient Intake Outcomes:  Enteral Nutrition Intake/Tolerance  Physical Signs/Symptoms Outcomes:  Biochemical Data, Chewing or Swallowing, GI Status, Fluid Status or Edema, Hemodynamic Status, Skin, Weight, Nutrition Focused Physical Findings     Discharge Planning:    Enteral Nutrition     Electronically signed by Mikhail Mendez RD, BRIANNA on 10/23/20 at 1:16 PM EDT    Contact: [phone]

## 2020-10-23 NOTE — PROGRESS NOTES
Hospitalist Progress Note    Patient:  Maddison Monday      Unit/Bed:4K-21/021-A    YOB: 1940    MRN: [id number]       Acct: [de-identified]     PCP: Ashley Bennett MD    Date of Admission: 10/11/2020    Assessment/Plan:    # S/p J-Tube placement, POD 1     10/19- patient failed swallow eval and modified barium swallow-speech seen and recommended NG tube with feedings/PEG tube-family and patient reluctant about this-we will consult palliative care keep n.p.o. for now. 10/20: Palliative care consult is in. We will keep patient n.p.o. and will talk to the family today regarding NG tube feeding/PEG tube. Patient himself states he is okay with nasogastric tube feedings. 10/21: After consulting GI, they have recommended a J-tube insertion due to the patient's complex esophageal anatomy status post esophagectomy. Surgery has been consulted and is planning on doing this on Thursday. Plavix is being held for now. 10/22: Patient will have J-Tube placed today. Dietician on board to help with feedings. 10/23: Patient is s/p J-Tube placement, POD 1     -Surgery is following the patient. Will follow up with any recommendations.     -Continue with NG tube feeds with a goal of 70 ml/hr    -Flush with 110 cc free water q6h        # Acute Urinary Retention likely 2/2 Post-Surgical/Post-Anesthesia  -Patient has not urinated during the morning of 10/23, POD 1 from J-Tube placement surgery. Bladder scan is 575 cc. Urinary retention is common post-op     10/23: Continue with intermittent external catheterization      # Severe pneumonia present on admission secondary to [address] in the ICU ,status post extubation 10/18 - Resolved     10/19: Continuing with Levaquin that was started on 10/12 (day 9 today)    -Patient remains afebrile.   White blood cell count is 9.7 this morning    -Will discontinue Levaquin tomorrow, after 10 total days of therapy and if the patient remains afebrile     10/20: We will discontinue Levaquin today, has had a for 10 days total.  He has been afebrile for the last 48 hours. White blood cell count has elevated however this is likely due to a hemoconcentration    -Discontinue Levaquin after his dose today 10/20     10/21:  -Levaquin discontinued, has a 10 days total    -Has been afebrile for over 48 hours, white blood cell count likely elevated due to hemoconcentration    # Acute diastolic congestive heart failure exacerbation-likely precipitated with pneumonia and sepsis did receive a lot of fluid secondary to sepsis. .     10/19: Patient on IV Lasix 20 mg twice daily started on 10/18. Is also receiving D5W at a rate of 100 cc/h due to hypoglycemia    -Speech therapy will evaluate the patient in order to advance diet. Will need to move D5W as patient is also receiving Lasix to remove fluid in his lungs    -Diffuse crackles heard throughout the lungs on exam today  -Chest x-ray tomorrow stop D5-continue to check intake and output and daily weight as well as electrolytes     10/20: Patient has lost 4 pounds since yesterday, I/Os negative for 426.8 cc      10/21: Patient has further lost 6 pounds since yesterday is down to 145 pounds. Has lost 1051 cc net fluid as per I/Os     10/22: Patient has gained 11 pounds since yesterday. We will remeasure weight as this is not likely accurate. He is net -1321.8 cc as per I/Os     10/23: -Patient is 154 lbs today. Overall has gained +4 lbs since initial admission. # History of Chronic Macrocytic Anemia     10/19: Follows Dr. Alfred Atkins. As per chart review patient apparently had recent blood transfusion and iron infusions    -H&H 13.6/41.8 today, stable     10/20: -H&H today is 12.8/39. 6. Stable     10/21: -H&H today not drawn this morning, orders have been placed     10/22: -Latest H&H 12.2/38. 1. Stable. 10/23: -Latest H&H 13.1/42. 1. Stable.     # Non-Insulin Dependent Diabetes Mellitus Type 2     10/19: While in the ICU, Lantus and sliding scale insulin was added. Latest A1c is 6.3. Lantus was increased on 10/14 for hyperglycemia     -Patient receiving D5W currently due to some hypoglycemia experiences last night. He is currently on Lantus 30 units at night as well as a medium dose corrective sliding scale insulin    -Patient is currently n.p.o. we will hold Lantus completely discontinue medium scale sliding scale monitor blood sugars every 6 hours and at bedtime. Use of glucagon IM for hyperglycemia     10/23: Patient receiving NG tube feedings with Glucerna     -Will need to monitor sugars closely    # History of DVT and PE     10/19: Patient was apparently on Plavix and Coumadin. As per heme-onc this was stopped secondary to an acute GI bleed.    -We will continue to hold Plavix and Coumadin as the patient has a follow-up appointment with heme-onc in 2 weeks    # History of COPD    10/19: Stable, on RA     # History of Esophageal Cancer   -S/p esophagectomy    # History of Colon Cancer    -S/p colon resection    # Septic Shock - Resolved    -Required Levophed drip while in the ICU. Has been weaned off.   -Blood pressures remain stable    # Acute Hypoxic Respiratory Failure likely 2/2 Legionella Pneumonia s/p extubation 10/18 - Resolved   -As stated patient was extubated to room air on 10/18   -Saturating well so far on RA     # Disposition Planning   -J Tube POD 1. Will likely be discharged over this weekend (10/24 or 10/25) depending on post op recovery    Chief Complaint: [facility] MEDICAL [facility] Course: Butch eMlchor [de-identified]year-old white male who presented to [facility] on 10/12/2020 with complaints of shortness of breath, fever, nausea vomiting. He has a past medical history of reformed smoker daily, CAD placement in 2015,hypertension, esophageal cancer, colon cancer, wade's syndrome, nephrolithiasis.   Per report patient presented to the emergency department on 10/11/2020 for evaluation of shortness of breath, fatigue, fever, nausea and vomiting. He was accompanied by his wife and daughter. Daughter states that he began having symptoms on Friday and had increased difficulty breathing with fevers T-max 102.0. He was also noted to have difficulty ambulating and altered mental status. Influenza a and COVID were negative. While in the emergency department patient did have escalating oxygen requirements and was placed on BiPAP. Hypotension was not improved with fluid bolusing. He did require levo fed drip to be started and intubation. He was admitted to the ICU. PCR did reveal patient was positive for Legionella pneumonia. Patient was started on Levaquin. Patient does have positive history for DVT and PE but per wife's report anticoagulation had been on hold secondary to GI bleed. Patient does follow with Dr. Gisselle Martin heme-onc    10/18: Patient extubated to room air, transferred to [facility] ICU stepdown unit    10/19: Patient is being kept n.p.o., speech therapy performed a modified barium swallow which revealed the patient has some aspiration concerns. After discussing with the family they are not willing to do a NG tube with feedings or a PEG tube. Palliative care has been consulted. Otherwise clinically he is doing much better, on ninth day of Levaquin. 10/20: Nasogastric tube insertion was attempted x3, with no success. Patient is agreeable to having a PEG tube placement as he has had this in the past.  GI and general surgery were consulted and are recommending a J-tube placement to be done on 10/22    10/21: No change in clinical status. J-tube still scheduled for 10/22    10/22: J-Tube placement today    Subjective (past 24 hours):     S/p J-tube placement, POD1     Patient is somnolent on exam.  Unable to speak due to NG tube. Shakes his head when asked if he has belly pain. He has some urinary retention found by bladder scan.     Medications:  Reviewed    Infusion Medications    sodium chloride 40 mL/hr at 10/23/20 0035    dextrose Stopped (10/19/20 1522)     Scheduled Medications    [START ON 10/24/2020] atenolol  100 mg Per J Tube Daily    [START ON 10/24/2020] finasteride  5 mg Per J Tube Daily    furosemide  20 mg Per J Tube BID    atenolol  100 mg Per NG tube Once    glycopyrrolate-formoterol  2 puff Inhalation BID    [Held by provider] insulin glargine  30 Units Subcutaneous Nightly    insulin lispro  0-12 Units Subcutaneous Q6H    sodium chloride flush  10 mL Intravenous 2 times per day    isosorbide mononitrate  30 mg Oral Daily    tamsulosin  0.4 mg Oral Nightly    chlorhexidine  15 mL Mouth/Throat BID    pantoprazole  40 mg Intravenous BID    [Held by provider] clopidogrel  75 mg Per NG tube Daily     PRN Meds: HYDROmorphone, ondansetron, metoprolol, glucose, dextrose, glucagon (rDNA), dextrose, sodium chloride flush, [DISCONTINUED] acetaminophen **OR** acetaminophen, fentanNYL, albuterol sulfate HFA **AND** ipratropium **AND** [CANCELED] MDI Treatment      Intake/Output Summary (Last 24 hours) at 10/23/2020 1337  Last data filed at 10/23/2020 0000  Gross per 24 hour   Intake 3108.29 ml   Output 1400 ml   Net 1708.29 ml       Diet:  DIET TUBE FEED CONTINUOUS/CYCLIC NPO; Diabetic (Glucerna 1.2); Nasogastric; Continuous; 20; 70    Exam:  /60   Pulse 62   Temp 97 °F (36.1 °C) (Axillary)   Resp 18   Ht 5' 11\" (1.803 m)   Wt 154 lb 14.4 oz (70.3 kg)   SpO2 92%   BMI 21.60 kg/m²     General appearance: Somnolent on exam this morning. J-Tube in place. NG tube also placed. HEENT: Pupils equal, round, and reactive to light. Conjunctivae/corneas clear. Neck: Supple, with full range of motion. No jugular venous distention. Trachea midline. S/p extubation 10/18  Respiratory: Some decreased breath sounds. Cardiovascular: Regular rate and rhythm with normal S1/S2 without murmurs, rubs or gallops.   Abdomen: Soft, non-tender, non-distended with normal bowel sounds. Musculoskeletal: passive and active ROM x 4 extremities. Digit amputations of the right hand noted  Skin: Pale complexion  Neurologic:  Neurovascularly intact without any focal sensory/motor deficits. Cranial nerves: II-XII intact, grossly non-focal.  Psychiatric: Alert and oriented, thought content appropriate, normal insight  Capillary Refill: Brisk,< 3 seconds   Peripheral Pulses: +2 palpable, equal bilaterally     Labs:   Recent Labs     10/21/20  0937 10/22/20  0617 10/23/20  0856   WBC 16.4* 13.2* 20.9*   HGB 12.2* 13.0* 13.1*   HCT 38.1* 41.3* 42.1    373 442*     Recent Labs     10/21/20  0937 10/22/20  0617 10/23/20  0856    140 144   K 4.3 4.0 4.2    102 102   CO2 31 27 26   BUN 26* 20 33*   CREATININE 0.6 0.6 0.9   CALCIUM 8.0* 8.3* 8.3*     No results for input(s): AST, ALT, BILIDIR, BILITOT, ALKPHOS in the last 72 hours. Recent Labs     10/21/20  0539 10/22/20  0617 10/23/20  0542   INR 1.52* 1.46* 1.27*     No results for input(s): CKTOTAL, TROPONINI in the last 72 hours. Microbiology:      Urinalysis:      Lab Results   Component Value Date    NITRU NEGATIVE 10/12/2020    WBCUA 0-2 10/12/2020    BACTERIA FEW 10/12/2020    RBCUA 3-5 10/12/2020    BLOODU MODERATE 10/12/2020    SPECGRAV 1.022 10/12/2020    GLUCOSEU 100 03/28/2019       Radiology:  FL MODIFIED BARIUM SWALLOW W VIDEO   Final Result   1. Laryngeal penetration and aspiration of honey thick, nectar thick and thin barium. 2. Additional recommendations from the speech therapist will follow. **This report has been created using voice recognition software. It may contain minor errors which are inherent in voice recognition technology. **      Final report electronically signed by Dr. Jamarcus Belcher on 10/19/2020 12:54 PM      XR CHEST PORTABLE   Final Result   Impression:   1. Mild progression in the bilateral lower lobe airspace disease.      Increasing left and possible new right pleural fluid collection      This document has been electronically signed by: Lorin White MD on    10/17/2020 04:41 AM         XR CHEST PORTABLE   Final Result   Impression:      Stable bibasilar consolidation and pleural fluid. This document has been electronically signed by: Anitha Tenorio MD on    10/15/2020 02:50 AM         XR CHEST PORTABLE   Final Result   Impression:      Worsening right and stable left basilar consolidation and pleural fluid. This document has been electronically signed by: Anitha Tenorio MD on    10/14/2020 02:12 AM         XR CHEST PORTABLE   Final Result   Impression:   Tubes and lines as noted. Bilateral consolidations and pleural effusions, moderately improved. Significant improvement in central pulmonary edema/CHF. Moderate sized hiatal hernia is suspected. This document has been electronically signed by: Sang Regan MD on    10/12/2020 02:55 AM         CT CHEST WO CONTRAST   Final Result   Right lower lobe dense consolidation with involvement of the posterior    aspect of the upper and middle lobe. Findings suspicious for pneumonia. Left base atelectasis or pneumonia. Small hiatal hernia. Deep mesenteric calcified mass may reflect chronic    sclerosing mesenteritis versus carcinoid. Left kidney cyst.  Possible cholelithiasis. This document has been electronically signed by: Sang Regan MD on    10/12/2020 01:01 AM      All CT scans at this facility use dose modulation, iterative    reconstruction, and/or weight-based   dosing when appropriate to reduce radiation dose to as low as reasonably    achievable. XR CHEST PORTABLE   Final Result   Central venous catheter tip is at the cavoatrial junction            **This report has been created using voice recognition software. It may contain minor errors which are inherent in voice recognition technology. **      Final report electronically signed by Dr. Marilyn Nicole on 10/11/2020 6:40 PM XR CHEST PORTABLE   Final Result   Right lower lobe airspace consolidation. In addition interstitial/pulmonary edema bilaterally. **This report has been created using voice recognition software. It may contain minor errors which are inherent in voice recognition technology. **      Final report electronically signed by Dr. Tyrone Lama on 10/11/2020 1:03 PM        DVT prophylaxis: [] Lovenox                                 [x] SCDs                                  [] SQ Heparin                                 [] Encourage ambulation           [] Already on Anticoagulation     Code Status: Full Code    PT/OT Eval Status: Consults placed    Tele:   [x] yes              [] no    Electronically signed by Jovanna Rico MD on 10/23/2020 at 1:37 PM

## 2020-10-23 NOTE — FLOWSHEET NOTE
[address] [id number]  PHYSICAL THERAPY MISSED TREATMENT NOTE  ACUTE CARE  [facility] ICU STEPDOWN TELEMETRY 4K              Missed Treatment  Pt working with OT at time of attempt, RN also reporting that pt not feeling well this am. Will try back later if able.

## 2020-10-23 NOTE — PLAN OF CARE
Patient unable to have a bowel movement today. This is likely secondary to postoperative and opiate induced ileus. I have ordered senna 8.6 mg per J tube to be taken at night time. This order will be kept PRN in case the patient does not have a bowl movement throughout the day tomorrow.

## 2020-10-23 NOTE — PLAN OF CARE
Problem: Impaired respiratory status  Goal: Clear lung sounds  10/23/2020 0736 by Sharmaine Mccartney RCP  Note: Vara Safer given as ordered to improve aeration and decrease WOB.

## 2020-10-23 NOTE — FLOWSHEET NOTE
10/23/20 1350   Provider Notification   Reason for Communication Evaluate; Review case   Provider Name Dr Mani Anne    Provider Notification Resident   Method of Communication Face to face   Response See orders   Notification Time 1350   Updated that patient is very lethargic and was only responding to sternal rubs but vitals remain stable, also updated that patient has not urinated and bladder scan was 575. Order to straight cath. RN asked about sending urine culture and no order at this time needed. 1640: RN updated Dr Mani Anne about patient BP being 91/54. Fluids adjusted to 50 mls/hour in addition to free water flushed. RN updated MD about patient having pain with flushes/nausea and wanting something for bowel movement, no further orders at this time for that.

## 2020-10-24 NOTE — PROGRESS NOTES
0200 - Patient up to unit, tube feed noted to be draining from midline abdominal incision. Jai DASILVA notified, no new orders at this time.

## 2020-10-24 NOTE — FLOWSHEET NOTE
10/23/20 0830   Provider Notification   Reason for Communication Evaluate; Review case   Provider Name [facility]   Provider Notification Advance Practice Clinician (CNS, NP, CNM, CRNA, PA)   Method of Communication Face to face   Response No new orders   Notification Time 0830   updated about patient having abdominal pain with flushing of J tube /nausea.  No further orders at this time, continue to monitor

## 2020-10-24 NOTE — H&P
CRITICAL CARE PROGRESS NOTE      Patient:  Lonn Halsted    Unit/Bed:4D-18/018-A  YOB: 1940  MRN: [id number]   PCP: Oanh Oliver MD  Date of Admission: 10/11/2020  Chief Complaint:- Shortness of breath and fever    Assessment and Plan:    1. Acute Hypoxic Respiratory Failure:  Patient intubated on 10/23/2020 for airway protection. Continue PCV mode of mechanical ventilation with peak pressure of 35 or less and plateau pressure of 30 or less. 2. Septic Shock: Secondary to Pneumonia. S/p 250 mL fluid bolus x3. Lactic acid 2.7. Procalcitonin 0.70. Continue IV Levophed and vasopressin for goal MAP 65 or greater. 3. Community Acquired Pneumonia: Positive for Legionella on 10/12/2020. S/p Levaquin 750 mg (10/12 - 10/20). White blood cell count trending down. Chest x-ray 10/24/2020 shows bilateral lower airspace opacities, no significant difference since 10/17/2020. Pending PCR panel, respiratory cultures, and blood cultures obtained on 10/24/2020.  4. S/p J-Tube placement:  POD 2. J-tube insertion due to the patient's complex esophageal anatomy. Increased tan colored drainage at surgical site. Cultures pending. Empiric antibiotic coverage with Zosyn initiated on 10/24/2020.  5. Subdiaphragmatic Free Air: Noted on CT abdomen pelvis 10/24/2020. General Surgery to evaluate positioning of J-tube. 6. Acute Urinary Retention:  Secondary to post-surgical/post-anesthesia. Bedside bladder scan showed 575 cc of urine. Continue with intermittent external catheterization. 7. Hx of DVT and PE: Patient was noted to be on Plavix and Coumadin. This was stopped by Heme-Onc secondary to acute GI bleed. Patient to continue to hold Coumadin and Plavix and follow-up with heme-onc in 2 weeks. 8. COPD:  Stable, continue inhalers. 9. Hx of Esophageal cancer: S/p esophagectomy  10.  Hx of Colon cancer: S/p colon resection      INITIAL H AND P AND ICU COURSE:  Anali Randy 76-year-old white male who has a past medical history of reformed smoker, CAD placement in 2015, hypertension, esophageal cancer, colon cancer, nephrolithiasis. Per report patient presented to the emergency department on 10/11/2020 for evaluation of shortness of breath, fatigue, fever, nausea and vomiting. He was accompanied by his wife and daughter. The daughter reported that he began having symptoms on Friday 10/09/2020 and had increased difficulty breathing with fevers T-max of 102.0. He was also noted to have difficulty ambulating and altered mental status. Patient was admitted to the ICU because of escalating oxygen requirements and hypotension. Patient required Levophed drip and intubation. PCR panel was positive for Legionella pneumoniae and was started on Levaquin. On 10/18/2020, patient was extubated to room air and transferred to [facility] ICU stepdown unit. On 10/22/2020, patient had a J-tube placed after failing modified barium swallow twice. On 10/24/2020, patient became hypotensive with blood pressure in the 70s with change in mental status. Patient became more lethargic and restless. Patient was unresponsive to fluid resuscitation. Patient was transferred to the ICU for intubation and pressure support. Past Medical History: Per HPI. Family History: Mother: diabetes, stroke. Father: cancer  Social History: Reformed smoker, 25-pack-year history, denies alcohol use, denies drug use. .    ROS   Unable to obtain secondary to patient being intubated and sedated on mechanical ventilation.     Scheduled Meds:   sodium chloride flush  10 mL Intravenous 2 times per day    enoxaparin  40 mg Subcutaneous Daily    chlorhexidine  15 mL Mouth/Throat BID    famotidine (PEPCID) injection  20 mg Intravenous BID    fentaNYL        hydrocortisone sodium succinate PF  50 mg Intravenous Q6H    [Held by provider] atenolol  100 mg Per J Tube Daily    [Held by provider] finasteride  5 mg Per J Tube Daily    [Held by provider] furosemide  20 mg Per J Tube BID    glycopyrrolate-formoterol  2 puff Inhalation BID    [Held by provider] insulin glargine  30 Units Subcutaneous Nightly    insulin lispro  0-12 Units Subcutaneous Q6H    [Held by provider] isosorbide mononitrate  30 mg Oral Daily    [Held by provider] tamsulosin  0.4 mg Oral Nightly    [Held by provider] clopidogrel  75 mg Per NG tube Daily     Continuous Infusions:   norepinephrine 15 mcg/min (10/24/20 4947)    propofol 15 mcg/kg/min (10/24/20 7512)    fentaNYL (SUBLIMAZE) 500 mcg in sodium chloride 0.9 % 100 mL 25 mcg/hr (10/24/20 4012)    vasopressin (Septic Shock) infusion 0.04 Units/min (10/24/20 3743)    dextrose Stopped (10/19/20 1522)       PHYSICAL EXAMINATION:  T:  99.  P:  57. RR:  12. B/P:  135/65. FiO2:  45. O2 Sat:  100. I/O:  3616/1225  Body mass index is 21.6 kg/m². GCS:   11  PC: 29/6:  FiO2: 45: SpO2: 100: Ti:1 sec:  General:  Acutely-ill appearing  mechanically ventilated. HEENT:  normocephalic and atraumatic. No scleral icterus. PERR  Neck: supple. No Thyromegaly. Lungs: clear to auscultation. No retractions  Cardiac: RRR. No JVD. Abdomen: soft. Nontender. Extremities:  2+ pitting edema BLE. Vasculature: capillary refill < 3 seconds. Palpable dorsalis pedis pulses. Skin:  warm and dry. Psych:  Sedated. Lymph:  No supraclavicular adenopathy. Neurologic:  No focal deficit. No seizures. Data: (All radiographs, tracings, PFTs, and imaging are personally viewed and interpreted unless otherwise noted).  Sodium 143, potassium 4.0, chloride 104, bicarbonate 23, BUN 44, creatinine 1.5, estimated GFR 45, lactic acid 2.7, glucose 158, calcium 8.8, procalcitonin 0.70   WBC 14.0, hemoglobin 12.2, hematocrit 38.5, platelet 771   Telemetry shows   CT abdomen pelvis without contrast 10/24/2020: Sliding hiatal hernia. Subdiaphragmatic free air. Complex fluid in the abdomen and pelvis. Cholelithiasis.   Percutaneous jejunostomy tube tip in jejunal loop in left anterior abdomen. Bilateral lower lobe airspace opacities, increased. Seen with multidisciplinary ICU team.  Meets Continued ICU Level Care Criteria:    [x] Yes   [] No - Transfer Planned to listed location:  [] HOSPITALIST CONTACTED-      Case and plan discussed with Dr. León Majano.         Electronically signed by He Osborne MD  [address] [place] [address]

## 2020-10-24 NOTE — FLOWSHEET NOTE
10/23/20 1900   Urine Assessment   Bladder Scan Volume (mL) 350 mL   $ Bladder scan $ Yes   Patient is going to try to urinate on own and going to re-scan in a few hours.

## 2020-10-24 NOTE — SIGNIFICANT EVENT
10/23/20 AT 2110    Received a call from nursing staff regarding pt low blood pressure of 76/44 and change in mental status. Patient became more lethargic and restless. Order was placed for Midodrine 5 mg and 250 mL normal saline bolus. There was a concern that his J Tube was clogged and none of his medications were going through. Kenny Sam along with house supervisor were made aware. Patient requires additional boluses of 250 fluids x3. Pt was unresponsive to fluid after passive leg test was performed. Order was placed for 25% Albumin 50 mg and Dopmine gtt to keep MAP >65. Fluid boluses were continued without much improvement. Patient was placed in Trendelenburg position without improvement. Subsequently, patient was transferred to ICU for pressure support.

## 2020-10-24 NOTE — PROGRESS NOTES
At shift change, pt's blood pressure was low. Got manual bps and pt's bp was lower in the 70s over 40s. Resident called and ordered to give a 250mL bolus. Pt bps went back up to 80 systolic. Resident asked RN to give patient his midodrine. RN waited for medication to get up to floor, and tried to administer through J tube. RN was able to administer his first medication earlier in the night into J tube, but was extremely hard to flush. When RN tried to administer his midodrine next, his J tube would no longer flush. Resident ordered a second 250mL bolus. PA came up, and bps still in 24Z systolic. PA ordered a third 250mL bolus. Aware of J tube being occluded. Tried Pepsi down J tube, but unable to dislodge blockage. Pt bps were slowly declining still. Improved slightly in trendelenburg, but still low systolics of 12L-40P. Pt was started on dopamine gtt through his peripheral 20g. RN was aware and monitoring IV site qhourly. Stat blood cultures and labs were ordered. Unable to obtain at time when phlebotomy came. Urine culture achieved after straight cath pt for 420. Pt unable to void on own. 4th bolus ordered with albumin. RN administered. Pt slightly going up with bps but still minimal response. Pt's breathing became wheezing after last bolus. Pt was given the okay for transfer down to ICU. RN called down to report while pt being transferred. Pt's family member called at time of transfer and RN informed pt and updated on plan of care.

## 2020-10-24 NOTE — PLAN OF CARE
Problem: Impaired respiratory status  Goal: Clear lung sounds  Outcome: Ongoing     Pt continues to require ventilatory support=will continue with current plan of care.

## 2020-10-24 NOTE — PROCEDURES
ICU PROCEDURE - ENDOTRACHEAL INTUBATION    Patty Nathan     MRN#: [id number]  10/24/20      Merissa [email or web address]     : 1940      INDICATION: acute respiratory failure, hypoxia    TIME OUT: taken    Permission obtained, risks/benefits reviewed:    ANESTHESIA:   []Ketamine  []Ativan  [] Morphine  []Propofol  [x]Other medications: Etomidate    ESTIMATED BLOOD LOSS:  None. COMPLICATIONS:  []N/A  [] Other:    LARYNGOSCOPIC AIRWAY GRADE (CORMACK-LEHANE):[]1  [x]2a  []2b []3  []4        INTUBATION EQUIPMENT USED:  [x] Direct laryngoscope only    OUTCOME: Successful placement of #  8  Taperguard Evac endotracheal via   [x]Oral route    INSERTION DEPTH:  24    cm from   [x]lip           CONFIRMATION OF TUBE POSITION:   [x]Capnography - Strong & repeatable exhaled CO2 detection   [x]Multiple point auscultation   [x]SpO2 response   [x]STAT X-ray   [x]Bronchoscopic assessment    UNUSUAL FINDINGS:    PROCEDURE:     Using direct video- laryngoscopy, the vocal cords were visualized and the endotracheal tube was placed through the cords under direct vision. Good breath sounds were auscultated bilaterally without sounds over abdomen. Appropriate strong & repeatable exhaled CO2 detection was confirmed.        Electronically signed by LARRY Mendoza CNP on 10/24/2020 at 3:08 AM

## 2020-10-24 NOTE — PROGRESS NOTES
General Surgery  Dr. Felicita Umanzor covering for Dr. Enrico Majano  Daily Progress Note      Patient:  Dayanara Carrillo      Unit/Bed:4D-18/018-A    YOB: 1940    MRN: [id number]     Acct: [de-identified]     Admit date: 10/11/2020    Subjective: Overnight events noted. Low grade temps. Was transferred to ICU for hypotension and acute respiratory failure. No intubated and sedated. J-tube started leaking overnight through incision and is now clamped. Midline incision intact with staples. What looks like tube feed coming from around J-tube site. Abdomen soft. OG tube cannot be advanced bedside secondary to hiatal hernia. Don in place. No family present. All other ROS negative except noted in HPI      Patient Seen, Chart, Consults notes, Labs, Radiology studies reviewed. Past, Family, Social History unchanged from admission.     Diet:  Diet NPO Effective Now    Medications:  Scheduled Meds:   sodium chloride flush  10 mL Intravenous 2 times per day    chlorhexidine  15 mL Mouth/Throat BID    famotidine (PEPCID) injection  20 mg Intravenous BID    hydrocortisone sodium succinate PF  50 mg Intravenous Q6H    piperacillin-tazobactam  3.375 g Intravenous Q8H    [Held by provider] atenolol  100 mg Per J Tube Daily    [Held by provider] finasteride  5 mg Per J Tube Daily    [Held by provider] furosemide  20 mg Per J Tube BID    glycopyrrolate-formoterol  2 puff Inhalation BID    [Held by provider] insulin glargine  30 Units Subcutaneous Nightly    insulin lispro  0-12 Units Subcutaneous Q6H    [Held by provider] isosorbide mononitrate  30 mg Oral Daily    [Held by provider] tamsulosin  0.4 mg Oral Nightly    [Held by provider] clopidogrel  75 mg Per NG tube Daily     Continuous Infusions:   norepinephrine 15 mcg/min (10/24/20 1641)    propofol 15 mcg/kg/min (10/24/20 1529)    fentaNYL (SUBLIMAZE) 500 mcg in sodium chloride 0.9 % 100 mL 25 mcg/hr (10/24/20 5589)    vasopressin (Septic Shock) infusion 0.04 Units/min (10/24/20 1528)    dextrose Stopped (10/19/20 1522)     PRN Meds:sodium chloride flush, acetaminophen **OR** acetaminophen, polyethylene glycol, promethazine **OR** ondansetron, diatrizoate meglumine-sodium, senna, metoprolol, glucose, dextrose, glucagon (rDNA), dextrose, albuterol sulfate HFA **AND** ipratropium **AND** [CANCELED] MDI Treatment    Objective:    CBC:   Recent Labs     10/22/20  0617 10/23/20  0856 10/24/20  0600   WBC 13.2* 20.9* 14.0*   HGB 13.0* 13.1* 12.2*    442* 411*     BMP:    Recent Labs     10/22/20  0617 10/23/20  0856 10/24/20  0600    144 143   K 4.0 4.2 4.0    102 104   CO2 27 26 23   BUN 20 33* 44*   CREATININE 0.6 0.9 1.5*   GLUCOSE 81 262* 158*     Calcium:  Recent Labs     10/24/20  0600   CALCIUM 8.8     Ionized Calcium:No results for input(s): IONCA in the last 72 hours. Magnesium:No results for input(s): MG in the last 72 hours. Phosphorus:No results for input(s): PHOS in the last 72 hours. BNP:No results for input(s): BNP in the last 72 hours. Glucose:  Recent Labs     10/24/20  0024 10/24/20  0624 10/24/20  1154   POCGLU 107 130* 183*     HgbA1C: No results for input(s): LABA1C in the last 72 hours. INR:   Recent Labs     10/23/20  0542   INR 1.27*     Hepatic: No results for input(s): ALKPHOS, ALT, AST, PROT, BILITOT, BILIDIR, LABALBU in the last 72 hours. Amylase and Lipase:No results for input(s): LACTA, AMYLASE in the last 72 hours. Lactic Acid: No results for input(s): LACTA in the last 72 hours. Troponin: No results for input(s): CKTOTAL, CKMB, TROPONINT in the last 72 hours. BNP: No results for input(s): BNP in the last 72 hours. Lipids: No results for input(s): CHOL, TRIG, HDL, LDLCALC in the last 72 hours.     Invalid input(s): LDL  ABGs:   Lab Results   Component Value Date    PH 7.19 10/11/2020    PCO2 75 10/11/2020    PO2 98 10/11/2020    HCO3 29 10/11/2020    O2SAT 95 10/11/2020       Radiology reports as per the Enterogastric tube loops in the stomach, tip in the gastric body, noting a retrocardiac hiatal hernia. Bilateral lower lobe consolidations on the left obscuring the hemidiaphragm, and bilateral pleural effusions. Heart is mildly enlarged. No pulmonary edema. Impression: Tubes and lines as noted. Bilateral consolidations and pleural effusions, moderately improved. Significant improvement in central pulmonary edema/CHF. Moderate sized hiatal hernia is suspected. This document has been electronically signed by: Robles Gaona MD on 10/12/2020 02:55 AM     Xr Chest Portable    Result Date: 10/11/2020  PROCEDURE: XR CHEST PORTABLE CLINICAL INFORMATION: Central line . COMPARISON: 11 October 2020 at 12:34 PM TECHNIQUE: Portable semiupright FINDINGS: Right internal jugular vein catheter is present courses unremarkable tip is at the cavoatrial junction. There are no other changes from earlier     Central venous catheter tip is at the cavoatrial junction **This report has been created using voice recognition software. It may contain minor errors which are inherent in voice recognition technology. ** Final report electronically signed by Dr. Saulo Moody on 10/11/2020 6:40 PM    Xr Chest Portable    Result Date: 10/11/2020  PROCEDURE: XR CHEST PORTABLE CLINICAL INFORMATION: sob . COMPARISON: 5/21/2020 TECHNIQUE: Portable semiupright FINDINGS: Patient is severely rotated. Heart size is within normal limits. Mediastinum is not widened. Dense airspace consolidation right lower lobe. Interstitial edema bilaterally. Retrocardiac atelectasis or infiltrate. Pulmonary vessels are not congested. Right lower lobe airspace consolidation. In addition interstitial/pulmonary edema bilaterally. **This report has been created using voice recognition software. It may contain minor errors which are inherent in voice recognition technology. ** Final report electronically signed by Dr. Saulo Moody on 10/11/2020 1:03 PM     PROCEDURE: XR CHEST PORTABLE         CLINICAL INFORMATION: ETT placement         COMPARISON: 10/24/2020         TECHNIQUE:  AP mobile chest single view           FINDINGS:         There is an endotracheal tube present with the tip overlying the midtrachea located approximately 2.8 cm above the david.         There is an enteric tube present which is looped within the esophagus. This was also present on the previous examination.         There is a right IJ central line again seen. The tip is located at the cavoatrial junction.         The heart size is normal.         Small bilateral pleural effusions are demonstrated.         Patchy airspace disease within the right lower lobe is noted which may represent atelectasis or pneumonia.         No acute osseous findings are seen.              Impression    1. There is an endotracheal tube present with the tip overlying the midtrachea located approximately 2.8 cm above the david.         2. There is an enteric tube present which is looped within the esophagus. This was also present on the previous examination. Recommend repositioning.         3. Small bilateral pleural effusions are demonstrated.         4. Patchy airspace disease within the right lower lobe is noted which may represent atelectasis or pneumonia.                        **This report has been created using voice recognition software.  It may contain minor errors which are inherent in voice recognition technology. **         Final report electronically signed by Dr. Leah Taylor on 10/24/2020 1:55 PM      PROCEDURE: XR INJ CONTRAST GASTRIC TUBE PERC         CLINICAL INFORMATION: check J-tube placement         COMPARISON: CT dated 10/24/2020         TECHNIQUE:  Multiple fluoroscopic images were obtained to evaluate the J tube positioning  and in order to attempt advancement of an enteric tube.         FINDINGS:         With the patient in the supine position, the enteric tube was found looped within the esophagus.  The enteric tube was retracted and redirected into the patient's known hiatal hernia at the lower mediastinum. However, the catheter could not be redirected    into the small bowel due to tortuosity. Therefore, the tip of the catheter cannot be advanced to the level of the gastric lumen at the hiatal hernia. This was despite multiple attempts with guidewire assistance.         An attempt was made to check J-tube placement. However there was obstruction of the tube. Contrast could not be injected through the tube due to this obstruction. A Proxsys guidewire was advanced through the J-tube to determine the level of the    obstruction which was noted to be within the lumen of the tube at the level of the skin surface of the anterior abdominal wall. A Glidewire was then advanced through the tube in order to attempt advancement of the wire beyond the obstruction. Again, the    obstruction was noted within the lumen of the tube at the level of the skin surface of the anterior abdominal wall. Again, contrast could not be flushed through the J-tube. Therefore, the J-tube positioning cannot be determined on the current    examination. There also appears to be retraction of the J-tube. Due to the inability to inject contrast, the tip of the J-tube cannot be definitively determined to be within or outside of the lumen of the jejunum.              Impression    1. The enteric tube was found looped within the esophagus. The enteric tube was retracted and redirected into the patient's known hiatal hernia at the lower mediastinum. However, the catheter could not be redirected into the small bowel due to    tortuosity. Therefore, the tip of the catheter cannot be advanced to the level of the gastric lumen at the hiatal hernia. This was despite multiple attempts with guidewire assistance and fluoroscopic guidance.         2. An attempt was made to check J-tube placement. However there was obstruction of the tube.  Contrast could not be clamped with tan feeds leaking around site  Musculoskeletal -weakness      DVT prophylaxis: [] Lovenox                                 [x] SCDs                                 [] SQ Heparin                                 [] Encourage ambulation           [] Already on Anticoagulation plavix                 Assessment:  1. S/p J tube POD#2  2. Dysphagia   3. Acute hypoxic respiratory failure - status post intubation  4. Septic shock  5. CAP (+) legionella  6. History of PE and DVT  7. Anemia   8. HX esophagectomy, esophageal cancer     Active Problems:    Respiratory failure with hypoxia (HCC)    Sepsis ([facility]    Pneumonia due to organism    Moderate malnutrition ([facility]  Resolved Problems:    * No resolved hospital problems. *      Plan:    1. Patient went down for IR study and fluro advancement of OG tube. Case discussed with Dr. Rosa Hyde. He was unable to advance OG tube past the hiatal hernia so we will leave tube in place to suction for now. 2. Keep J-tube clamped. Do not use at all. 3. Routine wound care  4. Antibiotic therapy  5. Pain control  6. Plavix and coumadin are on hold, hematology to evaluate  7. SCDs for DVT prophylaxis. Okay with Lovenox prophylatic if okay with attending. 8. Palliative care   9. WBC trending down from 20.9 today. Repeat in am.   10. ICU supportive care. Appreciate assistance. Wean pressors as able. 11. Continue conservative management. No acute surgical intervention today, but may need further intervention if no improvement with tube. IR team to possibly reassess patient tomorrow with Dr. Aubrie Nur. Electronically signed by LARRY Witt CNP on 10/24/2020 at 5:11 PM     Patient seen and examined independently by me earlier this AM. Above discussed and I agree with Jayashree Merritt CNP. See my additional comments below for updated orders and plan. Labs, cultures, and radiographs where available were reviewed.   I discussed patient concerns with the patient's nurse and instructions were given. Please see our orders for the updated patient care plan. -Concern early this morning for tube feeds coming out of the incision. When seen this morning tube feeds were off. Majority of red rubber J-tube was out but did not appear to slide in or out secondary to the suture at the skin. Also having difficulty with NG tube placement. Interventional radiology attempted guidance for NG tube without success. J-tube placement attempt failed in interventional radiology. Per surgical report jejunum sutured to peritoneum. No obvious drainage intra-abdominal he. Most likely malpositioned J-tube with previous tube feeds superficial in the subcutaneous tissue plane. Do not use J-tube at this time. Antibiotics. Ventilator management. Plavix and Coumadin on hold. DVT prophylaxis with SCDs. Incisional/wound care. WBC actually better today. Repeat labs in a.m. Possible reassessment tomorrow for interventional attempt again at NG. We will discuss with primary surgeon about next steps with J-tube if surgical replacement needed. No emergent surgical intervention needed today.     Electronically signed by Jhonatan De León MD on 10/24/20 at 5:29 PM EDT

## 2020-10-25 NOTE — FLOWSHEET NOTE
Pt with HR fluctuation between 50- 80s but appears to be sinus. Pt having PACs and PVCs. Order placed for EKG.

## 2020-10-25 NOTE — PROGRESS NOTES
Hospitalist Progress Note      Patient:  Robbi Villalobos    Unit/Bed:4D-18/018-A  PCP: Sukh Francisco MD  Date of Admission: 10/11/2020    Assessment and Plan:        1. Acute hypoxemic respiratory failure status post oral intubation on 10/23/2020  2. Septic shock, pneumonia, infected surgical J-tube site-continue IV Zosyn, follow with culture results. 3. Acute urinary retention. 4. History of PE/DVT, Coumadin and Plavix on hold secondary to recent history of upper GI bleed. 5. History of esophageal cancer status post esophagectomy  6. History of colon cancer status post colon resection. 7. History of COPD. 8. Community-acquired pneumonia. HPI: Shellye Current 79-year-old white male who has a past medical history of reformed smoker, CAD placement in 2015, hypertension, esophageal cancer, colon cancer, nephrolithiasis. Per report patient presented to the emergency department on 10/11/2020 for evaluation of shortness of breath, fatigue, fever, nausea and vomiting. He was accompanied by his wife and daughter. The daughter reported that he began having symptoms on Friday 10/09/2020 and had increased difficulty breathing with fevers T-max of 102.0. He was also noted to have difficulty ambulating and altered mental status. Patient was admitted to the ICU because of escalating oxygen requirements and hypotension. Patient required Levophed drip and intubation. PCR panel was positive for Legionella pneumoniae and was started on Levaquin. On 10/18/2020, patient was extubated to room air and transferred to [facility] ICU stepdown unit. On 10/22/2020, patient had a J-tube placed after failing modified barium swallow twice. On 10/24/2020, patient became hypotensive with blood pressure in the 70s with change in mental status. Patient became more lethargic and restless. Patient was unresponsive to fluid resuscitation.   Patient was transferred to the ICU for intubation and pressure support.     Follow-up:  10/25/2020: Gastrografin study failed to introduce through the J-tube, discussed surgery at bedside, IR consulted for Dobbhoff placement and G-tube evaluation, 1 L LR IV bolus, currently on Levophed 7 mcg and vasopressin 0.04, discontinue propofol, change fentanyl to as needed, discontinue vasopressin, wean off Levophed as tolerated, pending culture, ET suction with budding yeast, further evaluations are to follow. ROS: Unable to obtain, intubated sedated.       Medications:     norepinephrine 7 mcg/min (10/25/20 0602)    fentaNYL (SUBLIMAZE) 500 mcg in sodium chloride 0.9 % 100 mL 25 mcg/hr (10/24/20 2113)    dextrose Stopped (10/19/20 1522)      enoxaparin  40 mg Subcutaneous Daily    sodium chloride flush  10 mL Intravenous 2 times per day    chlorhexidine  15 mL Mouth/Throat BID    famotidine (PEPCID) injection  20 mg Intravenous BID    hydrocortisone sodium succinate PF  50 mg Intravenous Q6H    piperacillin-tazobactam  3.375 g Intravenous Q8H    [Held by provider] atenolol  100 mg Per J Tube Daily    [Held by provider] finasteride  5 mg Per J Tube Daily    [Held by provider] furosemide  20 mg Per J Tube BID    glycopyrrolate-formoterol  2 puff Inhalation BID    [Held by provider] insulin glargine  30 Units Subcutaneous Nightly    insulin lispro  0-12 Units Subcutaneous Q6H    [Held by provider] isosorbide mononitrate  30 mg Oral Daily    [Held by provider] tamsulosin  0.4 mg Oral Nightly    [Held by provider] clopidogrel  75 mg Per NG tube Daily       Vital Signs:   /62   Pulse 68   Temp 97.9 °F (36.6 °C)   Resp 12   Ht 5' 11\" (1.803 m)   Wt 155 lb 13.8 oz (70.7 kg)   SpO2 96%   BMI 21.74 kg/m²      Intake/Output Summary (Last 24 hours) at 10/25/2020 1252  Last data filed at 10/25/2020 0600  Gross per 24 hour   Intake 1268 ml   Output 850 ml   Net 418 ml        General: Intubated, sedated,

## 2020-10-25 NOTE — PLAN OF CARE
Problem: Falls - Risk of:  Goal: Will remain free from falls  Description: Will remain free from falls  Outcome: Ongoing  Note: Patient sedated on vent. Fall precautions in place, bed in lowest position, side rails up x4 per ICU, bed alarm on, non-skid socks. Hourly rounding in place. Goal: Absence of physical injury  Description: Absence of physical injury  Outcome: Ongoing  Note: Absence of physical injury this shift. Problem: Restraint Use - Nonviolent/Non-Self-Destructive Behavior:  Goal: Absence of restraint indications  Description: Absence of restraint indications  Outcome: Ongoing  Note: Bilateral soft wrist restraints for safety. When restraints are released patient reached for ETT tube. Goal: Absence of restraint-related injury  Description: Absence of restraint-related injury  Outcome: Ongoing  Note: Absence of injury to bilateral wrist related to restraints. ROM performed q2h and PRN. Don catheter, NPO. Problem: Airway Clearance - Ineffective:  Goal: Ability to maintain a clear airway will improve  Description: Ability to maintain a clear airway will improve  Outcome: Ongoing  Note: Suctioning mouth and ETT, red tingled sputum. Clots noted when suctioning mouth. Patient on vent. Lung sound clear. Problem: Fluid Volume - Imbalance:  Goal: Absence of imbalanced fluid volume signs and symptoms  Description: Absence of imbalanced fluid volume signs and symptoms  Outcome: Ongoing  Note: LAB in AM. Good urine output, 800ml's in last 8 hours. NPO. Problem: Skin Integrity - Impaired:  Goal: Absence of new skin breakdown  Description: Absence of new skin breakdown  Outcome: Ongoing  Note: No new skin breakdown noted. Turning patient q2h and PRN. Pillows for support. Problem: Respiratory  Goal: O2 Sat > 90%  Outcome: Ongoing  Note: Patient remains on the vent. FiO2 25%. O2 saturation 96%. Problem: Nutrition  Goal: Optimal nutrition therapy  Outcome: Ongoing   NPO.     Problem: Discharge Planning:  Goal: Discharged to appropriate level of care  Description: Discharged to appropriate level of care  Outcome: Ongoing  Patient is from home with wife. Currently I the ICU sedated on vent. Discharge planning in process. Problem: Pain:  Goal: Pain level will decrease  Description: Pain level will decrease  Outcome: Ongoing  Denies pain. Repositioning for comfort measures. Patient on fentanyl drip. Unable to review the POC. Spook with family this eveving. POC review with patient's daughter, Lidia Mott via telephone. Verbalized understanding of the POC.

## 2020-10-25 NOTE — FLOWSHEET NOTE
1248 Pt to radiology specials department to evaluate J tube. Report given to [facility]. 1332 Pt returned from radiology. Reported J tube is patent. Specials department to update surgeon with findings.

## 2020-10-25 NOTE — PROGRESS NOTES
1250 Patient received in IR for Duotube insertion. 1255 This procedure has been fully reviewed with the patient's wife and written informed consent has been obtained. 1300 Procedure started with Dr. Nahomy Edward. 1312 Procedure completed; patient tolerated well.   9314 Patient on bed; comfort ensured. 1325 Patient taken to ICU via bed.

## 2020-10-25 NOTE — PROGRESS NOTES
General Surgery  Dr. Deion Fernandes covering for Dr. Cielo Johns  Daily Progress Note      Patient:  Maddison Monday      Unit/Bed:4D-18/018-A    YOB: 1940    MRN: [id number]     Acct: [de-identified]     Admit date: 10/11/2020    Subjective: Overnight events noted. Low grade temps. Was transferred to ICU for hypotension and acute respiratory failure. Now intubated but not sedated. J-tube is now clamped due to leaking. Midline incision intact with staples. Abdomen soft. OG tube cannot be advanced bedside secondary to hiatal hernia. Don in place. Family present. All other ROS negative except noted in HPI      Patient Seen, Chart, Consults notes, Labs, Radiology studies reviewed. Past, Family, Social History unchanged from admission.     Diet:  Diet NPO Effective Now    Medications:  Scheduled Meds:   enoxaparin  40 mg Subcutaneous Daily    sodium chloride flush  10 mL Intravenous 2 times per day    chlorhexidine  15 mL Mouth/Throat BID    famotidine (PEPCID) injection  20 mg Intravenous BID    hydrocortisone sodium succinate PF  50 mg Intravenous Q6H    piperacillin-tazobactam  3.375 g Intravenous Q8H    [Held by provider] atenolol  100 mg Per J Tube Daily    [Held by provider] finasteride  5 mg Per J Tube Daily    [Held by provider] furosemide  20 mg Per J Tube BID    glycopyrrolate-formoterol  2 puff Inhalation BID    [Held by provider] insulin glargine  30 Units Subcutaneous Nightly    insulin lispro  0-12 Units Subcutaneous Q6H    [Held by provider] isosorbide mononitrate  30 mg Oral Daily    [Held by provider] tamsulosin  0.4 mg Oral Nightly    [Held by provider] clopidogrel  75 mg Per NG tube Daily     Continuous Infusions:   norepinephrine 7 mcg/min (10/25/20 0602)    fentaNYL (SUBLIMAZE) 500 mcg in sodium chloride 0.9 % 100 mL 25 mcg/hr (10/24/20 2113)    dextrose Stopped (10/19/20 1522)     PRN Meds:sodium chloride flush, acetaminophen **OR** acetaminophen, polyethylene glycol, promethazine **OR** ondansetron, diatrizoate meglumine-sodium, senna, metoprolol, glucose, dextrose, glucagon (rDNA), dextrose, albuterol sulfate HFA **AND** ipratropium **AND** [CANCELED] MDI Treatment    Objective:    CBC:   Recent Labs     10/23/20  0856 10/24/20  0600 10/25/20  0540   WBC 20.9* 14.0* 15.5*   HGB 13.1* 12.2* 11.1*   * 411* 313     BMP:    Recent Labs     10/23/20  0856 10/24/20  0600 10/25/20  0540    143 140   K 4.2 4.0 4.0    104 102   CO2 26 23 26   BUN 33* 44* 57*   CREATININE 0.9 1.5* 1.3*   GLUCOSE 262* 158* 260*     Calcium:  Recent Labs     10/25/20  0540   CALCIUM 8.8     Ionized Calcium:No results for input(s): IONCA in the last 72 hours. Magnesium:No results for input(s): MG in the last 72 hours. Phosphorus:No results for input(s): PHOS in the last 72 hours. BNP:No results for input(s): BNP in the last 72 hours. Glucose:  Recent Labs     10/24/20  1718 10/24/20  2343 10/25/20  0554   POCGLU 228* 214* 239*     HgbA1C: No results for input(s): LABA1C in the last 72 hours. INR:   Recent Labs     10/25/20  0540   INR 1.50*     Hepatic: No results for input(s): ALKPHOS, ALT, AST, PROT, BILITOT, BILIDIR, LABALBU in the last 72 hours. Amylase and Lipase:No results for input(s): LACTA, AMYLASE in the last 72 hours. Lactic Acid: No results for input(s): LACTA in the last 72 hours. Troponin: No results for input(s): CKTOTAL, CKMB, TROPONINT in the last 72 hours. BNP: No results for input(s): BNP in the last 72 hours. Lipids: No results for input(s): CHOL, TRIG, HDL, LDLCALC in the last 72 hours.     Invalid input(s): LDL  ABGs:   Lab Results   Component Value Date    PH 7.19 10/11/2020    PCO2 75 10/11/2020    PO2 98 10/11/2020    HCO3 29 10/11/2020    O2SAT 95 10/11/2020       Radiology reports as per the Radiologist  Radiology: Ct Chest Wo Contrast    Result Date: 10/12/2020  CT chest without contrast: Comparison:  CT,SR  - CT CHEST PULMONARY EMBOLISM W CONTRAST  - 10/19/2018 03:48 PM EDT FINDINGS: Lungs/pleura: Extensive consolidation of the right lower lobe with air bronchograms and areas of necrosis, with some involvement of the adjacent posterior aspect of the right upper lobe and right middle lobe. Left lower lobe consolidation or atelectasis. No pleural effusion or pneumothorax. Upper lobes are otherwise clear. Mediastinum/heart/aorta: Mild cardiac enlargement. No pericardial effusion. Coronary artery mural calcifications. Tortuous thoracic aorta. Dilated trachea and upper thoracic esophagus. Fluid-filled distended mid and distal thoracic esophagus. Small hiatal hernia. Uppermost abdomen: Surgical clips anterior to the aorta in the upper abdomen. Calcific mass in the deep mesentery measuring 11 x 37 mm. Probable cholelithiasis. Left kidney cortical cyst. MSK: Multilevel spondylosis of the thoracic spine. Mild kyphoscoliosis. No acute fracture. No destructive osseous lesion. Right lower lobe dense consolidation with involvement of the posterior aspect of the upper and middle lobe. Findings suspicious for pneumonia. Left base atelectasis or pneumonia. Small hiatal hernia. 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 Left kidney cyst.  Possible cholelithiasis. This document has been electronically signed by: María Che MD on 10/12/2020 01:01 AM All CT scans at this facility use dose modulation, iterative reconstruction, and/or weight-based dosing when appropriate to reduce radiation dose to as low as reasonably achievable. Xr Chest Portable    Result Date: 10/12/2020  1 view chest x-ray. Comparison:  CR,SR  - XR CHEST PORTABLE  - 10/11/2020 06:12 PM EDT Findings: Stable right IJ CVP catheter. ET tube terminates 4 cm above the david. Enterogastric tube loops in the stomach, tip in the gastric body, noting a retrocardiac hiatal hernia.  Bilateral lower lobe consolidations on the left obscuring the hemidiaphragm, and bilateral pleural effusions. Heart is mildly enlarged. No pulmonary edema. Impression: Tubes and lines as noted. Bilateral consolidations and pleural effusions, moderately improved. Significant improvement in central pulmonary edema/CHF. Moderate sized hiatal hernia is suspected. This document has been electronically signed by: Maurilio Melchor MD on 10/12/2020 02:55 AM     Xr Chest Portable    Result Date: 10/11/2020  PROCEDURE: XR CHEST PORTABLE CLINICAL INFORMATION: Central line . COMPARISON: 11 October 2020 at 12:34 PM TECHNIQUE: Portable semiupright FINDINGS: Right internal jugular vein catheter is present courses unremarkable tip is at the cavoatrial junction. There are no other changes from earlier     Central venous catheter tip is at the cavoatrial junction **This report has been created using voice recognition software. It may contain minor errors which are inherent in voice recognition technology. ** Final report electronically signed by Dr. Ez Barajas on 10/11/2020 6:40 PM    Xr Chest Portable    Result Date: 10/11/2020  PROCEDURE: XR CHEST PORTABLE CLINICAL INFORMATION: sob . COMPARISON: 5/21/2020 TECHNIQUE: Portable semiupright FINDINGS: Patient is severely rotated. Heart size is within normal limits. Mediastinum is not widened. Dense airspace consolidation right lower lobe. Interstitial edema bilaterally. Retrocardiac atelectasis or infiltrate. Pulmonary vessels are not congested. Right lower lobe airspace consolidation. In addition interstitial/pulmonary edema bilaterally. **This report has been created using voice recognition software. It may contain minor errors which are inherent in voice recognition technology. ** Final report electronically signed by Dr. Ez Barajas on 10/11/2020 1:03 PM     PROCEDURE: XR CHEST PORTABLE         CLINICAL INFORMATION: ETT placement         COMPARISON: 10/24/2020         TECHNIQUE:  AP mobile chest single view           FINDINGS:      There is an endotracheal tube present with the tip overlying the midtrachea located approximately 2.8 cm above the david.         There is an enteric tube present which is looped within the esophagus. This was also present on the previous examination.         There is a right IJ central line again seen. The tip is located at the cavoatrial junction.         The heart size is normal.         Small bilateral pleural effusions are demonstrated.         Patchy airspace disease within the right lower lobe is noted which may represent atelectasis or pneumonia.         No acute osseous findings are seen.              Impression    1. There is an endotracheal tube present with the tip overlying the midtrachea located approximately 2.8 cm above the david.         2. There is an enteric tube present which is looped within the esophagus. This was also present on the previous examination. Recommend repositioning.         3. Small bilateral pleural effusions are demonstrated.         4. Patchy airspace disease within the right lower lobe is noted which may represent atelectasis or pneumonia.                        **This report has been created using voice recognition software.  It may contain minor errors which are inherent in voice recognition technology. **         Final report electronically signed by Dr. Delmis Rivera on 10/24/2020 1:55 PM      PROCEDURE: XR INJ CONTRAST GASTRIC TUBE PERC         CLINICAL INFORMATION: check J-tube placement         COMPARISON: CT dated 10/24/2020         TECHNIQUE:  Multiple fluoroscopic images were obtained to evaluate the J tube positioning  and in order to attempt advancement of an enteric tube.         FINDINGS:         With the patient in the supine position, the enteric tube was found looped within the esophagus. The enteric tube was retracted and redirected into the patient's known hiatal hernia at the lower mediastinum.  However, the catheter could not be redirected    into the small bowel due to tortuosity. Therefore, the tip of the catheter cannot be advanced to the level of the gastric lumen at the hiatal hernia. This was despite multiple attempts with guidewire assistance.         An attempt was made to check J-tube placement. However there was obstruction of the tube. Contrast could not be injected through the tube due to this obstruction. A Bentson guidewire was advanced through the J-tube to determine the level of the    obstruction which was noted to be within the lumen of the tube at the level of the skin surface of the anterior abdominal wall. A Glidewire was then advanced through the tube in order to attempt advancement of the wire beyond the obstruction. Again, the    obstruction was noted within the lumen of the tube at the level of the skin surface of the anterior abdominal wall. Again, contrast could not be flushed through the J-tube. Therefore, the J-tube positioning cannot be determined on the current    examination. There also appears to be retraction of the J-tube. Due to the inability to inject contrast, the tip of the J-tube cannot be definitively determined to be within or outside of the lumen of the jejunum.              Impression    1. The enteric tube was found looped within the esophagus. The enteric tube was retracted and redirected into the patient's known hiatal hernia at the lower mediastinum. However, the catheter could not be redirected into the small bowel due to    tortuosity. Therefore, the tip of the catheter cannot be advanced to the level of the gastric lumen at the hiatal hernia. This was despite multiple attempts with guidewire assistance and fluoroscopic guidance.         2. An attempt was made to check J-tube placement. However there was obstruction of the tube. Contrast could not be injected through the tube due to this obstruction.  A Bentson guidewire was advanced through the J-tube to determine the level of the    obstruction which was noted to be within the lumen of the tube at the level of the skin surface of the anterior abdominal wall. A Glidewire was then advanced through the tube in order to attempt advancement of the wire beyond the obstruction. Again, the    obstruction was noted within the lumen of the tube at the level of the skin surface of the anterior abdominal wall. Again, contrast could not be flushed through the J-tube. Therefore, the J-tube positioning cannot be determined on the current    examination. There also appears to be retraction of the J-tube. Due to the inability to inject contrast, the tip of the J-tube cannot be definitively determined to be within or outside of the lumen of the jejunum.              **This report has been created using voice recognition software.  It may contain minor errors which are inherent in voice recognition technology. **         Final report electronically signed by Dr. Arley Dubois on 10/24/2020 3:44 PM      Physical Exam:  Vitals: BP (!) 100/46   Pulse 76   Temp 98.6 °F (37 °C) (Bladder)   Resp 12   Ht 5' 11\" (1.803 m)   Wt 155 lb 13.8 oz (70.7 kg)   SpO2 96%   BMI 21.74 kg/m²   24 hour intake/output:    Intake/Output Summary (Last 24 hours) at 10/25/2020 1018  Last data filed at 10/25/2020 0600  Gross per 24 hour   Intake 1268 ml   Output 850 ml   Net 418 ml     Last 3 weights:   Wt Readings from Last 3 Encounters:   10/25/20 155 lb 13.8 oz (70.7 kg)   10/01/20 164 lb 8 oz (74.6 kg)   07/23/20 157 lb 3.2 oz (71.3 kg)       General appearance - intubated, chronically ill appearing, alert and appears comfortable  HEENT: ETT tube  Chest - clear to auscultation  Cardiovascular - normal rate and regular rhythm  Abdomen - soft, tender, nondistended, no masses or organomegaly   Surgical Incision - well approximated with tan drainage noted, J tube clamped with tan feeds leaking around site  Musculoskeletal -weakness      DVT prophylaxis: [] Lovenox                                 [x] SCDs [] SQ Heparin                                 [] Encourage ambulation           [] Already on Anticoagulation plavix                 Assessment:  1. S/p J tube POD#3  2. Dysphagia   3. Acute hypoxic respiratory failure - status post intubation  4. Septic shock  5. CAP (+) legionella  6. History of PE and DVT  7. Anemia   8. HX esophagectomy, esophageal cancer     Active Problems:    Respiratory failure with hypoxia (HCC)    Sepsis ([address]    Pneumonia due to organism    Moderate malnutrition ([address]  Resolved Problems:    * No resolved hospital problems. *      Plan:    1. Patient went down for IR study and fluro advancement of OG tube. Were unable to advance OG tube past the hiatal hernia so we will leave tube in place to suction for now. 2. Keep J-tube clamped. Do not use at all. 3. Routine wound care  4. Antibiotic therapy  5. Pain control  6. Plavix and coumadin are on hold, hematology to evaluate  7. SCDs for DVT prophylaxis. Okay with Lovenox prophylatic if okay with attending. 8. Palliative care   9. ICU supportive care. Appreciate assistance. Wean pressors as able. 10. Continue conservative management. No acute surgical intervention today, but may need further intervention if no improvement with tube. IR team to reassess patient.     Electronically signed by Rohan Henriquez DO on 10/25/2020 at 10:18 AM

## 2020-10-25 NOTE — OP NOTE
Department of Radiology  Post Procedure Progress Note      Pre-Procedure Diagnosis: nonfunctioning J tube*    Procedure Performed: manipulated tube    Anesthesia: local     Findings: successful, tube in good position and functioning well    Immediate Complications:  None    Estimated Blood Loss: minimal    SEE DICTATED PROCEDURE NOTE FOR COMPLETE DETAILS.     Filemon White MD   10/25/2020 2:17 PM

## 2020-10-25 NOTE — PLAN OF CARE
Problem: Restraint Use - Nonviolent/Non-Self-Destructive Behavior:  Goal: Absence of restraint indications  Description: Absence of restraint indications  10/25/2020 1855 by Jovany Greene RN  Outcome: Completed  Note: Restraints released. Pt awake and not pulling at ETT    10/25/2020 0649 by Jett Murry RN  Outcome: Ongoing  Note: Bilateral soft wrist restraints for safety. When restraints are released patient reached for ETT tube. Goal: Absence of restraint-related injury  Description: Absence of restraint-related injury  10/25/2020 1855 by Jovany Greene RN  Outcome: Met This Shift  10/25/2020 0649 by Jett Murry RN  Outcome: Ongoing  Note: Absence of injury to bilateral wrist related to restraints. ROM performed q2h and PRN. Don catheter, NPO.

## 2020-10-26 NOTE — PROGRESS NOTES
[address] THERAPY MISSED TREATMENT NOTE  [facility] ICU 4D  4D-18/018-A      Date: 10/26/2020  Patient Name: Malu Moody        CSN: [id number]   : 1940  ([de-identified] y.o.)  Gender: male   Referring Practitioner: LARRY Bray CNP  Diagnosis: respiratory failure with hypoxia         REASON FOR MISSED TREATMENT: Hold OT treatment this date. Pt placed on ventilator and not appropriate for therapuetic interventions at this time. Will attempt tommorrow as pt appropriate.

## 2020-10-26 NOTE — PROGRESS NOTES
EKG at bedside. Patient noted to have increased pain with palpation to midsternal, epigastric region.

## 2020-10-26 NOTE — FLOWSHEET NOTE
10/26/20 1136   Encounter Summary   Services provided to: Patient   Referral/Consult From: [address] [facility] Children;Family members   Place of [facility]   ([place] [address])   Contact Jewish Completed   Continue Visiting Yes  (10/26 NR)   Complexity of Encounter Low   Length of Encounter 15 minutes   Routine   Type Follow up   Assessment Unable to respond   Intervention Prayer   Outcome Did not respond   In my encounter with the 79yr old patient, I attempted to see the patient but they were unresponsive and on the ventilator at this time. No family was present in the room. A  will attempt to see the patient at a later time as a follow up.

## 2020-10-26 NOTE — PROGRESS NOTES
[facility] THERAPY MISSED TREATMENT NOTE  [facility] ICU 4D      Date: 10/26/2020  Patient Name: Farhad Ayers        MRN: [id number]    : 1940  ([de-identified] y.o.)    REASON FOR MISSED TREATMENT:  Patient is currently not medically appropriate for skilled ST evaluation/treatment d/t current intubation/ventilation. Per discussion with RN, Javy Pascual, do Not anticipate extubation on this date. ST plans to re-attempt on a later date/time as schedule permits, pt is medically appropriate, and/or available. Carli Singh MA., CCC-SLP

## 2020-10-26 NOTE — PROGRESS NOTES
dextrose, glucagon (rDNA), dextrose, albuterol sulfate HFA **AND** ipratropium **AND** [CANCELED] MDI Treatment    Objective:    CBC:   Recent Labs     10/24/20  0600 10/25/20  0540 10/26/20  0504   WBC 14.0* 15.5* 13.7*   HGB 12.2* 11.1* 11.8*   * 313 302     BMP:    Recent Labs     10/25/20  0540 10/25/20  1220 10/26/20  0504    138 143   K 4.0 3.9 3.8    103 105   CO2 26 24 27   BUN 57* 55* 54*   CREATININE 1.3* 1.1 1.0   GLUCOSE 260* 200* 204*     Calcium:  Recent Labs     10/26/20  0504   CALCIUM 9.2     Ionized Calcium:No results for input(s): IONCA in the last 72 hours. Magnesium:  Recent Labs     10/25/20  1220   MG 2.6*     Phosphorus:  Recent Labs     10/25/20  1220   PHOS 2.9     BNP:No results for input(s): BNP in the last 72 hours. Glucose:  Recent Labs     10/25/20  2346 10/26/20  0557 10/26/20  1230   POCGLU 180* 197* 235*     HgbA1C: No results for input(s): LABA1C in the last 72 hours. INR:   Recent Labs     10/26/20  0504   INR 1.39*     Hepatic: No results for input(s): ALKPHOS, ALT, AST, PROT, BILITOT, BILIDIR, LABALBU in the last 72 hours. Amylase and Lipase:  Recent Labs     10/26/20  0945   LACTA 2.9*     Lactic Acid:   Recent Labs     10/26/20  0945   LACTA 2.9*     Troponin:   Recent Labs     10/25/20  1220   CKTOTAL 13*   TROPONINT 0.010*     BNP: No results for input(s): BNP in the last 72 hours. Lipids: No results for input(s): CHOL, TRIG, HDL, LDLCALC in the last 72 hours.     Invalid input(s): LDL  ABGs:   Lab Results   Component Value Date    PH 7.19 10/11/2020    PCO2 75 10/11/2020    PO2 98 10/11/2020    HCO3 29 10/11/2020    O2SAT 95 10/11/2020       Radiology reports as per the Radiologist  Radiology: Ct Chest Wo Contrast    Result Date: 10/12/2020  CT chest without contrast: Comparison:  CT,SR  - CT CHEST PULMONARY EMBOLISM W CONTRAST  - 10/19/2018 03:48 PM EDT FINDINGS: Lungs/pleura: Extensive consolidation of the right lower lobe with air bronchograms and areas of necrosis, with some involvement of the adjacent posterior aspect of the right upper lobe and right middle lobe. Left lower lobe consolidation or atelectasis. No pleural effusion or pneumothorax. Upper lobes are otherwise clear. Mediastinum/heart/aorta: Mild cardiac enlargement. No pericardial effusion. Coronary artery mural calcifications. Tortuous thoracic aorta. Dilated trachea and upper thoracic esophagus. Fluid-filled distended mid and distal thoracic esophagus. Small hiatal hernia. Uppermost abdomen: Surgical clips anterior to the aorta in the upper abdomen. Calcific mass in the deep mesentery measuring 11 x 37 mm. Probable cholelithiasis. Left kidney cortical cyst. MSK: Multilevel spondylosis of the thoracic spine. Mild kyphoscoliosis. No acute fracture. No destructive osseous lesion. Right lower lobe dense consolidation with involvement of the posterior aspect of the upper and middle lobe. Findings suspicious for pneumonia. Left base atelectasis or pneumonia. Small hiatal hernia. 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 Left kidney cyst.  Possible cholelithiasis. This document has been electronically signed by: Juan Mcginnis MD on 10/12/2020 01:01 AM All CT scans at this facility use dose modulation, iterative reconstruction, and/or weight-based dosing when appropriate to reduce radiation dose to as low as reasonably achievable. Xr Chest Portable    Result Date: 10/12/2020  1 view chest x-ray. Comparison:  SR SUSIE  - XR CHEST PORTABLE  - 10/11/2020 06:12 PM EDT Findings: Stable right IJ CVP catheter. ET tube terminates 4 cm above the david. Enterogastric tube loops in the stomach, tip in the gastric body, noting a retrocardiac hiatal hernia. Bilateral lower lobe consolidations on the left obscuring the hemidiaphragm, and bilateral pleural effusions. Heart is mildly enlarged. No pulmonary edema.      Impression: Tubes and lines as noted. Bilateral consolidations and pleural effusions, moderately improved. Significant improvement in central pulmonary edema/CHF. Moderate sized hiatal hernia is suspected. This document has been electronically signed by: Ramón Arango MD on 10/12/2020 02:55 AM     Xr Chest Portable    Result Date: 10/11/2020  PROCEDURE: XR CHEST PORTABLE CLINICAL INFORMATION: Central line . COMPARISON: 11 October 2020 at 12:34 PM TECHNIQUE: Portable semiupright FINDINGS: Right internal jugular vein catheter is present courses unremarkable tip is at the cavoatrial junction. There are no other changes from earlier     Central venous catheter tip is at the cavoatrial junction **This report has been created using voice recognition software. It may contain minor errors which are inherent in voice recognition technology. ** Final report electronically signed by Dr. Timmy Herrera on 10/11/2020 6:40 PM    Xr Chest Portable    Result Date: 10/11/2020  PROCEDURE: XR CHEST PORTABLE CLINICAL INFORMATION: sob . COMPARISON: 5/21/2020 TECHNIQUE: Portable semiupright FINDINGS: Patient is severely rotated. Heart size is within normal limits. Mediastinum is not widened. Dense airspace consolidation right lower lobe. Interstitial edema bilaterally. Retrocardiac atelectasis or infiltrate. Pulmonary vessels are not congested. Right lower lobe airspace consolidation. In addition interstitial/pulmonary edema bilaterally. **This report has been created using voice recognition software. It may contain minor errors which are inherent in voice recognition technology. ** Final report electronically signed by Dr. Timmy Herrera on 10/11/2020 1:03 PM     PROCEDURE: XR CHEST PORTABLE         CLINICAL INFORMATION: ETT placement         COMPARISON: 10/24/2020         TECHNIQUE:  AP mobile chest single view           FINDINGS:         There is an endotracheal tube present with the tip overlying the midtrachea located approximately 2.8 cm above the david.      hernia. This was despite multiple attempts with guidewire assistance.         An attempt was made to check J-tube placement. However there was obstruction of the tube. Contrast could not be injected through the tube due to this obstruction. A Bentson guidewire was advanced through the J-tube to determine the level of the    obstruction which was noted to be within the lumen of the tube at the level of the skin surface of the anterior abdominal wall. A Glidewire was then advanced through the tube in order to attempt advancement of the wire beyond the obstruction. Again, the    obstruction was noted within the lumen of the tube at the level of the skin surface of the anterior abdominal wall. Again, contrast could not be flushed through the J-tube. Therefore, the J-tube positioning cannot be determined on the current    examination. There also appears to be retraction of the J-tube. Due to the inability to inject contrast, the tip of the J-tube cannot be definitively determined to be within or outside of the lumen of the jejunum.              Impression    1. The enteric tube was found looped within the esophagus. The enteric tube was retracted and redirected into the patient's known hiatal hernia at the lower mediastinum. However, the catheter could not be redirected into the small bowel due to    tortuosity. Therefore, the tip of the catheter cannot be advanced to the level of the gastric lumen at the hiatal hernia. This was despite multiple attempts with guidewire assistance and fluoroscopic guidance.         2. An attempt was made to check J-tube placement. However there was obstruction of the tube. Contrast could not be injected through the tube due to this obstruction. A Bentson guidewire was advanced through the J-tube to determine the level of the    obstruction which was noted to be within the lumen of the tube at the level of the skin surface of the anterior abdominal wall.  A Glidewire was then advanced through the tube in order to attempt advancement of the wire beyond the obstruction. Again, the    obstruction was noted within the lumen of the tube at the level of the skin surface of the anterior abdominal wall. Again, contrast could not be flushed through the J-tube. Therefore, the J-tube positioning cannot be determined on the current    examination. There also appears to be retraction of the J-tube. Due to the inability to inject contrast, the tip of the J-tube cannot be definitively determined to be within or outside of the lumen of the jejunum.              **This report has been created using voice recognition software.  It may contain minor errors which are inherent in voice recognition technology. **         Final report electronically signed by Dr. Amber Samuels on 10/24/2020 3:44 PM      Physical Exam:  Vitals: BP (!) 160/80   Pulse 73   Temp 97.6 °F (36.4 °C) (Axillary)   Resp 16   Ht 5' 11\" (1.803 m)   Wt 155 lb 13.8 oz (70.7 kg)   SpO2 96%   BMI 21.74 kg/m²   24 hour intake/output:    Intake/Output Summary (Last 24 hours) at 10/26/2020 1333  Last data filed at 10/26/2020 1238  Gross per 24 hour   Intake 2382.73 ml   Output 975 ml   Net 1407.73 ml     Last 3 weights: Wt Readings from Last 3 Encounters:   10/25/20 155 lb 13.8 oz (70.7 kg)   10/01/20 164 lb 8 oz (74.6 kg)   07/23/20 157 lb 3.2 oz (71.3 kg)       General appearance - intubated, chronically ill appearing,   HEENT: ETT tube  Chest - clear to auscultation  Cardiovascular - normal rate and regular rhythm  Abdomen - soft,  nondistended, no masses or organomegaly   Surgical Incision - well approximated  J tube   Musculoskeletal -no swelling       DVT prophylaxis: [] Lovenox                                 [x] SCDs                                 [] SQ Heparin                                 [] Encourage ambulation           [] Already on Anticoagulation plavix                 Assessment:  1. S/p J tube POD#4  2. Intubated   3.  Dysphagia 4. Acute hypoxic respiratory failure - status post intubation  5. Septic shock  6. CAP (+) legionella  7. History of PE and DVT  8. Anemia   9. HX esophagectomy, esophageal cancer     Active Problems:    Respiratory failure with hypoxia (HCC)    Sepsis ([facility]    Pneumonia due to organism    Moderate malnutrition ([facility]  Resolved Problems:    * No resolved hospital problems. *      Plan:    1. Patient went down for IR yesterday, Dr Karl Francis able to manipulate J tube in position   2. J-tube with cyclic tube feeds  3. Routine wound care  4. Antibiotic therapy  5. Pain control   6. Plavix and coumadin are on hold, hematology to evaluate  7. SCDs for DVT prophylaxis. Okay with Lovenox   8. Palliative care   9. ICU supportive care. Appreciate assistance. Wean pressors as able. 10. Continue conservative management    Electronically signed by LARRY Juarez CNP on 10/26/2020 at 1:33 PM Patient seen and examined independently by me. Above discussed and I agree with CNP. Labs, cultures, and radiographs where available were reviewed. See orders for the updated patient care plan.     Cristhian Field MD, events of weekend noted J-tube may have flipped out of opening did have apparently some tube feedings coming through incision initial attempt to determine if J-tube was in place failed but then Dr. Karl Francis was able to apparently manipulate the J-tube back into its proper positioning abdomen is soft wound is good is tolerating tube feedings we will continue to monitor  10/26/2020   5:19 PM

## 2020-10-26 NOTE — PROGRESS NOTES
CRITICAL CARE PROGRESS NOTE      Patient:  Jon Peraza    Unit/Bed:4D-18/018-A  YOB: 1940  MRN: [id number]   PCP: Shivam Snyder MD  Date of Admission: 10/11/2020  Chief Complaint:- Shortness of breath and fever    Assessment and Plan:    1. Acute Hypoxic Respiratory Failure:  Patient intubated on 10/23/2020 for airway protection. Continue PCV mode of mechanical ventilation with peak pressure of 35 or less and plateau pressure of 30 or less. Patient developed arrhythmias while on SBT. 2. Septic Shock: Secondary to Pneumonia. S/p 250 mL fluid bolus x3. Lactic acid 2.7. Procalcitonin 0.70. S/p Vasopressin drip. Continue IV Levophed for goal MAP 65 or greater. 3. Community Acquired Pneumonia: Positive for Legionella on 10/12/2020. S/p Levaquin 750 mg (10/12 - 10/20). White blood cell count trending down. Chest x-ray 10/26/2020 shows bilateral lower airspace opacities. Pending CT chest wo contrast to evaluate for progression of pneumonia. 4. S/p J-Tube placement: POD 4. J-tube insertion due to the patient's complex esophageal anatomy. Empiric antibiotic coverage with Zosyn initiated on 10/24/2020. Gastrograffin study failed to introduce through the J-tube 10/25/2020. Duotube insertion by IR on 10/25/2020.  5. Dysphagia: Patient went down for IR study and fluoro advancement of OG tube. They were unable to advance OG tube past hiatal hernia. Tube in place to suction for now. 6. Acute Urinary Retention:  Secondary to post-surgical/post-anesthesia. Continue with intermittent external catheterization. 7. Hx of DVT and PE: Patient was noted to be on Plavix and Coumadin. This was stopped by Heme-Onc secondary to acute GI bleed. Patient to continue to hold Coumadin and Plavix and follow-up with heme-onc in 2 weeks. 8. COPD:  Stable, continue inhalers. 9. Hx of Esophageal cancer: S/p esophagectomy  10.  Hx of Colon cancer: S/p colon resection      INITIAL H AND P AND ICU COURSE:  Dinorah Jennings 78-year-old white male who has a past medical history of reformed smoker, CAD placement in 2015, hypertension, esophageal cancer, colon cancer, nephrolithiasis. Per report patient presented to the emergency department on 10/11/2020 for evaluation of shortness of breath, fatigue, fever, nausea and vomiting. He was accompanied by his wife and daughter. The daughter reported that he began having symptoms on Friday 10/09/2020 and had increased difficulty breathing with fevers T-max of 102.0. He was also noted to have difficulty ambulating and altered mental status. Patient was admitted to the ICU because of escalating oxygen requirements and hypotension. Patient required Levophed drip and intubation. PCR panel was positive for Legionella pneumoniae and was started on Levaquin. On 10/18/2020, patient was extubated to room air and transferred to [facility] ICU stepdown unit. On 10/22/2020, patient had a J-tube placed after failing modified barium swallow twice. On 10/24/2020, patient became hypotensive with blood pressure in the 70s with change in mental status. Patient became more lethargic and restless. Patient was unresponsive to fluid resuscitation. Patient was transferred to the ICU for intubation and pressure support. Past Medical History: Per HPI. Family History: Mother: diabetes, stroke. Father: cancer  Social History: Reformed smoker, 25-pack-year history, denies alcohol use, denies drug use. ROS   Unable to obtain secondary to patient being intubated and sedated on mechanical ventilation.     Scheduled Meds:   enoxaparin  40 mg Subcutaneous Daily    sodium chloride flush  10 mL Intravenous 2 times per day    chlorhexidine  15 mL Mouth/Throat BID    famotidine (PEPCID) injection  20 mg Intravenous BID    hydrocortisone sodium succinate PF  50 mg Intravenous Q6H    piperacillin-tazobactam  3.375 g Intravenous Q8H    [Held by provider] atenolol  100 mg Per J Tube Daily    [Held by provider] finasteride  5 mg Per J Tube Daily    [Held by provider] furosemide  20 mg Per J Tube BID    glycopyrrolate-formoterol  2 puff Inhalation BID    [Held by provider] insulin glargine  30 Units Subcutaneous Nightly    insulin lispro  0-12 Units Subcutaneous Q6H    [Held by provider] isosorbide mononitrate  30 mg Oral Daily    [Held by provider] tamsulosin  0.4 mg Oral Nightly    [Held by provider] clopidogrel  75 mg Per NG tube Daily     Continuous Infusions:   norepinephrine 7 mcg/min (10/26/20 0816)    fentaNYL (SUBLIMAZE) 500 mcg in sodium chloride 0.9 % 100 mL 25 mcg/hr (10/25/20 1932)    dextrose Stopped (10/19/20 1522)       PHYSICAL EXAMINATION:  T:  97.6. P:  60. RR:  15. B/P:  106/60. FiO2:  25. O2 Sat:  94.  I/O:  2215/825  Body mass index is 21.74 kg/m². GCS:   11  PC: 18/6:  FiO2: 25: SpO2: 94: Ti:1 sec  General:  Acutely-ill appearing  mechanically ventilated. HEENT:  normocephalic and atraumatic. No scleral icterus. PERR  Neck: supple. No Thyromegaly. Lungs: clear to auscultation. No retractions  Cardiac: RRR. No JVD. Abdomen: soft. Nontender. Extremities:  2+ pitting edema BLE. Vasculature: capillary refill < 3 seconds. Palpable dorsalis pedis pulses. Skin:  warm and dry. Lymph:  No supraclavicular adenopathy. Neurologic:  No focal deficit. No seizures. Data: (All radiographs, tracings, PFTs, and imaging are personally viewed and interpreted unless otherwise noted).  Sodium 143, potassium 3.8, chloride 105, bicarbonate 27, BUN 54, creatinine 1.0, estimated GFR 72, lactic acid 2.9, glucose 158, calcium 8.8, procalcitonin 1.28,    WBC 13.7, hemoglobin 11.8, hematocrit 36.0, platelet 233   CT abdomen pelvis without contrast 10/24/2020: Sliding hiatal hernia. Subdiaphragmatic free air. Complex fluid in the abdomen and pelvis. Cholelithiasis. Percutaneous jejunostomy tube tip in jejunal loop in left anterior abdomen.   Bilateral lower lobe airspace opacities, increased   CXR 10/26/2020: Moderate bibasilar atelectasis/pneumonia        Seen with multidisciplinary ICU team.  Meets Continued ICU Level Care Criteria:    [x] Yes   [] No - Transfer Planned to listed location:  [] HOSPITALIST CONTACTED-      Case and plan discussed with Dr. Rk Peoples.         Electronically signed by Annamary Curling, MD  [address] [place], [place] [address]

## 2020-10-26 NOTE — PROGRESS NOTES
[facility] [address]  PHYSICAL THERAPY MISSED TREATMENT NOTE  [facility] ICU 4D    Date: 10/26/2020  Patient Name: Tom Barth        MRN: [id number]   : 1940  ([de-identified] y.o.)  Gender: male   Referring Practitioner: DELGADO Loving  Diagnosis: respiratory failure with hypoxia         REASON FOR MISSED TREATMENT:  Pt intubated now and not appropriate for therapeutic interventions at this time. Will attempt tomorrow as pt appropriate. Benjamin Loya.  Nellie Yost, [place] [address]

## 2020-10-26 NOTE — PLAN OF CARE
Problem: Impaired respiratory status  Goal: Clear lung sounds  10/26/2020 1820 by Adan Grant RCP  Outcome: Ongoing   Vent setting optimized to achieve target tidal volume, respiratory rate and ideal oxygen saturations. SBT will be performed when appropriate.

## 2020-10-26 NOTE — PLAN OF CARE
Patient remains free from falls this shift. Bed rails 4/4 and bed alarm active. No physical injury noted this shift. Patient is repositioned every 2 hours to prevent pressure sores. Wife at bedside.  Denies concerns

## 2020-10-26 NOTE — PLAN OF CARE
Problem: Falls - Risk of:  Goal: Will remain free from falls  Description: Will remain free from falls  Outcome: Ongoing  Note: Patient intubated with light sedation. Fall precautions in place, call light within reach, bed in lowest position, side rails up x4 per ICU bed alarm on. Hourly rounding in place. Problem: Falls - Risk of:  Goal: Absence of physical injury  Description: Absence of physical injury  Outcome: Ongoing  Note: Absence of physical injury this shift. Problem: Restraint Use - Nonviolent/Non-Self-Destructive Behavior:  Goal: Absence of restraint-related injury  Description: Absence of restraint-related injury  10/25/2020 2152 by Donn Jeronimo RN  Outcome: Completed  Note: Bilateral wrist restraints discontinued. Problem: Airway Clearance - Ineffective:  Goal: Ability to maintain a clear airway will improve  Description: Ability to maintain a clear airway will improve  Outcome: Ongoing  Note: Patient remains on the ventilator for support. FiO2 25%. Suctioning as needed. Oral care completed. Problem: Fluid Volume - Imbalance:  Goal: Absence of imbalanced fluid volume signs and symptoms  Description: Absence of imbalanced fluid volume signs and symptoms  Outcome: Ongoing  Note: LR 500ml bolus given this shift. Don catheter draining kaleb colored urine. LABS in AM.      Problem: Skin Integrity - Impaired:  Goal: Absence of new skin breakdown  Description: Absence of new skin breakdown  Outcome: Ongoing  Note: No new skin breakdown noted. Turning patient q2h and PRN. Pillows for support. DTI on buttock/coccyx and ball of right foot. Problem: Respiratory  Goal: No pulmonary complications  Outcome: Ongoing  Note: Patient remains on the vent for support. FiO2 25%. Goal: O2 Sat > 90%  Outcome: Ongoing  Note: O2 saturation 97% on FiO2 of 25%. Tolerating vent.       Problem: Nutrition  Goal: Optimal nutrition therapy  Outcome: Ongoing  Note: Tube feeding Glucerna started today @ 20ml/hr. Tolerating. Denies stomach pain. Problem: Discharge Planning:  Goal: Discharged to appropriate level of care  Description: Discharged to appropriate level of care  Outcome: Ongoing  Note: Patient is from home with wife. Patient in the ICU, sedated on vent. Discharge planning in process. Problem: Urinary Elimination:  Goal: Signs and symptoms of infection will decrease  Description: Signs and symptoms of infection will decrease  Outcome: Ongoing  Note: Don catheter in place draining kaleb colored urine. Don care completed. Problem: Pain:  Goal: Pain level will decrease  Description: Pain level will decrease  Outcome: Ongoing  Note: Patient is on a fentanyl drip @ 25mcg/hr. CPOT. Tolerating vent. Nods head \"no\" to pain. POC reviewed with patient. Patient nods \"yes\" to understanding.

## 2020-10-26 NOTE — PROGRESS NOTES
Patient repositioned at this time for comfort. Wife now at bedside.  Patient and wife updated on POC for the day

## 2020-10-26 NOTE — CARE COORDINATION
10/26/20, 8:26 AM EDT    DISCHARGE BARRIERS        Patient transferred to 4D 18. Report given to unit Reinaldo Hunt, regarding discharge plan for this patient.

## 2020-10-26 NOTE — PLAN OF CARE
Problem: Impaired respiratory status  Goal: Clear lung sounds  10/26/2020 0754 by Renee March, [facility]  Outcome: Ongoing   Vent setting optimized to achieve target tidal volume, respiratory rate and ideal oxygen saturations. SBT will be performed when appropriate.     SBT stoppped due to ECG changes

## 2020-10-26 NOTE — PLAN OF CARE
Problem: Impaired respiratory status  Goal: Clear lung sounds  Outcome: Ongoing  Note: Pt will continue to be assessed by respiratory therapy for lung sounds.

## 2020-10-26 NOTE — CARE COORDINATION
[address]: 15  Location: 4D-18/018-A Reason for admit: Respiratory failure with hypoxia [facility]) [J96.91]   Procedure:   10/11 CVC  10/12 Intubated  10/17 Extubated  10/22 JT   10/24 CVC  10/24 Intubated  10/24 CXR  1. There is an endotracheal tube present with the tip overlying the midtrachea located approximately 2.8 cm above the david. 2. There is an enteric tube present which is looped within the esophagus. This was also present on the previous examination. Recommend repositioning. 3. Small bilateral pleural effusions are demonstrated. 4. Patchy airspace disease within the right lower lobe is noted which may represent atelectasis or pneumonia. 10/25 IR JT manipulation by IR successful (JT kinked)  Treatment Plan of Care: client admitted with Respiratory Failure/Legionella Pneumonia to ICU (history colon cancer, esophagectomy for esophageal cancer) prior to 4K, now back in ICU; updated Mariela, ICU CM; Palliative Care/SGY/GI following  Barriers to Discharge: hypotension 10/23 and IVF given with pressors, blocked, WBC 13.7; monitor. TF goal is 70 ml/hr. Today, IV Fentanyl/Levophed gtts, IV Zosyn continued.  Ventilator 25% FIO2, AC/PC 12 +6 Peep +12 PS continued  PCP: Prem Brizuela MD  Readmission Risk Score: 32%  Patient Goals/Plan/Treatment Preferences: lives with spouse, has walker; therapy following, has nebulizer, current with [facility], no SR IPR beds; family wants [facility] (Physiatry following), family resistant to ECF despite therapy recommends it (not safe for home); will need  minimum for JT teaching (nsg, aide, therapy, order in EPIC), will also need likely home infusion

## 2020-10-26 NOTE — PROGRESS NOTES
The patient was not able to tolerate SBT. SpO2 of 93 on 25% FiO2. Spontanteous VT was 350 and RR 19 breaths/min. The patient started having arrhythmias, which his nurse said had been happening, but happened more during the SBT. The patient was on SBT for 15 minutes.

## 2020-10-26 NOTE — PROGRESS NOTES
Bedside report completed with [facility] RN. Patient alert and able to respond with head gestures.

## 2020-10-27 PROBLEM — E43 SEVERE MALNUTRITION (HCC): Status: ACTIVE | Noted: 2020-01-01

## 2020-10-27 NOTE — PROGRESS NOTES
Pharmacy Consult       TPN    Ordering Provider: Dr Tej Grullon     Indication for TPN: NPO, severe malnutrition, gut rest     Macronutrients   DW: 77 kg   Total Kcal: 10 Kcal/kg              Clinimix 5/15 rate 45 ml/hour      Electrolyte Replacement: none    Assessment/ Plan:  Phosphorus = 3.3, OK to initiate Clinimix this evening  PICC line is ordered, currently has CVC  Plan to transition to 3-in-1 10/28/20  BMP, magnesium, phosphorus and ionized calcium in AM    Myrna Contreras PharmFITO 10/27/2020 4:09 PM

## 2020-10-27 NOTE — PLAN OF CARE
Problem: Nutrition  Goal: Optimal nutrition therapy  Outcome: Ongoing   Nutrition Problem #1: Severe malnutrition  Intervention: Food and/or Nutrient Delivery: (When able to use gut, recommend Glucerna 1.2 TF & goal would be 70 ml/hr. If gut rest needs to continue & TPN started, recommend dose on 77 kg & start with 10 kcals/kg, 1.2 grams protein/kg & 20% lipid kcals)  Nutritional Goals: TF to meet atleast % estimated nutritional needs until appropriate to transition to po feeds.

## 2020-10-27 NOTE — PROGRESS NOTES
Comprehensive Nutrition Assessment    Type and Reason for Visit:  Reassess(TF check)    Nutrition Recommendations/Plan:   Pt has been NPO with minimal TF intake since extubated 10/18 d/t failed swallow & Jtube issues. When able to use gut, recommend Glucerna 1.2 TF & goal would be 70 ml/hr and would recommend 110 ml free H20 every 4 hours or per MD if no IVF. If gut rest needs to continue & TPN started, recommend dose on 77 kg & start with 10 kcals/kg, 1.2 grams protein/kg & 20% lipid kcals. Pt is refeeding risk. Nutrition Assessment:    Pt declining  from a nutritional standpoint AEB meets criteria for severe  malnutrition as below & continues NPO   Remains at risk for further nutritional compromise r/t extubated 10/18 NPO, failed swallow & MBS , Jtube issues, Jtube place 10/22, 10/25 manipulated Jtube ,10/27 OR for Jtube replacement , closure & abd exploration, so NPO to minimal TF intake since 10/18, need for return to ICU  reintubated , increased nutrient needs for wound healing and underlying medical condition (hx COPD, DM, hx esophageal cancer s/p gastric pull through 2002, colon cancer s/p resection 1997). Nutrition recommendations/interventions as per above. Malnutrition Assessment:  Malnutrition Status:  Severe malnutrition    Context:  Acute Illness     Findings of the 6 clinical characteristics of malnutrition:  Energy Intake:  7 - 50% or less of estimated energy requirements for 5 or more days(extubated 10/18 NPO, failed swallow , Jtube issues, & reintubated)  Weight Loss:  Unable to assess(edema/fluid shifts, suspect wt loss nutrition & fluid related)     Body Fat Loss:  7 - Moderate body fat loss Orbital   Muscle Mass Loss:  7 - Moderate muscle mass loss Temples (temporalis)  Fluid Accumulation:  1 - Mild     Strength:  Not Performed    Estimated Daily Nutrient Needs:  Energy (kcal):  ~2084 kcals ( 27 kcals/kg);  Weight Used for Energy Requirements:  (10/12 76 kg)     Protein (g): ~93- 108 grams (1.2-  1.4 grams/kg)  monitor renal status; Weight Used for Protein Requirements:  Admission(77.2 kg)        Fluid (ml/day):  ~1800ml (25ml/kgm wt. 72kgm 10/20); Nutrition Related Findings:  severely malnouished pt seen intubated. Per Starling Sensor was displaced yesterday ,OR done early this morning & per surgery will need to be NPO at this time. Pt discussed in rounds. Pt extubated 10/18, TF stopped, failed swallow, was awating Jtube & then misplaced. Appears NPO, with minimal TF intakes since 10/18 & yesterday TF running,in intraabdominal area. Per Mayra Alvarado CNP, will see how long pt needs to be NPO  & check with surgery, may need TPN. MAP 65. meds include levophed, fentanyl, insulin      Wounds:  (stage1 perirectum. sagell location not documented. 10/25 mmanipulated Jtube, 10/27 OR abd exploration, Jtube replaced & closure)       Current Nutrition Therapies:    NPO    Anthropometric Measures:  · Height: 5' 11\" (180.3 cm)  · Current Body Weight: 156 lb 8 oz (71 kg)(10/22, +1 edema)   · Admission Body Weight: 170 lb 3.1 oz (77.2 kg)(10/12 +1 edema)    · Usual Body Weight: 164 lb 10.9 oz (74.7 kg)(10/14 +2 edema)     · Ideal Body Weight: 172 lbs;     · BMI: 21.8  · BMI Categories: Underweight (BMI less than 22) age over 72       Nutrition Diagnosis:   · Severe malnutrition related to inadequate protein-energy intake as evidenced by (less than 50% of projected energy needs for greater than 5 days. extubated 10/18 NPO, failed swallow , Jtube issues). , moderate fat loss & moderate muscle loss      Nutrition Interventions:   Food and/or Nutrient Delivery:  (When able to use gut, recommend Glucerna 1.2 TF & goal would be 70 ml/hr. If gut rest needs to continue & TPN started, recommend dose on 77 kg & start with 10 kcals/kg, 1.2 grams protein/kg & 20% lipid kcals)  Nutrition Education/Counseling:  Education completed(10/23 Reviewed home TF instructions with pt's wife (pt. sleeping).   Provided written

## 2020-10-27 NOTE — PROGRESS NOTES
Patient has been successfully weaned from Mechanical Ventilation. RSBI before extubation was 16 with EtCO2 of  and SpO2 of 100 on 21% FiO2. Patient extubated and placed on 4 liters/min via nasal cannula. Post extubation SpO2 is 100% with HR  116 bpm and RR 24 breaths/min. Patient had moderate cough that was non-productive. Extubation Well tolerated by patient. Isaias Mcelroy

## 2020-10-27 NOTE — PLAN OF CARE
Problem: Impaired respiratory status  Goal: Clear lung sounds  10/27/2020 0826 by Shruthi Aquino RCP  Outcome: Met This Shift   Vent setting optimized to achieve target tidal volume, respiratory rate and ideal oxygen saturations. SBT will be performed when appropriate.

## 2020-10-27 NOTE — ANESTHESIA PRE PROCEDURE
Department of Anesthesiology  Preprocedure Note       Name:  Matthew Arellano   Age:  [de-identified] y.o.  :  1940                                          MRN:  [id number]         Date:  10/27/2020      Surgeon: Tegan Sommers):  Cristhian Field MD    Procedure: Procedure(s):  LAPAROTOMY EXPLORATORY, [address] OUT    Medications prior to admission:   Prior to Admission medications    Medication Sig Start Date End Date Taking?  Authorizing Provider   traMADol (ULTRAM) 50 MG tablet take 1 tablet by mouth every 4 hours if needed for pain 20   Historical Provider, MD   isosorbide mononitrate (IMDUR) 30 MG extended release tablet TAKE 1 TABLET BY MOUTH ONCE DAILY 20   Historical Provider, MD   atenolol (TENORMIN) 100 MG tablet take 1 tablet by mouth once daily 10/1/20   Ortiz Morris MD   Nebulizer MISC Please provide new nebulizer machine to use with Albuterol 2.5mg via nebs Q6h prn, Arformoterol 15mcg via nebs BID and pulmicort 500 mcg via nebs BID 20   LARRY Sung CNP   metFORMIN (GLUCOPHAGE-XR) 500 MG extended release tablet Take 1 tablet by mouth daily (with breakfast) 20   Ortiz Morris MD   Arformoterol Tartrate [facility]) 15 MCG/2ML NEBU Take 2 mLs by nebulization 2 times daily 20  LARRY Sung CNP   budesonide (PULMICORT) 0.5 MG/2ML nebulizer suspension Take 2 mLs by nebulization 2 times daily 20  LARRY Sung CNP   albuterol-ipratropium (COMBIVENT RESPIMAT)  MCG/ACT AERS inhaler Inhale 1 puff into the lungs every 6 hours as needed for Wheezing 20   LARRY Sung CNP   furosemide (LASIX) 20 MG tablet Take 1 tablet by mouth daily 20   Ortiz Morris MD   warfarin (COUMADIN) 2.5 MG tablet Take by mouth every evening Take as directed by coumadin clinic, 105 tablets = 90 days     Historical Provider, MD   clopidogrel (PLAVIX) 75 MG tablet Take 75 mg by mouth daily    Historical Provider, MD   finasteride (PROSCAR) 5 MG tablet Take 1 tablet by mouth daily 3/17/20   LARRY Downey CNP   tamsulosin [facility]) 0.4 MG capsule Take 1 capsule by mouth nightly 3/17/20 3/17/21  LARRY Rogers CNP   ONE TOUCH ULTRA TEST strip Check blood sugar once daily. Dx: E11.9 3/11/19   Stephany Cruz MD   pantoprazole (PROTONIX) 40 MG tablet Take 40 mg by mouth 2 times daily  12/9/18   Historical Provider, MD   ipratropium-albuterol (DUONEB) 0.5-2.5 (3) MG/3ML SOLN nebulizer solution Inhale 3 mLs into the lungs every 6 hours as needed for Shortness of Breath (dyspnea or wheezes. ) . 9/5/18   Lily Meier MD   Blood Glucose Monitoring Suppl (ONE TOUCH ULTRA SYSTEM KIT) w/Device KIT Test blood sugar daily Dx E11.9 1/5/18   Stephany Cruz MD   acetaminophen (TYLENOL) 325 MG tablet Take 650 mg by mouth every 6 hours as needed for Pain Don't take more than 3,000 mg per day. Historical Provider, MD   nitroGLYCERIN (NITROSTAT) 0.4 MG SL tablet Place 0.4 mg under the tongue every 5 minutes as needed for Chest pain. If third one does not relieve pain, call 9-1-1. Historical Provider, MD   Respiratory Therapy Supplies (NEBULIZER COMPRESSOR) KIT by Does not apply route.  12/11/12   LARRY Jimenez CNP       Current medications:    Current Facility-Administered Medications   Medication Dose Route Frequency Provider Last Rate Last Dose    enoxaparin (LOVENOX) injection 70 mg  1 mg/kg Subcutaneous BID Vonda Pederson MD   70 mg at 10/26/20 2051    diatrizoate meglumine-sodium (GASTROGRAFIN) 66-10 % solution 30 mL  30 mL PEG Tube ONCE PRN Irma Dolan MD   30 mL at 10/25/20 1315    norepinephrine (LEVOPHED) 16 mg in dextrose 5% 250 mL infusion  2 mcg/min Intravenous Continuous LARRY Troncoso CNP 4.7 mL/hr at 10/26/20 2105 5 mcg/min at 10/26/20 2105    sodium chloride flush 0.9 % injection 10 mL  10 mL Intravenous 2 times per day Laure Mandel, LARRY - CNP   10 mL at 10/26/20 2056    sodium chloride flush 0.9 % injection 10 mL  10 mL Intravenous PRN LARRY Ojeda - CNP        acetaminophen (TYLENOL) tablet 650 mg  650 mg Oral Q6H PRN LARRY Ojeda - CNP        Or    acetaminophen (TYLENOL) suppository 650 mg  650 mg Rectal Q6H PRN LARRY Ojeda - CNP        polyethylene glycol (GLYCOLAX) packet 17 g  17 g Oral Daily PRN LARRY Ojeda - RICA        promethazine (PHENERGAN) tablet 12.5 mg  12.5 mg Oral Q6H PRN LARRY Ojeda - CNP        Or    ondansetron (ZOFRAN) injection 4 mg  4 mg Intravenous Q6H PRN LARRY Ojeda - CNP        chlorhexidine (PERIDEX) 0.12 % solution 15 mL  15 mL Mouth/Throat BID LARRY Ojeda - CNP   15 mL at 10/26/20 2051    famotidine (PEPCID) injection 20 mg  20 mg Intravenous BID LARRY Ojeda - CNP   20 mg at 10/26/20 2051    fentaNYL (SUBLIMAZE) 500 mcg in sodium chloride 0.9 % 100 mL  25 mcg/hr Intravenous Continuous LARRY Ojeda CNP 5 mL/hr at 10/25/20 1932 25 mcg/hr at 10/25/20 1932    hydrocortisone sodium succinate PF (SOLU-CORTEF) injection 50 mg  50 mg Intravenous Q6H Jessica Barton MD   50 mg at 10/26/20 2052    piperacillin-tazobactam (ZOSYN) 3.375 g in dextrose 5 % 50 mL IVPB extended infusion (mini-bag)  3.375 g Intravenous Mariaelena Laureano MD   Stopped at 10/27/20 0030    diatrizoate meglumine-sodium (GASTROGRAFIN) 66-10 % solution 30 mL  30 mL Oral ONCE PRN LARRY Saez CNP   30 mL at 10/24/20 1514    [Held by provider] atenolol (TENORMIN) tablet 100 mg  100 mg Per J Tube Daily Waqar Mccarthy MD        [Held by provider] finasteride (PROSCAR) tablet 5 mg  5 mg Per J Tube Daily Waqar Mccarthy MD        [Held by provider] furosemide (LASIX) tablet 20 mg  20 mg Per J Tube BID MD Issac Hallman (SENOKOT) tablet 8.6 mg  1 tablet Per J Tube Nightly PRN Mary Nunez MD        metoprolol (LOPRESSOR) injection 2.5 mg  2.5 mg Intravenous Q6H PRN Iggy Doe MD   2.5 mg at 10/18/20 1522    glycopyrrolate-formoterol (BEVESPI) 9-4.8 MCG/ACT inhaler 2 puff  2 puff Inhalation BID Iggy Doe MD   2 puff at 10/26/20 1747    [Held by provider] insulin glargine (LANTUS) injection vial 30 Units  30 Units Subcutaneous Nightly Iggy Doe MD   30 Units at 10/17/20 2020    insulin lispro (HUMALOG) injection vial 0-12 Units  0-12 Units Subcutaneous Q6H Iggy Doe MD   4 Units at 10/27/20 0118    glucose (GLUTOSE) 40 % oral gel 15 g  15 g Oral PRN Iggy Doe MD        dextrose 50 % IV solution  12.5 g Intravenous PRN Iggy Doe MD   12.5 g at 10/18/20 1805    glucagon (rDNA) injection 1 mg  1 mg Intramuscular PRN Iggy Doe MD        dextrose 5 % solution  100 mL/hr Intravenous PRN Iggy Doe MD   Stopped at 10/19/20 1522    [Held by provider] isosorbide mononitrate (IMDUR) extended release tablet 30 mg  30 mg Oral Daily Iggy Doe MD   30 mg at 10/19/20 0836    [Held by provider] tamsulosin (FLOMAX) capsule 0.4 mg  0.4 mg Oral Nightly Iggy Doe MD   0.4 mg at 10/23/20 2034    [Held by provider] clopidogrel (PLAVIX) tablet 75 mg  75 mg Per NG tube Daily Iggy Doe MD   75 mg at 10/13/20 0808    albuterol sulfate  (90 Base) MCG/ACT inhaler 1 puff  1 puff Inhalation Q6H PRN Iggy Doe MD        And    ipratropium (ATROVENT HFA) 17 MCG/ACT inhaler 1 puff  1 puff Inhalation Q6H PRN Iggy Doe MD         Facility-Administered Medications Ordered in Other Encounters   Medication Dose Route Frequency Provider Last Rate Last Dose    rocuronium (ZEMURON) injection    PRN Emiliano Tejada DO   30 mg at 10/27/20 0153    fentaNYL (SUBLIMAZE) injection    PRN Deryl Jermaine Pasion, DO   50 mcg at 10/27/20 0153    0.9 % sodium chloride infusion    Continuous PRN Francheska Magañaion,            Allergies:     Allergies   Allergen Reactions    Norco [Hydrocodone-Acetaminophen] Nausea And Vomiting       Problem List:    Patient Active Problem List   Diagnosis Code    History of esophageal cancer Z85.01    History of colon cancer Z85.038    COPD, 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    Restrictive lung disease J98.4    History of tobacco use Z87.891    TONG (dyspnea on exertion) R06.00    Pleural thickening J92.9    Ex-smoker Z87.891    Type 2 diabetes mellitus with complication (HCC) D78.6    GERD (gastroesophageal reflux disease) K21.9    Personal history of DVT (deep vein thrombosis) Z86.718    History of pulmonary embolism Z86.711    Iron deficiency E61.1    Anticoagulated on Coumadin Z79.01    Essential hypertension I10    Hip fracture requiring operative repair, right, closed, initial encounter ([facility] [address] S72.001A    Closed right hip fracture, initial encounter ([facility] [address] S72.001A    Presence of stent in coronary artery in patient with coronary artery disease I25.10, Z95.5    Postoperative hypotension I95.89    Respiratory failure with hypoxia ([facility] [address] J96.91    Sepsis ([facility] [address] A41.9    Pneumonia due to organism J18.9    Moderate malnutrition ([facility] [address] E44.0       Past Medical History:        Diagnosis Date    Arthritis     Hunt syndrome 2013    intestinal mucosa ( HX esophageal resection w/ pull through    CAD (coronary artery disease)     COPD (chronic obstructive pulmonary disease) (HCC)     GERD (gastroesophageal reflux disease)     History of colon cancer 1997    Status post ressection    History of esophageal cancer 2000    Status post resection in [facility] [address] Hx of blood clots     LEG, LUNGS    Hypertension     Kidney stone on left side 7/2010    Nausea & vomiting     Pericarditis     History of    Personal history of DVT (deep vein thrombosis)     Personal history of pulmonary embolism     Pseudogout 12/2005    Type II 09/03/2011    HGB 11.8 10/26/2020    HCT 36.0 10/26/2020    MCV 93.0 10/26/2020    RDW 14.7 03/19/2018     10/26/2020       CMP:   Lab Results   Component Value Date     10/26/2020    K 3.8 10/26/2020    K 3.9 10/25/2020     10/26/2020    CO2 27 10/26/2020    BUN 54 10/26/2020    CREATININE 1.0 10/26/2020    LABGLOM 72 10/26/2020    GLUCOSE 204 10/26/2020    GLUCOSE 151 04/11/2012    PROT 6.1 10/12/2020    CALCIUM 9.2 10/26/2020    BILITOT 0.5 10/12/2020    ALKPHOS 63 10/12/2020    AST 64 10/12/2020    ALT 20 10/12/2020       POC Tests:   Recent Labs     10/27/20  0113   POCGLU 220*       Coags:   Lab Results   Component Value Date    PROTIME 1.89 09/03/2011    INR 1.39 10/26/2020    APTT 38.9 09/02/2011       HCG (If Applicable): No results found for: PREGTESTUR, PREGSERUM, HCG, HCGQUANT     ABGs: No results found for: PHART, PO2ART, QVL3JNB, IRV5ONT, BEART, H6IRCGOB     Type & Screen (If Applicable):  Lab Results   Component Value Date    LABRH POS 09/15/2015       Drug/Infectious Status (If Applicable):  No results found for: HIV, HEPCAB    COVID-19 Screening (If Applicable):   Lab Results   Component Value Date    COVID19 NOT DETECTED 10/21/2020    COVID19 NOT DETECTED 10/11/2020         Anesthesia Evaluation    Airway: Mallampati: Unable to assess / NA       Comment: Intubated and sedated   Dental:          Pulmonary: breath sounds clear to auscultation  (+) pneumonia: unresolved,  COPD: moderate,  shortness of breath: new,                             Cardiovascular:  Exercise tolerance: poor (<4 METS),   (+) hypertension:, CAD:, TONG:,         Rhythm: regular                      Neuro/Psych:               GI/Hepatic/Renal:   (+) GERD:,           Endo/Other:    (+) Diabetes, . Abdominal:           Vascular:                                        Anesthesia Plan      general     ASA 4       Induction: inhalational.    MIPS: Postoperative ventilation.   Anesthetic plan and risks discussed with patient.                       [address] [facility], DO   10/27/2020

## 2020-10-27 NOTE — BRIEF OP NOTE
Brief Postoperative Note      Patient: Maggie Patient  YOB: 1940  MRN: [id number]    Date of Procedure: 10/27/2020    Pre-Op Diagnosis: DISPLACED j-tUBE    Post-Op Diagnosis: SAME WITH TF ASCITES       Procedure(s):  LAPAROTOMY EXPLORATORY, [address] OUT CLOSURE SB ENTEROTOMY  REPLACEMENT 16 FR J-TUBE     Surgeon(s):  Jeovany Avila MD    Assistant:  * No surgical staff found *    Anesthesia: General    Estimated Blood Loss (mL): 400 ML FROM OMENTAL BLEEDER    Complications: NONE    Specimens:       Implants:        Drains:   Closed/Suction Drain RLQ Bulb 15 [place] (Active)       Urethral Catheter Temperature probe (Active)   Catheter Indications Need for fluid management in critically ill patients in a critical care setting not able to be managed by other means such as BSC with hat, bedpan, urinal, condom catheter, or short term intermittent urethral catherization 10/26/20 2337   Site Assessment Weiner 10/26/20 2337   Urine Color Yellow 10/27/20 0209   Urine Appearance Hazy 10/25/20 1600   Output (mL) 300 mL 10/26/20 2202       Urethral Catheter 16 fr (Active)       [REMOVED] NG/OG/NJ/NE Tube Right mouth (Removed)   Surrounding Skin Dry; Intact 10/18/20 0000   Securement device Yes 10/18/20 0000   Status Other (Comment) 10/17/20 1145   Placement Verified by External Catheter Length 10/18/20 0000   NG/OG/NJ/NE External Measurement (cm) 65 cm 10/18/20 0000   Drainage Appearance Bile 10/17/20 1400   Tube Feeding Semi-elemental 10/18/20 0000   Tube Feeding Status Continuous 10/18/20 0000   Rate/Schedule 20 mL/hr 10/18/20 0000   Tube Feeding Intake (mL) 148 ml 10/18/20 0609   Free Water Flush (mL) 50 mL 10/17/20 1146   Residual Volume (ml) 20 ml 10/17/20 1400   Output (mL) 0 ml 10/17/20 0352       [REMOVED] NG/OG/NJ/NE Tube Orogastric (Removed)   Surrounding Skin Dry; Intact 10/25/20 0850   Securement device Yes 10/25/20 0850   Status Suction-low intermittent 10/25/20 0850   Placement Verified by Gastric Contents 10/24/20 2030   NG/OG/NJ/NE External Measurement (cm) 45 cm 10/25/20 0850   Drainage Appearance Bile;Green 10/25/20 0850   Output (mL) 50 ml 10/25/20 0600       [REMOVED] Gastrostomy/Enterostomy/Jejunostomy Jejunostomy LLQ 1 18 fr (Removed)   Drainage Appearance Milky;Lazaro;Brown 10/24/20 0950   Site Description Scabbed 10/26/20 2105   Drain Status Clamped 10/26/20 2105   Surrounding Skin Dry; Intact 10/26/20 2105   Dressing Status Changed 10/26/20 2105   Dressing Type Split gauze 10/26/20 2105   Tube Feeding Diabetic 10/26/20 0601   Rate/Schedule 0 mL/hr 10/26/20 2105   Tube Feeding Intake (mL) 1257 ml 10/26/20 2202   Free Water Flush (mL) 0 mL 10/26/20 2202   Output (mL) 0 ml 10/26/20 2202   Tube Placement Verified Yes 10/26/20 0601   Residual Volume (ml) 0 ml 10/26/20 2105       [REMOVED] Urethral Catheter Temperature probe (Removed)   $ Urethral catheter insertion $ Not inserted for procedure 10/12/20 0100   Catheter Indications Need for fluid management in critically ill patients in a critical care setting not able to be managed by other means such as BSC with hat, bedpan, urinal, condom catheter, or short term intermittent urethral catherization 10/21/20 1000   Site Assessment No urethral drainage 10/21/20 1000   Urine Color Yellow 10/21/20 1000   Urine Appearance Clear 10/21/20 1000   Output (mL) 275 mL 10/21/20 1000       [REMOVED] External Urinary Catheter (Removed)   Catheter changed  No 10/22/20 1428   Urine Color Yellow 10/22/20 1428   Urine Appearance Clear 10/22/20 1428   Urine Odor Malodorous 10/22/20 1428   Output (mL) 850 mL 10/22/20 1428   Suction- Female Only 40 mmgHg continuous 10/22/20 0400   Placement Initiated 10/21/20 2000   Skin Assessment No Injury 10/22/20 0400       [REMOVED] External Urinary Catheter (Removed)   Catheter changed  Yes 10/23/20 0000   Urine Color Yellow 10/23/20 0000   Urine Appearance Clear 10/23/20 0000   Output (mL) 50 mL 10/23/20 1230   Suction- Female Only 40 mmgHg continuous 10/23/20 0000   Placement Replaced 10/23/20 0000   Skin Assessment No Injury 10/23/20 0000       Findings: SEE OP NOTE    Electronically signed by Messi Carrasco MD on 10/27/2020 at 3:21 AM

## 2020-10-27 NOTE — PROGRESS NOTES
2105:  Patient's abdomen taut, distended but denies pain. Surgical site and J-tube intact with no drainage at sites. TF infusing at 70ml/hr. No residual. Notified Jarvis Brown CNP. CT abd/pelvis ordered. TF stopped. 2200:  Patient to CT scan. 2350 - 0100:  Notified Zan Thapa CNP that CT abd/pelvis resulted. STEPH Brown spoke with general surgery. Dr. Jodi Dawson called unit to inform us that he is coming in to take patient to surgery. Zan Thapa spoke with patient's wife, Dionne Snellen. This RN spoke to Victor Manuel, patient's daughter. 0130 - Patient transported to surgery. 0330 - Patient return to  from surgery. Patient drowsy. AMIRA with red colored drainage, bulb compressed. Midline incision dressing intact with no drainage on dressing. Family at bedside. 8827 - Patient alert and following all commands upon request. Absent bowel sounds. Patient nods to pain and points to abdomen. Fentanyl given per order.

## 2020-10-27 NOTE — PROGRESS NOTES
[facility] THERAPY MISSED TREATMENT NOTE  [facility] ICU 4D      Date: 10/27/2020  Patient Name: Tran Celestin        MRN: [id number]    : 1940  ([de-identified] y.o.)    REASON FOR MISSED TREATMENT:  ST attempted to see patient for skilled ST services; upon attempt MARNI Blancas reports \"patient continues with intubation, not appropriate for speech therapy at this time. \" Will plan to hold and re-attempt next date as clinically/medically appropriate.      Gray Epstein M.S. Bettie 23

## 2020-10-27 NOTE — PROGRESS NOTES
CRITICAL CARE PROGRESS NOTE      Patient:  Perico Reeder    Unit/Bed:4D-18/018-A  YOB: 1940  MRN: [id number]   PCP: Preethi Palacios MD  Date of Admission: 10/11/2020  Chief Complaint:- Shortness of breath and fever    Assessment and Plan:    1. Acute Hypoxic Respiratory Failure:  Patient intubated on 10/23/2020 for airway protection. Continue PCV mode of mechanical ventilation with peak pressure of 35 or less and plateau pressure of 30 or less. Patient developed arrhythmias while on SBT yesterday 10/26/2020. Patient doing well with spontaneous breathing trial.  On CPAP 6, pressure support 10, FiO2 30%: Oxygen saturation is 93% with a spontaneous respiratory rate of 14 and a spontaneous tidal volume 370. Patient will undergo extubation trial..  2. Septic Shock: Secondary to Pneumonia. S/p Vasopressin drip. Continue IV Levophed for goal MAP 65 or greater. Will continue to wean as tolerated. 3. Community Acquired Pneumonia: Positive for Legionella on 10/12/2020. S/p Levaquin 750 mg (10/12 - 10/20). White blood cell count normalized. Chest x-ray demonstrated no improvement from prior studies. There was concern that the pneumonia did not resolve with patient's overall septic picture. CT chest 10/26/2020 demonstrated extensive right lower lobe consolidation with patchy left lower lobe consolidation. Continue with Zosyn for empiric antibiotic coverage. Day 4/10. Patient converted to Decadron for consolidating process. 4. S/p J-Tube placement: POD 5. J-tube insertion due to the patient's complex esophageal anatomy and dysphagia. Empiric antibiotic coverage with Zosyn initiated on 10/24/2020. Initially, IR manipulated the J tube with successful tube positioning and functioning on 10/25/2020. CT abdomen and pelvis completed on 10/26/2020 demonstrated moderate amount of free fluid containing numerous pockets of gas. This was concerning of a J-tube leak.  Patient underwent washout of abdomen with replacement of J-tube by Dr. Fredi Reddy. 5. Dysphagia: Patient went down for IR study and fluoro advancement of OG tube. They were unable to advance OG tube past hiatal hernia. Tube in place to suction for now. 6. Acute Urinary Retention:  Secondary to post-surgical/post-anesthesia. Continue with intermittent external catheterization. 7. Hx of DVT and PE: Previously treated with Coumadin. Coumadin discontinued secondary to GI bleeding. Details regarding GI bleeding are unknown at this point. Unclear if this was gastritis or ulcer in nature. 8. COPD:  Stable, continue inhalers. 9. Coronary artery disease: Previously Plavix was discontinued secondary to GI bleeding. Patient had angioplasty with stent placement in 2014 and subsequently in 2015. Currently, Plavix on hold. 10. Severe protein calorie malnutrition: Albumin 1.9. Significant loss of muscle mass. Patient has temporal wasting and loss of supraclavicular fat. Patient would benefit from TPN. 11. Hx of Esophageal cancer: S/p esophagectomy  12. Hx of Colon cancer: S/p colon resection      INITIAL H AND P AND ICU COURSE:  Aj Gonzalez 14-year-old white male who has a past medical history of reformed smoker, CAD placement in 2015, hypertension, esophageal cancer, colon cancer, nephrolithiasis. Per report patient presented to the emergency department on 10/11/2020 for evaluation of shortness of breath, fatigue, fever, nausea and vomiting. He was accompanied by his wife and daughter. The daughter reported that he began having symptoms on Friday 10/09/2020 and had increased difficulty breathing with fevers T-max of 102.0. He was also noted to have difficulty ambulating and altered mental status. Patient was admitted to the ICU because of escalating oxygen requirements and hypotension. Patient required Levophed drip and intubation. PCR panel was positive for Legionella pneumoniae and was started on Levaquin.   On 10/18/2020, patient was extubated to room air and transferred to [facility] ICU stepdown unit. On 10/22/2020, patient had a J-tube placed after failing modified barium swallow twice. On 10/24/2020, patient became hypotensive with blood pressure in the 70s with change in mental status. Patient became more lethargic and restless. Patient was unresponsive to fluid resuscitation. Patient was transferred to the ICU for intubation and pressure support. General surgery was consulted regarding possible J-tube malpositioning. IR manipulated the J tube with successful tube positioning and functioning on 10/25/2020. On 10/26/2020, CT abdomen and pelvis demonstrated moderate amount of free fluid containing numerous pockets of gas. Dr. Rubi Bruno from general surgery was informed overnight and patient was taken to the OR. Patient underwent washout of abdomen with replacement of J-tube. Past Medical History: Per HPI. Family History: Mother: diabetes, stroke. Father: cancer  Social History: Reformed smoker, 25-pack-year history, denies alcohol use, denies drug use. ROS   Unable to obtain secondary to patient being intubated on mechanical ventilation.     Scheduled Meds:   enoxaparin  1 mg/kg Subcutaneous BID    sodium chloride flush  10 mL Intravenous 2 times per day    chlorhexidine  15 mL Mouth/Throat BID    famotidine (PEPCID) injection  20 mg Intravenous BID    hydrocortisone sodium succinate PF  50 mg Intravenous Q6H    piperacillin-tazobactam  3.375 g Intravenous Q8H    [Held by provider] atenolol  100 mg Per J Tube Daily    [Held by provider] finasteride  5 mg Per J Tube Daily    [Held by provider] furosemide  20 mg Per J Tube BID    glycopyrrolate-formoterol  2 puff Inhalation BID    [Held by provider] insulin glargine  30 Units Subcutaneous Nightly    insulin lispro  0-12 Units Subcutaneous Q6H    [Held by provider] isosorbide mononitrate  30 mg Oral Daily    [Held by provider] tamsulosin  0.4 mg Oral Nightly    [Held by provider] clopidogrel  75 mg Per NG tube Daily     Continuous Infusions:   norepinephrine 10 mcg/min (10/27/20 0239)    fentaNYL (SUBLIMAZE) 500 mcg in sodium chloride 0.9 % 100 mL 50 mcg/hr (10/27/20 0828)    dextrose Stopped (10/19/20 1522)       PHYSICAL EXAMINATION:  T:  98.8. P:  74. RR:  22. B/P:  102/71. FiO2:  30. O2 Sat: 100. I/O:  [id number]  Body mass index is 21.55 kg/m². GCS:   11  PC: 17/6:  FiO2: 30: SpO2: 100: Ti:1 sec  General:  Acutely-ill appearing  mechanically ventilated. HEENT:  normocephalic and atraumatic. No scleral icterus. PERR  Neck: supple. No Thyromegaly. Lungs: clear to auscultation. No retractions  Cardiac: RRR. No JVD. Abdomen: soft. Nontender. Extremities:  2+ pitting edema BLE. Left upper extremity hand deformity. Vasculature: capillary refill < 3 seconds. Palpable dorsalis pedis pulses. Skin:  warm and dry. Lymph:  No supraclavicular adenopathy. Neurologic:  No focal deficit. No seizures. Data: (All radiographs, tracings, PFTs, and imaging are personally viewed and interpreted unless otherwise noted).  CXR 10/26/2020: Moderate bibasilar atelectasis/pneumonia   CT Chest 10/26/2020: Extensive right lower lobe consolidation. Patchy left lower lobe consolidation.  CT Abdomen Pelvis 10/26/2020: Large amount of free intraperitoneal air without significant improvement. Moderate amount of free fluid containing numerous pockets of gas with interval increase since the prior study. Multiple nonobstructive calculi within the left kidney. Possible bladder calculi.    Sodium 144, potassium 4.4, chloride 109, bicarbonate 25, BUN 50, creatinine 1.0, estimated GFR 72, lactic acid 2.8, glucose 231, calcium 8.6, procalcitonin 1.28    Albumin 1.9, alk phos 64, ALT 6, AST 14, total bilirubin 0.7, total protein 4.5   WBC 7.7, hemoglobin 10.6, hematocrit 33.2, platelet 607        Seen with multidisciplinary ICU team.  Meets Continued ICU Level Care Criteria:    [x] Yes   [] No - Transfer Planned to listed location:  [] HOSPITALIST CONTACTED-      Case and plan discussed with Dr. Norma Walker. Electronically signed by Reva Gonzalez MD  [address]  Patient seen by me. Case discussed with resident physician. Patient critically ill on mechanical ventilator. Patient severely malnourished and will require TPN. Continue with antibiotics. Trial of extubation today. CC time 35 minutes. Time was discontiguous. Time does not include procedures. Time does include my direct assessment of the patient and coordination of care. Electronically signed by Juan C Walker MD.

## 2020-10-27 NOTE — CONSULTS
Patient chart reviewed for acute hypoxic respiratory failure with current intubation, septic shock, community-acquired pneumonia Legionella positive, and dysphagia. Review of the patient's chart indicates that the patient's family has expressed interest in the patient doing his acute inpatient rehabilitation stay at [facility]; this is an appropriate plan once the patient's is medically stable and out of the intensive care unit. He will not require a prior authorization under his Medicare benefits for a rehabilitation stay. Thank you for the consult.     Evonne Quiles MD, [facility]

## 2020-10-27 NOTE — CARE COORDINATION
10/27/20, 3:06 PM EDT    DISCHARGE ON GOING EVALUATION    [facility] day: 16  Location: -18/018-A Reason for admit: Respiratory failure with hypoxia ([address] [J96.91]   Procedure:   10/11 CVC  10/12 Intubated  10/17 Extubated  10/22 JT   10/24 CVC  10/24 Intubated  10/24 CXR  1. There is an endotracheal tube present with the tip overlying the midtrachea located approximately 2.8 cm above the david. 2. There is an enteric tube present which is looped within the esophagus. This was also present on the previous examination. Recommend repositioning. 3. Small bilateral pleural effusions are demonstrated. 4. Patchy airspace disease within the right lower lobe is noted which may represent atelectasis or pneumonia. 10/25 IR JT manipulation by IR successful (JT kinked)  10/26 CT Chest:   Extensive right lower lobe consolidation, with volume loss and involvement    of the posterior aspect of the right upper lobe.  Patchy left lower lobe    consolidation.  Findings suspicious for pneumonia which could be secondary    to aspiration. Pneumoperitoneum with ascites, reported on CT scan of the abdomen and    pelvis. 10/26 CT Abd/pelvis:   1. There is a large amount of free intraperitoneal air without significant    improvement. There is a moderate amount of free fluid containing numerous    pockets of gas with interval increase since the prior study. Findings are    suspicious for jejunostomy tube leak or leaking/perforated viscus. 2. Multiple nonobstructive calculi within the left kidney. 3.  Possible bladder calculi     10/27 LAPAROTOMY EXPLORATORY, [address] OUT CLOSURE SB ENTEROTOMY  REPLACEMENT 16 FR J-TUBE   10/27 Extubated    Treatment Plan of Care: OR overnight and new JT placed - see note/scans above. Extubated this afternoon to 4L O2. Tmax 99.3. NSR. AMIRA x1. Jtube. Don. Levo @ 10 mcg/min, IV decadron 4 mg daily, lovenox bid, pepcid, inhaler, SSI Q6H, IV zosyn.  Intensivist, General Surgery, GI, and Physiatry following. PT/OT. Palliative Care consulted. Alb 1.9, INR 1.54. Abdominal incision and respiratory culture +light growth candidas.    Barriers to Discharge: not medically ready  PCP: Esther Majano MD  Readmission Risk Score: 33%  Patient Goals/Plan/Treatment Preferences: lives with spouse, has walker; therapy following, has nebulizer, current with [facility], no SR IPR beds; family wants [facility] (Physiatry following), family resistant to ECF despite therapy recommends it (not safe for home); will need  minimum for JT teaching (nsg, aide, therapy, order in EPIC), will also need likely home infusion

## 2020-10-27 NOTE — OP NOTE
[address] [place] [address]                                OPERATIVE REPORT    PATIENT NAME: Kanika David                    :        1940  MED REC NO:   [id number]                           ROOM:       0018  ACCOUNT NO:   [de-identified]                           ADMIT DATE: 10/11/2020  PROVIDER:     Neha Flores. Kenyon Glover MD    DATE OF PROCEDURE:  10/27/2020    PREOPERATIVE DIAGNOSIS:  Displaced J-tube with intra-abdominal tube  feeding ascites. POSTOPERATIVE DIAGNOSIS:  Displaced J-tube with intra-abdominal tube  feeding ascites. OPERATIONS:  1. Abdominal exploration. 2.  Closure of J-tube site enterotomy. 3.  Washout of abdomen. 4.  Replacement of 16-Arabic J-tube. SURGEON:  Wilmer Qiu MD    ANESTHESIA:  General.    COMPLICATIONS:  Omental bleed. BLOOD LOSS:  400 mL. INDICATION FOR PROCEDURE:  The patient is an 54-year-old white male who  is almost 4 days post placement of a J-tube. The patient over the  weekend had apparently replaced it via Dr. Armando Kaur with IR but this  evening felt abdominal distention. Repeat CT scan showed ascites and  free air and is brought to Surgery. FINDINGS:  The patient had J-tube ascites. The J-tube had become  dislodged. There was also some minimal succus draining but mostly tube  feeding. The J-tube site enterotomy was closed along with Lembert  suture of the [facility] placed sutures. I placed the J-tube approximately  4 inches down from the previous enterotomy site. The J-tube was fed at  least 8 inches down into the bowel. There had been a bleeder from the  omentum likely due to his Lovenox and Plavix contributing. Before we  could control with suture ligatures, we probably lost 400 mL of blood. OPERATIVE PROCEDURE:  The patient was brought to the operating suite,  placed supine on the operating room table.   After adequate inhalational  anesthesia was administered, the patient's abdomen was prepped and  draped in the usual sterile fashion. The staples were removed. The  fascial sutures were removed and there was immediate release of tube  feeding ascites. We did lengthen the incision both superiorly and  distally, got into the abdominal cavity. We could see where the J-tube  had dislodged out of the jejunum and we took down the [facility] sutures. In the process, there was a tear in the omentum that was attached right  up to the transverse colon. It bled rather profusely and by the time it  was ligated with suture ligatures and clips, probably approximately 400  mL of blood loss. At this point in time, the J-tube enterotomy site was  closed with 3-0 Vicryl sutures. There was some tearing of the serosa  from the [facility] sutures that had been placed initially and these were  suture ligature. We then irrigated the abdominal cavity with a 3-liter  bag of saline. I then chose a point distal to the previous enterotomy  site, placed a pursestring suture, made entrance into the jejunum. We  had removed the previous J-tube and I brought in through this another  J-tube, 16-Nepalese, to the same site of 16-Nepalese red rubber. We fed it  into the bowel and this time threaded down approximately 8 inches into  the small bowel, tied down the suture ligature and then used sutures to  just suture the jejunum to the peritoneum. We then irrigated with  Irrisept, suctioned it out. I then placed Evicel over the previous  enterotomy sites. We rechecked for bleeding. There was no evidence of  active bleeding. I did place a MAIRA through a right abdominal wall and  set it up to go into the left upper quadrant where the previous J-tube  site was lying in the left upper quadrant. I attempted to make sure  there was no kinking of the small bowel both proximal and distal to the  J-tube site. Then closure was begun. The fascia was closed with #1  Vicryl sutures.   The skin was

## 2020-10-28 NOTE — PROGRESS NOTES
[address] THERAPY  [facility] ICU STEPDOWN TELEMETRY 4K  DAILY NOTE    TIME   SLP Individual Minutes  Time In: 5637  Time Out: 0930  Minutes: 15  Timed Code Treatment Minutes: 0 Minutes       Date: 10/28/2020  Patient Name: Chapis Roque      CSN: [id number]   : 1940  ([de-identified] y.o.)  Gender: male   Referring Physician:  LARRY Sauceda CNP  Diagnosis: Respiratory failure with hypoxia  Secondary Diagnosis: Dysphagia   Precautions: Aspiration   Current Diet: NPO - no means of nutrition at this time; planning J-tube 10/22   Swallowing Strategies: N/a -NPO  Date of Last MBS: 10/19    Pain:  No pain reported. Subjective:  Patient seen at bedside; alert and very pleasant. Patient non-verbal d/t aphonic vocal quality. Wife NOT present on this date. Short-Term Goals:  SHORT TERM GOAL #1:  Goal 1: Patient will tolerate limited PO trials w/ ST only with no overt s/s of aspiraiton/penetration to demosntrate readiness for repeat instrumental (FEES)  INTERVENTIONS: Provided oral moisture following oral care d/t severe xerostomia. Moisture provided via clean toothette dipped and drained of water. Per discussion with RN Sobia Cisse, the pt's oral cavity remains VERY DRY despite daily oral care, compounded with dry lung sounds. PO trials deferred on this date d/t no airway protection; inability to cough, as well as, no s/s of VF adduction. Vocal quality is significantly declines since most recent admission, highly suspected d/t recent intubation 10/24/2020-. ST discussed concerns for airway invasion, and high risk for reintubation without instrumental assessment, compounded with encouragement for ENT consult to determine prognosis and determination of VF paralysis, treatment, and consideration for artificial airway (tracheostomy). MD chan verbalized understanding of ST concerns, with plan to follow-up.       SHORT TERM GOAL #2:  Goal 2: Pt will complete OME and pharyngeal strengthening exercises 10x each to improve laryngeal strength and airway protection. INTERVENTIONS:   Completed OME:  -Lingual lateralization: x10 poor to fair success, improved ROM, while decreased endurance  -Lingual protrusion and hold: x10 fair success, reduced lingual strength noted  -Lingual circles: x5 fair success, each direction-reduced coordination upon rotation to left-- cognition and decreased comprehension,      -Effortful swallows: x5 attempts; fair success, provided verbal cues patient able to initiate successful swallows. Noted 1x attempting to swallow with open mouth. SHORT TERM GOAL #3:  Goal 3: Family/staff will demonstrate understanding of oral care measures and complete 2-3x daily to decrease amount of oral bacteria given NPO status. INTERVENTIONS: ST provided education re: oral care routine, supplied provided. ST and patient completed oral care utilizing hydrogen peroxide and water solution + toothet as well as nonalcoholic mouth wash + water + toothet ; patient with fair-good tolerance overall. Patient continued to require oral suctioning to remove excessive secretions. ST encouraged continued oral care at least every 2 hours. Patient verbalized understanding. ST also encouraged RN to continued oral care measures. SHORT TERM GOAL #4:  Goal 4: Will monitor for appropriateness to complete repeat instrumental assessment, per ST recommendations only (MBS vs FEEs)  INTERVENTIONS: ST discussed FEES with MD Cristy Salmon, who provided verbal approval for FEES to be complete at [address] [place] [address] Dr discretion. *ST also HIGHLY encouraged a ENT consult d/t concerns for limited to no airway protection-- ineffective cough. Long-Term Goals:   No LTM Goals recommended, due to anticipated short ELOS.      EDUCATION:  Learner: Patient and Significant Other  Education:  Reviewed results and recommendations of this evaluation, Reviewed diet and strategies, Reviewed signs, symptoms and risks of aspiration, Reviewed ST goals and Plan of Care, Reviewed recommendations for follow-up and Education Related to Potential Risks and Complications Due to Impairment/Illness/Injury  Evaluation of Education: Verbalizes understanding, Demonstrates with assistance and Needs further instruction    ASSESSMENT/PLAN:  Activity Tolerance:  Patient tolerance of  treatment: fair. Assessment/Plan: Patient progressing toward established goals. Continues to require skilled care of licensed speech pathologist to progress toward achievement of established goals and plan of care. .     Plan for Next Session: Oral care; OME, monitor for FEES      Leidy Gallardo MA., CCC-SLP

## 2020-10-28 NOTE — PLAN OF CARE
Problem: Nutrition  Goal: Optimal nutrition therapy  Outcome: Ongoing   Nutrition Problem #1: Severe malnutrition  Intervention: Food and/or Nutrient Delivery: Modify Tube Feeding  Nutritional Goals: TPN to provide % of nutrient needs until pt able adequate enteral intake

## 2020-10-28 NOTE — PLAN OF CARE
Problem: Falls - Risk of:  Goal: Will remain free from falls  Description: Will remain free from falls  Outcome: Met This Shift  Note: Fall armband on. Falling star on door. Siderails up times 4. Call light within reach. Problem: Falls - Risk of:  Goal: Absence of physical injury  Description: Absence of physical injury  Outcome: Met This Shift  Note: No new physical injury noted. Problem: Skin Integrity - Impaired:  Goal: Absence of new skin breakdown  Description: Absence of new skin breakdown  Outcome: Met This Shift  Note: No new skin breakdown noted. Turn every 2 hours and as needed. Problem: Respiratory  Goal: O2 Sat > 90%  Outcome: Met This Shift  Note: Pulse ox 94%. Problem: Pain:  Goal: Pain level will decrease  Description: Pain level will decrease  Outcome: Met This Shift     Problem: Airway Clearance - Ineffective:  Goal: Ability to maintain a clear airway will improve  Description: Ability to maintain a clear airway will improve  Outcome: Ongoing  Note: Weak gag, no cough. Aspiration precautions maintained. Problem: Fluid Volume - Imbalance:  Goal: Absence of imbalanced fluid volume signs and symptoms  Description: Absence of imbalanced fluid volume signs and symptoms  Outcome: Ongoing  Note: Good urine output. Generalized edema throughout. Problem: Respiratory  Goal: No pulmonary complications  Outcome: Ongoing  Note: No cough. Respirations easy, unlabored. Pulse ox stable. Problem: Skin Integrity/Risk  Goal: No skin breakdown during hospitalization  Outcome: Ongoing  Note: Has existing pressure ulcer stage II. Scattered areas of bruising. Problem: Skin Integrity/Risk  Goal: Wound healing  Outcome: Ongoing  Note: Wound bed pink, no drainage. Problem: Nutrition  Goal: Optimal nutrition therapy  10/28/2020 1426 by Corrie Hui RN  Outcome: Not Met This Shift  Note: NPO.  TPN initiated. Care plan reviewed with patient and wife.   Wife verbalize understanding of the plan of care and contribute to goal setting.

## 2020-10-28 NOTE — PROGRESS NOTES
CRITICAL CARE PROGRESS NOTE      Patient:  Sandeep Palmer    Unit/Bed:4D-18/018-A  YOB: 1940  MRN: [id number]   PCP: Edwin Bailey MD  Date of Admission: 10/11/2020  Chief Complaint:- Shortness of breath and fever    Assessment and Plan:    1. Acute Hypoxic Respiratory Failure:  Patient intubated on 10/23/2020 for airway protection. Patient successfully extubated on 10/27/2020. Now on 2L of O2 via nasal cannula. Titrate O2 to keep SpO2 > 92%. Wean O2 as tolerated. Per speech evaluation: Patient not able to protect airway with inability to cough. Significant decline in vocal quality since most recent admission. Patient at high risk for reintubation. Spoke to the family regarding tracheostomy. 2. Septic Shock: Secondary to Pneumonia. S/p Vasopressin drip. Continue IV Levophed for goal MAP 65 or greater. Will continue to wean as tolerated. 3. Community Acquired Pneumonia: Positive for Legionella on 10/12/2020. S/p Levaquin 750 mg (10/12 - 10/20). CT chest 10/26/2020 demonstrated extensive right lower lobe consolidation with patchy left lower lobe consolidation. Continue with Zosyn for empiric antibiotic coverage, Day 5/10. Decadron initiated 10/27/2020 for consolidating process. 4. Atrial Fibrillation with RVR: Patient went into atrial fibrillation with RVR overnight. Amiodarone drip initiated 10/27/2020 for rhythm control. Will continue to monitor. Serial EKGs. 5. S/p J-Tube placement: J-tube insertion due to the patient's complex esophageal anatomy and dysphagia. Initially, IR manipulated the J tube with successful tube positioning and functioning on 10/25/2020. CT abdomen and pelvis completed on 10/26/2020 demonstrated moderate amount of free fluid containing numerous pockets of gas. This was concerning of a J-tube leak. Patient underwent washout of abdomen with replacement of J-tube by Dr. Lidia Lopez. 6. Dysphagia: Patient went down for IR study and fluoro advancement of OG tube. Patient required Levophed drip and intubation. PCR panel was positive for Legionella pneumoniae and was started on Levaquin. On 10/18/2020, patient was extubated to room air and transferred to [facility] ICU stepdown unit. On 10/22/2020, patient had a J-tube placed after failing modified barium swallow twice. On 10/24/2020, patient became hypotensive with blood pressure in the 70s with change in mental status. Patient became more lethargic and restless. Patient was unresponsive to fluid resuscitation. Patient was transferred to the ICU for intubation and pressure support. General surgery was consulted regarding possible J-tube malpositioning. IR manipulated the J tube with successful tube positioning and functioning on 10/25/2020. On 10/26/2020, CT abdomen and pelvis demonstrated moderate amount of free fluid containing numerous pockets of gas. Dr. Jose Barragan from general surgery was informed overnight and patient was taken to the OR. Patient underwent washout of abdomen with replacement of J-tube. Past Medical History: Per HPI. Family History: Mother: diabetes, stroke. Father: cancer  Social History: Reformed smoker, 25-pack-year history, denies alcohol use, denies drug use. ROS   Review of Systems   Respiratory: Negative for cough and shortness of breath. Cardiovascular: Negative for chest pain and palpitations. Gastrointestinal: Negative for abdominal pain, nausea and vomiting.        Scheduled Meds:   insulin glargine  15 Units Subcutaneous BID    dexamethasone  4 mg Intravenous Daily    sodium chloride flush  10 mL Intravenous 2 times per day    chlorhexidine  15 mL Mouth/Throat BID    famotidine (PEPCID) injection  20 mg Intravenous BID    piperacillin-tazobactam  3.375 g Intravenous Q8H    [Held by provider] atenolol  100 mg Per J Tube Daily    [Held by provider] finasteride  5 mg Per J Tube Daily    [Held by provider] furosemide  20 mg Per J Tube BID    glycopyrrolate-formoterol  2 puff Inhalation BID    [Held by provider] insulin glargine  30 Units Subcutaneous Nightly    insulin lispro  0-12 Units Subcutaneous Q6H    [Held by provider] isosorbide mononitrate  30 mg Oral Daily    [Held by provider] tamsulosin  0.4 mg Oral Nightly    [Held by provider] clopidogrel  75 mg Per NG tube Daily     Continuous Infusions:   PN-Adult  3 IN 1 Central Line (Custom)      CLINIMIX 5/15 45 mL/hr (10/27/20 2016)    amiodarone 0.5 mg/min (10/28/20 0200)    norepinephrine 8 mcg/min (10/27/20 2152)    fentaNYL (SUBLIMAZE) 500 mcg in sodium chloride 0.9 % 100 mL Stopped (10/27/20 1425)    dextrose Stopped (10/19/20 1522)       PHYSICAL EXAMINATION:  T:  97.0.  P:  59. RR:  17. B/P:  116/53. O2 Sat: 100. I/O:  1079/1025  Body mass index is 21.55 kg/m². General:  Acutely-ill appearing  male. Severely malnourished. HEENT:  normocephalic and atraumatic. No scleral icterus. PERR  Neck: supple. No Thyromegaly. Lungs: clear to auscultation. No retractions  Cardiac: RRR. No JVD. Abdomen: soft. Nontender. Extremities:  2+ pitting edema BLE. Left upper extremity hand deformity. Vasculature: capillary refill < 3 seconds. Palpable dorsalis pedis pulses. Skin:  warm and dry. Lymph:  No supraclavicular adenopathy. Neurologic:  No focal deficit. No seizures. Data: (All radiographs, tracings, PFTs, and imaging are personally viewed and interpreted unless otherwise noted).  CXR 10/26/2020: Moderate bibasilar atelectasis/pneumonia   CT Chest 10/26/2020: Extensive right lower lobe consolidation. Patchy left lower lobe consolidation.  CT Abdomen Pelvis 10/26/2020: Large amount of free intraperitoneal air without significant improvement. Moderate amount of free fluid containing numerous pockets of gas with interval increase since the prior study. Multiple nonobstructive calculi within the left kidney. Possible bladder calculi.    Sodium 143, potassium 4.1, chloride 107, bicarbonate 28, BUN 55, creatinine 0.9, estimated GFR 81, glucose 425, calcium 8.4, total protein 4.5   Albumin 1.9, alk phos 61, ALT 6, AST 8, total bilirubin 0.5, total protein 4.5   WBC 8.1, hemoglobin 8.8, hematocrit 28.0, platelet 017        Seen with multidisciplinary ICU team.  Meets Continued ICU Level Care Criteria:    [x] Yes   [] No - Transfer Planned to listed location:  [] HOSPITALIST CONTACTED-      Case and plan discussed with Dr. Paulina Alegria. Electronically signed by Donnell Ramirez MD  [address] [place] [address]  Patient seen by me. Case discussed with resident physician. Case discussed with speech pathology. Patient has very poor capacity to protect his airway. He has no cough reflex. Swallowing mechanics are severely impaired. Patient will need tracheostomy tube in order to permit time for training and recovery. Awaiting family decision regarding tracheostomy tube placement. Still requiring pressors. Electronically signed by Katarina Alegria MD.

## 2020-10-28 NOTE — PLAN OF CARE
Problem: Impaired respiratory status  Goal: Clear lung sounds  10/27/2020 2153 by Karen Vasquez RCP  Outcome: Ongoing

## 2020-10-28 NOTE — PROGRESS NOTES
Comprehensive Nutrition Assessment    Type and Reason for Visit:  Reassess, Consult(TPN macronutrients)    Nutrition Recommendations/Plan:   Next bag changed to 3:1 TPN dosed on 77 kg, 10 kcals/kg, 1.2 grams protein/kg, & 20% lipid kcals   Plan increase macronutrients as pt tolerates. Pt is refeeding risk. Pt has been NPO with minimal TF intake since extubated 10/18 d/t failed swallow & Jtube issues. Pt NPO. SLP on case. When able to use gut & if pt unable safe swallow, recommend Glucerna 1.2 TF & goal would be 70 ml/hr and would recommend 110 ml free H20 every 4 hours or per MD if no IVF  Nutrition Assessment:     Pt compromised  from a nutritional standpoint AEB meets criteria for severe  malnutrition as below  continues NPO & need for TPN.  Remains at risk for further nutritional compromise r/t extubated 10/18 NPO, failed swallow & MBS , Jtube issues, Jtube place 10/22, 10/25 manipulated Jtube ,10/27 OR for Jtube replacement , closure & abd exploration, so NPO to minimal TF intake since 10/18, need for return to ICU  reintubated ,  extubated again 10/27,  increased nutrient needs for wound healing and underlying medical condition (hx COPD, DM, hx esophageal cancer s/p gastric pull through 2002, colon cancer s/p resection 1997).  Nutrition recommendations/interventions as per above  Malnutrition Assessment:  Malnutrition Status:  Severe malnutrition    Context:  Acute Illness     Findings of the 6 clinical characteristics of malnutrition:  Energy Intake:  1 - 75% or less of estimated energy requirements for 7 or more days(less than 50% of projected energy needs for greater than 5 days.  extubated 10/18 NPO, failed swallow , Jtube issues))  Weight Loss:  Unable to assess(edema/fluid shifts, suspect wt loss nutrition & fluid related)     Body Fat Loss:  7 - Moderate body fat loss Orbital   Muscle Mass Loss:  7 - Moderate muscle mass loss Temples (temporalis)  Fluid Accumulation:  1 - Mild     Strength: Not Performed    Estimated Daily Nutrient Needs:  Energy (kcal):  ~2084 kcals ( 27 kcals/kg); Weight Used for Energy Requirements:  (10/12 76 kg)     Protein (g):  ~93- 108 grams (1.2-  1.4 grams/kg)  monitor renal status; Weight Used for Protein Requirements:  Admission(77.2 kg)        Fluid (ml/day):  Per Dr;         Nutrition Related Findings:  Severely malnourished pt seen with 5/15 Clinimix infusing. NPO. Pt had been extubated 10/18 & then extubated again 10/27 . Pt with failed swallow following 10/18 extubation & Jtube issues withs/p OR 10/27. TPN started 10/27 evening. Per Valarie Francisco RN pt does have central line & pt is so weak he can't cough. Glucose 425, Disucced with Valarie Francisco RN that next bag of TPN will be 3:1 TPN , so CHO will be reduced with fat added & increased protein. Discussed  glucose with Zoran PAYTON as well.  meds include lantus, Humalog, steroid, lasix, levophed, fentanyl & bm med. Wounds:  (stage1 perirectum. sagell location not documented.  10/25 mmanipulated Jtube, 10/27 OR abd exploration, Jtube replaced & closure)       Current Nutrition Therapies:    Current Parenteral Nutrition Orders:  · Type and Formula: Premix Central(Clinimix 5/15)   · Lipids:  none in Cliinimix, but will start with 3:1  · Duration: Continuous  · Rate/Volume: 45 ml/hr  · Current PN Order Provides: Clinimix 5/15 767 kcals, 54 grams protein, 162 grams CHO & no fat  ·  next bag changed to 3:1 TPN dosed on 77 kg, 10 kcals/kg, 1.2 grams protein/kg, & 20% lipid kcals to provide 770 kcals, 92 grams protein, 72 grams CHO & 15.4 grams fat/24 hours      Anthropometric Measures:  · Height: 5' 11\" (180.3 cm)  · Current Body Weight: 154 lb 8.7 oz (70.1 kg)(10/27 +1 edema)   · Admission Body Weight: 170 lb 3.1 oz (77.2 kg)(10/12 +1 edema)    · Usual Body Weight: 164 lb 10.9 oz (74.7 kg)(10/14 +2 edema)     · Ideal Body Weight: 172 lbs; % Ideal Body Weight     · BMI: 21.6    · BMI Categories: Underweight (BMI less than 22) age over 72 Nutrition Diagnosis:   · Severe malnutrition related to inadequate protein-energy intake, swallowing difficulty as evidenced by moderate muscle loss, moderate loss of subcutaneous fat(less than 50% of projected energy needs for greater than 5 days. extubated 10/18 NPO, failed swallow , Jtube issues))      Nutrition Interventions:   Food and/or Nutrient Delivery:  Modify Tube PN  Nutrition Education/Counseling:  Education completed(10/23 Reviewed home TF instructions with pt's wife (pt. sleeping). Provided written materials with TF prescription, RD name and number.)   Coordination of Nutrition Care:  Continued Inpatient Monitoring    Goals:  TPN to provide % of nutrient needs until pt able adequate enteral intake       Nutrition Monitoring and Evaluation:   Behavioral-Environmental Outcomes:  Knowledge or Skill   Food/Nutrient Intake Outcomes:  Parenteral Nutrition Intake/Tolerance  Physical Signs/Symptoms Outcomes:  Biochemical Data, Chewing or Swallowing, GI Status, Fluid Status or Edema, Hemodynamic Status, Skin, Weight, Nutrition Focused Physical Findings     Discharge Planning:     Too soon     Electronically signed by Sulaiman Benedict RD, LD on 10/28/20 at 10:27 AM EDT    Contact: [phone]

## 2020-10-28 NOTE — PROGRESS NOTES
General Surgery  Dr. Xiomy Baeza  Daily Progress Note      Patient:  Matthew Arellano      Unit/Bed:4D-18/018-A    YOB: 1940    MRN: [id number]     Acct: [de-identified]     Admit date: 10/11/2020    Subjective: in bed, spouse at bedside. patient is awake and extubated. He complains of postoperative pain as expected. All other ROS negative except noted in HPI      Patient Seen, Chart, Consults notes, Labs, Radiology studies reviewed. Past, Family, Social History unchanged from admission.     Diet:  PN-Adult  3 IN 1 Central Line (Custom)    Medications:  Scheduled Meds:   insulin glargine  15 Units Subcutaneous BID    sodium phosphate IVPB  10 mmol Intravenous Once    lidocaine 1 % injection  5 mL Intradermal Once    dexamethasone  4 mg Intravenous Daily    sodium chloride flush  10 mL Intravenous 2 times per day    chlorhexidine  15 mL Mouth/Throat BID    famotidine (PEPCID) injection  20 mg Intravenous BID    piperacillin-tazobactam  3.375 g Intravenous Q8H    [Held by provider] atenolol  100 mg Per J Tube Daily    [Held by provider] finasteride  5 mg Per J Tube Daily    [Held by provider] furosemide  20 mg Per J Tube BID    glycopyrrolate-formoterol  2 puff Inhalation BID    [Held by provider] insulin glargine  30 Units Subcutaneous Nightly    insulin lispro  0-12 Units Subcutaneous Q6H    [Held by provider] isosorbide mononitrate  30 mg Oral Daily    [Held by provider] tamsulosin  0.4 mg Oral Nightly    [Held by provider] clopidogrel  75 mg Per NG tube Daily     Continuous Infusions:   PN-Adult  3 IN 1 Central Line (Custom)      CLINIMIX 5/15 45 mL/hr (10/27/20 2016)    amiodarone 0.5 mg/min (10/28/20 0200)    norepinephrine 8 mcg/min (10/27/20 2152)    fentaNYL (SUBLIMAZE) 500 mcg in sodium chloride 0.9 % 100 mL Stopped (10/27/20 1425)    dextrose Stopped (10/19/20 1522)     PRN Meds:morphine **OR** morphine, diatrizoate meglumine-sodium, sodium chloride flush, PULMONARY EMBOLISM W CONTRAST  - 10/19/2018 03:48 PM EDT FINDINGS: Lungs/pleura: Extensive consolidation of the right lower lobe with air bronchograms and areas of necrosis, with some involvement of the adjacent posterior aspect of the right upper lobe and right middle lobe. Left lower lobe consolidation or atelectasis. No pleural effusion or pneumothorax. Upper lobes are otherwise clear. Mediastinum/heart/aorta: Mild cardiac enlargement. No pericardial effusion. Coronary artery mural calcifications. Tortuous thoracic aorta. Dilated trachea and upper thoracic esophagus. Fluid-filled distended mid and distal thoracic esophagus. Small hiatal hernia. Uppermost abdomen: Surgical clips anterior to the aorta in the upper abdomen. Calcific mass in the deep mesentery measuring 11 x 37 mm. Probable cholelithiasis. Left kidney cortical cyst. MSK: Multilevel spondylosis of the thoracic spine. Mild kyphoscoliosis. No acute fracture. No destructive osseous lesion. Right lower lobe dense consolidation with involvement of the posterior aspect of the upper and middle lobe. Findings suspicious for pneumonia. Left base atelectasis or pneumonia. Small hiatal hernia. 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 Left kidney cyst.  Possible cholelithiasis. This document has been electronically signed by: Ramón Arango MD on 10/12/2020 01:01 AM All CT scans at this facility use dose modulation, iterative reconstruction, and/or weight-based dosing when appropriate to reduce radiation dose to as low as reasonably achievable. Xr Chest Portable    Result Date: 10/12/2020  1 view chest x-ray. Comparison:  [facility] SUSIE  - XR CHEST PORTABLE  - 10/11/2020 06:12 PM EDT Findings: Stable right IJ CVP catheter. ET tube terminates 4 cm above the david. Enterogastric tube loops in the stomach, tip in the gastric body, noting a retrocardiac hiatal hernia.  Bilateral lower lobe consolidations on the left obscuring the hemidiaphragm, and bilateral pleural effusions. Heart is mildly enlarged. No pulmonary edema. Impression: Tubes and lines as noted. Bilateral consolidations and pleural effusions, moderately improved. Significant improvement in central pulmonary edema/CHF. Moderate sized hiatal hernia is suspected. This document has been electronically signed by: Nyla Nina MD on 10/12/2020 02:55 AM     Xr Chest Portable    Result Date: 10/11/2020  PROCEDURE: XR CHEST PORTABLE CLINICAL INFORMATION: Central line . COMPARISON: 11 October 2020 at 12:34 PM TECHNIQUE: Portable semiupright FINDINGS: Right internal jugular vein catheter is present courses unremarkable tip is at the cavoatrial junction. There are no other changes from earlier     Central venous catheter tip is at the cavoatrial junction **This report has been created using voice recognition software. It may contain minor errors which are inherent in voice recognition technology. ** Final report electronically signed by Dr. April Arora on 10/11/2020 6:40 PM    Xr Chest Portable    Result Date: 10/11/2020  PROCEDURE: XR CHEST PORTABLE CLINICAL INFORMATION: sob . COMPARISON: 5/21/2020 TECHNIQUE: Portable semiupright FINDINGS: Patient is severely rotated. Heart size is within normal limits. Mediastinum is not widened. Dense airspace consolidation right lower lobe. Interstitial edema bilaterally. Retrocardiac atelectasis or infiltrate. Pulmonary vessels are not congested. Right lower lobe airspace consolidation. In addition interstitial/pulmonary edema bilaterally. **This report has been created using voice recognition software. It may contain minor errors which are inherent in voice recognition technology. ** Final report electronically signed by Dr. April Arora on 10/11/2020 1:03 PM     PROCEDURE: XR CHEST PORTABLE         CLINICAL INFORMATION: ETT placement         COMPARISON: 10/24/2020         TECHNIQUE:  AP mobile chest single view        FINDINGS:         There is an endotracheal tube present with the tip overlying the midtrachea located approximately 2.8 cm above the david.         There is an enteric tube present which is looped within the esophagus. This was also present on the previous examination.         There is a right IJ central line again seen. The tip is located at the cavoatrial junction.         The heart size is normal.         Small bilateral pleural effusions are demonstrated.         Patchy airspace disease within the right lower lobe is noted which may represent atelectasis or pneumonia.         No acute osseous findings are seen.              Impression    1. There is an endotracheal tube present with the tip overlying the midtrachea located approximately 2.8 cm above the david.         2. There is an enteric tube present which is looped within the esophagus. This was also present on the previous examination. Recommend repositioning.         3. Small bilateral pleural effusions are demonstrated.         4. Patchy airspace disease within the right lower lobe is noted which may represent atelectasis or pneumonia.                        **This report has been created using voice recognition software.  It may contain minor errors which are inherent in voice recognition technology. **         Final report electronically signed by Dr. Kimberly Austin on 10/24/2020 1:55 PM      PROCEDURE: XR INJ CONTRAST GASTRIC TUBE PERC         CLINICAL INFORMATION: check J-tube placement         COMPARISON: CT dated 10/24/2020         TECHNIQUE:  Multiple fluoroscopic images were obtained to evaluate the J tube positioning  and in order to attempt advancement of an enteric tube.         FINDINGS:         With the patient in the supine position, the enteric tube was found looped within the esophagus. The enteric tube was retracted and redirected into the patient's known hiatal hernia at the lower mediastinum.  However, the catheter could not be redirected    into the small bowel due to tortuosity. Therefore, the tip of the catheter cannot be advanced to the level of the gastric lumen at the hiatal hernia. This was despite multiple attempts with guidewire assistance.         An attempt was made to check J-tube placement. However there was obstruction of the tube. Contrast could not be injected through the tube due to this obstruction. A Bentson guidewire was advanced through the J-tube to determine the level of the    obstruction which was noted to be within the lumen of the tube at the level of the skin surface of the anterior abdominal wall. A Glidewire was then advanced through the tube in order to attempt advancement of the wire beyond the obstruction. Again, the    obstruction was noted within the lumen of the tube at the level of the skin surface of the anterior abdominal wall. Again, contrast could not be flushed through the J-tube. Therefore, the J-tube positioning cannot be determined on the current    examination. There also appears to be retraction of the J-tube. Due to the inability to inject contrast, the tip of the J-tube cannot be definitively determined to be within or outside of the lumen of the jejunum.              Impression    1. The enteric tube was found looped within the esophagus. The enteric tube was retracted and redirected into the patient's known hiatal hernia at the lower mediastinum. However, the catheter could not be redirected into the small bowel due to    tortuosity. Therefore, the tip of the catheter cannot be advanced to the level of the gastric lumen at the hiatal hernia. This was despite multiple attempts with guidewire assistance and fluoroscopic guidance.         2. An attempt was made to check J-tube placement. However there was obstruction of the tube. Contrast could not be injected through the tube due to this obstruction.  A Bentson guidewire was advanced through the J-tube to determine the level of the obstruction which was noted to be within the lumen of the tube at the level of the skin surface of the anterior abdominal wall. A Glidewire was then advanced through the tube in order to attempt advancement of the wire beyond the obstruction. Again, the    obstruction was noted within the lumen of the tube at the level of the skin surface of the anterior abdominal wall. Again, contrast could not be flushed through the J-tube. Therefore, the J-tube positioning cannot be determined on the current    examination. There also appears to be retraction of the J-tube. Due to the inability to inject contrast, the tip of the J-tube cannot be definitively determined to be within or outside of the lumen of the jejunum.              **This report has been created using voice recognition software.  It may contain minor errors which are inherent in voice recognition technology. **         Final report electronically signed by Dr. Lion Rader on 10/24/2020 3:44 PM      Physical Exam:  Vitals: BP (!) 131/47   Pulse 65   Temp 97 °F (36.1 °C) (Bladder)   Resp 20   Ht 5' 11\" (1.803 m)   Wt 154 lb 8.7 oz (70.1 kg)   SpO2 94%   BMI 21.55 kg/m²   24 hour intake/output:    Intake/Output Summary (Last 24 hours) at 10/28/2020 1057  Last data filed at 10/28/2020 0435  Gross per 24 hour   Intake 1079.25 ml   Output 1025 ml   Net 54.25 ml     Last 3 weights:   Wt Readings from Last 3 Encounters:   10/27/20 154 lb 8.7 oz (70.1 kg)   10/01/20 164 lb 8 oz (74.6 kg)   07/23/20 157 lb 3.2 oz (71.3 kg)       General appearance -  chronically ill appearing,   HEENT:  Chest - clear to auscultation  Cardiovascular - normal rate and regular rhythm  Abdomen - soft,  nondistended, no masses or organomegaly   Surgical Incision - well approximated  J tube   Musculoskeletal -no swelling       DVT prophylaxis: [] Lovenox                                 [x] SCDs                                 [] SQ Heparin                                 [] Encourage ambulation           [] Already on Anticoagulation plavix                 Assessment:  1. S/p J tube POD#6  2. S/P abd exploration, abdominal washout, closure of J tube enterotomy site and replacement of  J tube  3. Dysphagia   4. Acute hypoxic respiratory failure improved  5. Septic shock  6. CAP (+) legionella  7. History of PE and DVT  8. Anemia   9. HX esophagectomy, esophageal cancer     Active Problems:    Respiratory failure with hypoxia (HCC)    Sepsis ([address]    Pneumonia due to organism    Moderate malnutrition ([address]    Severe malnutrition ([address]  Resolved Problems:    * No resolved hospital problems. *      Plan:      1. J-tube with cyclic tube feeds on hold  2. PICC and TPN  3. Routine wound care  4. Antibiotic therapy  5. Pain control   6. Plavix and coumadin are on hold, hematology to evaluate  7. SCDs for DVT prophylaxis. Okay with Lovenox   8. Palliative care   9. ICU supportive care. Appreciate assistance. Electronically signed by LARRY Dallas CNP on 10/28/2020 at 10:57 AM Patient seen and examined independently by me. Above discussed and I agree with CNP. Labs, cultures, and radiographs where available were reviewed. See orders for the updated patient care plan.     Dionna Fry MD, patient continues to stay extubated although wife at bedside states considering tracheostomy patient on TPN dressing removed abdomen soft incision looks good has bowel sounds present will consider beginning low-rate J-tube feedings tomorrow  10/28/2020   3:35 PM

## 2020-10-28 NOTE — FLOWSHEET NOTE
[facility] PROGRESS NOTE      Patient: Matthew Arellano  Room #: 7R-55/497-M            YOB: 1940  Age: [de-identified] y.o. Gender: male            Admit Date & Time: 10/11/2020 11:39 AM    Assessment:  \"Bill\" as pt likes to be called is now on ICU. He is an [de-identified]year old male who is in bed and has his family in the room with him. His daughter stated he is doing better now that he does not have to be intubated. Interventions:  Prayer as well as words of encouragement with Anahy Anderson and his family    Outcomes:  Encouraged. Plan: 1. Care Plan:  Continue spiritual and emotional care for patient and family. Including prayers.    Electronically signed by Tim Deal on 10/28/2020 at 4:29 PM.  [facility]  [phone]

## 2020-10-28 NOTE — PROGRESS NOTES
Pharmacy Consult       TPN    Ordering Provider: Dr Phylicia Vazquez    Indication for TPN: NPO, severe malnutrition, gut rest    Macronutrients   DW: 77kg   AA: 1.2 g/kg   Total Kcal: 10 Kcal/kg   Lipids: 20 % of Total Kcal   Infusion Rate: 100 mL/hr    Electrolytes (per bag)   Na Acetate:    35 meq   NaCl:         190 meq   NaPhos:       12 mmol     K Acetate:     70 meq      MV:      10 mL       Electrolyte Replacement: NaPhos 10 mmol    Assessment/ Plan:  Glu in 300-400 range. On SSI Med. Lantus 15 Units BID added this am  Change to 3 in 1 TPN today          [facility]. D., BCPS, BCCCP 10/28/2020 10:01 AM

## 2020-10-29 NOTE — PROGRESS NOTES
1105 Patient received in IR for PICC advancement/reposition. 1108 Procedure started. 1110 Procedure completed; patient tolerated well. PICC repositioned without difficulty. Dressing/securing device to left upper arm PICC insertion site; no bleeding noted. 1114 Patient on bed; comfort ensured. 1116 Patient taken to ICU via bed.

## 2020-10-29 NOTE — PLAN OF CARE
Problem: Nutrition  Goal: Optimal nutrition therapy  10/29/2020 0926 by Alexander Holter, RD, LD  Outcome: Ongoing  Nutrition Problem #1: Severe malnutrition  Intervention: Food and/or Nutrient Delivery: Modify Parenteral Nutrition  Nutritional Goals: TPN to provide % of nutrient needs until pt able adequate enteral intake

## 2020-10-29 NOTE — PROGRESS NOTES
General Surgery  Dr. Low Outlaw  Daily Progress Note      Patient:  Tran Celestin      Unit/Bed:4D-18/018-A    YOB: 1940    MRN: [id number]     Acct: [de-identified]     Admit date: 10/11/2020    Subjective: in bed, PICC nurse at bedside. patient is awake and extubated. He complains of postoperative pain as expected. Start cyclic feeds through PEG today, discussed with patient and updated Dr. Balwinder Ann. Chart has been reviewed     All other ROS negative except noted in HPI      Patient Seen, Chart, Consults notes, Labs, Radiology studies reviewed. Past, Family, Social History unchanged from admission.     Diet:  PN-Adult  3 IN 1 Central Line (Custom)    Medications:  Scheduled Meds:   potassium phosphate IVPB  20 mmol Intravenous Once    insulin glargine  15 Units Subcutaneous BID    dexamethasone  4 mg Intravenous Daily    sodium chloride flush  10 mL Intravenous 2 times per day    chlorhexidine  15 mL Mouth/Throat BID    famotidine (PEPCID) injection  20 mg Intravenous BID    piperacillin-tazobactam  3.375 g Intravenous Q8H    [Held by provider] atenolol  100 mg Per J Tube Daily    [Held by provider] finasteride  5 mg Per J Tube Daily    [Held by provider] furosemide  20 mg Per J Tube BID    glycopyrrolate-formoterol  2 puff Inhalation BID    [Held by provider] insulin glargine  30 Units Subcutaneous Nightly    insulin lispro  0-12 Units Subcutaneous Q6H    [Held by provider] isosorbide mononitrate  30 mg Oral Daily    [Held by provider] tamsulosin  0.4 mg Oral Nightly    [Held by provider] clopidogrel  75 mg Per NG tube Daily     Continuous Infusions:   PN-Adult  3 IN 1 Central Line (Custom) 100 mL/hr at 10/28/20 1752    amiodarone 0.5 mg/min (10/29/20 0349)    norepinephrine Stopped (10/29/20 0138)    fentaNYL (SUBLIMAZE) 500 mcg in sodium chloride 0.9 % 100 mL Stopped (10/27/20 1425)    dextrose Stopped (10/19/20 1522)     PRN Meds:morphine **OR** morphine, diatrizoate meglumine-sodium, sodium chloride flush, acetaminophen **OR** acetaminophen, polyethylene glycol, promethazine **OR** ondansetron, diatrizoate meglumine-sodium, senna, metoprolol, glucose, dextrose, glucagon (rDNA), dextrose, albuterol sulfate HFA **AND** ipratropium **AND** [CANCELED] MDI Treatment    Objective:    CBC:   Recent Labs     10/27/20  0614 10/28/20  0530 10/29/20  0550   WBC 7.7 8.1 9.5   HGB 10.6* 8.8* 8.8*    218 170     BMP:    Recent Labs     10/27/20  2222 10/28/20  0530 10/29/20  0550    143 146*   K 4.4 4.1 4.2    107 109   CO2 28 28 30   BUN 57* 55* 42*   CREATININE 1.0 0.9 0.6   GLUCOSE 359* 425* 182*     Calcium:  Recent Labs     10/29/20  0550   CALCIUM 8.3*     Ionized Calcium:No results for input(s): IONCA in the last 72 hours. Magnesium:  Recent Labs     10/29/20  0550   MG 2.1     Phosphorus:  Recent Labs     10/29/20  0550   PHOS 1.0*     BNP:No results for input(s): BNP in the last 72 hours. Glucose:  Recent Labs     10/29/20  0034 10/29/20  0552 10/29/20  0800   POCGLU 205* 144* 152*     HgbA1C: No results for input(s): LABA1C in the last 72 hours. INR:   Recent Labs     10/29/20  0550   INR 1.33*     Hepatic:   Recent Labs     10/29/20  0550   ALKPHOS 67   ALT 7*   AST 10   PROT 4.4*   BILITOT 0.5   LABALBU 1.9*     Amylase and Lipase:  No results for input(s): LACTA, AMYLASE in the last 72 hours. Lactic Acid:   No results for input(s): LACTA in the last 72 hours. Troponin:   No results for input(s): CKTOTAL, CKMB, TROPONINT in the last 72 hours. BNP: No results for input(s): BNP in the last 72 hours. Lipids: No results for input(s): CHOL, TRIG, HDL, LDLCALC in the last 72 hours.     Invalid input(s): LDL  ABGs:   Lab Results   Component Value Date    PH 7.19 10/11/2020    PCO2 75 10/11/2020    PO2 98 10/11/2020    HCO3 29 10/11/2020    O2SAT 95 10/11/2020       Radiology reports as per the Radiologist  Radiology: Ct Chest Wo Contrast    Result Date: 10/12/2020  CT chest without contrast: Comparison:  CT,SR  - CT CHEST PULMONARY EMBOLISM W CONTRAST  - 10/19/2018 03:48 PM EDT FINDINGS: Lungs/pleura: Extensive consolidation of the right lower lobe with air bronchograms and areas of necrosis, with some involvement of the adjacent posterior aspect of the right upper lobe and right middle lobe. Left lower lobe consolidation or atelectasis. No pleural effusion or pneumothorax. Upper lobes are otherwise clear. Mediastinum/heart/aorta: Mild cardiac enlargement. No pericardial effusion. Coronary artery mural calcifications. Tortuous thoracic aorta. Dilated trachea and upper thoracic esophagus. Fluid-filled distended mid and distal thoracic esophagus. Small hiatal hernia. Uppermost abdomen: Surgical clips anterior to the aorta in the upper abdomen. Calcific mass in the deep mesentery measuring 11 x 37 mm. Probable cholelithiasis. Left kidney cortical cyst. MSK: Multilevel spondylosis of the thoracic spine. Mild kyphoscoliosis. No acute fracture. No destructive osseous lesion. Right lower lobe dense consolidation with involvement of the posterior aspect of the upper and middle lobe. Findings suspicious for pneumonia. Left base atelectasis or pneumonia. Small hiatal hernia. 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 Left kidney cyst.  Possible cholelithiasis. This document has been electronically signed by: Cesar Isidro MD on 10/12/2020 01:01 AM All CT scans at this facility use dose modulation, iterative reconstruction, and/or weight-based dosing when appropriate to reduce radiation dose to as low as reasonably achievable. Xr Chest Portable    Result Date: 10/12/2020  1 view chest x-ray. Comparison:  CR,SR  - XR CHEST PORTABLE  - 10/11/2020 06:12 PM EDT Findings: Stable right IJ CVP catheter. ET tube terminates 4 cm above the david.  Enterogastric tube loops in the stomach, tip in the gastric body, noting COMPARISON: 10/24/2020         TECHNIQUE:  AP mobile chest single view           FINDINGS:         There is an endotracheal tube present with the tip overlying the midtrachea located approximately 2.8 cm above the david.         There is an enteric tube present which is looped within the esophagus. This was also present on the previous examination.         There is a right IJ central line again seen. The tip is located at the cavoatrial junction.         The heart size is normal.         Small bilateral pleural effusions are demonstrated.         Patchy airspace disease within the right lower lobe is noted which may represent atelectasis or pneumonia.         No acute osseous findings are seen.              Impression    1. There is an endotracheal tube present with the tip overlying the midtrachea located approximately 2.8 cm above the david.         2. There is an enteric tube present which is looped within the esophagus. This was also present on the previous examination. Recommend repositioning.         3. Small bilateral pleural effusions are demonstrated.         4. Patchy airspace disease within the right lower lobe is noted which may represent atelectasis or pneumonia.                        **This report has been created using voice recognition software.  It may contain minor errors which are inherent in voice recognition technology. **         Final report electronically signed by Dr. Courtney Childs on 10/24/2020 1:55 PM      PROCEDURE: XR INJ CONTRAST GASTRIC TUBE PERC         CLINICAL INFORMATION: check J-tube placement         COMPARISON: CT dated 10/24/2020         TECHNIQUE:  Multiple fluoroscopic images were obtained to evaluate the J tube positioning  and in order to attempt advancement of an enteric tube.         FINDINGS:         With the patient in the supine position, the enteric tube was found looped within the esophagus.  The enteric tube was retracted and redirected into the patient's known hiatal hernia at the lower mediastinum. However, the catheter could not be redirected    into the small bowel due to tortuosity. Therefore, the tip of the catheter cannot be advanced to the level of the gastric lumen at the hiatal hernia. This was despite multiple attempts with guidewire assistance.         An attempt was made to check J-tube placement. However there was obstruction of the tube. Contrast could not be injected through the tube due to this obstruction. A [employer] guidewire was advanced through the J-tube to determine the level of the    obstruction which was noted to be within the lumen of the tube at the level of the skin surface of the anterior abdominal wall. A Glidewire was then advanced through the tube in order to attempt advancement of the wire beyond the obstruction. Again, the    obstruction was noted within the lumen of the tube at the level of the skin surface of the anterior abdominal wall. Again, contrast could not be flushed through the J-tube. Therefore, the J-tube positioning cannot be determined on the current    examination. There also appears to be retraction of the J-tube. Due to the inability to inject contrast, the tip of the J-tube cannot be definitively determined to be within or outside of the lumen of the jejunum.              Impression    1. The enteric tube was found looped within the esophagus. The enteric tube was retracted and redirected into the patient's known hiatal hernia at the lower mediastinum. However, the catheter could not be redirected into the small bowel due to    tortuosity. Therefore, the tip of the catheter cannot be advanced to the level of the gastric lumen at the hiatal hernia. This was despite multiple attempts with guidewire assistance and fluoroscopic guidance.         2. An attempt was made to check J-tube placement. However there was obstruction of the tube. Contrast could not be injected through the tube due to this obstruction.  A [employer] guidewire was advanced through the J-tube to determine the level of the    obstruction which was noted to be within the lumen of the tube at the level of the skin surface of the anterior abdominal wall. A Glidewire was then advanced through the tube in order to attempt advancement of the wire beyond the obstruction. Again, the    obstruction was noted within the lumen of the tube at the level of the skin surface of the anterior abdominal wall. Again, contrast could not be flushed through the J-tube. Therefore, the J-tube positioning cannot be determined on the current    examination. There also appears to be retraction of the J-tube. Due to the inability to inject contrast, the tip of the J-tube cannot be definitively determined to be within or outside of the lumen of the jejunum.              **This report has been created using voice recognition software.  It may contain minor errors which are inherent in voice recognition technology. **         Final report electronically signed by Dr. Kvng Medina on 10/24/2020 3:44 PM      Physical Exam:  Vitals: /63   Pulse 95   Temp 97.2 °F (36.2 °C) (Bladder)   Resp 20   Ht 5' 11\" (1.803 m)   Wt 167 lb 1.7 oz (75.8 kg)   SpO2 92%   BMI 23.31 kg/m²   24 hour intake/output:    Intake/Output Summary (Last 24 hours) at 10/29/2020 1036  Last data filed at 10/29/2020 0806  Gross per 24 hour   Intake 3095.83 ml   Output 1380 ml   Net 1715.83 ml     Last 3 weights:   Wt Readings from Last 3 Encounters:   10/29/20 167 lb 1.7 oz (75.8 kg)   10/01/20 164 lb 8 oz (74.6 kg)   07/23/20 157 lb 3.2 oz (71.3 kg)       General appearance -  chronically ill appearing,   Chest - Rhonchi   Cardiovascular - normal rate and regular rhythm  Abdomen - soft,  nondistended, no masses or organomegaly   Surgical Incision - well approximated  J tube   Musculoskeletal -no swelling       DVT prophylaxis: [] Lovenox                                 [x] SCDs                                 [] SQ Heparin                                 [] Encourage ambulation           [] Already on Anticoagulation plavix                 Assessment:  1. S/p J tube POD# 7  2. S/P abd exploration, abdominal washout, closure of J tube enterotomy site and replacement of  J tube POD# 2  3. Dysphagia   4. Acute hypoxic respiratory failure improved  5. Septic shock  6. CAP (+) legionella  7. History of PE and DVT  8. Anemia   9. HX esophagectomy, esophageal cancer     Active Problems:    Respiratory failure with hypoxia (HCC)    Sepsis ([address]    Pneumonia due to organism    Moderate malnutrition ([address]    Severe malnutrition ([address]  Resolved Problems:    * No resolved hospital problems. *      Plan:      1. J-tube okay to starts cyclic feeds at 94AO hour only, do not titrate rate   2. PICC and TPN  3. Routine wound care  4. Antibiotic therapy  5. Pain control   6. Plavix and coumadin are on hold, hematology to evaluate  7. SCDs for DVT prophylaxis. Okay with Lovenox   8. Palliative care   9. ICU supportive care. Appreciate assistance. Electronically signed by LARRY Henson CNP on 10/29/2020 at 10:36 AM Patient seen and examined independently by me. Above discussed and I agree with CNP. Labs, cultures, and radiographs where available were reviewed. See orders for the updated patient care plan.     Jon Muniz MD, patient actually looks good color is good hemoglobin stable at 8.8 bowel sounds are present wound is good AMIRA shows sanguinous will begin tube feedings at 10 mL an hour and hold at that point and see how patient does continue TPN  10/29/2020   7:31 PM

## 2020-10-29 NOTE — PROGRESS NOTES
PICC Procedure Note    Tran Falling   Admitted- 10/11/2020 11:39 AM  Admission diagnosis- Respiratory failure with hypoxia [facility]) [J96.91]      Attending Physician- Evelyn Pryor MD  Ordering Physician-Dr Devlin  Indication for Insertion: TPN    Catheter Insertion Date- 10/29/2020   Lot Number- [id number]  Gauge-6  Lumen-triple    Insertion Site- DWAIN Brachial  Vein Diameter- 2.67 mm  Catheter Length- 48 cm  Internal Length- 38 cm  Exposed Catheter Length- 10cm   Upper Arm Circumference- 24cm  Tip Confirmation System Bundle met- No:   If X-ray required, Tip Location-Left internal jugular vein  Radiologist- Dr Gallito Syed    PICC insertion successful- Yes  Ultrasound- yes    Okay To Use PICC- No: to IR for reposition    Electronically signed by Woody Aldana RN on 10/29/2020 at 10:33 AM

## 2020-10-29 NOTE — PROGRESS NOTES
CRITICAL CARE PROGRESS NOTE      Patient:  Dheeraj Perez    Unit/Bed:4D-18/018-A  YOB: 1940  MRN: [id number]   PCP: Prem Brizuela MD  Date of Admission: 10/11/2020  Chief Complaint:- Shortness of breath and fever    Assessment and Plan:    1. Acute Hypoxic Respiratory Failure:  Patient intubated on 10/23/2020 for airway protection. Patient successfully extubated on 10/27/2020. Now on 2L of O2 via nasal cannula. Titrate O2 to keep SpO2 > 92%. Wean O2 as tolerated. Patient has very poor capacity to protect his airway. He has no cough reflex. Swallowing mechanics are severely impaired. Patient will need tracheostomy tube in order to permit time for training and recovery. Received consent from patient's wife and daughter for tracheostomy tube placement. Tracheostomy scheduled for tomorrow morning. 2. Community Acquired Pneumonia: Positive for Legionella on 10/12/2020. S/p Levaquin 750 mg (10/12 - 10/20). CT chest 10/26/2020 demonstrated extensive right lower lobe consolidation with patchy left lower lobe consolidation. Continue with Zosyn for empiric antibiotic coverage, Day 6/10. Decadron initiated 10/27/2020 for consolidating process. 3. Atrial Fibrillation with RVR: Patient went into atrial fibrillation with RVR overnight. Amiodarone drip initiated 10/27/2020 for rhythm control. Will continue to monitor. Serial EKGs. 4. S/p J-Tube placement: J-tube insertion due to the patient's complex esophageal anatomy and dysphagia. Initially, IR manipulated the J tube with successful tube positioning and functioning on 10/25/2020. CT abdomen and pelvis completed on 10/26/2020 demonstrated moderate amount of free fluid containing numerous pockets of gas. This was concerning of a J-tube leak. Patient underwent washout of abdomen with replacement of J-tube by Dr. Jose G Vicente. 5. Dysphagia: Patient went down for IR study and fluoro advancement of OG tube.  They were unable to advance OG tube past hiatal hernia. Tube in place to suction for now. 6. Acute Urinary Retention:  Secondary to post-surgical/post-anesthesia. Continue with intermittent external catheterization. 7. Hx of DVT and PE: Previously treated with Coumadin. Coumadin discontinued by Dr. Anjelica Machado, heme-onc, secondary to GI bleeding. Details regarding GI bleeding are unknown at this point. Patient was scheduled to visit Dr. Andreea Eller office mid-October to reassess his Plavix and Coumadin. Patient's hemoglobin dropped from 10.6 to 8.8 since starting therapeutic Lovenox yesterday 10/27/2020. Lovenox discontinued. Patient will require IVC filter since anticoagulation is contraindicated at this time. 8. Coronary artery disease: Previously Plavix was discontinued secondary to GI bleeding. Patient had angioplasty with stent placement in 2014 and subsequently in 2015. Currently, Plavix on hold. 9. Severe protein calorie malnutrition: Albumin 1.9. Significant loss of muscle mass. Patient has temporal wasting and loss of supraclavicular fat. TPN initiated 10/27/2020. 10. COPD:  Stable, continue inhalers. 11. Hx of Esophageal cancer: S/p esophagectomy  12. Hx of Colon cancer: S/p colon resection  13. Septic Shock: Secondary to Pneumonia. S/p IV pressors. Resolved. INITIAL H AND P AND ICU COURSE:  Edvin Valle 65-year-old white male who has a past medical history of reformed smoker, CAD placement in 2015, hypertension, esophageal cancer, colon cancer, nephrolithiasis. Per report patient presented to the emergency department on 10/11/2020 for evaluation of shortness of breath, fatigue, fever, nausea and vomiting. He was accompanied by his wife and daughter. The daughter reported that he began having symptoms on Friday 10/09/2020 and had increased difficulty breathing with fevers T-max of 102.0. He was also noted to have difficulty ambulating and altered mental status.   Patient was admitted to the ICU because of escalating oxygen requirements and hypotension. Patient required Levophed drip and intubation. PCR panel was positive for Legionella pneumoniae and was started on Levaquin. On 10/18/2020, patient was extubated to room air and transferred to [facility] ICU stepdown unit. On 10/22/2020, patient had a J-tube placed after failing modified barium swallow twice. On 10/24/2020, patient became hypotensive with blood pressure in the 70s with change in mental status. Patient became more lethargic and restless. Patient was unresponsive to fluid resuscitation. Patient was transferred to the ICU for intubation and pressure support. General surgery was consulted regarding possible J-tube malpositioning. IR manipulated the J tube with successful tube positioning and functioning on 10/25/2020. On 10/26/2020, CT abdomen and pelvis demonstrated moderate amount of free fluid containing numerous pockets of gas. Dr. Fredi Reddy from general surgery was informed overnight and patient was taken to the OR. Patient underwent washout of abdomen with replacement of J-tube. PICC placed on 10/29/2020. Past Medical History: Per HPI. Family History: Mother: diabetes, stroke. Father: cancer  Social History: Reformed smoker, 25-pack-year history, denies alcohol use, denies drug use. ROS   Review of Systems   Respiratory: Positive for shortness of breath. Negative for cough. Cardiovascular: Negative for chest pain and palpitations. Gastrointestinal: Negative for abdominal pain, diarrhea and vomiting.          Scheduled Meds:   insulin glargine  15 Units Subcutaneous BID    dexamethasone  4 mg Intravenous Daily    sodium chloride flush  10 mL Intravenous 2 times per day    chlorhexidine  15 mL Mouth/Throat BID    famotidine (PEPCID) injection  20 mg Intravenous BID    piperacillin-tazobactam  3.375 g Intravenous Q8H    [Held by provider] atenolol  100 mg Per J Tube Daily    [Held by provider] finasteride  5 mg Per J Tube Daily    [Held by provider] furosemide  20 mg Per J Tube BID    glycopyrrolate-formoterol  2 puff Inhalation BID    [Held by provider] insulin glargine  30 Units Subcutaneous Nightly    insulin lispro  0-12 Units Subcutaneous Q6H    [Held by provider] isosorbide mononitrate  30 mg Oral Daily    [Held by provider] tamsulosin  0.4 mg Oral Nightly    [Held by provider] clopidogrel  75 mg Per NG tube Daily     Continuous Infusions:   PN-Adult  3 IN 1 Central Line (Custom)      PN-Adult  3 IN 1 Central Line (Custom) 100 mL/hr at 10/28/20 1752    amiodarone 0.5 mg/min (10/29/20 1141)    norepinephrine Stopped (10/29/20 0138)    fentaNYL (SUBLIMAZE) 500 mcg in sodium chloride 0.9 % 100 mL Stopped (10/27/20 1425)    dextrose Stopped (10/19/20 1522)       PHYSICAL EXAMINATION:  T:  97.2.  P:  86. RR:  27. B/P:  142/75. O2 Sat: 93.  I/O:  3075/1380  Body mass index is 23.31 kg/m². General:  Acutely-ill appearing  male. Severely malnourished. HEENT:  normocephalic and atraumatic. No scleral icterus. PERR  Neck: supple. No Thyromegaly. Lungs: Rhonchi present. No retractions  Cardiac: RRR. No JVD. Abdomen: soft. Nontender. Extremities:  2+ pitting edema BLE. Left upper extremity hand deformity. Vasculature: capillary refill < 3 seconds. Palpable dorsalis pedis pulses. Skin:  warm and dry. Lymph:  No supraclavicular adenopathy. Neurologic:  No focal deficit. No seizures. Data: (All radiographs, tracings, PFTs, and imaging are personally viewed and interpreted unless otherwise noted).  CT Chest 10/26/2020: Extensive right lower lobe consolidation. Patchy left lower lobe consolidation.  CT Abdomen Pelvis 10/26/2020: Large amount of free intraperitoneal air without significant improvement. Moderate amount of free fluid containing numerous pockets of gas with interval increase since the prior study. Multiple nonobstructive calculi within the left kidney. Possible bladder calculi.  CXR 10/26/2020:  Improved right lung base infiltrate. No evidence of pulmonary edema or failure.  Sodium 146, potassium 4.2, chloride 109, bicarbonate 30, BUN 42, creatinine 0.6, estimated GFR >90, glucose 182, calcium 8.3, total protein 4.4   Albumin 1.9, alk phos 67, ALT 7, AST 10, total bilirubin 0.5, total protein 4.4   WBC 9.5, hemoglobin 8.8, hematocrit 27.7, platelet 131        Seen with multidisciplinary ICU team.  Meets Continued ICU Level Care Criteria:    [x] Yes   [] No - Transfer Planned to listed location:  [] HOSPITALIST CONTACTED-      Case and plan discussed with Dr. Dominic Carty. Electronically signed by Jaden Marie MD  [address]    Patient seen by me. Case discussed with resident physician. Patient unable to adequately protect airway. Plans are for tracheostomy tube tomorrow morning. Electronically signed by Kendy Carty MD.

## 2020-10-29 NOTE — PROGRESS NOTES
TPN Follow Up Note    Assessment: starting TFs at 10 mL/hr today (do not titrate). All po meds remain on hold  Electrolyte Replacement: KPhos 20 mmol    TPN changes for (today) at 1800:   Remove Na Ac  Decrease NaCl to 176 mEq  Increase NaPhos to 18 mmol  Add Mg 4.06 mEq    Re-check BMP, Mg, PO4, iCa in am        Ronald Sanchez, BCPS, BCCCP 10/29/2020 10:56 AM

## 2020-10-29 NOTE — CARE COORDINATION
10/29/20, 2:43 PM EDT    DISCHARGE ON GOING [address] [facility] day: 18  Location: -18/018-A Reason for admit: Respiratory failure with hypoxia ([address] [J96.91]   Procedure:   10/11 CVC  10/12 Intubated  10/17 Extubated  10/22 JT   10/24 CVC - 10/29 removed  10/24 Intubated  10/24 CXR  1. There is an endotracheal tube present with the tip overlying the midtrachea located approximately 2.8 cm above the david. 2. There is an enteric tube present which is looped within the esophagus. This was also present on the previous examination. Recommend repositioning. 3. Small bilateral pleural effusions are demonstrated. 4. Patchy airspace disease within the right lower lobe is noted which may represent atelectasis or pneumonia. 10/25 IR JT manipulation by IR successful (JT kinked)  10/26 CT Chest:   Extensive right lower lobe consolidation, with volume loss and involvement    of the posterior aspect of the right upper lobe.  Patchy left lower lobe    consolidation.  Findings suspicious for pneumonia which could be secondary    to aspiration. Pneumoperitoneum with ascites, reported on CT scan of the abdomen and    pelvis.       10/26 CT Abd/pelvis:   1. There is a large amount of free intraperitoneal air without significant    improvement. There is a moderate amount of free fluid containing numerous    pockets of gas with interval increase since the prior study. Findings are    suspicious for jejunostomy tube leak or leaking/perforated viscus. 2. Multiple nonobstructive calculi within the left kidney. 3.  Possible bladder calculi      10/27 LAPAROTOMY EXPLORATORY, [address] OUT CLOSURE SB ENTEROTOMY  REPLACEMENT 16 FR J-TUBE   10/27 Extubated  10/29 PICC Left brachial  10/29 CXR:   Improved right lung base infiltrate.  No evidence of pulmonary edema or    failure     Treatment Plan of Care: Awaiting pt/family decision regarding trach. TF to start today via JTube @ fixed rate 10 ml/hr.  TPN to continue. Levo weaned off early this morning. On room air. Afebrile. NSR. Oriented x3, not situation. Follows commands. AMIRA x1. Jtube w/TF @ 10 ml/hr. SCDs. Don. Amio @ 0.5 mg/min, TPN, IV decadron 4 mg daily, pepcid, inhaler, lantus bid, SSI Q6H, prn IV morphine, IV zosyn. Intensivist, General Surgery, GI, and Physiatry following. SLP/PT/OT. Palliative Care consulted. Abdominal incision and respiratory culture +light growth candidas. Na+ 146, phos 1, alb 1.9, hgb 8.8, INR 1.33.     Barriers to Discharge: not medically ready; TPN  PCP: Vimal Polanco MD  Readmission Risk Score: 34%  Patient Goals/Plan/Treatment Preferences:  lives with spouse, has walker; therapy following, has nebulizer, current with [facility], no SR IPR beds; family wants [facility] (Physiatry following), family resistant to ECF despite therapy recommends it (not safe for home); will need  minimum for JT teaching (nsg, aide, therapy, order in EPIC)

## 2020-10-30 NOTE — PROGRESS NOTES
[address]  PHYSICAL THERAPY MISSED TREATMENT NOTE  [facility] ICU 4D    Date: 10/30/2020  Patient Name: Kasie Stiles        MRN: [id number]   : 1940  ([de-identified] y.o.)  Gender: male   Referring Practitioner: DELGADO Palmer  Diagnosis: respiratory failure with hypoxia         REASON FOR MISSED TREATMENT:  Hold treatment per nursing request.  Pt to have trach placed today and plan for [place] over the weekend.

## 2020-10-30 NOTE — PROCEDURES
ICU PROCEDURE - ENDOTRACHEAL INTUBATION    Carlota Fossa     MRN#: [id number]  10/30/20      Merissa [email or web address]     : 1940      INDICATION: Respiratory failure secondary to inability to protect airway. TIME OUT: taken    Permission obtained, risks/benefits reviewed:    ANESTHESIA:   [x]Ketamine  []Ativan  [] Morphine  []Propofol  []Other medications:      ESTIMATED BLOOD LOSS:  None. COMPLICATIONS:  []N/A  [] Other:    LARYNGOSCOPIC AIRWAY GRADE (CORMACK-LEHANE):[]1  [x]2a  []2b []3  []4        INTUBATION EQUIPMENT USED:  [x] Direct laryngoscope only    OUTCOME: Successful placement of #  8.0  Taperguard Evac endotracheal via   [x]Oral route    INSERTION DEPTH:  23    cm from   [x]lip           CONFIRMATION OF TUBE POSITION:   [x]Capnography - Strong & repeatable exhaled CO2 detection   [x]Multiple point auscultation   [x]SpO2 response   []STAT X-ray   []Bronchoscopic assessment    UNUSUAL FINDINGS: Patient with gastric content in the posterior oropharynx and between the cords. Clear overt uncompensated aspiration    PROCEDURE:     Using direct laryngoscopy, the vocal cords were visualized and the endotracheal tube was placed through the cords under direct vision. Good breath sounds were auscultated bilaterally without sounds over abdomen. Appropriate strong & repeatable exhaled CO2 detection was confirmed.        Electronically signed by Viri Bowden MD on 10/30/2020 at 2:54 PM

## 2020-10-30 NOTE — PROCEDURES
PERCUTANEOUS TRACHEOSTOMY PLACEMENT    Risks and benefits to the procedure were discussed. Alternatives and their risks were discussed as well. INDICATION:respitatory failure with inability to protect airway    SEDATION:  100 mg Fentanyl, 100 mg Ketamine, 4 mg Ativan, 10 mg Nimbex. EBL:Less than 5 ml    COMPLICATIONS: Patient was found to have gastric content throughout the airway bilaterally. PROCEDURE:   After informed consent was obtained, patient received sedation as detailed above. Utilizing contact precautions, patient was prepped using chlorhexidine prep, and draped utilizing sterile gloves, sterile gown, hair neck, and mask. Procedure was performed under direct bronchoscopy. Endotracheal tube was pulled back using blotting technique and area secured. Patient received a total of 7 cc of lidocaine with epinephrine utilizing a scalpel a 1-1/2 incision was made in the skin vertically. Utilizing forceps, the area was bluntly dissected to the level of the trachea. Utilizing 3 cc of lidocaine with the area around the trachea  Was anesthetized. Needle was placed into the trachea under direct bronchoscopic visualization. Needle was withdrawn. Utilizing an introducer needle, it was placed into the trachea under direct bronchoscopic visualization. Guidewire was placed through the introducer needle. Introducer needle was withdrawn leaving the guidewire in place. Punch dilator was placed over the guidewire and subsequently removed. [employer] was utilized. Guiding sheath was placed over the guidewire. Blue Rh[employer] dilator was placed onto the guiding sheath. The unit was subsequently advanced into the trachea under direct bronchoscopic visualization until [employer] [place] was visualized within the airway. Blue Rhino dilator was removed leaving the guiding sheath and guidewire in position. A number 6 DCT Shiley was loaded onto dilator and  Placed on the guiding sheath and guidewire.  The unit was advanced into the trachea under direct supervision via bronchoscopy. Tracheostomy tube was successfully placed. Bronchoscopy was placed into tracheostomy and verified position. Tracheostomy cuff was inflated, and then later hooked up to the tracheostomy tube. Protective gauze was placed at the insertion site. Tracheostomy was secured to the skin utilizing  2. 0 Ethilon ×2. There were no immediate issue, and the procedure was completed.

## 2020-10-30 NOTE — CARE COORDINATION
10/30/20, 10:15 AM EDT    DISCHARGE PLANNING EVALUATION    Planning trach today, SW met with pt and wife, agreed to Mary. SW updated Mariela LAINA.

## 2020-10-30 NOTE — PROGRESS NOTES
[address] [facility] THERAPY MISSED TREATMENT NOTE  [facility] ICU 4D      Date: 10/30/2020  Patient Name: Gerber Tejada        MRN: [id number]    : 1940  ([de-identified] y.o.)    REASON FOR MISSED TREATMENT:  ST attempts to follow up with skilled dysphagia therapy. Spoke with RN, Angie Kim who reports plans for pt placement of tracheostomy this date at ~80. Inappropriate for PO consumption at this time given respiratory concerns. Will follow up s/p tracheostomy placement as appropriate.      Diallo Heck M.S. Shamar Arroyo 10/30/2020

## 2020-10-30 NOTE — PROGRESS NOTES
% 100 mL Stopped (10/27/20 1425)    dextrose Stopped (10/19/20 1522)     PRN Meds:morphine **OR** morphine, diatrizoate meglumine-sodium, sodium chloride flush, acetaminophen **OR** acetaminophen, polyethylene glycol, promethazine **OR** ondansetron, diatrizoate meglumine-sodium, senna, metoprolol, glucose, dextrose, glucagon (rDNA), dextrose, albuterol sulfate HFA **AND** ipratropium **AND** [CANCELED] MDI Treatment    Objective:    CBC:   Recent Labs     10/28/20  0530 10/29/20  0550 10/30/20  0500   WBC 8.1 9.5 16.3*   HGB 8.8* 8.8* 8.9*    170 176     BMP:    Recent Labs     10/28/20  0530 10/29/20  0550 10/30/20  0500    146* 146*   K 4.1 4.2 4.7    109 108   CO2 28 30 31   BUN 55* 42* 38*   CREATININE 0.9 0.6 0.5   GLUCOSE 425* 182* 112*     Calcium:  Recent Labs     10/30/20  0500   CALCIUM 8.0*     Ionized Calcium:No results for input(s): IONCA in the last 72 hours. Magnesium:  Recent Labs     10/30/20  0500   MG 1.9     Phosphorus:  Recent Labs     10/30/20  0500   PHOS 2.3*     BNP:No results for input(s): BNP in the last 72 hours. Glucose:  Recent Labs     10/29/20  2357 10/30/20  0607 10/30/20  0908   POCGLU 219* 91 90     HgbA1C: No results for input(s): LABA1C in the last 72 hours. INR:   Recent Labs     10/30/20  0500   INR 1.14*     Hepatic:   Recent Labs     10/30/20  0500   ALKPHOS 71   ALT 6*   AST 11   PROT 4.4*   BILITOT 0.5   LABALBU 2.0*     Amylase and Lipase:  No results for input(s): LACTA, AMYLASE in the last 72 hours. Lactic Acid:   No results for input(s): LACTA in the last 72 hours. Troponin:   No results for input(s): CKTOTAL, CKMB, TROPONINT in the last 72 hours. BNP: No results for input(s): BNP in the last 72 hours. Lipids: No results for input(s): CHOL, TRIG, HDL, LDLCALC in the last 72 hours.     Invalid input(s): LDL  ABGs:   Lab Results   Component Value Date    PH 7.19 10/11/2020    PCO2 75 10/11/2020    PO2 98 10/11/2020    HCO3 29 10/11/2020    O2SAT 95 10/11/2020       Radiology reports as per the Radiologist  Radiology: Ct Chest Wo Contrast    Result Date: 10/12/2020  CT chest without contrast: Comparison:  CT,SR  - CT CHEST PULMONARY EMBOLISM W CONTRAST  - 10/19/2018 03:48 PM EDT FINDINGS: Lungs/pleura: Extensive consolidation of the right lower lobe with air bronchograms and areas of necrosis, with some involvement of the adjacent posterior aspect of the right upper lobe and right middle lobe. Left lower lobe consolidation or atelectasis. No pleural effusion or pneumothorax. Upper lobes are otherwise clear. Mediastinum/heart/aorta: Mild cardiac enlargement. No pericardial effusion. Coronary artery mural calcifications. Tortuous thoracic aorta. Dilated trachea and upper thoracic esophagus. Fluid-filled distended mid and distal thoracic esophagus. Small hiatal hernia. Uppermost abdomen: Surgical clips anterior to the aorta in the upper abdomen. Calcific mass in the deep mesentery measuring 11 x 37 mm. Probable cholelithiasis. Left kidney cortical cyst. MSK: Multilevel spondylosis of the thoracic spine. Mild kyphoscoliosis. No acute fracture. No destructive osseous lesion. Right lower lobe dense consolidation with involvement of the posterior aspect of the upper and middle lobe. Findings suspicious for pneumonia. Left base atelectasis or pneumonia. Small hiatal hernia. 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 Left kidney cyst.  Possible cholelithiasis. This document has been electronically signed by: Yoli Acosta MD on 10/12/2020 01:01 AM All CT scans at this facility use dose modulation, iterative reconstruction, and/or weight-based dosing when appropriate to reduce radiation dose to as low as reasonably achievable. Xr Chest Portable    Result Date: 10/12/2020  1 view chest x-ray. Comparison:  CR,SR  - XR CHEST PORTABLE  - 10/11/2020 06:12 PM EDT Findings: Stable right IJ CVP catheter.  ET tube terminates 4 cm above the david. Enterogastric tube loops in the stomach, tip in the gastric body, noting a retrocardiac hiatal hernia. Bilateral lower lobe consolidations on the left obscuring the hemidiaphragm, and bilateral pleural effusions. Heart is mildly enlarged. No pulmonary edema. Impression: Tubes and lines as noted. Bilateral consolidations and pleural effusions, moderately improved. Significant improvement in central pulmonary edema/CHF. Moderate sized hiatal hernia is suspected. This document has been electronically signed by: Ashish La MD on 10/12/2020 02:55 AM     Xr Chest Portable    Result Date: 10/11/2020  PROCEDURE: XR CHEST PORTABLE CLINICAL INFORMATION: Central line . COMPARISON: 11 October 2020 at 12:34 PM TECHNIQUE: Portable semiupright FINDINGS: Right internal jugular vein catheter is present courses unremarkable tip is at the cavoatrial junction. There are no other changes from earlier     Central venous catheter tip is at the cavoatrial junction **This report has been created using voice recognition software. It may contain minor errors which are inherent in voice recognition technology. ** Final report electronically signed by Dr. Ken Garcia on 10/11/2020 6:40 PM    Xr Chest Portable    Result Date: 10/11/2020  PROCEDURE: XR CHEST PORTABLE CLINICAL INFORMATION: sob . COMPARISON: 5/21/2020 TECHNIQUE: Portable semiupright FINDINGS: Patient is severely rotated. Heart size is within normal limits. Mediastinum is not widened. Dense airspace consolidation right lower lobe. Interstitial edema bilaterally. Retrocardiac atelectasis or infiltrate. Pulmonary vessels are not congested. Right lower lobe airspace consolidation. In addition interstitial/pulmonary edema bilaterally. **This report has been created using voice recognition software. It may contain minor errors which are inherent in voice recognition technology. ** Final report electronically signed by Dr. Ken Garcia on 10/11/2020 1:03 PM     PROCEDURE: XR CHEST PORTABLE         CLINICAL INFORMATION: ETT placement         COMPARISON: 10/24/2020         TECHNIQUE:  AP mobile chest single view           FINDINGS:         There is an endotracheal tube present with the tip overlying the midtrachea located approximately 2.8 cm above the david.         There is an enteric tube present which is looped within the esophagus. This was also present on the previous examination.         There is a right IJ central line again seen. The tip is located at the cavoatrial junction.         The heart size is normal.         Small bilateral pleural effusions are demonstrated.         Patchy airspace disease within the right lower lobe is noted which may represent atelectasis or pneumonia.         No acute osseous findings are seen.              Impression    1. There is an endotracheal tube present with the tip overlying the midtrachea located approximately 2.8 cm above the david.         2. There is an enteric tube present which is looped within the esophagus. This was also present on the previous examination. Recommend repositioning.         3. Small bilateral pleural effusions are demonstrated.         4. Patchy airspace disease within the right lower lobe is noted which may represent atelectasis or pneumonia.                        **This report has been created using voice recognition software.  It may contain minor errors which are inherent in voice recognition technology. **         Final report electronically signed by Dr. Fernandez Angeles on 10/24/2020 1:55 PM      PROCEDURE: XR INJ CONTRAST GASTRIC TUBE PERC         CLINICAL INFORMATION: check J-tube placement         COMPARISON: CT dated 10/24/2020         TECHNIQUE:  Multiple fluoroscopic images were obtained to evaluate the J tube positioning  and in order to attempt advancement of an enteric tube.         FINDINGS:         With the patient in the supine position, the enteric tube was found looped within the esophagus. The enteric tube was retracted and redirected into the patient's known hiatal hernia at the lower mediastinum. However, the catheter could not be redirected    into the small bowel due to tortuosity. Therefore, the tip of the catheter cannot be advanced to the level of the gastric lumen at the hiatal hernia. This was despite multiple attempts with guidewire assistance.         An attempt was made to check J-tube placement. However there was obstruction of the tube. Contrast could not be injected through the tube due to this obstruction. A [employer] guidewire was advanced through the J-tube to determine the level of the    obstruction which was noted to be within the lumen of the tube at the level of the skin surface of the anterior abdominal wall. A Glidewire was then advanced through the tube in order to attempt advancement of the wire beyond the obstruction. Again, the    obstruction was noted within the lumen of the tube at the level of the skin surface of the anterior abdominal wall. Again, contrast could not be flushed through the J-tube. Therefore, the J-tube positioning cannot be determined on the current    examination. There also appears to be retraction of the J-tube. Due to the inability to inject contrast, the tip of the J-tube cannot be definitively determined to be within or outside of the lumen of the jejunum.              Impression    1. The enteric tube was found looped within the esophagus. The enteric tube was retracted and redirected into the patient's known hiatal hernia at the lower mediastinum. However, the catheter could not be redirected into the small bowel due to    tortuosity. Therefore, the tip of the catheter cannot be advanced to the level of the gastric lumen at the hiatal hernia. This was despite multiple attempts with guidewire assistance and fluoroscopic guidance.         2. An attempt was made to check J-tube placement.  However there was obstruction of prophylaxis: [] Lovenox                                 [x] SCDs                                 [] SQ Heparin                                 [] Encourage ambulation           [] Already on Anticoagulation plavix                 Assessment:  1. S/p J tube POD# 8  2. S/P abd exploration, abdominal washout, closure of J tube enterotomy site and replacement of  J tube POD# 3  3. Dysphagia   4. Acute hypoxic respiratory failure improved  5. Septic shock  6. CAP (+) legionella  7. History of PE and DVT  8. Anemia   9. HX esophagectomy, esophageal cancer   10. Cyclic tube feeds at 08SK/J    Active Problems:    Respiratory failure with hypoxia (HCC)    Sepsis ([facility] [address]    Pneumonia due to organism    Moderate malnutrition (HCC)    Severe malnutrition ([facility]  Resolved Problems:    * No resolved hospital problems. *      Plan:      1. J-tube okay advance tube feeds to 20ml/h  2. PICC and TPN  3. Routine wound care  4. Antibiotic therapy  5. Pain control   6. Plavix and coumadin are on hold, hematology to evaluate  7. SCDs for DVT prophylaxis. Okay with Lovenox   8. Palliative care   9. ICU supportive care. Appreciate assistance. 8. Shazia for sukhdeep       Electronically signed by LARRY Juarez CNP on 10/30/2020 at 11:04 AM Patient seen and examined independently by me. Above discussed and I agree with CNP. Labs, cultures, and radiographs where available were reviewed. See orders for the updated patient care plan.     Cristhian Field MD, patient had percutaneous tracheostomy today post procedure and NG tube had to be placed due to a lot of bile reflux J-tube is been hooked to suction it is likely not going to be helpful would recommend take J-tube off suction continue NG [address] [place] J-tube with a small openings is unlikely to give any support to the reflux plus the catheter as well down the jejunum  10/30/2020   6:04 PM

## 2020-10-30 NOTE — PROGRESS NOTES
[address] THERAPY MISSED TREATMENT NOTE  [facility] ICU 4D  4D-18/018-A      Date: 10/30/2020  Patient Name: Mendel Bourdon        CSN: [id number]   : 1940  ([de-identified] y.o.)  Gender: male   Referring Practitioner: LARRY Jones CNP  Diagnosis: respiratory failure with hypoxia         REASON FOR MISSED TREATMENT: Pt is getting a trach this am. Will check back at a later date.

## 2020-10-30 NOTE — PROGRESS NOTES
Comprehensive Nutrition Assessment    Type and Reason for Visit:  Reassess(TPN macronutrients. TF start)    Nutrition Recommendations/Plan:  Next bag 3:1 TPN dosed on 77 kg, increase to 15 kcals/kg, 1.4 grams protein/kg & 30% lipid kcals . Glucerna 1.2 TF at 10 ml/hr per Dr. Per RN plan trach today & per Dr can increase TF to 20 ml/hr. If /When able to tolerate TF increase per Dr,  goal rate of 70 ml/hr is recommend. Recommend 110 ml free water flush every 4 hours (if no IVF) when TF at goal.   As pt tolerates TF adequately , wean TPN. Pt currently NPO. SLP following. Palliative care on case  Nutrition Assessment:    Pt compromised  from a nutritional standpoint AEB meets criteria for severe  malnutrition as below  continues NPO ,need for TPN ( appears refeeding syndrome), TF started at 10 ml/hr & plan trach today.   Remains at risk for further nutritional compromise r/t extubated 10/18 NPO, failed swallow & MBS , Jtube issues, Jtube place 10/22, 10/25 manipulated Jtube ,10/27 OR for Jtube replacement , closure & abd exploration, so NPO to minimal TF intake since 10/18, need for return to ICU  reintubated ,  extubated again 10/27,  increased nutrient needs for wound healing and underlying medical condition (hx COPD, DM, hx esophageal cancer s/p gastric pull through 2002, colon cancer s/p resection 1997).  Nutrition recommendations/interventions as per above    Malnutrition Assessment:  Malnutrition Status:  Severe malnutrition    Context:  Acute Illness     Findings of the 6 clinical characteristics of malnutrition:  Energy Intake:  1 - 75% or less of estimated energy requirements for 7 or more days(less than 50% of projected energy needs for greater than 5 days.  extubated 10/18 NPO, failed swallow , Jtube issues))  Weight Loss:  Unable to assess(edema/fluid shifts, suspect wt loss nutrition & fluid related)     Body Fat Loss:  7 - Moderate body fat loss Orbital   Muscle Mass Loss:  7 - Moderate muscle mass loss Temples (temporalis)  Fluid Accumulation:  1 - Mild     Strength:  Not Performed    Estimated Daily Nutrient Needs:  Energy (kcal):  ~2084 kcals ( 27 kcals/kg); Weight Used for Energy Requirements:  (10/12 76 kg)     Protein (g):  ~93- 108 grams (1.2-  1.4 grams/kg)  monitor renal status; Weight Used for Protein Requirements:  Admission(77.2 kg)        Fluid (ml/day):  ~1800ml (25ml/kgm wt. 72kgm 10/20); Method Used for Fluid Requirements:         Nutrition Related Findings:  Severely malnouished pt, alert, trying to talk, no voice per RN. TPN infusing at 100 ml/hr. Per Pharmacy  pt needed PO4 replacement 3 times, but now PO4 2.3. Discussed with pharmacy TF at 10 ml/hr & hoping to increase to 20 ml/hr after trach  today. Glucerna TF goal is 70 ml/hr. Discussed with Deborah PAYTON, plan TPN increase to 15 kcals on next bag. Per RN pt has had small o bm, Pt  with Jtube, PICC. meds include levophed, steroid, insulin, lasix , electrolyte replacement, & bm med. WBC 16.3, glucose much improved at 112 . Wounds:  (stage1 perirectum. sagell location not documented. 10/25 mmanipulated Jtube, 10/27 OR abd exploration, Jtube replaced & closure)       Current Nutrition Therapies:  Current Tube Feeding (TF) Orders:  · Feeding Route: Jejunostomy(Jtube placed 10/22, Jtube manipulated 10/25, Jtube replaced 10/27)  · Formula: Diabetic(Glucerna 1.2)  · Schedule: Continuous(curretnly at 10 ml/hr per Dr. Gustabo Savage increase to 20 ml/hr after trach today.   Note goal is 70 ml/hr when OK to increase)  · Additives/Modulars: (none)  · Water Flushes: Free h20 per Dr  · Current TF & Flush Orders Provides: currently 10 ml/hr  288 kcals  Current Parenteral Nutrition Orders:  · Type and Formula: 3-in-1 Custom(dosing wt: 77 kgm, 10 kcals/kgm, 1.2 gm protein/kgm and 20% kcals from lipids)   · Lipids: Daily  · Duration: Continuous  · Rate/Volume: 100 ml/hr  · Current PN Order Provides: 770 kcals, 92.4 gm protein, 72.5 gm CHO/day  Next

## 2020-10-30 NOTE — PROGRESS NOTES
Order was in the chart for bronchoscopy procedure. Verified that consent was signed. Time-out was done. ICU disposable  mobile scope  was used. Assisted Dr. Balwinder Ann with bronchoscopy procedure. Bronchial washings were not obtained. Patient tolerated the procedure well. The bronchoscope was disposed of in a red bag and placed in dirty utility room. Assisted Dr. Balwinder Ann with a percutaneous tracheostomy insertion. A time out was performed. Respiratory care practitioners assisting was Mariela Conklin RRT. The type and size of tracheostomy tube was 6 DCT. After the procedure vitals were assessed. Vital signs were reviewed and were stable after the procedure (see flow sheet for vitals).  Breath sounds were normal.

## 2020-10-30 NOTE — PROGRESS NOTES
CRITICAL CARE PROGRESS NOTE      Patient:  Gerber Tejada    Unit/Bed:4D-18/018-A  YOB: 1940  MRN: [id number]   PCP: Kimi Franco MD  Date of Admission: 10/11/2020  Chief Complaint:- Shortness of breath and fever    Assessment and Plan:    1. Acute Hypoxic Respiratory Failure:  Patient has very poor capacity to protect his airway. He has no cough reflex. Swallowing mechanics are severely impaired. Tracheostomy tube placed on 10/30/2020.    2. Community Acquired Pneumonia: Positive for Legionella on 10/12/2020. S/p Levaquin 750 mg (10/12 - 10/20). CT chest 10/26/2020 demonstrated extensive right lower lobe consolidation with patchy left lower lobe consolidation. Continue with Zosyn for empiric antibiotic coverage, Day 7/10. Decadron initiated 10/27/2020 for consolidating process. 3. Atrial Fibrillation with RVR: Patient went into atrial fibrillation with RVR overnight. Amiodarone drip initiated 10/27/2020 for rhythm control. Will continue to monitor. Serial EKGs. 4. S/p J-Tube placement: J-tube insertion due to the patient's complex esophageal anatomy and dysphagia. CT abdomen and pelvis completed on 10/26/2020 demonstrated moderate amount of free fluid containing numerous pockets of gas. This was concerning of a J-tube leak. Patient underwent washout of abdomen with replacement of J-tube by Dr. Jose Barragan. 5. Dysphagia: TPN initiated 10/27/2020. Will initiate cyclic feeds via J-tube at 10 mL/hour. 6. Acute Urinary Retention:  Secondary to post-surgical/post-anesthesia. Continue with intermittent external catheterization. 7. Hx of DVT and PE: Previously treated with Coumadin. Coumadin discontinued by Dr. Suzette Harley, heme-onc, secondary to GI bleeding. Details regarding GI bleeding are unknown at this point. Patient was scheduled to visit Dr. Thereasa Spatz office mid-October to reassess his Plavix and Coumadin. Patient's hemoglobin dropped from 10.6 to 8.8 since initiating Lovenox.  Lovenox discontinued. 8. Coronary artery disease: Previously Plavix was discontinued secondary to GI bleeding. Patient had angioplasty with stent placement in 2014 and subsequently in 2015. Currently, Plavix on hold. 9. Severe protein calorie malnutrition: Albumin 1.9. Significant loss of muscle mass. Patient has temporal wasting and loss of supraclavicular fat. TPN initiated 10/27/2020. 10. COPD:  Stable, continue inhalers. 11. Hx of Esophageal cancer: S/p esophagectomy  12. Hx of Colon cancer: S/p colon resection  13. Septic Shock: Secondary to Pneumonia. S/p IV pressors. Resolved. INITIAL H AND P AND ICU COURSE:  Shira Hickman 62-year-old white male who has a past medical history of reformed smoker, CAD placement in 2015, hypertension, esophageal cancer, colon cancer, nephrolithiasis. Per report patient presented to the emergency department on 10/11/2020 for evaluation of shortness of breath, fatigue, fever, nausea and vomiting. He was accompanied by his wife and daughter. The daughter reported that he began having symptoms on Friday 10/09/2020 and had increased difficulty breathing with fevers T-max of 102.0. He was also noted to have difficulty ambulating and altered mental status. Patient was admitted to the ICU because of escalating oxygen requirements and hypotension. Patient required Levophed drip and intubation. PCR panel was positive for Legionella pneumoniae and was started on Levaquin. On 10/18/2020, patient was extubated to room air and transferred to [facility] ICU stepdown unit. On 10/22/2020, patient had a J-tube placed after failing modified barium swallow twice. On 10/24/2020, patient became hypotensive with blood pressure in the 70s with change in mental status. Patient became more lethargic and restless. Patient was unresponsive to fluid resuscitation. Patient was transferred to the ICU for intubation and pressure support.  General surgery was consulted regarding possible J-tube study.  Multiple nonobstructive calculi within the left kidney. Possible bladder calculi.  CXR 10/29/2020: Improved right lung base infiltrate. No evidence of pulmonary edema or failure.  Sodium 146, potassium 4.7, chloride 108, bicarbonate 31, BUN 38, creatinine 0.5, estimated GFR >90, glucose 112, calcium 8.0, total protein 4.4   Albumin 2.0, alk phos 71, ALT 6, AST 11, total bilirubin 0.5, total protein 4.4   WBC 16.3, hemoglobin 8.9, hematocrit 28.1, platelet 649        Seen with multidisciplinary ICU team.  Meets Continued ICU Level Care Criteria:    [x] Yes   [] No - Transfer Planned to listed location:  [] HOSPITALIST CONTACTED-      Case and plan discussed with Dr. Marc Ferrell. Electronically signed by MD Natividad Mendo[facility]  Patient seen by me. Case discussed with resident physician. Patient underwent tracheostomy tube placement on 10/30/2020. Patient had overt aspiration which was silent. Patient could not protect his airway. He was found to have gastric contents within both lungs. Patient on Zosyn for aspiration pneumonia. There is a right lower lobe infiltrate on chest x-ray. Cc time 35 minutes. Time was discontiguous. Time does not include procedures. Time includes my direct assessment the patient coordination of care. Electronically signed by Mustapha Ferrell MD.

## 2020-10-30 NOTE — PROGRESS NOTES
TPN Follow Up Note    Assessment: severely malnourished per dietary, TF increase to 20ml/hr  Electrolyte Replacement: Kphos 10 mmol    TPN changes for (today) at 1800:   Decrease NaCl 150mEq  Decrease K Ac to 40 mEq  Add Ca gluconate 4.65 mEq    Re-check BMP, Mg, PO4, iCa in am      [employer], [facility]. D., BCPS, BCCCP 10/30/2020 1:44 PM
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
## 2020-10-30 NOTE — PROCEDURES
BRONCHOSCOPY    Indication: Recurrent aspiration and tracheostomy tube  Risks and benefits to the procedure were discussed. Alternatives and their risks were discussed as well. Sedation: Ketamine      EBL:  None. Findings:   Extensive bilious gastric content throughout the airway. This was cleared with suction.  Tracheostomy tube was placed under direct bronchoscopic visualization without difficulty.  Verification of tracheostomy tube placement above the david. Balloon lip Don enters the trachea and seals the trachea. Samples:   None    Complications:  None    Procedure:  After informed consent was obtained, patient received sedation as detailed above. Utilizing the endotracheal tube, the bronchoscope was advanced to the level of the trachea and subsequently the david. The david was noted to be crisp. Scope was taken to the left and right lung fields. Samples taken as noted above. Bronchoscope was withdrawn to the point of entry. Under direct bronchoscopic visualization, tracheostomy tube was placed. Immediately post tracheostomy tube placement, the bronchoscope was taken through the tracheostomy tube and verified adequate positioning of the tracheostomy tube. Gastric content removed. .        Electronically signed by Mateus Trinh M.D.

## 2020-10-30 NOTE — PLAN OF CARE
Problem: Nutrition  Goal: Optimal nutrition therapy  Outcome: Ongoing   Nutrition Problem #1: Severe malnutrition  Intervention: Food and/or Nutrient Delivery: Modify Tube Feeding, Modify Parenteral Nutrition  Nutritional Goals: TPN to provide % of nutrient needs until pt able adequate enteral intake

## 2020-10-30 NOTE — PLAN OF CARE
Problem: Airway Clearance - Ineffective:  Goal: Ability to maintain a clear airway will improve  Description: Ability to maintain a clear airway will improve  Outcome: Ongoing

## 2020-10-30 NOTE — CARE COORDINATION
10/30/20 3:26 PM    Spoke with Intensivist service this morning; plan for transfer to [facility] tomorrow if stable. Haroldo Holcomb Liaison, updated. This CM spoke with Ceferino Haile NP for Surgery; reported plan for [facility] tomorrow if stable and she is ok with this plan. ENT being consulted - Dr. Francesca Cerrato; he has privileges at [address]. Trach placed this afternoon.
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
## 2020-10-31 NOTE — PROGRESS NOTES
CRITICAL CARE PROGRESS NOTE      Patient:  Jatin Nunez    Unit/Bed:4D-18/018-A  YOB: 1940  MRN: [id number]   PCP: Esther Majano MD  Date of Admission: 10/11/2020  Chief Complaint:- Shortness of breath and fever    Assessment and Plan:    1. Acute Hypoxic Respiratory Failure:  Patient has very poor capacity to protect his airway. He has no cough reflex. Swallowing mechanics are severely impaired. Tracheostomy tube placed on 10/30/2020.   2. Aspiration Pneumonia: Positive for Legionella on 10/12/2020. S/p Levaquin 750 mg (10/12 - 10/20). CT chest 10/26/2020 demonstrated extensive right lower lobe consolidation with patchy left lower lobe consolidation. Patient had overt aspiration which was silent. Patient could not protect his airway. He was found to have gastric contents within both lungs. S/p Decadron. Continue with Zosyn for empiric antibiotic coverage, Day 8/10.   3. Candida Infection: Positive for Candida glabrata growth of cultures. Elevated WBC count. Anidulafungin initiated 10/30/2020.   4. Atrial Fibrillation with RVR: Patient went into atrial fibrillation with RVR overnight. Amiodarone drip initiated 10/27/2020 for rhythm control. Will continue to monitor. Serial EKGs. 5. S/p J-Tube placement: J-tube insertion due to the patient's complex esophageal anatomy and dysphagia. CT abdomen and pelvis completed on 10/26/2020 demonstrated moderate amount of free fluid containing numerous pockets of gas. This was concerning of a J-tube leak. Patient underwent washout of abdomen with replacement of J-tube by Dr. Carri Samuels. 6. Dysphagia: TPN initiated 10/27/2020. Will initiate cyclic feeds via J-tube at 10 mL/hour. 7. Acute Urinary Retention:  Secondary to post-surgical/post-anesthesia. Continue with intermittent external catheterization. 8. Hx of DVT and PE: Previously treated with Coumadin. Coumadin discontinued by Dr. Prasanth Avilez, heme-onc, secondary to GI bleeding.   Details regarding GI bleeding are unknown at this point. Patient was scheduled to visit Dr. Tonia Miranda office mid-October to reassess his Plavix and Coumadin. Patient's hemoglobin dropped from 10.6 to 8.8 since initiating Lovenox. Lovenox discontinued. 9. Coronary artery disease: Previously Plavix was discontinued secondary to GI bleeding. Patient had angioplasty with stent placement in 2014 and subsequently in 2015. Currently, Plavix on hold. 10. Severe protein calorie malnutrition: Albumin 1.9. Significant loss of muscle mass. Patient has temporal wasting and loss of supraclavicular fat. TPN initiated 10/27/2020. 11. COPD:  Stable, continue inhalers. 12. Hx of Esophageal cancer: S/p esophagectomy  13. Hx of Colon cancer: S/p colon resection  14. Septic Shock: Secondary to Pneumonia. S/p IV pressors. Resolved. INITIAL H AND P AND ICU COURSE:  Aj Gonzalez 58-year-old white male who has a past medical history of reformed smoker, CAD placement in 2015, hypertension, esophageal cancer, colon cancer, nephrolithiasis. Per report patient presented to the emergency department on 10/11/2020 for evaluation of shortness of breath, fatigue, fever, nausea and vomiting. He was accompanied by his wife and daughter. The daughter reported that he began having symptoms on Friday 10/09/2020 and had increased difficulty breathing with fevers T-max of 102.0. He was also noted to have difficulty ambulating and altered mental status. Patient was admitted to the ICU because of escalating oxygen requirements and hypotension. Patient required Levophed drip and intubation. PCR panel was positive for Legionella pneumoniae and was started on Levaquin. On 10/18/2020, patient was extubated to room air and transferred to [facility] ICU stepdown unit. On 10/22/2020, patient had a J-tube placed after failing modified barium swallow twice. On 10/24/2020, patient became hypotensive with blood pressure in the 70s with change in mental status. Patient became more lethargic and restless. Patient was unresponsive to fluid resuscitation. Patient was transferred to the ICU for intubation and pressure support. General surgery was consulted regarding possible J-tube malpositioning. IR manipulated the J tube with successful tube positioning and functioning on 10/25/2020. On 10/26/2020, CT abdomen and pelvis demonstrated moderate amount of free fluid containing numerous pockets of gas. Dr. Cielo Johns from general surgery was informed overnight and patient was taken to the OR. Patient underwent washout of abdomen with replacement of J-tube. PICC placed on 10/29/2020. Tracheostomy tube placed on 10/30/2020. Past Medical History: Per HPI. Family History: Mother: diabetes, stroke. Father: cancer  Social History: Reformed smoker, 25-pack-year history, denies alcohol use, denies drug use. ROS   Unable to obtain secondary to patient's altered state of consciousness. PHYSICAL EXAMINATION:  T:  99.2. P:  70. RR:  20. B/P:  103/47. O2 Sat: 100. I/O:  4184/615  Body mass index is 24.04 kg/m². General:  Acutely-ill appearing  male. Severely malnourished. HEENT:  normocephalic and atraumatic. No scleral icterus. PERR  Neck: supple. No Thyromegaly. Lungs: Clear to auscultation bilaterally. No retractions  Cardiac: RRR. No JVD. Abdomen: soft. Nontender. Extremities:  2+ pitting edema BLE. Left upper extremity hand deformity. Vasculature: capillary refill < 3 seconds. Palpable dorsalis pedis pulses. Skin:  warm and dry. Lymph:  No supraclavicular adenopathy. Neurologic:  No focal deficit. No seizures. Data: (All radiographs, tracings, PFTs, and imaging are personally viewed and interpreted unless otherwise noted).  CT Chest 10/26/2020: Extensive right lower lobe consolidation. Patchy left lower lobe consolidation.  CT Abdomen Pelvis 10/26/2020: Large amount of free intraperitoneal air without significant improvement. Moderate amount of free fluid containing numerous pockets of gas with interval increase since the prior study. Multiple nonobstructive calculi within the left kidney. Possible bladder calculi.  CXR 10/29/2020: Improved right lung base infiltrate. No evidence of pulmonary edema or failure.  Sodium 141, potassium 4.9, chloride 105, bicarbonate 28, BUN 46, creatinine 0.6, estimated GFR >90, glucose 219, calcium 8.1, total protein 4.6   Albumin 2.4, alk phos 75, ALT 7, AST 12, total bilirubin 0.5, total protein 4.5   WBC 25.0, hemoglobin 8.1, hematocrit 25.1, platelet 344        Seen with multidisciplinary ICU team.  Meets Continued ICU Level Care Criteria:    [x] Yes   [] No - Transfer Planned to listed location:  [] HOSPITALIST CONTACTED-      Case and plan discussed with Dr. Radha Benitez.         Electronically signed by Jaden Marie MD  [facility]

## 2020-10-31 NOTE — PROGRESS NOTES
TPN Follow Up Note    Assessment: TF remain at 20 ml/hr  Electrolyte Replacement: none    TPN changes for (today) at 1800:   Decrease K Acetate to 20 mEq    Re-check BMP, Mg, PO4, iCa 11/01/20 am    Sofia Costello PharmD, BCPS   10/31/2020  10:28 AM

## 2020-10-31 NOTE — PROGRESS NOTES
Respiratory Care Artificial Airway Evaluation    The patients airway is not older than seven days. The patients airway has panic sutures. Patient experiencing no problems.     Type of airway:  [x] Tracheostomy  [] Larngectomy    Size 6    Type  [x]Shiley disposable cannula cuffed tracheostomy tube (DCT)   []Shiley diposable cannula fenestrated cuffed tracheostomy tube (DFEN)  []Shiley disposable cannula cuffed percutaneous tracheostomy tube (PERC)  []Shiley disposable cannula cuffless tracheostomy tube (DCFS)  [] Shiley disposable cannula cuffless fenestrated tracheostomy tube [facility])  []Shiley extended length cuffless tracheostomy tube  []Shiley extended length cuffed tracheostomy tube  []Shiley laryngectomy tube (LGT)  []Shiley single cannula cuffed tracheostomy tube (SCT)  []Other:    Length  [x]standard  []99 mm  []120 mm  []Other:

## 2020-10-31 NOTE — PLAN OF CARE
Problem: Impaired respiratory status  Goal: Clear lung sounds  Outcome: Ongoing  Note: Vent setting optimized to achieve target tidal volume, respiratory rate and ideal oxygen saturations. SBT will be performed when appropriate.

## 2020-11-02 NOTE — CARE COORDINATION
11/2/20, 6:49 AM EST    Late entry  Discharged to [address] [place] [address] on 10/31. Patient goals/plan/ treatment preferences discussed by  and . Patient goals/plan/ treatment preferences reviewed with patient/ family. Patient/ family verbalize understanding of discharge plan and are in agreement with goal/plan/treatment preferences. Understanding was demonstrated using the teach back method. AVS provided by RN at time of discharge, which includes all necessary medical information pertaining to the patients current course of illness, treatment, post-discharge goals of care, and treatment preferences.     Services After Discharge  Services At/After Discharge: ([address])   IMM Letter  IMM Letter given to Patient/Family/Significant other/Guardian/POA/by[de-identified]   IMM Letter date given[de-identified] 10/23/20  IMM Letter time given[de-identified] 6283 Paperwork is in DR CASTRO MAIL BOX

## 2020-11-25 NOTE — DISCHARGE SUMMARY
Discharge Summary      Patient Identification:   Sruthi Silva   : 1940  MRN: [id number]   Account: [de-identified]      Patient's PCP: Beryl Warren MD    Admit Date: 10/11/2020     Discharge Date: 10/31/2020    Admitting Physician: Antonia Barroso MD     Discharge Physician: Reva Gonzalez MD     The patient was seen and examined on day of discharge and this discharge summary is in conjunction with any daily progress note from day of discharge. Hospital Course: Sruthi Silva is a 79 y.o. male admitted to [facility] on 10/11/2020 for shortness of breath, fatigue, fever, nausea and vomiting. He was accompanied by his wife and daughter. The daughter reported that he began having symptoms on Friday 10/09/2020 and had increased difficulty breathing with fevers T-max of 102.0. He was also noted to have difficulty ambulating and altered mental status. Patient was admitted to the ICU because of escalating oxygen requirements and hypotension. Patient required Levophed drip and intubation. PCR panel was positive for Legionella pneumoniae and was started on Levaquin. On 10/18/2020, patient was extubated to room air and transferred to [facility] ICU stepdown unit. On 10/22/2020, patient had a J-tube placed after failing modified barium swallow twice. On 10/24/2020, patient became hypotensive with blood pressure in the 70s with change in mental status. Patient became more lethargic and restless. Patient was unresponsive to fluid resuscitation. Patient was transferred to the ICU for intubation and pressure support. General surgery was consulted regarding possible J-tube malpositioning. IR manipulated the J tube with successful tube positioning and functioning on 10/25/2020. On 10/26/2020, CT abdomen and pelvis demonstrated moderate amount of free fluid containing numerous pockets of gas. Dr. Bartolome Rico from general surgery was informed overnight and patient was taken to the OR.   Patient underwent washout of abdomen with replacement of J-tube. PICC placed on 10/29/2020. Swallowing mechanics are severely impaired. Tracheostomy tube placed on 10/30/2020.        1. Acute Hypoxic Respiratory Failure:  Patient has very poor capacity to protect his airway. He has no cough reflex. Swallowing mechanics are severely impaired. Tracheostomy tube placed on 10/30/2020.     2. Aspiration Pneumonia: Positive for Legionella on 10/12/2020. S/p Levaquin 750 mg (10/12 - 10/20). CT chest 10/26/2020 demonstrated extensive right lower lobe consolidation with patchy left lower lobe consolidation. Patient had overt aspiration which was silent. Patient could not protect his airway. He was found to have gastric contents within both lungs. S/p Decadron and Zosyn. 3. Candida Infection: Positive for Candida glabrata growth of cultures. Elevated WBC count. S/p Anidulafungin therapy. 4. Atrial Fibrillation with RVR: Patient went into atrial fibrillation with RVR overnight. S/p Amiodarone drip. 5. S/p J-Tube placement: J-tube insertion due to the patient's complex esophageal anatomy and dysphagia. CT abdomen and pelvis completed on 10/26/2020 demonstrated moderate amount of free fluid containing numerous pockets of gas. This was concerning of a J-tube leak. Patient underwent washout of abdomen with replacement of J-tube by Dr. Kristen Dietrich. 6. Severe protein calorie malnutrition: Albumin 1.9. Significant loss of muscle mass. Patient has temporal wasting and loss of supraclavicular fat. S/p TPN.       Exam:     Vitals:  Vitals:    10/31/20 0904 10/31/20 1200 10/31/20 1228 10/31/20 1400   BP: (!) 83/48 (!) 105/55  (!) 124/55   Pulse: 78 65 67 64   Resp: 24 22 20 21   Temp:  99.4 °F (37.4 °C)     TempSrc:  Axillary     SpO2: 100% 100% 100% 100%   Weight:       Height:         Weight: Weight: 172 lb 6.4 oz (78.2 kg)     24 hour intake/output:No intake or output data in the 24 hours ending 11/25/20 1443      General:  Acutely-ill appearing  male. Severely malnourished. HEENT:  normocephalic and atraumatic. No scleral icterus. PERR  Neck: supple. No Thyromegaly. Lungs: Clear to auscultation bilaterally. No retractions  Cardiac: RRR. No JVD. Abdomen: soft. Nontender. Extremities:  2+ pitting edema BLE. Left upper extremity hand deformity. Vasculature: capillary refill < 3 seconds. Palpable dorsalis pedis pulses. Skin:  warm and dry. Lymph:  No supraclavicular adenopathy. Neurologic:  No focal deficit. No seizures. Labs: For convenience and continuity at follow-up the following most recent labs are provided:      CBC:    Lab Results   Component Value Date    WBC 12.7 11/24/2020    HGB 7.2 11/24/2020    HCT 23.3 11/24/2020     11/24/2020       Renal:    Lab Results   Component Value Date     11/25/2020    K 5.6 11/25/2020    K 3.9 10/25/2020    CL 98 11/25/2020    CO2 28 11/25/2020    BUN 36 11/25/2020    CREATININE 0.7 11/25/2020    CALCIUM 7.7 11/25/2020    PHOS 3.5 11/25/2020         Significant Diagnostic Studies    Radiology:   RADIOLOGY REPORT   Final Result      XR CHEST PORTABLE   Final Result   1. Consolidative left greater the right airspace disease at the medial lung bases bilaterally are noted which may be related to atelectasis or pneumonia. 2. Small bilateral left greater than right pleural effusions are noted. 3. Overall aeration of the lungs appears similar to the prior examination. 4.There is a left upper extremity PICC present with the tip overlying the SVC. The tip of the PICC appears slightly angulated. Cannot exclude engagement of the azygos vein. Consider correlation with ability to flush the PICC line. PICC line may be    retracted by 1 cm if catheter is unable to be flushed. Positioning of the PICC can then be reevaluated radiographically if there is retraction of the catheter.                **This report has been created using voice recognition software. It may contain minor errors which are inherent in voice recognition technology. **      Final report electronically signed by Dr. Angela Cummings on 10/31/2020 1:02 PM      XR CHEST PORTABLE   Final Result   1. No enteric tube present, the tip of which appears be coiled in the distal thoracic esophagus. 2. Otherwise no change since previous study on the same day at 1457 hours. **This report has been created using voice recognition software. It may contain minor errors which are inherent in voice recognition technology. **      Final report electronically signed by DR Kaiser Sykes on 10/30/2020 4:16 PM      XR CHEST PORTABLE   Final Result   What appears to represent an NG tube is in abnormal position. Results were given to Dr. Marc Ferrell by perfect serve at 1528 hours 10/30/2020. **This report has been created using voice recognition software. It may contain minor errors which are inherent in voice recognition technology. **      Final report electronically signed by Dr. Kaela Matthews on 10/30/2020 3:31 PM      IR FLUORO GUIDED CVA DEVICE PLMT/REPLACE/REMOVAL   Final Result   Status post successful PICC line repositioning. .. **This report has been created using voice recognition software. It may contain minor errors which are inherent in voice recognition technology. **      Final report electronically signed by Dr. Cornelio Scherer on 10/29/2020 11:29 AM      XR CHEST PORTABLE   Final Result   Improved right lung base infiltrate. No evidence of pulmonary edema or    failure. This document has been electronically signed by: Chaya Koenig MD    on 10/29/2020 05:53 AM         CT CHEST WO CONTRAST   Final Result   Extensive right lower lobe consolidation, with volume loss and involvement    of the posterior aspect of the right upper lobe. Patchy left lower lobe    consolidation. Findings suspicious for pneumonia which could be secondary    to aspiration. Pneumoperitoneum with ascites, reported on CT scan of the abdomen and    pelvis. This document has been electronically signed by: Alek White MD on    10/27/2020 01:28 AM      All CT scans at this facility use dose modulation, iterative    reconstruction, and/or weight-based   dosing when appropriate to reduce radiation dose to as low as reasonably    achievable. CT ABDOMEN PELVIS WO CONTRAST Additional Contrast? None   Final Result   1. There is a large amount of free intraperitoneal air without significant    improvement. There is a moderate amount of free fluid containing numerous    pockets of gas with interval increase since the prior study. Findings are    suspicious for jejunostomy tube leak or leaking/perforated viscus. 2. Multiple nonobstructive calculi within the left kidney. 3.  Possible bladder calculi. This document has been electronically signed by: Kong Sanders MD on    10/26/2020 11:20 PM      All CT scans at this facility use dose modulation, iterative    reconstruction, and/or weight-based   dosing when appropriate to reduce radiation dose to as low as reasonably    achievable. XR CHEST PORTABLE   Final Result   1. Borderline heart size. Suboptimal inflation of lungs. ET tube and right jugular line remain in good position. The NG tube is present previously has been removed. 2. Moderate eventration of left hemidiaphragm, best seen on prior films. No effusion. 3. Moderate bibasilar atelectasis/pneumonia, superimposed upon chronic bibasilar fibrotic changes. 4. No significant change from prior study. **This report has been created using voice recognition software. It may contain minor errors which are inherent in voice recognition technology. **      Final report electronically signed by Dr. Britta Greenfield on 10/26/2020 9:57 AM      IR PLACE NG TUBE BY DR Karthikeyan Garcia   Final Result   Status post successful manipulation of indwelling jejunostomy tube which was found to be kinked. At this point, the J-tube is functioning well and seen to be in excellent position within the jejunum. **This report has been created using voice recognition software. It may contain minor errors which are inherent in voice recognition technology. **      Final report electronically signed by Dr. Sina Crowder on 10/25/2020 1:43 PM      FL INTRO LONG GI TUBE   Final Result   1. The enteric tube was found looped within the esophagus. The enteric tube was retracted and redirected into the patient's known hiatal hernia at the lower mediastinum. However, the catheter could not be redirected into the small bowel due to    tortuosity. Therefore, the tip of the catheter cannot be advanced to the level of the gastric lumen at the hiatal hernia. This was despite multiple attempts with guidewire assistance and fluoroscopic guidance. 2. An attempt was made to check J-tube placement. However there was obstruction of the tube. Contrast could not be injected through the tube due to this obstruction. A LoopFuse guidewire was advanced through the J-tube to determine the level of the    obstruction which was noted to be within the lumen of the tube at the level of the skin surface of the anterior abdominal wall. A Glidewire was then advanced through the tube in order to attempt advancement of the wire beyond the obstruction. Again, the    obstruction was noted within the lumen of the tube at the level of the skin surface of the anterior abdominal wall. Again, contrast could not be flushed through the J-tube. Therefore, the J-tube positioning cannot be determined on the current    examination. There also appears to be retraction of the J-tube. Due to the inability to inject contrast, the tip of the J-tube cannot be definitively determined to be within or outside of the lumen of the jejunum. **This report has been created using voice recognition software.   It may contain minor errors which are inherent in voice recognition technology. **      Final report electronically signed by Dr. Hitesh Alonzo on 10/24/2020 3:44 PM      XR PLACE NG TUBE BY DR Maria Del Rosario Chan   Final Result   1. The enteric tube was found looped within the esophagus. The enteric tube was retracted and redirected into the patient's known hiatal hernia at the lower mediastinum. However, the catheter could not be redirected into the small bowel due to    tortuosity. Therefore, the tip of the catheter cannot be advanced to the level of the gastric lumen at the hiatal hernia. This was despite multiple attempts with guidewire assistance and fluoroscopic guidance. 2. An attempt was made to check J-tube placement. However there was obstruction of the tube. Contrast could not be injected through the tube due to this obstruction. A [employer] guidewire was advanced through the J-tube to determine the level of the    obstruction which was noted to be within the lumen of the tube at the level of the skin surface of the anterior abdominal wall. A Glidewire was then advanced through the tube in order to attempt advancement of the wire beyond the obstruction. Again, the    obstruction was noted within the lumen of the tube at the level of the skin surface of the anterior abdominal wall. Again, contrast could not be flushed through the J-tube. Therefore, the J-tube positioning cannot be determined on the current    examination. There also appears to be retraction of the J-tube. Due to the inability to inject contrast, the tip of the J-tube cannot be definitively determined to be within or outside of the lumen of the jejunum. **This report has been created using voice recognition software. It may contain minor errors which are inherent in voice recognition technology. **      Final report electronically signed by Dr. Hitesh Alonzo on 10/24/2020 3:44 PM      XR INJ CONTRAST GASTRIC TUBE PERC   Final Result   1.  The enteric tube was found looped within the esophagus. The enteric tube was retracted and redirected into the patient's known hiatal hernia at the lower mediastinum. However, the catheter could not be redirected into the small bowel due to    tortuosity. Therefore, the tip of the catheter cannot be advanced to the level of the gastric lumen at the hiatal hernia. This was despite multiple attempts with guidewire assistance and fluoroscopic guidance. 2. An attempt was made to check J-tube placement. However there was obstruction of the tube. Contrast could not be injected through the tube due to this obstruction. A [employer] guidewire was advanced through the J-tube to determine the level of the    obstruction which was noted to be within the lumen of the tube at the level of the skin surface of the anterior abdominal wall. A Glidewire was then advanced through the tube in order to attempt advancement of the wire beyond the obstruction. Again, the    obstruction was noted within the lumen of the tube at the level of the skin surface of the anterior abdominal wall. Again, contrast could not be flushed through the J-tube. Therefore, the J-tube positioning cannot be determined on the current    examination. There also appears to be retraction of the J-tube. Due to the inability to inject contrast, the tip of the J-tube cannot be definitively determined to be within or outside of the lumen of the jejunum. **This report has been created using voice recognition software. It may contain minor errors which are inherent in voice recognition technology. **      Final report electronically signed by Dr. Joce Thorpe on 10/24/2020 3:44 PM      XR CHEST PORTABLE   Final Result   1. There is an endotracheal tube present with the tip overlying the midtrachea located approximately 2.8 cm above the david. 2. There is an enteric tube present which is looped within the esophagus. This was also present on the previous examination.  Recommend repositioning. 3. Small bilateral pleural effusions are demonstrated. 4. Patchy airspace disease within the right lower lobe is noted which may represent atelectasis or pneumonia. **This report has been created using voice recognition software. It may contain minor errors which are inherent in voice recognition technology. **      Final report electronically signed by Dr. Ori Pollock on 10/24/2020 1:55 PM      XR CHEST PORTABLE   Final Result   1. Esophagogastric tube position remains suboptimal, projecting into the    upper esophagus. Recommend complete removal and replacement. 2.  Unchanged endotracheal tube and central line positions. 3.  Right base infiltrate persists. This document has been electronically signed by: Swapna Argueta MD    on 10/24/2020 11:07 AM         CT ABDOMEN PELVIS WO CONTRAST Additional Contrast? Oral (Via J tube)   Final Result   Impression:   1. Large pneumonia right lower lobe. Small pneumonia left lower lobe. These findings are markedly increased   2. Sliding hiatal hernia. See above. 3.  Subdiaphragmatic free air. Surgical staples in the abdominal wall    suggest this finding is likely postop. Perforated viscus is not excluded   4. Complex fluid in the abdomen and pelvis. Considerations would include    a small amount of hemoperitoneum   5. Cholelithiasis unchanged   6. Percutaneous jejunostomy tube tip in jejunal loop in left anterior    abdomen      This document has been electronically signed by: Soni Pérez MD on    10/24/2020 04:18 AM      All CT scans at this facility use dose modulation, iterative    reconstruction, and/or weight-based   dosing when appropriate to reduce radiation dose to as low as reasonably    achievable. XR CHEST PORTABLE   Final Result   Impression:    Endotracheal tube tip 3 cm above david. Transesophageal tube tip overlies upper mediastinum; this tube appears    coiled upon itself. MEDICATION  IP CONSULT TO OTOLARYNGOLOGY    Disposition: [facility]  Condition at Discharge: Stable    Code Status:  Prior     Patient Instructions: Follow-up visits:   Cory Raymond MD  [address] [place] [address] [id number] [phone]      staff-please s/u w/i 7d     [address] [facility] - INPATIENT)  [place]  [phone]          Discharge Medications:      [facility] Medication Instructions MKA:[id number]    Printed on:11/25/20 2345   Medication Information                      acetaminophen (TYLENOL) 325 MG tablet  Take 650 mg by mouth every 6 hours as needed for Pain Don't take more than 3,000 mg per day. Amiodarone (NEXTERONE) 360-4.14 MG/200ML-% SOLN  Infuse 0.5 mg/min intravenously continuous             famotidine (PEPCID) 20 MG/2ML injection  Infuse 2 mLs intravenously 2 times daily             insulin glargine (LANTUS) 100 UNIT/ML injection vial  Inject 15 Units into the skin 2 times daily             insulin lispro (HUMALOG) 100 UNIT/ML injection vial  Inject 0-12 Units into the skin every 6 hours             nitroGLYCERIN (NITROSTAT) 0.4 MG SL tablet  Place 0.4 mg under the tongue every 5 minutes as needed for Chest pain. If third one does not relieve pain, call 9-1-1.             traMADol (ULTRAM) 50 MG tablet  take 1 tablet by mouth every 4 hours if needed for pain                 Time Spent on discharge is more than 30 minutes in the examination, evaluation, counseling and review of medications and discharge plan. Signed: Thank you Cory Raymond MD for the opportunity to be involved in this patient's care.     Electronically signed by Jaylan Escalera MD on 11/25/2020 at 2:43 PM

## 2020-12-03 NOTE — DISCHARGE SUMMARY
[address]  Modified Barium Swallow    SLP Individual Minutes  Time In: 0930  Time Out: 1000  Minutes: 30  Timed Code Treatment Minutes: 0 Minutes       Date: 12/3/2020  Patient Name: Sruthi Silva      CSN: [id number]   : 1940  (79 y.o.)  Gender: male   Referring Physician: Madonna Farr MD   Diagnosis: Respiratory Failure, unspecified   Secondary Diagnosis: Dysphagia   Precautions: Fall risk, aspiration risk   History of Present Illness/Injury: Per chart review; Kasie Tolliver is a 78 y/o male who was referred for a OP MBS from [facility] on this date. Pt was admitted to [facility] on 10/11/2020 for shortness of breath, fatigue, fever, nausea and vomiting. He was accompanied by his wife and daughter. The daughter reported that he began having symptoms on Friday 10/09/2020 and had increased difficulty breathing with fevers T-max of 102.0.  He was also noted to have difficulty ambulating and altered mental status.  Patient was admitted to the ICU because of escalating oxygen requirements and hypotension.  Patient required Levophed drip and intubation.  PCR panel was positive for Legionella pneumoniae and was started on Levaquin.  On 10/18/2020, patient was extubated to room air and transferred to [facility] ICU stepdown unit. On 10/22/2020, patient had a J-tube placed after failing modified barium swallow twice. On 10/24/2020, patient became hypotensive with blood pressure in the 70s with change in mental status. Patient became more lethargic and restless.  Patient was unresponsive to fluid resuscitation. Muna Patricia was transferred to the ICU for intubation and pressure support. General surgery was consulted regarding possible J-tube malpositioning. IR manipulated the J tube with successful tube positioning and functioning on 10/25/2020.  On 10/26/2020, CT abdomen and pelvis demonstrated moderate amount of free fluid containing numerous pockets of gas.   Vivi from general surgery was informed overnight and patient was taken to the [address] [place] [address] underwent washout of abdomen with replacement of J-tube. PICC placed on 10/29/2020. Swallowing mechanics are severely impaired. Tracheostomy tube placed on 10/30/2020. Upon Patient arrival to [facility] [address] RN reported J-tube has been dislodged and the pt and his wife are NOT wanting another sx for the feeding tube to be replaced d/t concerns for pt's ability to survive the sx. NG (duo tube) was in place on this date.          has a past medical history of Arthritis, Hunt syndrome, CAD (coronary artery disease), COPD (chronic obstructive pulmonary disease) ([facility] [address] GERD (gastroesophageal reflux disease), History of colon cancer, History of esophageal cancer, Hx of blood clots, Hypertension, Kidney stone on left side, Nausea & vomiting, Pericarditis, Personal history of DVT (deep vein thrombosis), Personal history of pulmonary embolism, Pseudogout, and Type II or unspecified type diabetes mellitus without mention of complication, not stated as uncontrolled. Current Diet: NPO + NG tube     Pain: No pain reported. SUBJECTIVE:  Patient was alert, upright in bed with RN and RT traveling off the floor for MBS. The pt remains aphonic d/t fully inflated trach cuff balloon and CPAP support. OBJECTIVE:    Respiratory Status:  Shiley Tracheostomy 6mm + Ventilator/CPAP settings with pressure support of 10, PEEP of 5, and 40% FIO2, fully inflated cuff balloon.      Behavioral Observation:  Alert and Oriented    PATIENT WAS EVALUATED USING:  BARIUM - thin via tsp/cup drink, nectar/mild via tsp/cup/straw, honey/moderate via cup, puree vis tsp    ORAL PREPARATION PHASE:  Impaired:  Decreased Bolus Control and Loss of Bolus from Lips/Anterior Spillage    ORAL PHASE: Impaired:  Uncoordinated AP Movement, Decreased Lingual Elevation, Decreased Tongue Based Retraction and Uncontrolled Bolus/Diffuse Fall Over Tongue Base     ORAL PHASE JOSUE SCORE: (Dysphagia outcome and severity scale)  4 = Mild-Moderate Dysphagia - May have one or two diet consistencies restricted - Oral residue clears with cue - Intermittent supervision or cueing    PHARYNGEAL PHASE:  Impaired: Decreased Airway Protection, Decreased Epiglottic Inversion, Decreased Hyolaryngeal Elevation, Residue in the Pyriform Sinus, Cricopharyngeal Dysfunction, Residue Along the Posterior Pharyngeal Wall and Residue Along the Ariepiglottic Folds     PHARYNGEAL PHASE JOSUE SCORE: (Dysphagia outcome and severity scale)  1 = Severe Dysphagia - NPO - Unable to tolerate any PO safely- Severe residue unable to clear - Silent aspiration with two or more consistencies, non-functional cough OR Unable to achieve a swallow    EVIDENCE FOR LARYNGEAL PENETRATION AND/OR ASPIRATION:  Laryngeal penetration evident with thin liquids vis cup drink  Audible aspiration evident with nectar   Silent aspiration evident with thin liquids     PENETRATION-ASPIRATION SCALE (PAS):   Thin Liquids vis tsp x3: 1 = Material does not enter the airway  Thin Liquids via cup drink: 8 = Material enters the airway, passes below the vocal folds, and no effort is made to eject    Mildly Thick Liquids via cup drinks 4/6:  2 = Material enters the airway, remains above vocal folds, and is ejected from the airway  Mildly Thick Liquids straw drink x1 and cup drink x1 (last drink suspected d/t endurance):  7 = Material enters the airway, passes below the vocal folds, and is not ejected from the trachea despite effort    Moderately Thick Liquids: 1 = Material does not enter the airway    Puree:  1 = Material does not enter the airway    ESOPHAGEAL PHASE:   No significant findings    ATTEMPTED TECHNIQUES:  Small Bolus Size Effective Thin via tsp - EFFECTIVE  Nectar/Mild via tsp - EFFETIVE   Straw Ineffective    Cup Effective Thin via cup - INEFFECTIVE  Nectar/Mild via cup - INEFFECTIVE d/t endurance  Honey/Moderate - EFFECTIVE   Chin Tuck Not Attempted    Head Turn Not Attempted    Spoon Presentations Effective Thin via tsp - EFFECTIVE  Nectar/Mild via tsp - EFFETIVE   Volitional Cough Ineffective Ineffective d/t cuff balloon fully inflated    Spontaneous Cough Ineffective Ineffective d/t cuff balloon fully inflated           DIAGNOSTIC IMPRESSIONS:  Patient seen with RN permission on this date. Patient also traveled to [facility] with RN and RT for supervision and assistance as needed. As aforementioned the pt's respiratory status remains compromised, requiring Tracheostomy (Shiley 6mm) + Ventilator/CPAP settings with pressure support of 10, PEEP of 5, and 40% FIO2, fully inflated cuff balloon. Per discussion with RN prior to MBS, the pt was on full ventilator support at bedside, switched to CPAP for MBS. Pt has a CHRONIC hx of dysphagia d/t medical complications, outlined about in medical hx and current illnesses. On this date the patient presented with Mild to Moderate oral dysphagia characterized by edentulous oral cavity with anticipated impaired mastication, significantly decreased posterior tongue base control and AP movement -- resulting in uncontrollable diffuse loss of liquids over BOT to the level of the valleculae and pyriform sinus'. Bolus control slightly improves with increased viscosity.  Patient presented with Severe pharyngeal dysphagia characterized by limited tongue base retraction, severely decreased pharyngeal constriction and laryngeal elevation/anterior tilt -- resulting in fixed epiglottis, poor UES opening, limited glottic closure, and cricopharyngeal dysfunction, contributing to anterior posterior penetration and aspiration (described below)  Aspiration/penetration:  -Thin liquids and nectar/mildly via tsp - No laryngeal penetration or tracheal aspiration.   -Thin liquids via cup - gross, SILENT tracheal aspiration during the swallow -- oral suctioning complete by KNRD RT    -[place]/mildly via cup - Pt completed 4/6 cup sips with inconsistent trace, shallow laryngeal penetration, which cleared with the swallow. As trials progressed the pt was presented with a straw drink, resulting in gross audible tracheal aspiration, ineffective cough d/t inflated cuff. ST returned to cup drinks with continued audible tracheal aspiration. Suspected worsening performance contributed to decreased endurance and pt reporting; \"I'm tired. \"   -Honey/moderate thick via cup and puree vis tsp - No laryngeal penetration or tracheal aspiration. *Recommended to remain NPO + NG tube in place with consideration for J-tube. Per discussion with RN, a J-tube replacement does NOT appear to be a option d/t wife concerns for decline in medical status with another procedure. Although ST recommends NPO at this time, NG tube is a short term means of nutrition, therefore, PO intake would become the pt's only means of nutrition once NG is removed-- STRONGLY ENCOURAGE A PALLIATIVE CARE CONSULT + consideration for pleasure feeds of honey/moderate vis cup sips and puree vis tsp + double swallows and STRICT UPRIGHT positioning. Impaired: Decreased Airway Protection, Decreased Epiglottic Inversion, Decreased Hyolaryngeal Elevation, Residue in the Pyriform Sinus, Cricopharyngeal Dysfunction, Residue Along the Posterior Pharyngeal Wall and Residue Along the Ariepiglottic Folds    Diet Recommendations:  NPO + NG   Strategies:  Recommend NPO   Rehabilitation Potential: poor    EDUCATION:  Learner: Patient  Education:  Reviewed results and recommendations of this evaluation, Reviewed signs, symptoms and risks of aspiration, Reviewed ST goals and Plan of Care and Reviewed recommendations for follow-up  Evaluation of Education: Needs further instruction and Family not present    PLAN:  Frequency and duration to be determined by primary ST. PATIENT GOAL:    Return to prior level of function. SHORT TERM GOALS/LONG TERM GOALS:  STG and LTG to be determined by primary ST.  Based upon MBS patient may benefit from generalized pharyngeal strengthening (elevation, constriction, BOT, epiglottic inversion), daily oral care 3-4x with suction swabs, ice chips for comfort. Carli Escalante MA., CCC-SLP

## 2020-12-23 NOTE — H&P
[address] [place] [address]                              HISTORY AND PHYSICAL    PATIENT NAME: Pedro Littlejohn                    :        1940  MED REC NO:   [id number]                           ROOM:  ACCOUNT NO:   [de-identified]                           ADMIT DATE: 2020  PROVIDER:     Pramod Jackson MD    DATE OF ADMISSION:  2020    CHIEF COMPLAINT:  Malnutrition, need for feeding tube. HISTORY OF PRESENT ILLNESS:  The patient is an 79-year-old white male  who was admitted in October with pulmonary insufficiency, thought to  possibly be COVID, but proved to be legionnaire's disease. He was  intubated for several days, and then post intubation when he was  extubated, he basically was unable to swallow. He had had no problems  swallowing prior to this, and the patient had had a previous  esophagectomy with a gastric pull-through up into the chest and he  needed a feeding tube, but a PEG tube would not be possible. I was  consulted then and he was taken to surgery on 10/23/2020, undergoing a  placement of an open jejunostomy tube. Unfortunately, the patient  developed a dislodgement of the tube intra-abdominally, had tube feeding  ascites, was taken back to surgery on 10/27/2020, undergoing replacement  of the tube. The patient was back on Mary. Approximately three  weeks ago, the tube worked its way out. The patient has been having  problems with reflux, but now is being fed via an NG tube for tube  feedings. The family has gone back and forth as to how aggressive to  be, but the patient is awake, alert. He wants to have the J-tube  replaced. He is being brought back to surgery for replacement of the  jejunostomy feeding tube.     PAST MEDICAL HISTORY:  Positive for COPD, coronary artery disease,  history of Hunt's esophagus, esophageal cancer, colon cancer, hypertension, history of DVT, pulmonary emboli, diabetes and pseudogout. PAST SURGICAL HISTORY:  Includes coronary angioplasty with stent  placement. He has had the esophagectomy as mentioned above. He has had  hand amputation, metacarpals on the fourth and fifth digits of the right  hand in the 40s. He has had a hip replacement in actually May of this  year. He had some sort of a thoracotomy secondary to a scar tissue from  chemo and radiation. He has had a remote tonsillectomy, a right knee  arthroscopy and then the surgeries as mentioned above. FAMILY HISTORY:  Positive for diabetes, coronary artery disease. SOCIAL HISTORY:  The patient has a history of smoking, but quit 50 years  ago. ALLERGIES:  Benjamine Other. PHYSICAL EXAMINATION:  GENERAL:  The patient is a cachectic 70-year-old white male who appears  his age. HEAD, EARS, EYES, NOSE, AND THROAT:  Show no scleral icterus. He does  have an NG tube in place. CHEST:  Respirations are clear. CARDIOVASCULAR:  S1 and S2.  ABDOMEN:  Soft. The incision is well healed. EXTREMITIES:  Upper and lower extremities show wasting. ASSESSMENT AND PLAN:  Need for feeding tube, jejunostomy tube. The wife  and the patient would like to proceed. He is being admitted for same. IZABELLA CARRERO [facility], MD    D: 12/22/2020 21:59:15       T: 12/22/2020 22:05:09     [email or web address]  Job#: [id number]     Doc#: [id number]

## 2020-12-23 NOTE — BRIEF OP NOTE
Brief Postoperative Note      Patient: Carlota Argueta  YOB: 1940  MRN: [id number]    Date of Procedure: 12/23/2020    Pre-Op Diagnosis: DYSPHAGIA    Post-Op Diagnosis: FROZEN ABD UNABLE TO PLACE j tube       Procedure(s):  abdominal exploration    Surgeon(s):  Lisa Montes De Oca MD    Assistant:      Anesthesia: General    Estimated Blood Loss (mL): 20 ml    Complications: none    Specimens:       Implants:        Drains:   Closed/Suction Drain RLQ Bulb 15 Yi (Active)   Site Description Healing;Reddened 10/30/20 0830   Dressing Status Clean;Dry; Intact 10/30/20 0830   Drainage Appearance Serosanguinous 10/30/20 0830   Status To bulb suction 10/30/20 0830   Output (ml) 85 ml 10/31/20 0443       Closed/Suction Drain Left Abdomen  8 [place] (Active)   Dressing Status Clean;Dry; Intact 11/10/20 0928   Drainage Appearance Lazaro;Milky 11/10/20 0928   Status Other (Comment) 11/10/20 0928   Output (ml) 20 ml 11/10/20 0928       NG/OG/NJ/NE Tube Nasogastric Left nostril (Active)   Surrounding Skin Dry 10/30/20 1652   Securement device Yes 10/30/20 1652   Status Suction-low intermittent 10/30/20 1652   Placement Verified by Gastric Contents 10/30/20 1652   Drainage Appearance Bile 10/30/20 1652   Output (mL) 0 ml 10/31/20 0443       Gastrostomy/Enterostomy/Jejunostomy Jejunostomy (Active)   Drainage Appearance Bile 10/30/20 1652   Site Description Healing 10/30/20 0830   Drain Status Clamped 10/30/20 1830   Surrounding Skin Intact; Reddened 10/30/20 0830   Dressing Status Clean;Dry; Intact 10/30/20 0528   Dressing Type Open to air 10/30/20 0830   Tube Feeding Diabetic 10/30/20 0830   Rate/Schedule 20 mL/hr 10/30/20 1000   Tube Feeding Intake (mL) 67 ml 10/30/20 1503   Free Water Flush (mL) 0 mL 10/30/20 0432   Output (mL) 0 ml 10/31/20 0443   Residual Volume (ml) 5 ml 10/30/20 0830       Urethral Catheter Temperature probe (Active) Output (mL) 0 ml 10/26/20 2202   Tube Placement Verified Yes 10/26/20 0601   Residual Volume (ml) 0 ml 10/26/20 2105       [REMOVED] Urethral Catheter Temperature probe (Removed)   $ Urethral catheter insertion $ Not inserted for procedure 10/12/20 0100   Catheter Indications Need for fluid management in critically ill patients in a critical care setting not able to be managed by other means such as BSC with hat, bedpan, urinal, condom catheter, or short term intermittent urethral catherization 10/21/20 1000   Site Assessment No urethral drainage 10/21/20 1000   Urine Color Yellow 10/21/20 1000   Urine Appearance Clear 10/21/20 1000   Output (mL) 275 mL 10/21/20 1000       [REMOVED] Urethral Catheter 16 fr (Removed)   Catheter Indications Need for fluid management in critically ill patients in a critical care setting not able to be managed by other means such as BSC with hat, bedpan, urinal, condom catheter, or short term intermittent urethral catherization 10/31/20 0313   Site Assessment No urethral drainage;Pink 10/31/20 0313   Urine Color Yellow 10/31/20 0313   Urine Appearance Clear 10/31/20 0313   Output (mL) 500 mL 10/30/20 1503       [REMOVED] External Urinary Catheter (Removed)   Catheter changed  No 10/22/20 1428   Urine Color Yellow 10/22/20 1428   Urine Appearance Clear 10/22/20 1428   Urine Odor Malodorous 10/22/20 1428   Output (mL) 850 mL 10/22/20 1428   Suction- Female Only 40 mmgHg continuous 10/22/20 0400   Placement Initiated 10/21/20 2000   Skin Assessment No Injury 10/22/20 0400       [REMOVED] External Urinary Catheter (Removed)   Catheter changed  Yes 10/23/20 0000   Urine Color Yellow 10/23/20 0000   Urine Appearance Clear 10/23/20 0000   Output (mL) 50 mL 10/23/20 1230   Suction- Female Only 40 mmgHg continuous 10/23/20 0000   Placement Replaced 10/23/20 0000   Skin Assessment No Injury 10/23/20 0000       Findings: see op note Electronically signed by Honey Reynoso MD on 12/23/2020 at 10:53 AM

## 2020-12-23 NOTE — ANESTHESIA PRE PROCEDURE
Department of Anesthesiology  Preprocedure Note       Name:  Sigifredo Russell   Age:  79 y.o.  :  1940                                          MRN:  [id number]         Date:  2020      Surgeon: Ana Guillen):  Hal Valdes MD    Procedure: Procedure(s):  JEJUNOSTOMY TUBE INSERTION    Medications prior to admission:   Prior to Admission medications    Medication Sig Start Date End Date Taking? Authorizing Provider   Amiodarone (NEXTERONE) 360-4.14 MG/200ML-% SOLN Infuse 0.5 mg/min intravenously continuous 10/31/20   Jimmy Spain MD   insulin glargine (LANTUS) 100 UNIT/ML injection vial Inject 15 Units into the skin 2 times daily 10/31/20   Jimmy Spain MD   insulin lispro (HUMALOG) 100 UNIT/ML injection vial Inject 0-12 Units into the skin every 6 hours 10/31/20   Jimmy Spain MD   famotidine (PEPCID) 20 MG/2ML injection Infuse 2 mLs intravenously 2 times daily 10/31/20   Jimmy Spain MD   traMADol (ULTRAM) 50 MG tablet take 1 tablet by mouth every 4 hours if needed for pain 20   Historical Provider, MD   acetaminophen (TYLENOL) 325 MG tablet Take 650 mg by mouth every 6 hours as needed for Pain Don't take more than 3,000 mg per day. Historical Provider, MD   nitroGLYCERIN (NITROSTAT) 0.4 MG SL tablet Place 0.4 mg under the tongue every 5 minutes as needed for Chest pain. If third one does not relieve pain, call . Historical Provider, MD       Current medications:    No current facility-administered medications for this encounter. Allergies:     Allergies   Allergen Reactions    Norco [Hydrocodone-Acetaminophen] Nausea And Vomiting       Problem List:    Patient Active Problem List   Diagnosis Code    History of esophageal cancer Z85.01    History of colon cancer Z85.038    COPD, 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    Restrictive lung disease J98.4    History of tobacco use Z87.891    TONG (dyspnea on exertion) R06.00    Pleural thickening J92.9    Ex-smoker B76.167  Type 2 diabetes mellitus with complication (HCC) C21.2    GERD (gastroesophageal reflux disease) K21.9    Personal history of DVT (deep vein thrombosis) Z86.718    History of pulmonary embolism Z86.711    Iron deficiency E61.1    Anticoagulated on Coumadin Z79.01    Essential hypertension I10    Hip fracture requiring operative repair, right, closed, [facility] S72.001A    Closed right hip fracture, initial encounter ([facility] S72.001A    Presence of stent in coronary artery in patient with coronary artery disease I25.10, Z95.5    Postoperative hypotension I95.89    Respiratory failure with hypoxia ([facility]) J96.91    Sepsis ([facility] A41.9    Pneumonia due to organism J18.9    Moderate malnutrition ([facility]) E44.0    Severe malnutrition ([facility] E43    Aphonia R49.1    Oropharyngeal dysphagia R13.12       Past Medical History:        Diagnosis Date    Arthritis     Hunt syndrome 2013    intestinal mucosa ( HX esophageal resection w/ pull through    CAD (coronary artery disease)     COPD (chronic obstructive pulmonary disease) (HCC)     GERD (gastroesophageal reflux disease)     History of colon cancer 1997    Status post ressection    History of esophageal cancer 2000    Status post resection in [facility]. Hx of blood clots     LEG, LUNGS    Hypertension     Kidney stone on left side 7/2010    Nausea & vomiting     Pericarditis     History of    Personal history of DVT (deep vein thrombosis)     Personal history of pulmonary embolism     Pseudogout 12/2005    Type II or unspecified type diabetes mellitus without mention of complication, not stated as uncontrolled        Past Surgical History:        Procedure Laterality Date    COLONOSCOPY  6/6/2011    Dr Leticia Cabrera  11/24/14    Psychiatric    CORONARY ANGIOPLASTY WITH STENT PLACEMENT  3/5/15    Psychiatric    CT RETROPERITONEAL PERC DRAIN  11/10/2020 CO2 27 12/22/2020    BUN 39 12/22/2020    CREATININE 0.7 12/22/2020    LABGLOM >90 12/22/2020    GLUCOSE 194 12/22/2020    GLUCOSE 151 04/11/2012    PROT 7.3 12/22/2020    CALCIUM 9.3 12/22/2020    BILITOT 0.4 12/22/2020    ALKPHOS 102 12/22/2020    AST 27 12/22/2020    ALT 12 12/22/2020       POC Tests: No results for input(s): POCGLU, POCNA, POCK, POCCL, POCBUN, POCHEMO, POCHCT in the last 72 hours. Coags:   Lab Results   Component Value Date    PROTIME 1.89 09/03/2011    INR 1.32 11/09/2020    APTT 38.9 09/02/2011       HCG (If Applicable): No results found for: PREGTESTUR, PREGSERUM, HCG, HCGQUANT     ABGs: No results found for: PHART, PO2ART, MWS1WGX, DLV4KSQ, BEART, Z6XDGVGS     Type & Screen (If Applicable):  Lab Results   Component Value Date    LABRH POS 12/16/2020       Drug/Infectious Status (If Applicable):  No results found for: HIV, HEPCAB    COVID-19 Screening (If Applicable):   Lab Results   Component Value Date    COVID19 NOT DETECTED 12/21/2020    COVID19 NOT DETECTED 10/11/2020         Anesthesia Evaluation    Airway: Mallampati: II  TM distance: >3 FB   Neck ROM: limited  Comment: PT. HAS TRACHEOSTOMY IN PLACE  ON VENT. SUPPORT  Mouth opening: > = 3 FB Dental:          Pulmonary:   (+) COPD:  decreased breath sounds,                             Cardiovascular:    (+) hypertension:, CAD:,         Rhythm: regular                      Neuro/Psych:               GI/Hepatic/Renal:   (+) GERD:,           Endo/Other:    (+) Diabetes, . Abdominal:           Vascular:                                        Anesthesia Plan      general     ASA 4     (PT. HAS TRAC IN PLACE   PT. ON VENT. SUPPORT  PT. CAME FROM [facility] ICU  DNR STATUS DISCUSSED WITH PT.'S WIFE NO ELECTRIC SHOCKS AND CHEST COMPRESSIONS)  Induction: intravenous. MIPS: Postoperative opioids intended, Prophylactic antiemetics administered and Postoperative ventilation. Anesthetic plan and risks discussed with patient and spouse. Plan discussed with CRNA.                   Vlad Gimenez MD   12/23/2020

## 2020-12-23 NOTE — H&P
[facility]  History and Physical Update      Pt Name: Phillip Maldonado  MRN: [id number]  YOB: 1940  Date of evaluation: 12/22/2020    [x] I have examined the patient and reviewed the H&P/Consult and there are no changes to the patient or plans. [] I have examined the patient and reviewed the H&P/Consult and have noted the following changes:         Vivian Trinidad  Electronically signed 12/22/2020 at 9:52 PM

## 2020-12-23 NOTE — OP NOTE
[address] [place] [address]                                OPERATIVE REPORT    PATIENT NAME: Estevan Juarez                    :        1940  MED REC NO:   [id number]                           ROOM:  ACCOUNT NO:   [de-identified]                           ADMIT DATE: 2020  PROVIDER:     Franck Hernandez. Luz Zamudio MD    DATE OF PROCEDURE:  2020    PREOPERATIVE DIAGNOSIS:  Dysphagia, need for replacement of jejunostomy  tube. 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 OPERATION:  Abdominal exploration. SURGEON:  Franck Hernandez. MD Vivi    ANESTHESIA:  General.    COMPLICATIONS:  Inability to complete operation plan. BLOOD LOSS:  20 mL. 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 He was taken to  surgery having placement of a J-tube on 10/23/2020. Unfortunately, it  became dislodged, he had tube feeding ascites, was taken back to surgery  on 10/27/2020 with replacement of the J-tube into the small bowel and  irrigation of the abdominal cavity. The J-tube unfortunately came out  about 3 weeks ago. The patient has NG feedings via the nose, but after  discussion with the family, they wanted the J-tube replaced and he is  brought to surgery for attempted replacement. FINDINGS:  The abdomen basically was frozen. The small bowel was in an  inflammatory state, completely adhered to the peritoneum. We did do  some dissection, but it was clear we were to be unable to dissect out  bowel without injuring significant amount of bowel. We pulled out of  the procedure and did not complete placement of the J-tube. DESCRIPTION OF PROCEDURE:  The patient was brought to the operating  suite and placed supine on the operating room table. After adequate  inhalational anesthesia was administered, the patient's abdomen was  prepped and draped in usual sterile fashion. We cut out the old  incision and it was clear that there was a lot of edema and inflammation  of the subcutaneous tissue. As we attempted to go through the scarred  linea alba, it was also clear that the small bowel was completely  adhered. It was actually very difficult to even get into a little space  into the abdominal cavity. All the bowel was inflamed and adhered  together. We did dissect some. We amazingly did not get into the  bowel, but it was clear, if we continue to try to dissect, we were  likely to make multiple holes in small bowel. The inflammatory state  was so severe that I did not feel I could complete the operation. We  placed some 3-0 Vicryl sutures into some of the bleeding serosa. Again,  there had been no evidence of entrance into the bowel. We then  cautiously closed the fascia with #1 Vicryl sutures. Staples were used  to close the skin. The patient tolerated the procedure well. IZABELLA CARRERO [facility], MD    D: 12/23/2020 11:14:05       T: 12/23/2020 11:22:00     [email or web address]  Job#: [id number]     Doc#: [id number]    CC:

## 2020-12-23 NOTE — ANESTHESIA POSTPROCEDURE EVALUATION
Department of Anesthesiology  Postprocedure Note    Patient: Jatin Nunez  MRN: [id number]  YOB: 1940  Date of evaluation: 12/23/2020  Time:  12:30 PM     Procedure Summary     Date: 12/23/20 Room / Location: [address] [facility]    Anesthesia Start: 0927 Anesthesia Stop: 1106    Procedure: abdominal exploration (N/A Abdomen) Diagnosis: (DYSPHAGIA)    Surgeons: Stu Wagner MD Responsible Provider: Vlad Gimenez MD    Anesthesia Type: general ASA Status: 4          Anesthesia Type: general    Lemuel Phase I:      Lemuel Phase II:      Last vitals: Reviewed and per EMR flowsheets. Anesthesia Post Evaluation    Patient location during evaluation: bedside  Patient participation: complete - patient cannot participate  Level of consciousness: sedated and ventilated  Airway patency: patent  Nausea & Vomiting: no vomiting and no nausea  Complications: no  Cardiovascular status: hemodynamically stable  Respiratory status: acceptable and ventilator  Hydration status: stable  Comments: PT.'S CARE ENDORSED TO [facility] ICU STAFF  PT. ON TO VENT.  SUPPORT

## 2021-01-01 ENCOUNTER — APPOINTMENT (OUTPATIENT)
Dept: GENERAL RADIOLOGY | Age: 81
End: 2021-01-01
Payer: COMMERCIAL

## 2021-01-01 ENCOUNTER — APPOINTMENT (OUTPATIENT)
Dept: GENERAL RADIOLOGY | Age: 81
End: 2021-01-01
Attending: INTERNAL MEDICINE
Payer: COMMERCIAL

## 2021-01-01 LAB
ALBUMIN SERPL-MCNC: 2.3 G/DL (ref 3.5–5.1)
ALBUMIN SERPL-MCNC: 2.5 G/DL (ref 3.5–5.1)
ALLEN TEST: POSITIVE
ALP BLD-CCNC: 122 U/L (ref 38–126)
ALP BLD-CCNC: 90 U/L (ref 38–126)
ALT SERPL-CCNC: 12 U/L (ref 11–66)
ALT SERPL-CCNC: 14 U/L (ref 11–66)
ANION GAP SERPL CALCULATED.3IONS-SCNC: 6 MEQ/L (ref 8–16)
ANION GAP SERPL CALCULATED.3IONS-SCNC: 7 MEQ/L (ref 8–16)
ANION GAP SERPL CALCULATED.3IONS-SCNC: 8 MEQ/L (ref 8–16)
ANION GAP SERPL CALCULATED.3IONS-SCNC: 9 MEQ/L (ref 8–16)
ANISOCYTOSIS: PRESENT
AST SERPL-CCNC: 21 U/L (ref 5–40)
AST SERPL-CCNC: 27 U/L (ref 5–40)
BASE EXCESS (CALCULATED): 4.9 MMOL/L (ref -2.5–2.5)
BASOPHILS # BLD: 0.4 %
BASOPHILS # BLD: 0.5 %
BASOPHILS # BLD: 0.6 %
BASOPHILS # BLD: 0.6 %
BASOPHILS ABSOLUTE: 0 THOU/MM3 (ref 0–0.1)
BASOPHILS ABSOLUTE: 0.1 THOU/MM3 (ref 0–0.1)
BILIRUB SERPL-MCNC: 0.3 MG/DL (ref 0.3–1.2)
BILIRUB SERPL-MCNC: 0.3 MG/DL (ref 0.3–1.2)
BILIRUBIN DIRECT: < 0.2 MG/DL (ref 0–0.3)
BUN BLDV-MCNC: 38 MG/DL (ref 7–22)
BUN BLDV-MCNC: 45 MG/DL (ref 7–22)
BUN BLDV-MCNC: 46 MG/DL (ref 7–22)
BUN BLDV-MCNC: 47 MG/DL (ref 7–22)
BUN BLDV-MCNC: 49 MG/DL (ref 7–22)
BUN BLDV-MCNC: 67 MG/DL (ref 7–22)
BUN BLDV-MCNC: 72 MG/DL (ref 7–22)
BUN BLDV-MCNC: 73 MG/DL (ref 7–22)
BUN BLDV-MCNC: 75 MG/DL (ref 7–22)
BUN BLDV-MCNC: 75 MG/DL (ref 7–22)
BUN BLDV-MCNC: 77 MG/DL (ref 7–22)
BUN BLDV-MCNC: 82 MG/DL (ref 7–22)
BUN BLDV-MCNC: 91 MG/DL (ref 7–22)
CALCIUM SERPL-MCNC: 8.8 MG/DL (ref 8.5–10.5)
CALCIUM SERPL-MCNC: 9 MG/DL (ref 8.5–10.5)
CALCIUM SERPL-MCNC: 9.1 MG/DL (ref 8.5–10.5)
CALCIUM SERPL-MCNC: 9.2 MG/DL (ref 8.5–10.5)
CALCIUM SERPL-MCNC: 9.2 MG/DL (ref 8.5–10.5)
CALCIUM SERPL-MCNC: 9.3 MG/DL (ref 8.5–10.5)
CALCIUM SERPL-MCNC: 9.4 MG/DL (ref 8.5–10.5)
CALCIUM SERPL-MCNC: 9.5 MG/DL (ref 8.5–10.5)
CALCIUM SERPL-MCNC: 9.5 MG/DL (ref 8.5–10.5)
CHLORIDE BLD-SCNC: 103 MEQ/L (ref 98–111)
CHLORIDE BLD-SCNC: 106 MEQ/L (ref 98–111)
CHLORIDE BLD-SCNC: 106 MEQ/L (ref 98–111)
CHLORIDE BLD-SCNC: 108 MEQ/L (ref 98–111)
CHLORIDE BLD-SCNC: 108 MEQ/L (ref 98–111)
CHLORIDE BLD-SCNC: 109 MEQ/L (ref 98–111)
CHLORIDE BLD-SCNC: 110 MEQ/L (ref 98–111)
CHLORIDE BLD-SCNC: 110 MEQ/L (ref 98–111)
CHLORIDE BLD-SCNC: 115 MEQ/L (ref 98–111)
CHLORIDE BLD-SCNC: 115 MEQ/L (ref 98–111)
CHLORIDE BLD-SCNC: 116 MEQ/L (ref 98–111)
CHLORIDE BLD-SCNC: 117 MEQ/L (ref 98–111)
CHLORIDE BLD-SCNC: 117 MEQ/L (ref 98–111)
CO2: 26 MEQ/L (ref 23–33)
CO2: 27 MEQ/L (ref 23–33)
CO2: 29 MEQ/L (ref 23–33)
COLLECTED BY:: ABNORMAL
CREAT SERPL-MCNC: 0.5 MG/DL (ref 0.4–1.2)
CREAT SERPL-MCNC: 0.6 MG/DL (ref 0.4–1.2)
CREAT SERPL-MCNC: 0.7 MG/DL (ref 0.4–1.2)
CREAT SERPL-MCNC: 0.8 MG/DL (ref 0.4–1.2)
CREAT SERPL-MCNC: 0.9 MG/DL (ref 0.4–1.2)
DEVICE: ABNORMAL
EOSINOPHIL # BLD: 0.9 %
EOSINOPHIL # BLD: 1.1 %
EOSINOPHIL # BLD: 1.7 %
EOSINOPHIL # BLD: 1.8 %
EOSINOPHIL # BLD: 2.3 %
EOSINOPHIL # BLD: 2.7 %
EOSINOPHIL # BLD: 3 %
EOSINOPHIL # BLD: 3.1 %
EOSINOPHIL # BLD: 3.2 %
EOSINOPHIL # BLD: 3.6 %
EOSINOPHIL # BLD: 4 %
EOSINOPHILS ABSOLUTE: 0.1 THOU/MM3 (ref 0–0.4)
EOSINOPHILS ABSOLUTE: 0.1 THOU/MM3 (ref 0–0.4)
EOSINOPHILS ABSOLUTE: 0.2 THOU/MM3 (ref 0–0.4)
EOSINOPHILS ABSOLUTE: 0.3 THOU/MM3 (ref 0–0.4)
EOSINOPHILS ABSOLUTE: 0.4 THOU/MM3 (ref 0–0.4)
ERYTHROCYTE [DISTWIDTH] IN BLOOD BY AUTOMATED COUNT: 16.5 % (ref 11.5–14.5)
ERYTHROCYTE [DISTWIDTH] IN BLOOD BY AUTOMATED COUNT: 16.5 % (ref 11.5–14.5)
ERYTHROCYTE [DISTWIDTH] IN BLOOD BY AUTOMATED COUNT: 16.6 % (ref 11.5–14.5)
ERYTHROCYTE [DISTWIDTH] IN BLOOD BY AUTOMATED COUNT: 16.7 % (ref 11.5–14.5)
ERYTHROCYTE [DISTWIDTH] IN BLOOD BY AUTOMATED COUNT: 16.8 % (ref 11.5–14.5)
ERYTHROCYTE [DISTWIDTH] IN BLOOD BY AUTOMATED COUNT: 17 % (ref 11.5–14.5)
ERYTHROCYTE [DISTWIDTH] IN BLOOD BY AUTOMATED COUNT: 17.1 % (ref 11.5–14.5)
ERYTHROCYTE [DISTWIDTH] IN BLOOD BY AUTOMATED COUNT: 17.6 % (ref 11.5–14.5)
ERYTHROCYTE [DISTWIDTH] IN BLOOD BY AUTOMATED COUNT: 17.8 % (ref 11.5–14.5)
ERYTHROCYTE [DISTWIDTH] IN BLOOD BY AUTOMATED COUNT: 17.9 % (ref 11.5–14.5)
ERYTHROCYTE [DISTWIDTH] IN BLOOD BY AUTOMATED COUNT: 18.1 % (ref 11.5–14.5)
ERYTHROCYTE [DISTWIDTH] IN BLOOD BY AUTOMATED COUNT: 57.8 FL (ref 35–45)
ERYTHROCYTE [DISTWIDTH] IN BLOOD BY AUTOMATED COUNT: 59 FL (ref 35–45)
ERYTHROCYTE [DISTWIDTH] IN BLOOD BY AUTOMATED COUNT: 59.2 FL (ref 35–45)
ERYTHROCYTE [DISTWIDTH] IN BLOOD BY AUTOMATED COUNT: 59.4 FL (ref 35–45)
ERYTHROCYTE [DISTWIDTH] IN BLOOD BY AUTOMATED COUNT: 59.5 FL (ref 35–45)
ERYTHROCYTE [DISTWIDTH] IN BLOOD BY AUTOMATED COUNT: 59.7 FL (ref 35–45)
ERYTHROCYTE [DISTWIDTH] IN BLOOD BY AUTOMATED COUNT: 60.4 FL (ref 35–45)
ERYTHROCYTE [DISTWIDTH] IN BLOOD BY AUTOMATED COUNT: 62.6 FL (ref 35–45)
ERYTHROCYTE [DISTWIDTH] IN BLOOD BY AUTOMATED COUNT: 63.9 FL (ref 35–45)
ERYTHROCYTE [DISTWIDTH] IN BLOOD BY AUTOMATED COUNT: 64.2 FL (ref 35–45)
ERYTHROCYTE [DISTWIDTH] IN BLOOD BY AUTOMATED COUNT: 65.3 FL (ref 35–45)
GFR SERPL CREATININE-BSD FRML MDRD: 81 ML/MIN/1.73M2
GFR SERPL CREATININE-BSD FRML MDRD: > 90 ML/MIN/1.73M2
GLUCOSE BLD-MCNC: 103 MG/DL (ref 70–108)
GLUCOSE BLD-MCNC: 134 MG/DL (ref 70–108)
GLUCOSE BLD-MCNC: 138 MG/DL (ref 70–108)
GLUCOSE BLD-MCNC: 157 MG/DL (ref 70–108)
GLUCOSE BLD-MCNC: 169 MG/DL (ref 70–108)
GLUCOSE BLD-MCNC: 179 MG/DL (ref 70–108)
GLUCOSE BLD-MCNC: 183 MG/DL (ref 70–108)
GLUCOSE BLD-MCNC: 187 MG/DL (ref 70–108)
GLUCOSE BLD-MCNC: 191 MG/DL (ref 70–108)
GLUCOSE BLD-MCNC: 194 MG/DL (ref 70–108)
GLUCOSE BLD-MCNC: 197 MG/DL (ref 70–108)
GLUCOSE BLD-MCNC: 198 MG/DL (ref 70–108)
GLUCOSE BLD-MCNC: 203 MG/DL (ref 70–108)
HCO3: 30 MMOL/L (ref 23–28)
HCT VFR BLD CALC: 24.2 % (ref 42–52)
HCT VFR BLD CALC: 25.1 % (ref 42–52)
HCT VFR BLD CALC: 25.7 % (ref 42–52)
HCT VFR BLD CALC: 26.3 % (ref 42–52)
HCT VFR BLD CALC: 26.4 % (ref 42–52)
HCT VFR BLD CALC: 26.5 % (ref 42–52)
HCT VFR BLD CALC: 26.9 % (ref 42–52)
HCT VFR BLD CALC: 27 % (ref 42–52)
HCT VFR BLD CALC: 27.3 % (ref 42–52)
HCT VFR BLD CALC: 28 % (ref 42–52)
HCT VFR BLD CALC: 28.6 % (ref 42–52)
HEMOGLOBIN: 7.1 GM/DL (ref 14–18)
HEMOGLOBIN: 7.3 GM/DL (ref 14–18)
HEMOGLOBIN: 7.4 GM/DL (ref 14–18)
HEMOGLOBIN: 7.5 GM/DL (ref 14–18)
HEMOGLOBIN: 7.6 GM/DL (ref 14–18)
HEMOGLOBIN: 7.7 GM/DL (ref 14–18)
HEMOGLOBIN: 7.9 GM/DL (ref 14–18)
HEMOGLOBIN: 8 GM/DL (ref 14–18)
HEMOGLOBIN: 8.2 GM/DL (ref 14–18)
HYPOCHROMIA: PRESENT
IFIO2: 40
IMMATURE GRANS (ABS): 0.03 THOU/MM3 (ref 0–0.07)
IMMATURE GRANS (ABS): 0.04 THOU/MM3 (ref 0–0.07)
IMMATURE GRANS (ABS): 0.05 THOU/MM3 (ref 0–0.07)
IMMATURE GRANS (ABS): 0.06 THOU/MM3 (ref 0–0.07)
IMMATURE GRANS (ABS): 0.06 THOU/MM3 (ref 0–0.07)
IMMATURE GRANS (ABS): 0.09 THOU/MM3 (ref 0–0.07)
IMMATURE GRANULOCYTES: 0.4 %
IMMATURE GRANULOCYTES: 0.5 %
IMMATURE GRANULOCYTES: 0.6 %
IMMATURE GRANULOCYTES: 0.7 %
LYMPHOCYTES # BLD: 11.9 %
LYMPHOCYTES # BLD: 11.9 %
LYMPHOCYTES # BLD: 12.2 %
LYMPHOCYTES # BLD: 12.5 %
LYMPHOCYTES # BLD: 12.9 %
LYMPHOCYTES # BLD: 6.7 %
LYMPHOCYTES # BLD: 7.2 %
LYMPHOCYTES # BLD: 8.1 %
LYMPHOCYTES # BLD: 8.8 %
LYMPHOCYTES # BLD: 8.9 %
LYMPHOCYTES # BLD: 9.6 %
LYMPHOCYTES ABSOLUTE: 0.8 THOU/MM3 (ref 1–4.8)
LYMPHOCYTES ABSOLUTE: 0.9 THOU/MM3 (ref 1–4.8)
LYMPHOCYTES ABSOLUTE: 0.9 THOU/MM3 (ref 1–4.8)
LYMPHOCYTES ABSOLUTE: 1 THOU/MM3 (ref 1–4.8)
LYMPHOCYTES ABSOLUTE: 1.1 THOU/MM3 (ref 1–4.8)
LYMPHOCYTES ABSOLUTE: 1.1 THOU/MM3 (ref 1–4.8)
LYMPHOCYTES ABSOLUTE: 1.3 THOU/MM3 (ref 1–4.8)
MCH RBC QN AUTO: 27.5 PG (ref 26–33)
MCH RBC QN AUTO: 28.1 PG (ref 26–33)
MCH RBC QN AUTO: 28.2 PG (ref 26–33)
MCH RBC QN AUTO: 28.4 PG (ref 26–33)
MCH RBC QN AUTO: 28.4 PG (ref 26–33)
MCH RBC QN AUTO: 28.5 PG (ref 26–33)
MCH RBC QN AUTO: 28.7 PG (ref 26–33)
MCH RBC QN AUTO: 28.8 PG (ref 26–33)
MCH RBC QN AUTO: 28.9 PG (ref 26–33)
MCHC RBC AUTO-ENTMCNC: 28 GM/DL (ref 32.2–35.5)
MCHC RBC AUTO-ENTMCNC: 28.1 GM/DL (ref 32.2–35.5)
MCHC RBC AUTO-ENTMCNC: 28.4 GM/DL (ref 32.2–35.5)
MCHC RBC AUTO-ENTMCNC: 28.5 GM/DL (ref 32.2–35.5)
MCHC RBC AUTO-ENTMCNC: 28.6 GM/DL (ref 32.2–35.5)
MCHC RBC AUTO-ENTMCNC: 28.7 GM/DL (ref 32.2–35.5)
MCHC RBC AUTO-ENTMCNC: 28.9 GM/DL (ref 32.2–35.5)
MCHC RBC AUTO-ENTMCNC: 29.1 GM/DL (ref 32.2–35.5)
MCHC RBC AUTO-ENTMCNC: 29.3 GM/DL (ref 32.2–35.5)
MCHC RBC AUTO-ENTMCNC: 29.3 GM/DL (ref 32.2–35.5)
MCHC RBC AUTO-ENTMCNC: 30 GM/DL (ref 32.2–35.5)
MCV RBC AUTO: 100.4 FL (ref 80–94)
MCV RBC AUTO: 100.4 FL (ref 80–94)
MCV RBC AUTO: 95.9 FL (ref 80–94)
MCV RBC AUTO: 96.8 FL (ref 80–94)
MCV RBC AUTO: 97.2 FL (ref 80–94)
MCV RBC AUTO: 97.7 FL (ref 80–94)
MCV RBC AUTO: 97.8 FL (ref 80–94)
MCV RBC AUTO: 98.6 FL (ref 80–94)
MCV RBC AUTO: 98.9 FL (ref 80–94)
MCV RBC AUTO: 98.9 FL (ref 80–94)
MCV RBC AUTO: 99.6 FL (ref 80–94)
MODE: AC
MONOCYTES # BLD: 10 %
MONOCYTES # BLD: 10.1 %
MONOCYTES # BLD: 11 %
MONOCYTES # BLD: 11.3 %
MONOCYTES # BLD: 14.1 %
MONOCYTES # BLD: 14.9 %
MONOCYTES # BLD: 6.6 %
MONOCYTES # BLD: 9.1 %
MONOCYTES # BLD: 9.6 %
MONOCYTES ABSOLUTE: 0.7 THOU/MM3 (ref 0.4–1.3)
MONOCYTES ABSOLUTE: 0.8 THOU/MM3 (ref 0.4–1.3)
MONOCYTES ABSOLUTE: 0.9 THOU/MM3 (ref 0.4–1.3)
MONOCYTES ABSOLUTE: 0.9 THOU/MM3 (ref 0.4–1.3)
MONOCYTES ABSOLUTE: 1 THOU/MM3 (ref 0.4–1.3)
MONOCYTES ABSOLUTE: 1.3 THOU/MM3 (ref 0.4–1.3)
MONOCYTES ABSOLUTE: 1.4 THOU/MM3 (ref 0.4–1.3)
MONOCYTES ABSOLUTE: 1.5 THOU/MM3 (ref 0.4–1.3)
MONOCYTES ABSOLUTE: 1.5 THOU/MM3 (ref 0.4–1.3)
NUCLEATED RED BLOOD CELLS: 0 /100 WBC
O2 SATURATION: 95 %
PCO2: 46 MMHG (ref 35–45)
PH BLOOD GAS: 7.42 (ref 7.35–7.45)
PLATELET # BLD: 479 THOU/MM3 (ref 130–400)
PLATELET # BLD: 494 THOU/MM3 (ref 130–400)
PLATELET # BLD: 509 THOU/MM3 (ref 130–400)
PLATELET # BLD: 513 THOU/MM3 (ref 130–400)
PLATELET # BLD: 514 THOU/MM3 (ref 130–400)
PLATELET # BLD: 520 THOU/MM3 (ref 130–400)
PLATELET # BLD: 544 THOU/MM3 (ref 130–400)
PLATELET # BLD: 585 THOU/MM3 (ref 130–400)
PLATELET # BLD: 585 THOU/MM3 (ref 130–400)
PLATELET # BLD: 591 THOU/MM3 (ref 130–400)
PLATELET # BLD: 657 THOU/MM3 (ref 130–400)
PMV BLD AUTO: 10.1 FL (ref 9.4–12.4)
PMV BLD AUTO: 10.2 FL (ref 9.4–12.4)
PMV BLD AUTO: 10.2 FL (ref 9.4–12.4)
PMV BLD AUTO: 10.4 FL (ref 9.4–12.4)
PMV BLD AUTO: 10.6 FL (ref 9.4–12.4)
PMV BLD AUTO: 11 FL (ref 9.4–12.4)
PMV BLD AUTO: 11.3 FL (ref 9.4–12.4)
PMV BLD AUTO: 11.3 FL (ref 9.4–12.4)
PO2: 77 MMHG (ref 71–104)
POTASSIUM SERPL-SCNC: 4.1 MEQ/L (ref 3.5–5.2)
POTASSIUM SERPL-SCNC: 4.1 MEQ/L (ref 3.5–5.2)
POTASSIUM SERPL-SCNC: 4.2 MEQ/L (ref 3.5–5.2)
POTASSIUM SERPL-SCNC: 4.4 MEQ/L (ref 3.5–5.2)
POTASSIUM SERPL-SCNC: 4.5 MEQ/L (ref 3.5–5.2)
POTASSIUM SERPL-SCNC: 4.7 MEQ/L (ref 3.5–5.2)
POTASSIUM SERPL-SCNC: 4.8 MEQ/L (ref 3.5–5.2)
POTASSIUM SERPL-SCNC: 4.9 MEQ/L (ref 3.5–5.2)
POTASSIUM SERPL-SCNC: 4.9 MEQ/L (ref 3.5–5.2)
POTASSIUM SERPL-SCNC: 5 MEQ/L (ref 3.5–5.2)
PREALBUMIN: 4.7 MG/DL (ref 20–40)
PREALBUMIN: 5.5 MG/DL (ref 20–40)
RBC # BLD: 2.5 MILL/MM3 (ref 4.7–6.1)
RBC # BLD: 2.57 MILL/MM3 (ref 4.7–6.1)
RBC # BLD: 2.64 MILL/MM3 (ref 4.7–6.1)
RBC # BLD: 2.67 MILL/MM3 (ref 4.7–6.1)
RBC # BLD: 2.68 MILL/MM3 (ref 4.7–6.1)
RBC # BLD: 2.69 MILL/MM3 (ref 4.7–6.1)
RBC # BLD: 2.7 MILL/MM3 (ref 4.7–6.1)
RBC # BLD: 2.73 MILL/MM3 (ref 4.7–6.1)
RBC # BLD: 2.81 MILL/MM3 (ref 4.7–6.1)
RBC # BLD: 2.84 MILL/MM3 (ref 4.7–6.1)
RBC # BLD: 2.85 MILL/MM3 (ref 4.7–6.1)
SCAN OF BLOOD SMEAR: NORMAL
SEG NEUTROPHILS: 70.3 %
SEG NEUTROPHILS: 72.7 %
SEG NEUTROPHILS: 72.9 %
SEG NEUTROPHILS: 73.1 %
SEG NEUTROPHILS: 73.8 %
SEG NEUTROPHILS: 74.9 %
SEG NEUTROPHILS: 75.2 %
SEG NEUTROPHILS: 79.4 %
SEG NEUTROPHILS: 80.2 %
SEG NEUTROPHILS: 80.6 %
SEG NEUTROPHILS: 80.9 %
SEGMENTED NEUTROPHILS ABSOLUTE COUNT: 10.4 THOU/MM3 (ref 1.8–7.7)
SEGMENTED NEUTROPHILS ABSOLUTE COUNT: 11 THOU/MM3 (ref 1.8–7.7)
SEGMENTED NEUTROPHILS ABSOLUTE COUNT: 5 THOU/MM3 (ref 1.8–7.7)
SEGMENTED NEUTROPHILS ABSOLUTE COUNT: 6.1 THOU/MM3 (ref 1.8–7.7)
SEGMENTED NEUTROPHILS ABSOLUTE COUNT: 6.1 THOU/MM3 (ref 1.8–7.7)
SEGMENTED NEUTROPHILS ABSOLUTE COUNT: 6.3 THOU/MM3 (ref 1.8–7.7)
SEGMENTED NEUTROPHILS ABSOLUTE COUNT: 6.6 THOU/MM3 (ref 1.8–7.7)
SEGMENTED NEUTROPHILS ABSOLUTE COUNT: 6.6 THOU/MM3 (ref 1.8–7.7)
SEGMENTED NEUTROPHILS ABSOLUTE COUNT: 7 THOU/MM3 (ref 1.8–7.7)
SEGMENTED NEUTROPHILS ABSOLUTE COUNT: 9.1 THOU/MM3 (ref 1.8–7.7)
SEGMENTED NEUTROPHILS ABSOLUTE COUNT: 9.7 THOU/MM3 (ref 1.8–7.7)
SET PEEP: 5 MMHG
SET RESPIRATORY RATE: 12 BPM
SET TIDAL VOLUME: 400 ML
SODIUM BLD-SCNC: 138 MEQ/L (ref 135–145)
SODIUM BLD-SCNC: 139 MEQ/L (ref 135–145)
SODIUM BLD-SCNC: 141 MEQ/L (ref 135–145)
SODIUM BLD-SCNC: 143 MEQ/L (ref 135–145)
SODIUM BLD-SCNC: 143 MEQ/L (ref 135–145)
SODIUM BLD-SCNC: 144 MEQ/L (ref 135–145)
SODIUM BLD-SCNC: 144 MEQ/L (ref 135–145)
SODIUM BLD-SCNC: 145 MEQ/L (ref 135–145)
SODIUM BLD-SCNC: 149 MEQ/L (ref 135–145)
SODIUM BLD-SCNC: 151 MEQ/L (ref 135–145)
SODIUM BLD-SCNC: 151 MEQ/L (ref 135–145)
SODIUM BLD-SCNC: 152 MEQ/L (ref 135–145)
SODIUM BLD-SCNC: 152 MEQ/L (ref 135–145)
SOURCE, BLOOD GAS: ABNORMAL
TOTAL PROTEIN: 8.3 G/DL (ref 6.1–8)
TOTAL PROTEIN: 8.7 G/DL (ref 6.1–8)
TSH RECEPTOR AB: 4.7 IU/L
WBC # BLD: 11.3 THOU/MM3 (ref 4.8–10.8)
WBC # BLD: 12 THOU/MM3 (ref 4.8–10.8)
WBC # BLD: 13.1 THOU/MM3 (ref 4.8–10.8)
WBC # BLD: 13.6 THOU/MM3 (ref 4.8–10.8)
WBC # BLD: 6.8 THOU/MM3 (ref 4.8–10.8)
WBC # BLD: 8.1 THOU/MM3 (ref 4.8–10.8)
WBC # BLD: 8.4 THOU/MM3 (ref 4.8–10.8)
WBC # BLD: 8.6 THOU/MM3 (ref 4.8–10.8)
WBC # BLD: 8.8 THOU/MM3 (ref 4.8–10.8)
WBC # BLD: 9 THOU/MM3 (ref 4.8–10.8)
WBC # BLD: 9.9 THOU/MM3 (ref 4.8–10.8)

## 2021-01-01 PROCEDURE — 71045 X-RAY EXAM CHEST 1 VIEW: CPT

## 2023-03-30 NOTE — PROGRESS NOTES
Questioned orders for tube feeding and TPN orders. Patient does not have j tube feeding active at this time and does have tube feeding going through [facility]. Dr. Dariel Hager notified. States he will look into it. Yes
